# Patient Record
Sex: FEMALE | Race: WHITE | NOT HISPANIC OR LATINO | Employment: FULL TIME | URBAN - METROPOLITAN AREA
[De-identification: names, ages, dates, MRNs, and addresses within clinical notes are randomized per-mention and may not be internally consistent; named-entity substitution may affect disease eponyms.]

---

## 2017-03-15 ENCOUNTER — OFFICE VISIT (OUTPATIENT)
Dept: FAMILY MEDICINE | Facility: CLINIC | Age: 62
End: 2017-03-15
Payer: COMMERCIAL

## 2017-03-15 VITALS
SYSTOLIC BLOOD PRESSURE: 126 MMHG | OXYGEN SATURATION: 96 % | WEIGHT: 216 LBS | HEART RATE: 90 BPM | HEIGHT: 66 IN | TEMPERATURE: 97.6 F | BODY MASS INDEX: 34.72 KG/M2 | DIASTOLIC BLOOD PRESSURE: 72 MMHG | RESPIRATION RATE: 16 BRPM

## 2017-03-15 DIAGNOSIS — F33.0 MAJOR DEPRESSIVE DISORDER, RECURRENT EPISODE, MILD (H): ICD-10-CM

## 2017-03-15 DIAGNOSIS — F41.1 GAD (GENERALIZED ANXIETY DISORDER): ICD-10-CM

## 2017-03-15 PROCEDURE — 99214 OFFICE O/P EST MOD 30 MIN: CPT | Performed by: FAMILY MEDICINE

## 2017-03-15 RX ORDER — VENLAFAXINE HYDROCHLORIDE 75 MG/1
75 TABLET, EXTENDED RELEASE ORAL DAILY
Qty: 90 TABLET | Refills: 1 | Status: SHIPPED | OUTPATIENT
Start: 2017-03-15 | End: 2017-10-13

## 2017-03-15 ASSESSMENT — ANXIETY QUESTIONNAIRES
6. BECOMING EASILY ANNOYED OR IRRITABLE: SEVERAL DAYS
GAD7 TOTAL SCORE: 3
7. FEELING AFRAID AS IF SOMETHING AWFUL MIGHT HAPPEN: NOT AT ALL
IF YOU CHECKED OFF ANY PROBLEMS ON THIS QUESTIONNAIRE, HOW DIFFICULT HAVE THESE PROBLEMS MADE IT FOR YOU TO DO YOUR WORK, TAKE CARE OF THINGS AT HOME, OR GET ALONG WITH OTHER PEOPLE: SOMEWHAT DIFFICULT
2. NOT BEING ABLE TO STOP OR CONTROL WORRYING: NOT AT ALL
4. TROUBLE RELAXING: NOT AT ALL
1. FEELING NERVOUS, ANXIOUS, OR ON EDGE: SEVERAL DAYS
5. BEING SO RESTLESS THAT IT IS HARD TO SIT STILL: NOT AT ALL
3. WORRYING TOO MUCH ABOUT DIFFERENT THINGS: SEVERAL DAYS

## 2017-03-15 NOTE — PROGRESS NOTES
"  SUBJECTIVE:                                                    Debby Barriga is a 61 year old female who presents to clinic today for the following health issues:    Depression and Anxiety Follow-Up    Status since last visit: No change    Other associated symptoms fatigue     Complicating factors: none    Significant life event: No     Current substance abuse: None    PHQ-9 SCORE 3/18/2015 8/26/2015 9/20/2016   Total Score 2 - -   Total Score - 9 8     СЕРГЕЙ-7 SCORE 7/23/2013 9/20/2016   Total Score 3 -   Total Score - 4        PHQ-9  English      PHQ-9   Any Language     GAD7       Pt has good support system, she also has a dog which makes her happy.   Pt is sleeping better. Pt is taking dog out for a walk. She will start long walks.   Pt is eating better. Mood is stable, no active SI.       Problem list and histories reviewed & adjusted, as indicated.  Additional history: as documented        Reviewed and updated as needed this visit by clinical staff       Reviewed and updated as needed this visit by Provider         ROS:  Constitutional, HEENT, cardiovascular, pulmonary, gi and gu systems are negative, except as otherwise noted.    OBJECTIVE:                                                    /72 (BP Location: Left arm, Patient Position: Chair, Cuff Size: Adult Large)  Pulse 90  Temp 97.6  F (36.4  C) (Tympanic)  Resp 16  Ht 5' 6\" (1.676 m)  Wt 216 lb (98 kg)  SpO2 96%  BMI 34.86 kg/m2  Body mass index is 34.86 kg/(m^2).  GENERAL: healthy, alert and no distress  EYES: Eyes grossly normal to inspection  HENT: nose and mouth without ulcers or lesions  PSYCH: mentation appears normal, affect normal/bright    Diagnostic Test Results:  none      ASSESSMENT/PLAN:                                                      ## Major depressive disorder, recurrent episode, mild/СЕРГЕЙ:  PHQ-9 SCORE 8/26/2015 9/20/2016 3/15/2017   Total Score - - -   Total Score 9 8 2     СЕРГЕЙ-7 SCORE 7/23/2013 9/20/2016 3/15/2017   Total Score " 3 - -   Total Score - 4 3     - stable, recommended to continue increased activity   - venlafaxine (EFFEXOR-ER) 75 MG TB24 24 hr tablet; Take 1 tablet (75 mg) by mouth daily  Dispense: 90 tablet; Refill: 1  - pt due for colonoscopy, mammogram and pap, recommended to f/u within next month or two for physical     Deqa Velvet Rebollar MD  [facility]

## 2017-03-15 NOTE — NURSING NOTE
"Chief Complaint   Patient presents with     Refill Request     venlafaxine (EFFEXOR-ER)       Initial /72 (BP Location: Left arm, Patient Position: Chair, Cuff Size: Adult Large)  Pulse 90  Temp 97.6  F (36.4  C) (Tympanic)  Resp 16  Ht 5' 6\" (1.676 m)  Wt 216 lb (98 kg)  SpO2 96%  BMI 34.86 kg/m2 Estimated body mass index is 34.86 kg/(m^2) as calculated from the following:    Height as of this encounter: 5' 6\" (1.676 m).    Weight as of this encounter: 216 lb (98 kg).  Medication Reconciliation: complete       Curly Gee MA      "

## 2017-03-15 NOTE — MR AVS SNAPSHOT
"              After Visit Summary   3/15/2017    Debby Barrgia    MRN: [id number]           Patient Information     Date Of Birth          1955        Visit Information        Provider Department      3/15/2017 8:00 AM Galo Rebollar MD [facility]        Today's Diagnoses     Major depressive disorder, recurrent episode, mild (H)        СЕРГЕЙ (generalized anxiety disorder)           Follow-ups after your visit        Who to contact     If you have questions or need follow up information about today's clinic visit or your schedule please contact [facility] directly at [phone].  Normal or non-critical lab and imaging results will be communicated to you by MyChart, letter or phone within 4 business days after the clinic has received the results. If you do not hear from us within 7 days, please contact the clinic through AdExtenthart or phone. If you have a critical or abnormal lab result, we will notify you by phone as soon as possible.  Submit refill requests through Taqua or call your pharmacy and they will forward the refill request to us. Please allow 3 business days for your refill to be completed.          Additional Information About Your Visit        MyChart Information     Taqua lets you send messages to your doctor, view your test results, renew your prescriptions, schedule appointments and more. To sign up, go to [email or web address] . Click on \"Log in\" on the left side of the screen, which will take you to the Welcome page. Then click on \"Sign up Now\" on the right side of the page.     You will be asked to enter the access code listed below, as well as some personal information. Please follow the directions to create your username and password.     Your access code is: [email or web address]  Expires: 2017  8:48 AM     Your access code will  in 90 days. If you need help or a new code, please call your [facility] or [phone].        Care EveryWhere ID     " "This is your Care EveryWhere ID. This could be used by other organizations to access your [place] medical records  [id number]        Your Vitals Were     Pulse Temperature Respirations Height Pulse Oximetry BMI (Body Mass Index)    90 97.6  F (36.4  C) (Tympanic) 16 5' 6\" (1.676 m) 96% 34.86 kg/m2       Blood Pressure from Last 3 Encounters:   03/15/17 126/72   10/16/16 129/62   09/20/16 110/70    Weight from Last 3 Encounters:   03/15/17 216 lb (98 kg)   09/20/16 212 lb (96.2 kg)   12/01/15 214 lb (97.1 kg)              Today, you had the following     No orders found for display         Today's Medication Changes          These changes are accurate as of: 3/15/17  8:48 AM.  If you have any questions, ask your nurse or doctor.               Stop taking these medicines if you haven't already. Please contact your care team if you have questions.     HYDROcodone-acetaminophen 5-325 MG per tablet   Commonly known as:  NORCO   Stopped by:  Galo Rebollar MD                Where to get your medicines      These medications were sent to [facility] [address] [place] [address] AT [address] [address]    Hours:  24-hours Phone:  [phone]     venlafaxine 75 MG Tb24 24 hr tablet                Primary Care Provider Office Phone # Fax #    Moriaha Velvet Rebollar [phone]       [place] [address] [facility] [address]        Thank you!     Thank you for choosing [facility]  for your care. Our goal is always to provide you with excellent care. Hearing back from our patients is one way we can continue to improve our services. Please take a few minutes to complete the written survey that you may receive in the mail after your visit with us. Thank you!             Your Updated Medication List - Protect others around you: Learn how to safely use, store and throw away your medicines at " www.disposemymeds.org.          This list is accurate as of: 3/15/17  8:48 AM.  Always use your most recent med list.                   Brand Name Dispense Instructions for use    venlafaxine 75 MG Tb24 24 hr tablet    EFFEXOR-ER    90 tablet    Take 1 tablet (75 mg) by mouth daily

## 2017-03-16 ASSESSMENT — ANXIETY QUESTIONNAIRES: GAD7 TOTAL SCORE: 3

## 2017-03-16 ASSESSMENT — PATIENT HEALTH QUESTIONNAIRE - PHQ9: SUM OF ALL RESPONSES TO PHQ QUESTIONS 1-9: 2

## 2017-05-31 ENCOUNTER — OFFICE VISIT (OUTPATIENT)
Dept: URGENT CARE | Facility: URGENT CARE | Age: 62
End: 2017-05-31
Payer: COMMERCIAL

## 2017-05-31 VITALS
BODY MASS INDEX: 33.98 KG/M2 | DIASTOLIC BLOOD PRESSURE: 89 MMHG | OXYGEN SATURATION: 98 % | SYSTOLIC BLOOD PRESSURE: 128 MMHG | HEART RATE: 87 BPM | TEMPERATURE: 98.2 F | WEIGHT: 210.5 LBS

## 2017-05-31 DIAGNOSIS — R07.0 THROAT PAIN: Primary | ICD-10-CM

## 2017-05-31 LAB
DEPRECATED S PYO AG THROAT QL EIA: NORMAL
MICRO REPORT STATUS: NORMAL
SPECIMEN SOURCE: NORMAL

## 2017-05-31 PROCEDURE — 87880 STREP A ASSAY W/OPTIC: CPT | Performed by: FAMILY MEDICINE

## 2017-05-31 PROCEDURE — 99213 OFFICE O/P EST LOW 20 MIN: CPT | Performed by: PHYSICIAN ASSISTANT

## 2017-05-31 PROCEDURE — 87081 CULTURE SCREEN ONLY: CPT | Performed by: FAMILY MEDICINE

## 2017-05-31 NOTE — NURSING NOTE
"Chief Complaint   Patient presents with     Pharyngitis     sore throat 2x days        Initial /89 (BP Location: Left arm, Patient Position: Chair, Cuff Size: Adult Regular)  Pulse 87  Temp 98.2  F (36.8  C) (Oral)  Wt 210 lb 8 oz (95.5 kg)  SpO2 98%  BMI 33.98 kg/m2 Estimated body mass index is 33.98 kg/(m^2) as calculated from the following:    Height as of 3/15/17: 5' 6\" (1.676 m).    Weight as of this encounter: 210 lb 8 oz (95.5 kg).  Medication Reconciliation: complete    "

## 2017-05-31 NOTE — PROGRESS NOTES
SUBJECTIVE:   Debby Barriga is a 61 year old female presenting with a chief complaint of   1) sore throat for the past 2 days  2) ear discomfort.    Denies any runny nose or sneezing.    3) Slight cough.  No noted fevers.   Onset of symptoms was as above.  Course of illness is worsening.    Severity moderate  Current and Associated symptoms: as above  Treatment measures tried include OTC meds: cough drops, salt water gargles  Predisposing factors include met a client in a hospital cafeteria a few days ago.    Past Medical History:   Diagnosis Date     Corneal ulcer of right eye 3-2015     Mild major depression (H) 2/7/2013     Tear of retina     right eye     Patient Active Problem List   Diagnosis     Mild major depression (H)     Generalized anxiety disorder     Medication refill     Hypercholesteremia     Myopic astigmatism of both eyes     Presbyopia     Social History   Substance Use Topics     Smoking status: Former Smoker     Types: Cigarettes     Quit date: 7/23/1982     Smokeless tobacco: Never Used     Alcohol use No       ROS:  CONSTITUTIONAL:NEGATIVE for fever, chills, change in weight  INTEGUMENTARY/SKIN: NEGATIVE for worrisome rashes, moles or lesions  EYES: NEGATIVE for vision changes or irritation  ENT/MOUTH: as per HPI  RESP:NEGATIVE for significant cough or SOB  CV: NEGATIVE for chest pain, palpitations or peripheral edema  GI: NEGATIVE for nausea, abdominal pain, heartburn, or change in bowel habits  MUSCULOSKELETAL: NEGATIVE for significant arthralgias or myalgia    OBJECTIVE  :/89 (BP Location: Left arm, Patient Position: Chair, Cuff Size: Adult Regular)  Pulse 87  Temp 98.2  F (36.8  C) (Oral)  Wt 210 lb 8 oz (95.5 kg)  SpO2 98%  BMI 33.98 kg/m2  GENERAL APPEARANCE: healthy, alert and no distress  EYES: EOMI,  PERRL, conjunctiva clear  HENT: ear canals and TM's normal.  Nose and mouth without ulcers, erythema or lesions  HENT: nasal turbinates boggy with bluish hue and rhinorrhea  clear  NECK: supple, nontender, no lymphadenopathy  RESP: lungs clear to auscultation - no rales, rhonchi or wheezes  CV: regular rates and rhythm, normal S1 S2, no murmur noted  ABDOMEN:  soft, nontender, no HSM or masses and bowel sounds normal  NEURO: Normal strength and tone, sensory exam grossly normal,  normal speech and mentation  SKIN: no suspicious lesions or rashes    (R07.0) Throat pain  (primary encounter diagnosis)  Comment: likely secondary to post drip  Plan: Rapid strep screen, Beta strep group A culture        Salt water gargles.  May try benadryl po QHS prn.  Tylenol or ibuprofen.      F/U with PCP should symptoms persist or worsen.

## 2017-05-31 NOTE — MR AVS SNAPSHOT
"              After Visit Summary   2017    Debby Barriga    MRN: [id number]           Patient Information     Date Of Birth          1955        Visit Information        Provider Department      2017 9:35 AM Antionette George PA-C [facility]        Today's Diagnoses     Throat pain    -  1       Follow-ups after your visit        Who to contact     If you have questions or need follow up information about today's clinic visit or your schedule please contact [facility] directly at [phone].  Normal or non-critical lab and imaging results will be communicated to you by Abcamhart, letter or phone within 4 business days after the clinic has received the results. If you do not hear from us within 7 days, please contact the clinic through Abcamhart or phone. If you have a critical or abnormal lab result, we will notify you by phone as soon as possible.  Submit refill requests through [employer] or call your pharmacy and they will forward the refill request to us. Please allow 3 business days for your refill to be completed.          Additional Information About Your Visit        MyChart Information     [employer] lets you send messages to your doctor, view your test results, renew your prescriptions, schedule appointments and more. To sign up, go to [email or web address] . Click on \"Log in\" on the left side of the screen, which will take you to the Welcome page. Then click on \"Sign up Now\" on the right side of the page.     You will be asked to enter the access code listed below, as well as some personal information. Please follow the directions to create your username and password.     Your access code is: [email or web address]  Expires: 2017  8:48 AM     Your access code will  in 90 days. If you need help or a new code, please call your [place] clinic or [phone].        Care EveryWhere ID     This is your Care EveryWhere ID. This could " be used by other organizations to access your [place] medical records  [id number]        Your Vitals Were     Pulse Temperature Pulse Oximetry BMI (Body Mass Index)          87 98.2  F (36.8  C) (Oral) 98% 33.98 kg/m2         Blood Pressure from Last 3 Encounters:   05/31/17 128/89   03/15/17 126/72   10/16/16 129/62    Weight from Last 3 Encounters:   05/31/17 210 lb 8 oz (95.5 kg)   03/15/17 216 lb (98 kg)   09/20/16 212 lb (96.2 kg)              We Performed the Following     Beta strep group A culture     Rapid strep screen        Primary Care Provider Office Phone # Fax #    Deqa Velvet Rebollar [phone]       [place] [address] [facility] [address]        Thank you!     Thank you for choosing [facility]  for your care. Our goal is always to provide you with excellent care. Hearing back from our patients is one way we can continue to improve our services. Please take a few minutes to complete the written survey that you may receive in the mail after your visit with us. Thank you!             Your Updated Medication List - Protect others around you: Learn how to safely use, store and throw away your medicines at www.disposemymeds.org.          This list is accurate as of: 5/31/17 10:37 AM.  Always use your most recent med list.                   Brand Name Dispense Instructions for use    venlafaxine 75 MG Tb24 24 hr tablet    EFFEXOR-ER    90 tablet    Take 1 tablet (75 mg) by mouth daily

## 2017-06-01 LAB
BACTERIA SPEC CULT: NORMAL
MICRO REPORT STATUS: NORMAL
SPECIMEN SOURCE: NORMAL

## 2017-06-03 ENCOUNTER — HEALTH MAINTENANCE LETTER (OUTPATIENT)
Age: 62
End: 2017-06-03

## 2017-06-05 ENCOUNTER — OFFICE VISIT (OUTPATIENT)
Dept: FAMILY MEDICINE | Facility: CLINIC | Age: 62
End: 2017-06-05
Payer: COMMERCIAL

## 2017-06-05 ENCOUNTER — TELEPHONE (OUTPATIENT)
Dept: FAMILY MEDICINE | Facility: CLINIC | Age: 62
End: 2017-06-05

## 2017-06-05 VITALS
BODY MASS INDEX: 34.06 KG/M2 | DIASTOLIC BLOOD PRESSURE: 79 MMHG | RESPIRATION RATE: 16 BRPM | TEMPERATURE: 98.8 F | WEIGHT: 211 LBS | HEART RATE: 80 BPM | SYSTOLIC BLOOD PRESSURE: 139 MMHG | OXYGEN SATURATION: 97 %
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
## 2017-06-05 DIAGNOSIS — J20.9 ACUTE BRONCHITIS, UNSPECIFIED ORGANISM: Primary | ICD-10-CM

## 2017-06-05 PROCEDURE — 99214 OFFICE O/P EST MOD 30 MIN: CPT | Performed by: FAMILY MEDICINE

## 2017-06-05 RX ORDER — ALBUTEROL SULFATE 90 UG/1
2 AEROSOL, METERED RESPIRATORY (INHALATION) EVERY 4 HOURS PRN
Qty: 1 INHALER | Refills: 0 | Status: SHIPPED | OUTPATIENT
Start: 2017-06-05 | End: 2018-10-10

## 2017-06-05 NOTE — MR AVS SNAPSHOT
"              After Visit Summary   2017    Debby Barriga    MRN: [id number]           Patient Information     Date Of Birth          1955        Visit Information        Provider Department      2017 2:40 PM Galo Rebollar MD [facility]        Today's Diagnoses     Acute bronchitis, unspecified organism    -  1    Insect bite, initial encounter           Follow-ups after your visit        Who to contact     If you have questions or need follow up information about today's clinic visit or your schedule please contact [facility] directly at [phone].  Normal or non-critical lab and imaging results will be communicated to you by MyChart, letter or phone within 4 business days after the clinic has received the results. If you do not hear from us within 7 days, please contact the clinic through "[employer]hart or phone. If you have a critical or abnormal lab result, we will notify you by phone as soon as possible.  Submit refill requests through Zamzee or call your pharmacy and they will forward the refill request to us. Please allow 3 business days for your refill to be completed.          Additional Information About Your Visit        MyChart Information     Zamzee lets you send messages to your doctor, view your test results, renew your prescriptions, schedule appointments and more. To sign up, go to [email or web address] . Click on \"Log in\" on the left side of the screen, which will take you to the Welcome page. Then click on \"Sign up Now\" on the right side of the page.     You will be asked to enter the access code listed below, as well as some personal information. Please follow the directions to create your username and password.     Your access code is: [email or web address]  Expires: 2017  8:48 AM     Your access code will  in 90 days. If you need help or a new code, please call your [facility] or [phone].        Care EveryWhere ID     This is your Care " EveryWhere ID. This could be used by other organizations to access your [place] medical records  [id number]        Your Vitals Were     Pulse Temperature Respirations Pulse Oximetry BMI (Body Mass Index)       80 98.8  F (37.1  C) (Tympanic) 16 97% 34.06 kg/m2        Blood Pressure from Last 3 Encounters:   06/05/17 139/79   05/31/17 128/89   03/15/17 126/72    Weight from Last 3 Encounters:   06/05/17 211 lb (95.7 kg)   05/31/17 210 lb 8 oz (95.5 kg)   03/15/17 216 lb (98 kg)              Today, you had the following     No orders found for display         Today's Medication Changes          These changes are accurate as of: 6/5/17  3:20 PM.  If you have any questions, ask your nurse or doctor.               Start taking these medicines.        Dose/Directions    albuterol 108 (90 BASE) MCG/ACT Inhaler   Commonly known as:  PROAIR HFA/PROVENTIL HFA/VENTOLIN HFA   Used for:  Acute bronchitis, unspecified organism   Started by:  Galo Rebollar MD        Dose:  2 puff   Inhale 2 puffs into the lungs every 4 hours as needed For wheezing or shortness of breath   Quantity:  1 Inhaler   Refills:  0            Where to get your medicines      These medications were sent to [facility] [address] [place] [address] AT [address] [address]    Hours:  24-hours Phone:  [phone]     albuterol 108 (90 BASE) MCG/ACT Inhaler                Primary Care Provider Office Phone # Fax #    Galo Rebollar [phone]       [place] [id number] [facility] [address]        Thank you!     Thank you for choosing [facility]  for your care. Our goal is always to provide you with excellent care. Hearing back from our patients is one way we can continue to improve our services. Please take a few minutes to complete the written survey that you may receive in the mail after your visit with us.  Thank you!             Your Updated Medication List - Protect others around you: Learn how to safely use, store and throw away your medicines at www.disposemymeds.org.          This list is accurate as of: 6/5/17  3:20 PM.  Always use your most recent med list.                   Brand Name Dispense Instructions for use    albuterol 108 (90 BASE) MCG/ACT Inhaler    PROAIR HFA/PROVENTIL HFA/VENTOLIN HFA    1 Inhaler    Inhale 2 puffs into the lungs every 4 hours as needed For wheezing or shortness of breath       venlafaxine 75 MG Tb24 24 hr tablet    EFFEXOR-ER    90 tablet    Take 1 tablet (75 mg) by mouth daily

## 2017-06-05 NOTE — PROGRESS NOTES
SUBJECTIVE:                                                    Debby Barriga is a 61 year old female who presents to clinic today for the following health issues:      RESPIRATORY SYMPTOMS      Duration: 6 days     Description  Productive cough, nasal discharge, nasal congestion, headaches, facial pressure, mild shortness of breath with rest and activity, chest tightness, ear plugging      History (predisposing factors):  none    Precipitating or alleviating factors: None    Therapies tried and outcome:  netti pot, OTC medication, soup, resting - not helping      Pt would also like a bug bite to be looked at on the left forearm which occurred one week ago. Pruritis improved. No fever or chills.       Problem list and histories reviewed & adjusted, as indicated.  Additional history: as documented    Labs reviewed in EPIC    Reviewed and updated as needed this visit by clinical staff  Tobacco  Allergies  Med Hx  Surg Hx  Fam Hx  Soc Hx      Reviewed and updated as needed this visit by Provider         ROS:  Constitutional, HEENT, cardiovascular, pulmonary, gi and gu systems are negative, except as otherwise noted.    OBJECTIVE:                                                    /79 (BP Location: Left arm, Patient Position: Chair, Cuff Size: Adult Large)  Pulse 80  Temp 98.8  F (37.1  C) (Tympanic)  Resp 16  Wt 211 lb (95.7 kg)  SpO2 97%  BMI 34.06 kg/m2  Body mass index is 34.06 kg/(m^2).  GENERAL: healthy, alert and no distress  EYES: Eyes grossly normal to inspection  HENT: ear canals and TM's normal, nose and mouth without ulcers or lesions, + bilateral maxillary tenderness   NECK: no adenopathy, no asymmetry, masses, or scars and thyroid normal to palpation  RESP: lungs clear to auscultation - no rales, rhonchi or wheezes  CV: regular rate and rhythm, normal S1 S2  SKIN: left forearm with small small erythematous papules, no warmth, no tenderness     Diagnostic Test Results:  none       ASSESSMENT/PLAN:                                                      1. Acute bronchitis, unspecified organism  - recommended to continue symptomatic tx   - albuterol (PROAIR HFA/PROVENTIL HFA/VENTOLIN HFA) 108 (90 BASE) MCG/ACT Inhaler; Inhale 2 puffs into the lungs every 4 hours as needed For wheezing or shortness of breath  Dispense: 1 Inhaler; Refill: 0  - if not improving after 7 days, then will do course of abx     2. Insect bite, initial encounter  - no a/s cellulitis  - recommended to use hydrocortisone PRN for pruritis   - Follow if symptoms worsen or fail to improve.    Galo Rebollar MD  [facility]

## 2017-06-05 NOTE — NURSING NOTE
"Chief Complaint   Patient presents with     URI     cough       Initial /79 (BP Location: Left arm, Patient Position: Chair, Cuff Size: Adult Large)  Pulse 80  Temp 98.8  F (37.1  C) (Tympanic)  Resp 16  Wt 211 lb (95.7 kg)  SpO2 97%  BMI 34.06 kg/m2 Estimated body mass index is 34.06 kg/(m^2) as calculated from the following:    Height as of 3/15/17: 5' 6\" (1.676 m).    Weight as of this encounter: 211 lb (95.7 kg).  Medication Reconciliation: complete     Curly Gee MA      "

## 2017-06-05 NOTE — TELEPHONE ENCOUNTER
Pt was seen in Urgent Care, strep test is negative.  She is still having severe coughing, etc.  Please call with recommendations.  OK to leave message on voicemail.

## 2017-06-05 NOTE — TELEPHONE ENCOUNTER
5/31/17  note-  R07.0) Throat pain  (primary encounter diagnosis)  Comment: likely secondary to post drip  Plan: Rapid strep screen, Beta strep group A culture        Salt water gargles.  May try benadryl po QHS prn.  Tylenol or ibuprofen.       F/U with PCP should symptoms persist or worsen.      LMTCB. Would rec f/u in clinic. Left this message for ptRaissa Woods RN

## 2017-06-20 ENCOUNTER — OFFICE VISIT (OUTPATIENT)
Dept: FAMILY MEDICINE | Facility: CLINIC | Age: 62
End: 2017-06-20
Payer: COMMERCIAL

## 2017-06-20 VITALS
HEIGHT: 66 IN | OXYGEN SATURATION: 98 % | WEIGHT: 208 LBS | SYSTOLIC BLOOD PRESSURE: 132 MMHG | TEMPERATURE: 98.2 F | DIASTOLIC BLOOD PRESSURE: 74 MMHG | HEART RATE: 84 BPM | BODY MASS INDEX: 33.43 KG/M2 | RESPIRATION RATE: 16 BRPM

## 2017-06-20 DIAGNOSIS — Z13.1 SCREENING FOR DIABETES MELLITUS: ICD-10-CM

## 2017-06-20 DIAGNOSIS — Z11.59 NEED FOR HEPATITIS C SCREENING TEST: ICD-10-CM

## 2017-06-20 DIAGNOSIS — Z13.220 SCREENING FOR LIPID DISORDERS: ICD-10-CM

## 2017-06-20 DIAGNOSIS — Z12.11 COLON CANCER SCREENING: ICD-10-CM

## 2017-06-20 DIAGNOSIS — Z01.419 ENCOUNTER FOR GYNECOLOGICAL EXAMINATION WITHOUT ABNORMAL FINDING: ICD-10-CM

## 2017-06-20 DIAGNOSIS — E66.01 MORBID OBESITY, UNSPECIFIED OBESITY TYPE (H): ICD-10-CM

## 2017-06-20 DIAGNOSIS — Z12.31 VISIT FOR SCREENING MAMMOGRAM: ICD-10-CM

## 2017-06-20 DIAGNOSIS — Z00.00 ROUTINE GENERAL MEDICAL EXAMINATION AT A HEALTH CARE FACILITY: ICD-10-CM

## 2017-06-20 LAB
CHOLEST SERPL-MCNC: 269 MG/DL
GLUCOSE SERPL-MCNC: 97 MG/DL (ref 70–99)
HCV AB SERPL QL IA: NORMAL
HDLC SERPL-MCNC: 55 MG/DL
LDLC SERPL CALC-MCNC: 197 MG/DL
NONHDLC SERPL-MCNC: 214 MG/DL
TRIGL SERPL-MCNC: 84 MG/DL

## 2017-06-20 PROCEDURE — 86803 HEPATITIS C AB TEST: CPT | Performed by: FAMILY MEDICINE

## 2017-06-20 PROCEDURE — 87624 HPV HI-RISK TYP POOLED RSLT: CPT | Performed by: FAMILY MEDICINE

## 2017-06-20 PROCEDURE — 99396 PREV VISIT EST AGE 40-64: CPT | Performed by: FAMILY MEDICINE

## 2017-06-20 PROCEDURE — 36415 COLL VENOUS BLD VENIPUNCTURE: CPT | Performed by: FAMILY MEDICINE

## 2017-06-20 PROCEDURE — 80061 LIPID PANEL: CPT | Performed by: FAMILY MEDICINE

## 2017-06-20 PROCEDURE — 82947 ASSAY GLUCOSE BLOOD QUANT: CPT | Performed by: FAMILY MEDICINE

## 2017-06-20 PROCEDURE — G0123 SCREEN CERV/VAG THIN LAYER: HCPCS | Performed by: FAMILY MEDICINE

## 2017-06-20 NOTE — LETTER
[facility]   [address] [place]   [address]  [phone]    June 23, 2017      Debby Barriga  [address] [facility] [address]              Dear Ms. Barriga,    Here are your results for your recent labs.   Your blood sugar and hepatitis C testing were normal.   Your total cholesterol and LDL (bad cholesterol) are elevated. As you may know, abnormal cholesterol is one factor that increases your risk for heart disease and stroke. You can improve your cholesterol by controlling the amount and type of fat you eat and by increasing your daily activity level.     Here are some ways to improve your nutrition:   Eat less fat (especially butter, Crisco and other saturated fats)   Buy lean cuts of meat; reduce your portions of red meat or substitute poultry or fish   Use skim milk and low-fat dairy products   Eat no more than 4 egg yolks per week   Avoid fried or fast foods that are high in fat   Eat more fruits and vegetables     Also consider starting or increasing your aerobic activity; this is the best way to improve HDL (good) cholesterol. If aerobic activity would be new to you, please talk with me first about what activities are safe for you.     Please call or message [employer] me if you have questions or concerns. Our clinic phone number is [phone].          Sincerely,    Galo Rebollar MD

## 2017-06-20 NOTE — MR AVS SNAPSHOT
After Visit Summary   6/20/2017    Debby Barriga    MRN: [id number]           Patient Information     Date Of Birth          1955        Visit Information        Provider Department      6/20/2017 8:00 AM Galo Rebollar MD [facility]        Today's Diagnoses     Colon cancer screening    -  1    Routine general medical examination at a health care facility        Visit for screening mammogram        Need for hepatitis C screening test        Encounter for gynecological examination without abnormal finding        Screening for diabetes mellitus        Screening for lipid disorders          Care Instructions    Colonoscopy screening   [facility] [phone]  [place] [phone]     Mammo screening [phone]        Preventive Health Recommendations  Female Ages 50 - 64    Yearly exam: See your health care provider every year in order to  o Review health changes.   o Discuss preventive care.    o Review your medicines if your doctor has prescribed any.      Get a Pap test every three years (unless you have an abnormal result and your provider advises testing more often).    If you get Pap tests with HPV test, you only need to test every 5 years, unless you have an abnormal result.     You do not need a Pap test if your uterus was removed (hysterectomy) and you have not had cancer.    You should be tested each year for STDs (sexually transmitted diseases) if you're at risk.     Have a mammogram every 1 to 2 years.    Have a colonoscopy at age 50, or have a yearly FIT test (stool test). These exams screen for colon cancer.      Have a cholesterol test every 5 years, or more often if advised.    Have a diabetes test (fasting glucose) every three years. If you are at risk for diabetes, you should have this test more often.     If you are at risk for osteoporosis (brittle bone disease), think about having a bone density scan (DEXA).    Shots: Get a flu shot each year.  Get a tetanus shot every 10 years.    Nutrition:     Eat at least 5 servings of fruits and vegetables each day.    Eat whole-grain bread, whole-wheat pasta and brown rice instead of white grains and rice.    Talk to your provider about Calcium and Vitamin D.     Lifestyle    Exercise at least 150 minutes a week (30 minutes a day, 5 days a week). This will help you control your weight and prevent disease.    Limit alcohol to one drink per day.    No smoking.     Wear sunscreen to prevent skin cancer.     See your dentist every six months for an exam and cleaning.    See your eye doctor every 1 to 2 years.            Follow-ups after your visit        Additional Services     GASTROENTEROLOGY ADULT REF PROCEDURE ONLY       Last Lab Result: Creatinine (mg/dL)       Date                     Value                 07/23/2013               0.82             ----------  Body mass index is 34.09 kg/(m^2).     Needed:  No  Language:  English    Patient will be contacted to schedule procedure.     Please be aware that coverage of these services is subject to the terms and limitations of your health insurance plan.  Call member services at your health plan with any benefit or coverage questions.  Any procedures must be performed at a [place] facility OR coordinated by your clinic's referral office.    Please bring the following with you to your appointment:    (1) Any X-Rays, CTs or MRIs which have been performed.  Contact the facility where they were done to arrange for  prior to your scheduled appointment.    (2) List of current medications   (3) This referral request   (4) Any documents/labs given to you for this referral                  Future tests that were ordered for you today     Open Future Orders        Priority Expected Expires Ordered    MA SCREENING DIGITAL BILAT - Future  (s+30) Routine  6/20/2018 6/20/2017            Who to contact     If you have questions or need follow up information about  "today's clinic visit or your schedule please contact [facility] directly at [phone].  Normal or non-critical lab and imaging results will be communicated to you by Convenehart, letter or phone within 4 business days after the clinic has received the results. If you do not hear from us within 7 days, please contact the clinic through Convenehart or phone. If you have a critical or abnormal lab result, we will notify you by phone as soon as possible.  Submit refill requests through YesGraph or call your pharmacy and they will forward the refill request to us. Please allow 3 business days for your refill to be completed.          Additional Information About Your Visit        ConveneharKeVita Information     YesGraph lets you send messages to your doctor, view your test results, renew your prescriptions, schedule appointments and more. To sign up, go to [email or web address] . Click on \"Log in\" on the left side of the screen, which will take you to the Welcome page. Then click on \"Sign up Now\" on the right side of the page.     You will be asked to enter the access code listed below, as well as some personal information. Please follow the directions to create your username and password.     Your access code is: [email or web address]  Expires: 2017  9:05 AM     Your access code will  in 90 days. If you need help or a new code, please call your [place] clinic or [phone].        Care EveryWhere ID     This is your Care EveryWhere ID. This could be used by other organizations to access your [place] medical records  [id number]        Your Vitals Were     Pulse Temperature Respirations Height Pulse Oximetry BMI (Body Mass Index)    84 98.2  F (36.8  C) (Tympanic) 16 5' 5.5\" (1.664 m) 98% 34.09 kg/m2       Blood Pressure from Last 3 Encounters:   17 132/74   17 139/79   17 128/89    Weight from Last 3 Encounters:   17 208 lb (94.3 kg)   17 211 lb (95.7 kg)   17 210 lb 8 oz " (95.5 kg)              We Performed the Following     GASTROENTEROLOGY ADULT REF PROCEDURE ONLY     GLUCOSE     Hepatitis C Screen Reflex to HCV RNA Quant and Genotype     Lipid Profile (Chol, Trig, HDL, LDL calc)     Pap imaged thin layer screen with HPV - recommended age 30 - 65 years (select HPV order below)        Primary Care Provider Office Phone # Fax #    Deqa Velvet Rebollar [phone]       [facility] [address]  [facility] [address]        Thank you!     Thank you for choosing [facility]  for your care. Our goal is always to provide you with excellent care. Hearing back from our patients is one way we can continue to improve our services. Please take a few minutes to complete the written survey that you may receive in the mail after your visit with us. Thank you!             Your Updated Medication List - Protect others around you: Learn how to safely use, store and throw away your medicines at www.disposemymeds.org.          This list is accurate as of: 6/20/17  9:05 AM.  Always use your most recent med list.                   Brand Name Dispense Instructions for use    albuterol 108 (90 BASE) MCG/ACT Inhaler    PROAIR HFA/PROVENTIL HFA/VENTOLIN HFA    1 Inhaler    Inhale 2 puffs into the lungs every 4 hours as needed For wheezing or shortness of breath       venlafaxine 75 MG Tb24 24 hr tablet    EFFEXOR-ER    90 tablet    Take 1 tablet (75 mg) by mouth daily

## 2017-06-20 NOTE — LETTER
June 30, 2017    Debby Barriga  [address] [facility] [address]    Dear Debby,  We are happy to inform you that your PAP smear result from 06/20/17 is normal.  We are now able to do a follow up test on PAP smears. The DNA test is for HPV (Human Papilloma Virus). Cervical cancer is closely linked with certain types of HPV. Your result showed no evidence of high risk HPV.  Therefore we recommend you return in 5 years for your next pap smear and HPV test.  You will still need to return to the clinic every year for an annual exam and other preventive tests.  Please contact the clinic at [phone] with any questions.  Sincerely,    Galo Rebollar MD/froy

## 2017-06-20 NOTE — NURSING NOTE
"Chief Complaint   Patient presents with     Physical     cold sore, and discuss inhaler     Recheck Medication       Initial /74 (BP Location: Right arm, Patient Position: Chair, Cuff Size: Adult Regular)  Pulse 84  Temp 98.2  F (36.8  C) (Tympanic)  Resp 16  Ht 5' 5.5\" (1.664 m)  Wt 208 lb (94.3 kg)  SpO2 98%  BMI 34.09 kg/m2 Estimated body mass index is 34.09 kg/(m^2) as calculated from the following:    Height as of this encounter: 5' 5.5\" (1.664 m).    Weight as of this encounter: 208 lb (94.3 kg).  Medication Reconciliation: complete     Curly Gee MA      "

## 2017-06-20 NOTE — PATIENT INSTRUCTIONS
Colonoscopy screening   [facility] [phone]  [place] [phone]     Mammo screening [phone]        Preventive Health Recommendations  Female Ages 50 - 64    Yearly exam: See your health care provider every year in order to  o Review health changes.   o Discuss preventive care.    o Review your medicines if your doctor has prescribed any.      Get a Pap test every three years (unless you have an abnormal result and your provider advises testing more often).    If you get Pap tests with HPV test, you only need to test every 5 years, unless you have an abnormal result.     You do not need a Pap test if your uterus was removed (hysterectomy) and you have not had cancer.    You should be tested each year for STDs (sexually transmitted diseases) if you're at risk.     Have a mammogram every 1 to 2 years.    Have a colonoscopy at age 50, or have a yearly FIT test (stool test). These exams screen for colon cancer.      Have a cholesterol test every 5 years, or more often if advised.    Have a diabetes test (fasting glucose) every three years. If you are at risk for diabetes, you should have this test more often.     If you are at risk for osteoporosis (brittle bone disease), think about having a bone density scan (DEXA).    Shots: Get a flu shot each year. Get a tetanus shot every 10 years.    Nutrition:     Eat at least 5 servings of fruits and vegetables each day.    Eat whole-grain bread, whole-wheat pasta and brown rice instead of white grains and rice.    Talk to your provider about Calcium and Vitamin D.     Lifestyle    Exercise at least 150 minutes a week (30 minutes a day, 5 days a week). This will help you control your weight and prevent disease.    Limit alcohol to one drink per day.    No smoking.     Wear sunscreen to prevent skin cancer.     See your dentist every six months for an exam and cleaning.    See your eye doctor every 1 to 2 years.

## 2017-06-20 NOTE — PROGRESS NOTES
SUBJECTIVE:     CC: Debby Barriga is an 61 year old woman who presents for preventive health visit.     Physical   Annual:     Getting at least 3 servings of Calcium per day::  Yes    Bi-annual eye exam::  Yes    Dental care twice a year::  NO    Sleep apnea or symptoms of sleep apnea::  Excessive snoring    Diet::  Regular (no restrictions)    Frequency of exercise::  2-3 days/week    Duration of exercise::  Less than 15 minutes    Taking medications regularly::  Yes    Medication side effects::  None    Additional concerns today::  No      Pt still has a dry cough and uses inhaler once daily. A/s with rhinorrhea, mild nasal congestion, mild sore throat, mild PND, mild shortness of breath with activity. No fever or chills. Pt is feeling much better.   Pt had a sleep study done which showed that she didn't have apnea. Sleep study was done two years ago.       Today's PHQ-2 Score:   PHQ-2 ( 1999 [employer]) 6/20/2017   Q1: Little interest or pleasure in doing things 1   Q2: Feeling down, depressed or hopeless 1   PHQ-2 Score 2   Q1: Little interest or pleasure in doing things Several days   Q2: Feeling down, depressed or hopeless Several days   PHQ-2 Score 2       Abuse: Current or Past(Physical, Sexual or Emotional)- No  Do you feel safe in your environment - Yes    Social History   Substance Use Topics     Smoking status: Former Smoker     Types: Cigarettes     Quit date: 7/23/1982     Smokeless tobacco: Never Used     Alcohol use No     The patient does not drink >3 drinks per day nor >7 drinks per week.    Recent Labs   Lab Test  07/23/13   1146   CHOL  262*   HDL  41*   LDL  191*   TRIG  153*   CHOLHDLRATIO  6.4*       Reviewed orders with patient.  Reviewed health maintenance and updated orders accordingly - Yes    Mammo Decision Support:  Patient over age 50, mutual decision to screen reflected in health maintenance.    Pertinent mammograms are reviewed under the imaging tab.  History of abnormal Pap smear: NO -  "age 30-65 PAP every 5 years with negative HPV co-testing recommended    Reviewed and updated as needed this visit by clinical staff  Tobacco  Allergies  Med Hx  Surg Hx  Fam Hx  Soc Hx        Reviewed and updated as needed this visit by Provider            ROS:  C: NEGATIVE for fever, chills, change in weight  I: NEGATIVE for worrisome rashes, moles or lesions  E: NEGATIVE for vision changes or irritation  ENT: NEGATIVE for ear, mouth and throat problems  R: NEGATIVE for significant cough or SOB  B: NEGATIVE for masses, tenderness or discharge  CV: NEGATIVE for chest pain, palpitations or peripheral edema  GI: NEGATIVE for nausea, abdominal pain, heartburn, or change in bowel habits  : NEGATIVE for unusual urinary or vaginal symptoms. Periods are regular.  M: NEGATIVE for significant arthralgias or myalgia  N: + also notes intermittent headaches, no triggers, she notes that it is due to stress, takes tylenol or ibuprofen, for stress relief pt is doing weekly walking and yard work, this helps relieve stress    P: NEGATIVE for changes in mood or affect    Problem list, Medication list, Allergies, and Medical/Social/Surgical histories reviewed in EPIC and updated as appropriate.  OBJECTIVE:     /74 (BP Location: Right arm, Patient Position: Chair, Cuff Size: Adult Regular)  Pulse 84  Temp 98.2  F (36.8  C) (Tympanic)  Resp 16  Ht 5' 5.5\" (1.664 m)  Wt 208 lb (94.3 kg)  SpO2 98%  BMI 34.09 kg/m2  EXAM:  GENERAL APPEARANCE: healthy, alert and no distress  EYES: Eyes grossly normal to inspection, PERRL and conjunctivae and sclerae normal  HENT: nose and mouth without ulcers or lesions, oropharynx clear and oral mucous membranes moist  NECK: no adenopathy, no asymmetry, masses, or scars and thyroid normal to palpation  RESP: lungs clear to auscultation - no rales, rhonchi or wheezes  CV: regular rate and rhythm, normal S1 S2, no S3 or S4, no murmur, click or rub  ABDOMEN: soft, nontender, no " hepatosplenomegaly, no masses and bowel sounds normal   (female): normal female external genitalia, normal urethral meatus, vaginal mucosal atrophy noted, normal cervix, adnexae, and uterus without masses or abnormal discharge  MS: no musculoskeletal defects are noted and gait is age appropriate without ataxia  SKIN: no suspicious lesions or rashes  NEURO: Normal strength and tone, sensory exam grossly normal, mentation intact and speech normal  PSYCH: mentation appears normal and affect normal/bright    ASSESSMENT/PLAN:       1. Routine general medical examination at a health care facility  - see below     2. Morbid obesity, unspecified obesity type (H)  - Discussed healthy diet and exercise.     3. Visit for screening mammogram  - MA SCREENING DIGITAL BILAT - Future  (s+30); Future    4. Need for hepatitis C screening test  - Hepatitis C Screen Reflex to HCV RNA Quant and Genotype    5. Colon cancer screening  - GASTROENTEROLOGY ADULT REF PROCEDURE ONLY    6. Encounter for gynecological examination without abnormal finding  - Pap imaged thin layer screen with HPV - recommended age 30 - 65 years (select HPV order below)    7. Screening for diabetes mellitus  - GLUCOSE    8. Screening for lipid disorders  - Lipid Profile (Chol, Trig, HDL, LDL calc)      COUNSELING:  Reviewed preventive health counseling, as reflected in patient instructions       Regular exercise       Healthy diet/nutrition       Consider Hep C screening for patients born between 1945 and 1965    BP Screening:   Last 3 BP Readings:    BP Readings from Last 3 Encounters:   06/20/17 132/74   06/05/17 139/79   05/31/17 128/89       The following was recommended to the patient:  Re-screen BP within a year and recommended lifestyle modifications     reports that she quit smoking about 34 years ago. Her smoking use included Cigarettes. She has never used smokeless tobacco.    Estimated body mass index is 34.09 kg/(m^2) as calculated from the  "following:    Height as of this encounter: 5' 5.5\" (1.664 m).    Weight as of this encounter: 208 lb (94.3 kg).   Weight management plan: Discussed healthy diet and exercise guidelines and patient will follow up in 12 months in clinic to re-evaluate.    Counseling Resources:  ATP IV Guidelines  Pooled Cohorts Equation Calculator  Breast Cancer Risk Calculator  FRAX Risk Assessment  ICSI Preventive Guidelines  Dietary Guidelines for Americans, 2010  USDA's MyPlate  ASA Prophylaxis  Lung CA Screening    Galo Rebollar MD  [facility]  Answers for HPI/ROS submitted by the patient on 6/20/2017   PHQ-2 Score: 2    "

## 2017-06-22 LAB
COPATH REPORT: NORMAL
PAP: NORMAL

## 2017-06-23 NOTE — PROGRESS NOTES
Please send the letter to the patient with the following.         Here are your results for your recent labs.  Your blood sugar and hepatitis C testing were normal.   Your total cholesterol and LDL (bad cholesterol) are elevated. As you may know, abnormal cholesterol is one factor that increases your risk for heart disease and stroke. You can improve your cholesterol by controlling the amount and type of fat you eat and by increasing your daily activity level.    Here are some ways to improve your nutrition:  Eat less fat (especially butter, Crisco and other saturated fats)  Buy lean cuts of meat; reduce your portions of red meat or substitute poultry or fish  Use skim milk and low-fat dairy products  Eat no more than 4 egg yolks per week  Avoid fried or fast foods that are high in fat  Eat more fruits and vegetables    Also consider starting or increasing your aerobic activity; this is the best way to improve HDL (good) cholesterol. If aerobic activity would be new to you, please talk with me first about what activities are safe for you.    Please call or message me if you have questions or concerns.

## 2017-06-26 LAB
FINAL DIAGNOSIS: NORMAL
HPV HR 12 DNA CVX QL NAA+PROBE: NEGATIVE
HPV16 DNA SPEC QL NAA+PROBE: NEGATIVE
HPV18 DNA SPEC QL NAA+PROBE: NEGATIVE
SPECIMEN DESCRIPTION: NORMAL

## 2017-09-12 ENCOUNTER — OFFICE VISIT (OUTPATIENT)
Dept: FAMILY MEDICINE | Facility: CLINIC | Age: 62
End: 2017-09-12
Payer: COMMERCIAL

## 2017-09-12 VITALS
HEART RATE: 72 BPM | DIASTOLIC BLOOD PRESSURE: 69 MMHG | WEIGHT: 204 LBS | TEMPERATURE: 97.1 F | SYSTOLIC BLOOD PRESSURE: 112 MMHG | OXYGEN SATURATION: 98 % | HEIGHT: 66 IN | BODY MASS INDEX: 32.78 KG/M2

## 2017-09-12 DIAGNOSIS — L65.9 HAIR LOSS: Primary | ICD-10-CM

## 2017-09-12 DIAGNOSIS — F41.1 GENERALIZED ANXIETY DISORDER: ICD-10-CM

## 2017-09-12 DIAGNOSIS — F32.0 MILD MAJOR DEPRESSION (H): ICD-10-CM

## 2017-09-12 LAB — TSH SERPL DL<=0.005 MIU/L-ACNC: 0.67 MU/L (ref 0.4–4)

## 2017-09-12 PROCEDURE — 36415 COLL VENOUS BLD VENIPUNCTURE: CPT | Performed by: FAMILY MEDICINE

## 2017-09-12 PROCEDURE — 84443 ASSAY THYROID STIM HORMONE: CPT | Performed by: FAMILY MEDICINE

## 2017-09-12 PROCEDURE — 99214 OFFICE O/P EST MOD 30 MIN: CPT | Performed by: FAMILY MEDICINE

## 2017-09-12 RX ORDER — VENLAFAXINE HYDROCHLORIDE 37.5 MG/1
37.5 CAPSULE, EXTENDED RELEASE ORAL DAILY
Qty: 30 CAPSULE | Refills: 0 | Status: SHIPPED | OUTPATIENT
Start: 2017-09-12 | End: 2017-10-13

## 2017-09-12 ASSESSMENT — PATIENT HEALTH QUESTIONNAIRE - PHQ9: SUM OF ALL RESPONSES TO PHQ QUESTIONS 1-9: 4

## 2017-09-12 ASSESSMENT — ANXIETY QUESTIONNAIRES
4. TROUBLE RELAXING: NOT AT ALL
5. BEING SO RESTLESS THAT IT IS HARD TO SIT STILL: NOT AT ALL
IF YOU CHECKED OFF ANY PROBLEMS ON THIS QUESTIONNAIRE, HOW DIFFICULT HAVE THESE PROBLEMS MADE IT FOR YOU TO DO YOUR WORK, TAKE CARE OF THINGS AT HOME, OR GET ALONG WITH OTHER PEOPLE: NOT DIFFICULT AT ALL
7. FEELING AFRAID AS IF SOMETHING AWFUL MIGHT HAPPEN: NOT AT ALL
6. BECOMING EASILY ANNOYED OR IRRITABLE: NOT AT ALL
3. WORRYING TOO MUCH ABOUT DIFFERENT THINGS: NOT AT ALL
2. NOT BEING ABLE TO STOP OR CONTROL WORRYING: NOT AT ALL
GAD7 TOTAL SCORE: 0
1. FEELING NERVOUS, ANXIOUS, OR ON EDGE: NOT AT ALL

## 2017-09-12 NOTE — MR AVS SNAPSHOT
"              After Visit Summary   2017    Debby Barriga    MRN: [id number]           Patient Information     Date Of Birth          1955        Visit Information        Provider Department      2017 7:40 AM Galo Rebollar MD [facility]        Today's Diagnoses     Hair loss    -  1    Mild major depression (H)        Generalized anxiety disorder           Follow-ups after your visit        Who to contact     If you have questions or need follow up information about today's clinic visit or your schedule please contact [facility] directly at [phone].  Normal or non-critical lab and imaging results will be communicated to you by MyChart, letter or phone within 4 business days after the clinic has received the results. If you do not hear from us within 7 days, please contact the clinic through MTailorhart or phone. If you have a critical or abnormal lab result, we will notify you by phone as soon as possible.  Submit refill requests through [employer] or call your pharmacy and they will forward the refill request to us. Please allow 3 business days for your refill to be completed.          Additional Information About Your Visit        MyChart Information     [employer] lets you send messages to your doctor, view your test results, renew your prescriptions, schedule appointments and more. To sign up, go to [email or web address] . Click on \"Log in\" on the left side of the screen, which will take you to the Welcome page. Then click on \"Sign up Now\" on the right side of the page.     You will be asked to enter the access code listed below, as well as some personal information. Please follow the directions to create your username and password.     Your access code is: [email or web address]  Expires: 2017  9:05 AM     Your access code will  in 90 days. If you need help or a new code, please call your [facility] or [phone].        Care EveryWhere ID     This is your " "Care EveryWhere ID. This could be used by other organizations to access your [place] medical records  [id number]        Your Vitals Were     Pulse Temperature Height Pulse Oximetry BMI (Body Mass Index)       72 97.1  F (36.2  C) (Tympanic) 5' 5.5\" (1.664 m) 98% 33.43 kg/m2        Blood Pressure from Last 3 Encounters:   09/12/17 112/69   06/20/17 132/74   06/05/17 139/79    Weight from Last 3 Encounters:   09/12/17 204 lb (92.5 kg)   06/20/17 208 lb (94.3 kg)   06/05/17 211 lb (95.7 kg)              We Performed the Following     TSH with free T4 reflex          Today's Medication Changes          These changes are accurate as of: 9/12/17  8:52 AM.  If you have any questions, ask your nurse or doctor.               These medicines have changed or have updated prescriptions.        Dose/Directions    * venlafaxine 75 MG Tb24 24 hr tablet   Commonly known as:  EFFEXOR-ER   This may have changed:  Another medication with the same name was added. Make sure you understand how and when to take each.   Used for:  СЕРГЕЙ (generalized anxiety disorder), Major depressive disorder, recurrent episode, mild (H)   Changed by:  Galo Rebollar MD        Dose:  75 mg   Take 1 tablet (75 mg) by mouth daily   Quantity:  90 tablet   Refills:  1       * venlafaxine 37.5 MG 24 hr capsule   Commonly known as:  EFFEXOR-XR   This may have changed:  You were already taking a medication with the same name, and this prescription was added. Make sure you understand how and when to take each.   Used for:  Mild major depression (H), Generalized anxiety disorder   Changed by:  Galo Rebollar MD        Dose:  37.5 mg   Take 1 capsule (37.5 mg) by mouth daily   Quantity:  30 capsule   Refills:  0       * Notice:  This list has 2 medication(s) that are the same as other medications prescribed for you. Read the directions carefully, and ask your doctor or other care provider to review them with you.         Where to get your medicines "      These medications were sent to [facility] [address] [facility] [address] [facility] [address]  [address] [place], [place] [address]MIKE [place]T, [facility] [address]    Hours:  24-hours Phone:  [phone]     venlafaxine 37.5 MG 24 hr capsule                Primary Care Provider Office Phone # Fax #    Deqa Velvet Rebollar [phone] [address] [facility] [address]        Equal Access to Services     [facility]: Hadii aad ku hadasho Soomaali, waaxda luqadaha, qaybta kaalmada adeegyada, waxay charin hayjoycelynn adeeg goran owens . So [facility] [phone].    ATENCIÓN: Si habla español, tiene a rojas disposición servicios gratuitos de asistencia lingüística. [facility] [phone].    We comply with applicable federal civil rights laws and [place] laws. We do not discriminate on the basis of race, color, national origin, age, disability sex, sexual orientation or gender identity.            Thank you!     Thank you for choosing [facility]  for your care. Our goal is always to provide you with excellent care. Hearing back from our patients is one way we can continue to improve our services. Please take a few minutes to complete the written survey that you may receive in the mail after your visit with us. Thank you!             Your Updated Medication List - Protect others around you: Learn how to safely use, store and throw away your medicines at www.disposemymeds.org.          This list is accurate as of: 9/12/17  8:52 AM.  Always use your most recent med list.                   Brand Name Dispense Instructions for use Diagnosis    albuterol 108 (90 BASE) MCG/ACT Inhaler    PROAIR HFA/PROVENTIL HFA/VENTOLIN HFA    1 Inhaler    Inhale 2 puffs into the lungs every 4 hours as needed For wheezing or shortness of breath    Acute bronchitis, unspecified organism       * venlafaxine 75 MG Tb24 24 hr tablet    EFFEXOR-ER    90 tablet    Take 1 tablet (75 mg) by mouth  daily    СЕРГЕЙ (generalized anxiety disorder), Major depressive disorder, recurrent episode, mild (H)       * venlafaxine 37.5 MG 24 hr capsule    EFFEXOR-XR    30 capsule    Take 1 capsule (37.5 mg) by mouth daily    Mild major depression (H), Generalized anxiety disorder       * Notice:  This list has 2 medication(s) that are the same as other medications prescribed for you. Read the directions carefully, and ask your doctor or other care provider to review them with you.

## 2017-09-12 NOTE — PROGRESS NOTES
Please send the letter to the patient with the following.         Here are your results for your recent lab. Your thyroid was normal. Please call or message me if you have questions or concerns.

## 2017-09-12 NOTE — LETTER
September 13, 2017      Debby Barriga  [address]  [facility] [address]        Dear ,    We are writing to inform you of your test results.    Here are your results for your recent lab. Your thyroid was normal. Please call or message me if you have questions or concerns.     Resulted Orders   TSH with free T4 reflex   Result Value Ref Range    TSH 0.67 0.40 - 4.00 mU/L       If you have any questions or concerns, please call the clinic at the number listed above.       Sincerely,        Galo Rebollar MD/nr

## 2017-09-12 NOTE — NURSING NOTE
"Chief Complaint   Patient presents with     Medication Follow-up     Venlafaxine     Referral     Carpel Tunnel     LAB REQUEST     Thyroid       Initial /69 (BP Location: Left arm, Patient Position: Chair, Cuff Size: Adult Large)  Pulse 72  Temp 97.1  F (36.2  C) (Tympanic)  Ht 5' 5.5\" (1.664 m)  Wt 204 lb (92.5 kg)  SpO2 98%  BMI 33.43 kg/m2 Estimated body mass index is 33.43 kg/(m^2) as calculated from the following:    Height as of this encounter: 5' 5.5\" (1.664 m).    Weight as of this encounter: 204 lb (92.5 kg).  Medication Reconciliation: complete     Zehra Mann MA      "

## 2017-09-12 NOTE — PROGRESS NOTES
"  SUBJECTIVE:   Debby Barriga is a 62 year old female who presents to clinic today for the following health issues:    Pt has been on red berry supplement for energy for two months. She is on this for energy. Recently she stopped and urinary stinging improved. She restarted supplement but now diluting supplement. No current urinary symptoms.     She notes that she lost her window peak and wondering if it is due to thyroid. Her nails are doing better as she is taking gelatin. Rest of the hair is thinning over time.     Pt needs a refill for the effexor. She is going to schedule herself for grad school test. She has a very strong support system. She feels that her symptoms are stable and would like to try lower dose .      Problem list and histories reviewed & adjusted, as indicated.  Additional history: as documented    Labs reviewed in EPIC    Reviewed and updated as needed this visit by clinical staffTobacco  Allergies  Meds  Med Hx  Surg Hx  Fam Hx  Soc Hx      Reviewed and updated as needed this visit by Provider         ROS:  Constitutional, HEENT, cardiovascular, pulmonary, gi and gu systems are negative, except as otherwise noted.      OBJECTIVE:   /69 (BP Location: Left arm, Patient Position: Chair, Cuff Size: Adult Large)  Pulse 72  Temp 97.1  F (36.2  C) (Tympanic)  Ht 5' 5.5\" (1.664 m)  Wt 204 lb (92.5 kg)  SpO2 98%  BMI 33.43 kg/m2  Body mass index is 33.43 kg/(m^2).  GENERAL: healthy, alert and no distress  EYES: Eyes grossly normal to inspection  HENT: nose and mouth without ulcers or lesions  PSYCH: mentation appears normal, affect normal/bright    Diagnostic Test Results:  none     ASSESSMENT/PLAN:     ## Hair loss  - discussed with pt that unlikely due to thyroid etiology, recommended to reduce stress; if symptoms continue to persist then will refer to dermatology   - TSH with free T4 reflex    ## Mild major depression/СЕРГЕЙ:  PHQ-9 SCORE 9/20/2016 3/15/2017 9/12/2017   Total Score - - - "   Total Score 8 2 4     СЕРГЕЙ-7 SCORE 9/20/2016 3/15/2017 9/12/2017   Total Score - - -   Total Score 4 3 0     - symptoms stable, pt would like to try to wean further currently on Effexor 75 mg, recommended to start Effexor 37.5 mg   - pt only given one month, repeat PHQ9 in one month, if stable then do further refills   - venlafaxine (EFFEXOR-XR) 37.5 MG 24 hr capsule; Take 1 capsule (37.5 mg) by mouth daily  Dispense: 30 capsule; Refill: 0    Mammogram - pts insurance doesn't cover mammogram, wants to hold off on mammogram       Glao Rebollar MD  [facility]

## 2017-09-13 ASSESSMENT — ANXIETY QUESTIONNAIRES: GAD7 TOTAL SCORE: 0

## 2017-10-03 ENCOUNTER — OFFICE VISIT (OUTPATIENT)
Dept: FAMILY MEDICINE | Facility: CLINIC | Age: 62
End: 2017-10-03
Payer: COMMERCIAL

## 2017-10-03 VITALS
TEMPERATURE: 98.2 F | HEART RATE: 74 BPM | RESPIRATION RATE: 16 BRPM | HEIGHT: 66 IN | WEIGHT: 200.75 LBS | OXYGEN SATURATION: 98 % | DIASTOLIC BLOOD PRESSURE: 70 MMHG | BODY MASS INDEX: 32.26 KG/M2 | SYSTOLIC BLOOD PRESSURE: 104 MMHG

## 2017-10-03 DIAGNOSIS — R30.0 DYSURIA: Primary | ICD-10-CM

## 2017-10-03 DIAGNOSIS — Z23 NEED FOR PROPHYLACTIC VACCINATION AND INOCULATION AGAINST INFLUENZA: ICD-10-CM

## 2017-10-03 LAB
ALBUMIN UR-MCNC: NEGATIVE MG/DL
APPEARANCE UR: CLEAR
BACTERIA #/AREA URNS HPF: ABNORMAL /HPF
BILIRUB UR QL STRIP: NEGATIVE
COLOR UR AUTO: YELLOW
GLUCOSE UR STRIP-MCNC: NEGATIVE MG/DL
HGB UR QL STRIP: ABNORMAL
KETONES UR STRIP-MCNC: NEGATIVE MG/DL
LEUKOCYTE ESTERASE UR QL STRIP: ABNORMAL
NITRATE UR QL: NEGATIVE
NON-SQ EPI CELLS #/AREA URNS LPF: ABNORMAL /LPF
PH UR STRIP: 6 PH (ref 5–7)
RBC #/AREA URNS AUTO: ABNORMAL /HPF
SOURCE: ABNORMAL
SP GR UR STRIP: 1.01 (ref 1–1.03)
UROBILINOGEN UR STRIP-ACNC: 0.2 EU/DL (ref 0.2–1)
WBC #/AREA URNS AUTO: ABNORMAL /HPF

## 2017-10-03 PROCEDURE — 90686 IIV4 VACC NO PRSV 0.5 ML IM: CPT | Performed by: FAMILY MEDICINE

## 2017-10-03 PROCEDURE — 90471 IMMUNIZATION ADMIN: CPT | Performed by: FAMILY MEDICINE

## 2017-10-03 PROCEDURE — 81001 URINALYSIS AUTO W/SCOPE: CPT | Performed by: FAMILY MEDICINE

## 2017-10-03 PROCEDURE — 99213 OFFICE O/P EST LOW 20 MIN: CPT | Mod: 25 | Performed by: FAMILY MEDICINE

## 2017-10-03 RX ORDER — SULFAMETHOXAZOLE/TRIMETHOPRIM 800-160 MG
1 TABLET ORAL 2 TIMES DAILY
Qty: 6 TABLET | Refills: 0 | Status: SHIPPED | OUTPATIENT
Start: 2017-10-03 | End: 2017-10-06

## 2017-10-03 NOTE — NURSING NOTE
"Chief Complaint   Patient presents with     UTI       Initial /70 (Cuff Size: Adult Large)  Pulse 74  Temp 98.2  F (36.8  C) (Oral)  Resp 16  Ht 5' 5.5\" (1.664 m)  Wt 200 lb 12 oz (91.1 kg)  SpO2 98%  BMI 32.9 kg/m2 Estimated body mass index is 32.9 kg/(m^2) as calculated from the following:    Height as of this encounter: 5' 5.5\" (1.664 m).    Weight as of this encounter: 200 lb 12 oz (91.1 kg).  Medication Reconciliation: complete     Diya Centeno, SUKHWINDER      "

## 2017-10-03 NOTE — MR AVS SNAPSHOT
"              After Visit Summary   10/3/2017    Debby Barriga    MRN: [id number]           Patient Information     Date Of Birth          1955        Visit Information        Provider Department      10/3/2017 8:00 AM Raul Bashir MD [facility]        Today's Diagnoses     Dysuria    -  1    Need for prophylactic vaccination and inoculation against influenza           Follow-ups after your visit        Who to contact     If you have questions or need follow up information about today's clinic visit or your schedule please contact [facility] directly at [phone].  Normal or non-critical lab and imaging results will be communicated to you by Talenzhart, letter or phone within 4 business days after the clinic has received the results. If you do not hear from us within 7 days, please contact the clinic through Talenzhart or phone. If you have a critical or abnormal lab result, we will notify you by phone as soon as possible.  Submit refill requests through [employer] or call your pharmacy and they will forward the refill request to us. Please allow 3 business days for your refill to be completed.          Additional Information About Your Visit        MyChart Information     [employer] lets you send messages to your doctor, view your test results, renew your prescriptions, schedule appointments and more. To sign up, go to [email or web address] . Click on \"Log in\" on the left side of the screen, which will take you to the Welcome page. Then click on \"Sign up Now\" on the right side of the page.     You will be asked to enter the access code listed below, as well as some personal information. Please follow the directions to create your username and password.     Your access code is: [email or web address]  Expires: 2018 10:12 AM     Your access code will  in 90 days. If you need help or a new code, please call your [facility] or [phone].        Care EveryWhere ID     This " "is your Care EveryWhere ID. This could be used by other organizations to access your [place] medical records  [id number]        Your Vitals Were     Pulse Temperature Respirations Height Pulse Oximetry BMI (Body Mass Index)    74 98.2  F (36.8  C) (Oral) 16 5' 5.5\" (1.664 m) 98% 32.9 kg/m2       Blood Pressure from Last 3 Encounters:   10/03/17 104/70   09/12/17 112/69   06/20/17 132/74    Weight from Last 3 Encounters:   10/03/17 200 lb 12 oz (91.1 kg)   09/12/17 204 lb (92.5 kg)   06/20/17 208 lb (94.3 kg)              We Performed the Following     FLU VAC, SPLIT VIRUS IM > 3 YO (QUADRIVALENT) [15407]     UA with Microscopic reflex to Culture     Vaccine Administration, Initial [87094]          Today's Medication Changes          These changes are accurate as of: 10/3/17 10:12 AM.  If you have any questions, ask your nurse or doctor.               Start taking these medicines.        Dose/Directions    sulfamethoxazole-trimethoprim 800-160 MG per tablet   Commonly known as:  BACTRIM DS/SEPTRA DS   Used for:  Dysuria   Started by:  Raul Bashri MD        Dose:  1 tablet   Take 1 tablet by mouth 2 times daily for 3 days   Quantity:  6 tablet   Refills:  0            Where to get your medicines      These medications were sent to [facility] [address] [place] [address] AT [address] [address]    Hours:  24-hours Phone:  [phone]     sulfamethoxazole-trimethoprim 800-160 MG per tablet                Primary Care Provider Office Phone # Fax #    Deqa Velvet Rebollar [phone] [address] [facility] [address]        Equal Access to Services     CANDY COLVIN AH: Fidel Fine, aldair stanley, marilyn longo. [facility] [phone].    ATENCIÓN: Si habla brandiañol, tiene a rojas disposición servicios gratuitos de asistencia " lingüístictequilaRaissa Garcia al [phone].    We comply with applicable federal civil rights laws and [place] laws. We do not discriminate on the basis of race, color, national origin, age, disability, sex, sexual orientation, or gender identity.            Thank you!     Thank you for choosing [facility]  for your care. Our goal is always to provide you with excellent care. Hearing back from our patients is one way we can continue to improve our services. Please take a few minutes to complete the written survey that you may receive in the mail after your visit with us. Thank you!             Your Updated Medication List - Protect others around you: Learn how to safely use, store and throw away your medicines at www.disposemymeds.org.          This list is accurate as of: 10/3/17 10:12 AM.  Always use your most recent med list.                   Brand Name Dispense Instructions for use Diagnosis    albuterol 108 (90 BASE) MCG/ACT Inhaler    PROAIR HFA/PROVENTIL HFA/VENTOLIN HFA    1 Inhaler    Inhale 2 puffs into the lungs every 4 hours as needed For wheezing or shortness of breath    Acute bronchitis, unspecified organism       sulfamethoxazole-trimethoprim 800-160 MG per tablet    BACTRIM DS/SEPTRA DS    6 tablet    Take 1 tablet by mouth 2 times daily for 3 days    Dysuria       * venlafaxine 75 MG Tb24 24 hr tablet    EFFEXOR-ER    90 tablet    Take 1 tablet (75 mg) by mouth daily    СЕРГЕЙ (generalized anxiety disorder), Major depressive disorder, recurrent episode, mild (H)       * venlafaxine 37.5 MG 24 hr capsule    EFFEXOR-XR    30 capsule    Take 1 capsule (37.5 mg) by mouth daily    Mild major depression (H), Generalized anxiety disorder       * Notice:  This list has 2 medication(s) that are the same as other medications prescribed for you. Read the directions carefully, and ask your doctor or other care provider to review them with you.

## 2017-10-03 NOTE — PROGRESS NOTES
SUBJECTIVE:   Debby Barriga is a 62 year old female who presents to clinic today for the following health issues:      URINARY TRACT SYMPTOMS      Duration: 1 month     Description  dysuria, frequency, urgency and hesitancy intermittent     Intensity:  moderate    Accompanying signs and symptoms:  Fever/chills: no   Flank pain no   Nausea and vomiting: no   Vaginal symptoms: none  Abdominal/Pelvic Pain: no     History  History of frequent UTI's: no   History of kidney stones: no   Sexually Active: no   Possibility of pregnancy: No    Precipitating or alleviating factors: None    Therapies tried and outcome: betadine douche    Outcome: helpful     No fever.    Last episode 30 yrs ago. No yeast infection in the past.     In menopause for around 10 yrs.     Problem list and histories reviewed & adjusted, as indicated.  Additional history: as documented    Labs reviewed in EPIC    Reviewed and updated as needed this visit by clinical staff     Reviewed and updated as needed this visit by Provider           Social History     Social History     Marital status: Single     Spouse name: N/A     Number of children: N/A     Years of education: N/A     Social History Main Topics     Smoking status: Former Smoker     Types: Cigarettes     Quit date: 7/23/1982     Smokeless tobacco: Never Used     Alcohol use No     Drug use: No     Sexual activity: Yes     Partners: Female     Other Topics Concern     Parent/Sibling W/ Cabg, Mi Or Angioplasty Before 65f 55m? No     Social History Narrative     No Known Allergies  Patient Active Problem List   Diagnosis     Mild major depression (H)     Generalized anxiety disorder     Medication refill     Hypercholesteremia     Myopic astigmatism of both eyes     Presbyopia     Morbid obesity, unspecified obesity type (H)     Reviewed medications, social history and  past medical and surgical history.    Review of system: for general, respiratory, CVS, GI and psychiatry negative except for  "noted above.     EXAM:  /70 (Cuff Size: Adult Large)  Pulse 74  Temp 98.2  F (36.8  C) (Oral)  Resp 16  Ht 5' 5.5\" (1.664 m)  Wt 200 lb 12 oz (91.1 kg)  SpO2 98%  BMI 32.9 kg/m2  Constitutional: healthy, alert and no distress   Psychiatric: mentation appears normal and affect normal/bright     Results for orders placed or performed in visit on 10/03/17   UA with Microscopic reflex to Culture   Result Value Ref Range    Color Urine Yellow     Appearance Urine Clear     Glucose Urine Negative NEG^Negative mg/dL    Bilirubin Urine Negative NEG^Negative    Ketones Urine Negative NEG^Negative mg/dL    Specific Gravity Urine 1.010 1.003 - 1.035    pH Urine 6.0 5.0 - 7.0 pH    Protein Albumin Urine Negative NEG^Negative mg/dL    Urobilinogen Urine 0.2 0.2 - 1.0 EU/dL    Nitrite Urine Negative NEG^Negative    Blood Urine Small (A) NEG^Negative    Leukocyte Esterase Urine Trace (A) NEG^Negative    Source Midstream Urine     WBC Urine 5-10 (A) OTO2^O - 2 /HPF    RBC Urine O - 2 OTO2^O - 2 /HPF    Squamous Epithelial /LPF Urine Few FEW^Few /LPF    Bacteria Urine Moderate (A) NEG^Negative /HPF       ASSESSMENT / PLAN:  (R30.0) Dysuria  (primary encounter diagnosis)  Comment: mildly abnormal UA. OK to use bactrim for 3 days. If not improving,  exam should be done to make sure atrophic vaginitis or other etiology is not a culprit. She agreed.   Plan: UA with Microscopic reflex to Culture,         sulfamethoxazole-trimethoprim (BACTRIM         DS/SEPTRA DS) 800-160 MG per tablet             (Z23) Need for prophylactic vaccination and inoculation against influenza  Comment:     Plan: FLU VAC, SPLIT VIRUS IM > 3 YO (QUADRIVALENT)         [01210], Vaccine Administration, Initial         [51197]                Injectable Influenza Immunization Documentation    1.  Is the person to be vaccinated sick today?   No    2. Does the person to be vaccinated have an allergy to a component   of the vaccine?   No    3. Has the " person to be vaccinated ever had a serious reaction   to influenza vaccine in the past?   No    4. Has the person to be vaccinated ever had Guillain-Barré syndrome?   No    Form completed by Diya Centeno CMA

## 2017-10-13 ENCOUNTER — OFFICE VISIT (OUTPATIENT)
Dept: FAMILY MEDICINE | Facility: CLINIC | Age: 62
End: 2017-10-13
Payer: COMMERCIAL

## 2017-10-13 VITALS
HEIGHT: 66 IN | RESPIRATION RATE: 8 BRPM | WEIGHT: 199.25 LBS | SYSTOLIC BLOOD PRESSURE: 122 MMHG | OXYGEN SATURATION: 98 % | TEMPERATURE: 97.7 F | BODY MASS INDEX: 32.02 KG/M2 | DIASTOLIC BLOOD PRESSURE: 81 MMHG | HEART RATE: 70 BPM

## 2017-10-13 DIAGNOSIS — F41.1 GAD (GENERALIZED ANXIETY DISORDER): ICD-10-CM

## 2017-10-13 DIAGNOSIS — F33.0 MAJOR DEPRESSIVE DISORDER, RECURRENT EPISODE, MILD (H): Primary | ICD-10-CM

## 2017-10-13 PROCEDURE — 99214 OFFICE O/P EST MOD 30 MIN: CPT | Performed by: FAMILY MEDICINE

## 2017-10-13 RX ORDER — VENLAFAXINE HYDROCHLORIDE 75 MG/1
75 TABLET, EXTENDED RELEASE ORAL DAILY
Qty: 90 TABLET | Refills: 1 | Status: SHIPPED | OUTPATIENT
Start: 2017-10-13 | End: 2018-08-14

## 2017-10-13 ASSESSMENT — ANXIETY QUESTIONNAIRES
1. FEELING NERVOUS, ANXIOUS, OR ON EDGE: NEARLY EVERY DAY
GAD7 TOTAL SCORE: 8
2. NOT BEING ABLE TO STOP OR CONTROL WORRYING: NOT AT ALL
3. WORRYING TOO MUCH ABOUT DIFFERENT THINGS: MORE THAN HALF THE DAYS
IF YOU CHECKED OFF ANY PROBLEMS ON THIS QUESTIONNAIRE, HOW DIFFICULT HAVE THESE PROBLEMS MADE IT FOR YOU TO DO YOUR WORK, TAKE CARE OF THINGS AT HOME, OR GET ALONG WITH OTHER PEOPLE: NOT DIFFICULT AT ALL
6. BECOMING EASILY ANNOYED OR IRRITABLE: NOT AT ALL
5. BEING SO RESTLESS THAT IT IS HARD TO SIT STILL: NOT AT ALL
7. FEELING AFRAID AS IF SOMETHING AWFUL MIGHT HAPPEN: NEARLY EVERY DAY

## 2017-10-13 ASSESSMENT — PATIENT HEALTH QUESTIONNAIRE - PHQ9
5. POOR APPETITE OR OVEREATING: NOT AT ALL
SUM OF ALL RESPONSES TO PHQ QUESTIONS 1-9: 4

## 2017-10-13 NOTE — PROGRESS NOTES
"  SUBJECTIVE:   Debby Barriga is a 62 year old female who presents to clinic today for the following health issues:      Depression and Anxiety Follow-Up    Status since last visit: Worsened slightly since medication dosage reduced    Other associated symptoms:None    Complicating factors:     Significant life event: Yes-  Applying and taking a test and not feeling comfortable about test      Current substance abuse: None    PHQ-9 Score and MyChart F/U Questions 3/15/2017 9/12/2017 10/13/2017   Total Score 2 4 4   Q9: Suicide Ideation Not at all Not at all Not at all     СЕРГЕЙ-7 SCORE 3/15/2017 9/12/2017 10/13/2017   Total Score - - -   Total Score 3 0 8     PHQ-9  English  PHQ-9   Any Language  GAD7  Suicide Assessment Five-step Evaluation and Treatment (SAFE-T)      Amount of exercise or physical activity: hiking on weekends     Problems taking medications regularly: No    Medication side effects: not feeling well on level of medication   Diet: regular (no restrictions)    UTI - better now. Very tiny symptoms now. Avoiding stimulants.     Mood - \"noticable humming in mind\", more anxious, sometime challenges in decision making.   Was tapering off dose but now feels she may need to go up on the dose. When on 75mg dose - did not notice any side effects.   Mild seasonal affective disorder - walking helps.      Problem list and histories reviewed & adjusted, as indicated.  Additional history: as documented    Labs reviewed in EPIC    Reviewed and updated as needed this visit by clinical staff     Reviewed and updated as needed this visit by Provider           Social History     Social History     Marital status: Single     Spouse name: N/A     Number of children: N/A     Years of education: N/A     Social History Main Topics     Smoking status: Former Smoker     Types: Cigarettes     Quit date: 7/23/1982     Smokeless tobacco: Never Used     Alcohol use No     Drug use: No     Sexual activity: Yes     Partners: Female " "    Other Topics Concern     Parent/Sibling W/ Cabg, Mi Or Angioplasty Before 65f 55m? No     Social History Narrative     No Known Allergies  Patient Active Problem List   Diagnosis     Mild major depression (H)     Generalized anxiety disorder     Medication refill     Hypercholesteremia     Myopic astigmatism of both eyes     Presbyopia     Morbid obesity, unspecified obesity type (H)     Reviewed medications, social history and  past medical and surgical history.    Review of system: for general, respiratory, CVS, GI and psychiatry negative except for noted above.     EXAM:  /81 (Cuff Size: Adult Large)  Pulse 70  Temp 97.7  F (36.5  C) (Oral)  Resp 8  Ht 5' 5.5\" (1.664 m)  Wt 199 lb 4 oz (90.4 kg)  SpO2 98%  BMI 32.65 kg/m2  Constitutional: healthy, alert and no distress   Psychiatric: mentation appears normal and affect normal/bright  Cardiovascular: RRR. No murmurs,  Respiratory: negative, Lungs clear. No crackles or wheezing. No tachypnea.     ASSESSMENT / PLAN:  (F33.0) Major depressive disorder, recurrent episode, mild (H)  (primary encounter diagnosis)  Comment: go up on the dose as per her request. No suicidal thoughts or intention.  Plan: venlafaxine (EFFEXOR-ER) 75 MG TB24 24 hr         tablet, DEPRESSION ACTION PLAN (DAP)             (F41.1) СЕРГЕЙ (generalized anxiety disorder)  Comment:    Tolerating it well. See above for dose changes.   Plan: venlafaxine (EFFEXOR-ER) 75 MG TB24 24 hr         tablet               Patient Instructions   See DR Pulido for possible atrophic vaginitis for the cause of burning or discomfort with passing urine if your symptoms fails to improve.             "

## 2017-10-13 NOTE — NURSING NOTE
"Chief Complaint   Patient presents with     Recheck Medication     Depression     Anxiety       Initial /81 (Cuff Size: Adult Large)  Pulse 70  Temp 97.7  F (36.5  C) (Oral)  Resp 8  Ht 5' 5.5\" (1.664 m)  Wt 199 lb 4 oz (90.4 kg)  SpO2 98%  BMI 32.65 kg/m2 Estimated body mass index is 32.65 kg/(m^2) as calculated from the following:    Height as of this encounter: 5' 5.5\" (1.664 m).    Weight as of this encounter: 199 lb 4 oz (90.4 kg).  Medication Reconciliation: complete     Diya Centeno CMA      "

## 2017-10-13 NOTE — PATIENT INSTRUCTIONS
See DR Pulido for possible atrophic vaginitis for the cause of burning or discomfort with passing urine if your symptoms fails to improve.

## 2017-10-14 ASSESSMENT — ANXIETY QUESTIONNAIRES: GAD7 TOTAL SCORE: 8

## 2017-10-27 NOTE — PROGRESS NOTES
SUBJECTIVE:   Debby Barriga is a 62 year old female who presents to clinic today for the following health issues:      Musculoskeletal problem/pain      Duration: few months (unsure of date) Gradual over time on right, left was sudden onset when picking up computer    Description  Location: ENRICO hand into wrist near base of thumbs    Intensity:  moderate    Accompanying signs and symptoms: weakness of left wrist    History  Previous similar problem: YES- similar issue about 35 years ago. Seen by someone at the job  Previous evaluation:  none    Precipitating or alleviating factors:  Trauma or overuse: YES- overuse while typing and carrying computer and supplies at work  Aggravating factors include: turning left wrist inward    Therapies tried and outcome: rest/inactivity, ice, acetaminophen and Ibuprofen      Bilateral wrist pain ongoing several months.    Left wrist pain with sudden onset after picking up large laptop at work.  Pain to radial aspect of wrist.  Pain is triggered by certain positions.  No swelling, redness, or bruising.  Had some tingling to wrsit with initial injury but not since then.  course is worsening.  Feels weak when grabbing items.    Right wrist pain with gradual onset over the past few months.  Pain to base of thumb, constant discomfort, worse with certain movement.  No trauma, redness, bruising, or swelling.  No numbness or tingling.      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  Needs larger screen and so laptop is larger.  Works in client homes, on kitchen table or lap.  In this position over 4 years.      Has tried ibuprofen and tylenol twice a day and ice with improvement in symptoms.      Patient Active Problem List   Diagnosis     Mild major depression (H)     Generalized anxiety disorder     Medication refill     Hypercholesteremia     Myopic astigmatism of both eyes     Presbyopia     Morbid obesity, unspecified obesity type (H)     Past Surgical History:   Procedure  Laterality Date     LAPAROSCOPIC TUBAL LIGATION         Social History   Substance Use Topics     Smoking status: Former Smoker     Types: Cigarettes     Quit date: 7/23/1982     Smokeless tobacco: Never Used     Alcohol use No     Family History   Problem Relation Age of Onset     Alzheimer Disease Mother      Prostate Cancer Father      Glaucoma No family hx of      Macular Degeneration No family hx of      DIABETES No family hx of      Coronary Artery Disease No family hx of      Hypertension No family hx of      Hyperlipidemia No family hx of      CEREBROVASCULAR DISEASE No family hx of      Breast Cancer No family hx of          Current Outpatient Prescriptions   Medication Sig Dispense Refill     venlafaxine (EFFEXOR-ER) 75 MG TB24 24 hr tablet Take 1 tablet (75 mg) by mouth daily 90 tablet 1     albuterol (PROAIR HFA/PROVENTIL HFA/VENTOLIN HFA) 108 (90 BASE) MCG/ACT Inhaler Inhale 2 puffs into the lungs every 4 hours as needed For wheezing or shortness of breath 1 Inhaler 0       ROS:  MSK, Neuro as above, otherwise negative       OBJECTIVE:                                                    /78 (BP Location: Left arm, Patient Position: Chair, Cuff Size: Adult Large)  Pulse 102  Temp 98.2  F (36.8  C) (Oral)  Resp 12  Wt 198 lb (89.8 kg)  SpO2 98%  BMI 32.45 kg/m2   GENERAL APPEARANCE: healthy, alert and no distress  EYES: Eyes grossly normal to inspection and conjunctivae and sclerae normal  RESP: respirations nonlabored   ORTHO: Wrist Exam: LEFT WRIST:  Inspection: no swelling  no effusion  Palpation: Tender: flexor tendons  Non-tender: distal radius, distal ulna, extension tendons, carpals  Range of Motion: normal, pin with flexion and radial/ulnar deviation   Strength: no deficits  Special tests: negative Tinel's at carpal tunnel.  , negative Phalen's.  , negative Finkelstein's.      Wrist Exam: RIGHT WRIST:  Inspection: no swelling  no effusion  Palpation: Tender: none  Non-tender: distal  radius, distal ulna, extension tendons, flexor tendons, carpals   Range of Motion: normal  Strength: no deficits    RIGHT Hand/Finger Exam: Inspection:All Normal  Tender: Thumb:   CMC  Non-tender:   Range of Motion Thumb:   opposition   full, pain-free, flexion MCP   full, painful, extension MCP   full, painful, flexion IP   full, extension IP   full    PSYCH: mentation appears normal and affect normal/bright        ASSESSMENT/PLAN:                                                    (M79.641) Pain of right hand  (primary encounter diagnosis)  Comment: suspect CMC arthritis  Plan: XR Hand Right G/E 3 Views        unfortunately xray is down today, pt will come back to have imaging done.  If consistent with arthritis recommend follow up with ortho for consideration of steroid injection.  Discussed surgery may be recommended if failing conservative management.    (M25.132) Left wrist pain  Comment: suspect tendonitis of flexor tensions, also consider carpal tunnel but given fingers not affected, no numbness/tingling, suspect tendonitis more likely   Plan: [facility] PT, HAND, AND CHIROPRACTIC REFERRAL        Recommend wrist brace at work, see physical therapy, continue NSAIDs and ice        See Patient Instructions    Missy Barton, [facility]    Patient Instructions   1.  For left wrist suspect tendonitis.  Start wearing wrist brace at work and follow up with physical therapy.  Cont ice and ibuprofen    2.  I suspect arthritis at the base of your right thumb.  Call for an xray appointment later today or this week.  If confirms arthritis recommend follow up with hand surgeon for possible joint injection.

## 2017-10-30 ENCOUNTER — OFFICE VISIT (OUTPATIENT)
Dept: FAMILY MEDICINE | Facility: CLINIC | Age: 62
End: 2017-10-30
Payer: COMMERCIAL

## 2017-10-30 VITALS
SYSTOLIC BLOOD PRESSURE: 123 MMHG | RESPIRATION RATE: 12 BRPM | WEIGHT: 198 LBS | TEMPERATURE: 98.2 F | DIASTOLIC BLOOD PRESSURE: 78 MMHG | HEART RATE: 102 BPM | OXYGEN SATURATION: 98 % | BODY MASS INDEX: 32.45 KG/M2

## 2017-10-30 DIAGNOSIS — M79.641 PAIN OF RIGHT HAND: Primary | ICD-10-CM

## 2017-10-30 DIAGNOSIS — M25.532 LEFT WRIST PAIN: ICD-10-CM

## 2017-10-30 PROCEDURE — 99213 OFFICE O/P EST LOW 20 MIN: CPT | Performed by: NURSE PRACTITIONER

## 2017-10-30 NOTE — PATIENT INSTRUCTIONS
1.  For left wrist suspect tendonitis.  Start wearing wrist brace at work and follow up with physical therapy.  Cont ice and ibuprofen    2.  I suspect arthritis at the base of your right thumb.  Call for an xray appointment later today or this week.  If confirms arthritis recommend follow up with hand surgeon for possible joint injection.

## 2017-10-30 NOTE — NURSING NOTE
"Chief Complaint   Patient presents with     Work Comp     Musculoskeletal Problem       Initial /78 (BP Location: Left arm, Patient Position: Chair, Cuff Size: Adult Large)  Pulse 102  Temp 98.2  F (36.8  C) (Oral)  Resp 12  Wt 198 lb (89.8 kg)  SpO2 98%  BMI 32.45 kg/m2 Estimated body mass index is 32.45 kg/(m^2) as calculated from the following:    Height as of 10/13/17: 5' 5.5\" (1.664 m).    Weight as of this encounter: 198 lb (89.8 kg).  Medication Reconciliation: complete     Cheli Kan, CMA      "

## 2017-10-30 NOTE — MR AVS SNAPSHOT
After Visit Summary   10/30/2017    Debby Barriga    MRN: [id number]           Patient Information     Date Of Birth          1955        Visit Information        Provider Department      10/30/2017 8:00 AM Missy Barton APRN [facility]        Today's Diagnoses     Pain of right hand    -  1    Left wrist pain          Care Instructions    1.  For left wrist suspect tendonitis.  Start wearing wrist brace at work and follow up with physical therapy.  Cont ice and ibuprofen    2.  I suspect arthritis at the base of your right thumb.  Call for an xray appointment later today or this week.  If confirms arthritis recommend follow up with hand surgeon for possible joint injection.            Follow-ups after your visit        Additional Services     [facility] PT, HAND, AND CHIROPRACTIC REFERRAL       **This order will print in the [facility] Scheduling Office**    Physical Therapy, Hand Therapy and Chiropractic Care are available through:    *[facility]  *[facility]  *[facility]    Call one number to schedule at any of the above locations: [phone].    Your provider has referred you to: Hand Therapy    Indication/Reason for Referral: Wrist Pain  Onset of Illness:   Therapy Orders: Evaluate and Treat  Special Programs: None  Special Request: None    Kd Yi      Additional Comments for the Therapist or Chiropractor:     Please be aware that coverage of these services is subject to the terms and limitations of your health insurance plan.  Call member services at your health plan with any benefit or coverage questions.      Please bring the following to your appointment:    *Your personal calendar for scheduling future appointments  *Comfortable clothing                  Future tests that were ordered for you today     Open Future Orders        Priority Expected Expires Ordered    XR Hand Right G/E 3 Views Routine 10/30/2017  "10/30/2018 10/30/2017            Who to contact     If you have questions or need follow up information about today's clinic visit or your schedule please contact [facility] directly at [phone].  Normal or non-critical lab and imaging results will be communicated to you by MyChart, letter or phone within 4 business days after the clinic has received the results. If you do not hear from us within 7 days, please contact the clinic through [email or web address] or phone. If you have a critical or abnormal lab result, we will notify you by phone as soon as possible.  Submit refill requests through P[employer]eline or call your pharmacy and they will forward the refill request to us. Please allow 3 business days for your refill to be completed.          Additional Information About Your Visit        [email or web address] Information     Priceline lets you send messages to your doctor, view your test results, renew your prescriptions, schedule appointments and more. To sign up, go to [email or web address] . Click on \"Log in\" on the left side of the screen, which will take you to the Welcome page. Then click on \"Sign up Now\" on the right side of the page.     You will be asked to enter the access code listed below, as well as some personal information. Please follow the directions to create your username and password.     Your access code is: [email or web address]  Expires: 2018 10:12 AM     Your access code will  in 90 days. If you need help or a new code, please call your [facility] clinic or [phone].        Care EveryWhere ID     This is your Care EveryWhere ID. This could be used by other organizations to access your [facility] medical records  [id number]        Your Vitals Were     Pulse Temperature Respirations Pulse Oximetry BMI (Body Mass Index)       102 98.2  F (36.8  C) (Oral) 12 98% 32.45 kg/m2        Blood Pressure from Last 3 Encounters:   10/30/17 123/78   10/13/17 122/81   10/03/17 104/70    Weight from Last 3 " Encounters:   10/30/17 198 lb (89.8 kg)   10/13/17 199 lb 4 oz (90.4 kg)   10/03/17 200 lb 12 oz (91.1 kg)              We Performed the Following     [facility] PT, HAND, AND CHIROPRACTIC REFERRAL        Primary Care Provider Office Phone # Fax #    Deqa Velvet Rebollar [phone]       [address]  [facility] [address]        Equal Access to Services     CANDY COLVIN : Hadii aad ku hadasho Soomaali, waaxda luqadaha, qaybta kaalmada adeegyada, waxay idiin hayaan adeeg kharash la'[place] . So [facility] [phone].    ATENCIÓN: Si habla español, tiene a rojas disposición servicios gratuitos de asistencia lingüística. [facility] [phone].    We comply with applicable federal civil rights laws and [place] laws. We do not discriminate on the basis of race, color, national origin, age, disability, sex, sexual orientation, or gender identity.            Thank you!     Thank you for choosing [facility]  for your care. Our goal is always to provide you with excellent care. Hearing back from our patients is one way we can continue to improve our services. Please take a few minutes to complete the written survey that you may receive in the mail after your visit with us. Thank you!             Your Updated Medication List - Protect others around you: Learn how to safely use, store and throw away your medicines at www.disposemymeds.org.          This list is accurate as of: 10/30/17  8:24 AM.  Always use your most recent med list.                   Brand Name Dispense Instructions for use Diagnosis    albuterol 108 (90 BASE) MCG/ACT Inhaler    PROAIR HFA/PROVENTIL HFA/VENTOLIN HFA    1 Inhaler    Inhale 2 puffs into the lungs every 4 hours as needed For wheezing or shortness of breath    Acute bronchitis, unspecified organism       venlafaxine 75 MG Tb24 24 hr tablet    EFFEXOR-ER    90 tablet    Take 1 tablet (75 mg) by mouth daily    СЕРГЕЙ (generalized anxiety disorder), Major depressive disorder,  recurrent episode, mild (H)

## 2018-03-23 ENCOUNTER — OFFICE VISIT (OUTPATIENT)
Dept: FAMILY MEDICINE | Facility: CLINIC | Age: 63
End: 2018-03-23
Payer: COMMERCIAL

## 2018-03-23 VITALS
RESPIRATION RATE: 17 BRPM | DIASTOLIC BLOOD PRESSURE: 63 MMHG | OXYGEN SATURATION: 99 % | SYSTOLIC BLOOD PRESSURE: 103 MMHG | BODY MASS INDEX: 30.07 KG/M2 | HEIGHT: 65 IN | WEIGHT: 180.5 LBS | TEMPERATURE: 97.4 F | HEART RATE: 62 BPM

## 2018-03-23 DIAGNOSIS — F41.1 GENERALIZED ANXIETY DISORDER: ICD-10-CM

## 2018-03-23 DIAGNOSIS — Z12.31 ENCOUNTER FOR SCREENING MAMMOGRAM FOR BREAST CANCER: ICD-10-CM

## 2018-03-23 DIAGNOSIS — E66.01 MORBID OBESITY, UNSPECIFIED OBESITY TYPE (H): ICD-10-CM

## 2018-03-23 DIAGNOSIS — F32.0 MILD MAJOR DEPRESSION (H): Primary | ICD-10-CM

## 2018-03-23 PROCEDURE — 99214 OFFICE O/P EST MOD 30 MIN: CPT | Performed by: FAMILY MEDICINE

## 2018-03-23 RX ORDER — VENLAFAXINE HYDROCHLORIDE 150 MG/1
150 CAPSULE, EXTENDED RELEASE ORAL DAILY
Qty: 90 CAPSULE | Refills: 1 | Status: SHIPPED | OUTPATIENT
Start: 2018-03-23 | End: 2018-08-14

## 2018-03-23 ASSESSMENT — ANXIETY QUESTIONNAIRES
7. FEELING AFRAID AS IF SOMETHING AWFUL MIGHT HAPPEN: NOT AT ALL
1. FEELING NERVOUS, ANXIOUS, OR ON EDGE: SEVERAL DAYS
5. BEING SO RESTLESS THAT IT IS HARD TO SIT STILL: NOT AT ALL
4. TROUBLE RELAXING: SEVERAL DAYS
IF YOU CHECKED OFF ANY PROBLEMS ON THIS QUESTIONNAIRE, HOW DIFFICULT HAVE THESE PROBLEMS MADE IT FOR YOU TO DO YOUR WORK, TAKE CARE OF THINGS AT HOME, OR GET ALONG WITH OTHER PEOPLE: SOMEWHAT DIFFICULT
3. WORRYING TOO MUCH ABOUT DIFFERENT THINGS: SEVERAL DAYS
6. BECOMING EASILY ANNOYED OR IRRITABLE: NOT AT ALL
GAD7 TOTAL SCORE: 3
2. NOT BEING ABLE TO STOP OR CONTROL WORRYING: NOT AT ALL

## 2018-03-23 NOTE — MR AVS SNAPSHOT
"              After Visit Summary   3/23/2018    Debby Barriga    MRN: [id number]           Patient Information     Date Of Birth          1955        Visit Information        Provider Department      3/23/2018 7:20 AM Galo Rebollar MD [facility]        Today's Diagnoses     Mild major depression (H)    -  1    Generalized anxiety disorder        Morbid obesity, unspecified obesity type (H)        Encounter for screening mammogram for breast cancer           Follow-ups after your visit        Who to contact     If you have questions or need follow up information about today's clinic visit or your schedule please contact [facility] directly at [phone].  Normal or non-critical lab and imaging results will be communicated to you by MyChart, letter or phone within 4 business days after the clinic has received the results. If you do not hear from us within 7 days, please contact the clinic through MyChart or phone. If you have a critical or abnormal lab result, we will notify you by phone as soon as possible.  Submit refill requests through Go[employer]Ch[employer] or call your pharmacy and they will forward the refill request to us. Please allow 3 business days for your refill to be completed.          Additional Information About Your Visit        MyChart Information     GooseChase lets you send messages to your doctor, view your test results, renew your prescriptions, schedule appointments and more. To sign up, go to [email or web address] . Click on \"Log in\" on the left side of the screen, which will take you to the Welcome page. Then click on \"Sign up Now\" on the right side of the page.     You will be asked to enter the access code listed below, as well as some personal information. Please follow the directions to create your username and password.     Your access code is: [email or web address]  Expires: 2018  7:58 AM     Your access code will  in 90 days. If you need help or a new code, " "please call your [place] clinic or [phone].        Care EveryWhere ID     This is your Care EveryWhere ID. This could be used by other organizations to access your [place] medical records  [id number]        Your Vitals Were     Pulse Temperature Respirations Height Pulse Oximetry BMI (Body Mass Index)    62 97.4  F (36.3  C) (Oral) 17 5' 5.25\" (1.657 m) 99% 29.81 kg/m2       Blood Pressure from Last 3 Encounters:   03/23/18 103/63   10/30/17 123/78   10/13/17 122/81    Weight from Last 3 Encounters:   03/23/18 180 lb 8 oz (81.9 kg)   10/30/17 198 lb (89.8 kg)   10/13/17 199 lb 4 oz (90.4 kg)              Today, you had the following     No orders found for display         Today's Medication Changes          These changes are accurate as of 3/23/18  7:58 AM.  If you have any questions, ask your nurse or doctor.               These medicines have changed or have updated prescriptions.        Dose/Directions    * venlafaxine 75 MG Tb24 24 hr tablet   Commonly known as:  EFFEXOR-ER   This may have changed:  Another medication with the same name was added. Make sure you understand how and when to take each.   Used for:  СЕРГЕЙ (generalized anxiety disorder), Major depressive disorder, recurrent episode, mild (H)   Changed by:  Galo Rebollar MD        Dose:  75 mg   Take 1 tablet (75 mg) by mouth daily   Quantity:  90 tablet   Refills:  1       * venlafaxine 150 MG 24 hr capsule   Commonly known as:  EFFEXOR-XR   This may have changed:  You were already taking a medication with the same name, and this prescription was added. Make sure you understand how and when to take each.   Used for:  Mild major depression (H), Generalized anxiety disorder   Changed by:  Galo Rebollar MD        Dose:  150 mg   Take 1 capsule (150 mg) by mouth daily   Quantity:  90 capsule   Refills:  1       * Notice:  This list has 2 medication(s) that are the same as other medications prescribed for you. Read the directions " carefully, and ask your doctor or other care provider to review them with you.         Where to get your medicines      These medications were sent to [facility]. - [address], [facility] [address]     Phone:  [phone]     venlafaxine 150 MG 24 hr capsule                Primary Care Provider Office Phone # Fax #    Deqa Velvet Rebollar [phone]       [address] [facility] [address]        Equal Access to Services     EARLENE [facility] Hospital[facility] : Hadii aad ku hadasho Soomaali, waaxda luqadaha, qaybta kaalmada adeegyada, waxay idiin hayaan adechemo owens . So [facility] [phone].    ATENCIÓN: Si habla español, tiene a rojas disposición servicios gratuitos de asistencia lingüística. [facility] [phone].    We comply with applicable federal civil rights laws and [place] laws. We do not discriminate on the basis of race, color, national origin, age, disability, sex, sexual orientation, or gender identity.            Thank you!     Thank you for choosing [facility]  for your care. Our goal is always to provide you with excellent care. Hearing back from our patients is one way we can continue to improve our services. Please take a few minutes to complete the written survey that you may receive in the mail after your visit with us. Thank you!             Your Updated Medication List - Protect others around you: Learn how to safely use, store and throw away your medicines at www.disposemymeds.org.          This list is accurate as of 3/23/18  7:58 AM.  Always use your most recent med list.                   Brand Name Dispense Instructions for use Diagnosis    albuterol 108 (90 BASE) MCG/ACT Inhaler    PROAIR HFA/PROVENTIL HFA/VENTOLIN HFA    1 Inhaler    Inhale 2 puffs into the lungs every 4 hours as needed For wheezing or shortness of breath    Acute bronchitis, unspecified organism       * venlafaxine 75 MG Tb24 24 hr tablet     EFFEXOR-ER    90 tablet    Take 1 tablet (75 mg) by mouth daily    СЕРГЕЙ (generalized anxiety disorder), Major depressive disorder, recurrent episode, mild (H)       * venlafaxine 150 MG 24 hr capsule    EFFEXOR-XR    90 capsule    Take 1 capsule (150 mg) by mouth daily    Mild major depression (H), Generalized anxiety disorder       * Notice:  This list has 2 medication(s) that are the same as other medications prescribed for you. Read the directions carefully, and ask your doctor or other care provider to review them with you.
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
## 2018-03-24 ASSESSMENT — ANXIETY QUESTIONNAIRES: GAD7 TOTAL SCORE: 3

## 2018-03-24 ASSESSMENT — PATIENT HEALTH QUESTIONNAIRE - PHQ9: SUM OF ALL RESPONSES TO PHQ QUESTIONS 1-9: 2

## 2018-03-29 DIAGNOSIS — F33.0 MAJOR DEPRESSIVE DISORDER, RECURRENT EPISODE, MILD (H): ICD-10-CM

## 2018-03-29 DIAGNOSIS — F41.1 GAD (GENERALIZED ANXIETY DISORDER): ICD-10-CM

## 2018-03-29 RX ORDER — VENLAFAXINE HYDROCHLORIDE 75 MG/1
75 TABLET, EXTENDED RELEASE ORAL DAILY
Qty: 90 TABLET | Refills: 1 | Status: CANCELLED | OUTPATIENT
Start: 2018-03-29

## 2018-04-03 NOTE — TELEPHONE ENCOUNTER
On 3/23/18 Effexor 150 mg refill sent in to [facility] pharmacy.    This request is for previous dose.    Closing encounter.  OZ FlorezN, RN

## 2018-04-26 ENCOUNTER — TELEPHONE (OUTPATIENT)
Dept: FAMILY MEDICINE | Facility: CLINIC | Age: 63
End: 2018-04-26

## 2018-04-26 DIAGNOSIS — M25.511 ACUTE PAIN OF RIGHT SHOULDER: Primary | ICD-10-CM

## 2018-04-26 NOTE — TELEPHONE ENCOUNTER
Reason for Call: Request for an order or referral:    Order or referral being requested: Pt is calling requesting an referral to see ortho for R shoulder pain    Date needed: as soon as possible    Has the patient been seen by the PCP for this problem? NO    Additional comments: NA    Phone number Patient can be reached at:  Home number on file [phone] (home)    Best Time:  anytime    Can we leave a detailed message on this number?  YES    Call taken on 4/26/2018 at 8:59 AM by Brea Rizo

## 2018-04-26 NOTE — TELEPHONE ENCOUNTER
Writer called patient who stated:  1. Right shoulder pain for months  2. 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 Pain is an ache and sometimes a burning sensation  4. Pain more noticeable depending on movement of RUE  5. Discomfort present when raising RUE above head  6. Tries not to do any heavy lifting    Patient aware Dr. Rebollar not in clinic today but due to be in clinic tomorrow.    Ortho  referral pended.    Dr. Rebollar-Please review and advise/sign if agree.    Thank you!  EUNICE Knott, BSN, RN

## 2018-04-27 NOTE — TELEPHONE ENCOUNTER
Left message on machine to call back.  Ask to speak to an RN, let them know it's a return call.  Leave a number and time that you can be reached.   Analy Willis RN

## 2018-08-14 ENCOUNTER — OFFICE VISIT (OUTPATIENT)
Dept: FAMILY MEDICINE | Facility: CLINIC | Age: 63
End: 2018-08-14
Payer: COMMERCIAL

## 2018-08-14 VITALS
BODY MASS INDEX: 28.49 KG/M2 | HEIGHT: 65 IN | DIASTOLIC BLOOD PRESSURE: 78 MMHG | SYSTOLIC BLOOD PRESSURE: 118 MMHG | TEMPERATURE: 98.3 F | WEIGHT: 171 LBS | HEART RATE: 69 BPM | RESPIRATION RATE: 12 BRPM | OXYGEN SATURATION: 95 %

## 2018-08-14 DIAGNOSIS — Z00.00 ROUTINE GENERAL MEDICAL EXAMINATION AT A HEALTH CARE FACILITY: Primary | ICD-10-CM

## 2018-08-14 DIAGNOSIS — Z13.1 SCREENING FOR DIABETES MELLITUS: ICD-10-CM

## 2018-08-14 DIAGNOSIS — M79.621 PAIN OF RIGHT UPPER ARM: ICD-10-CM

## 2018-08-14 DIAGNOSIS — F41.1 GENERALIZED ANXIETY DISORDER: ICD-10-CM

## 2018-08-14 DIAGNOSIS — F32.0 MILD MAJOR DEPRESSION (H): ICD-10-CM

## 2018-08-14 DIAGNOSIS — Z13.220 SCREENING FOR LIPID DISORDERS: ICD-10-CM

## 2018-08-14 LAB
CHOLEST SERPL-MCNC: 255 MG/DL
GLUCOSE SERPL-MCNC: 78 MG/DL (ref 70–99)
HDLC SERPL-MCNC: 51 MG/DL
LDLC SERPL CALC-MCNC: 187 MG/DL
NONHDLC SERPL-MCNC: 204 MG/DL
TRIGL SERPL-MCNC: 85 MG/DL

## 2018-08-14 PROCEDURE — 99396 PREV VISIT EST AGE 40-64: CPT | Performed by: FAMILY MEDICINE

## 2018-08-14 PROCEDURE — 80061 LIPID PANEL: CPT | Performed by: FAMILY MEDICINE

## 2018-08-14 PROCEDURE — 82947 ASSAY GLUCOSE BLOOD QUANT: CPT | Performed by: FAMILY MEDICINE

## 2018-08-14 PROCEDURE — 36415 COLL VENOUS BLD VENIPUNCTURE: CPT | Performed by: FAMILY MEDICINE

## 2018-08-14 RX ORDER — VENLAFAXINE HYDROCHLORIDE 150 MG/1
150 CAPSULE, EXTENDED RELEASE ORAL DAILY
Qty: 90 CAPSULE | Refills: 1 | Status: SHIPPED | OUTPATIENT
Start: 2018-08-14 | End: 2019-01-14

## 2018-08-14 NOTE — LETTER
August 15, 2018      Debby BERT Barriga  [address]  [place] [address]        Dear Debby,      Here are your results for your recent labs.   Your blood sugar was normal.   Your total cholesterol and LDL (bad cholesterol) were elevated.   You can improve your cholesterol by controlling the amount and type of fat you eat and by increasing your daily activity level.    Here are some ways to improve your nutrition:  Eat less fat (especially butter, Crisco and other saturated fats)  Buy lean cuts of meat; reduce your portions of red meat or substitute poultry or fish  Use skim milk and low-fat dairy products  Eat no more than 4 egg yolks per week  Avoid fried or fast foods that are high in fat  Eat more fruits and vegetables    Please call or message me if you have questions or concerns.     Results for orders placed or performed in visit on 08/14/18   Lipid Profile (Chol, Trig, HDL, LDL calc)   Result Value Ref Range    Cholesterol 255 (H) <200 mg/dL    Triglycerides 85 <150 mg/dL    HDL Cholesterol 51 >49 mg/dL    LDL Cholesterol Calculated 187 (H) <100 mg/dL    Non HDL Cholesterol 204 (H) <130 mg/dL   Glucose   Result Value Ref Range    Glucose 78 70 - 99 mg/dL     Sincerely,    Galo Rebolalr MD

## 2018-08-14 NOTE — PROGRESS NOTES
SUBJECTIVE:   CC: Debby Barriga is an 62 year old woman who presents for preventive health visit.     Physical   Annual:     Getting at least 3 servings of Calcium per day:  Yes    Bi-annual eye exam:  NO    Dental care twice a year:  Yes    Sleep apnea or symptoms of sleep apnea:  Daytime drowsiness    Diet:  Carbohydrate counting    Frequency of exercise:  2-3 days/week    Duration of exercise:  15-30 minutes    Taking medications regularly:  Yes    Medication side effects:  None    She notes that her urethra twinges which is due to stress.   Around once/week she is waking up with severe pain in her arm. Pain is worse with rubbing. She is putting ice which helps. That happens around once/week. During the day, the pain doesn't occur. Pain wakes her up. She notes pain is from the right shoulder to elbow. Her ROM is normal. She endorses weakness of the extremity. She does  for her clients.   She also endorses headaches around twice/week.   Pt feels that exercise helps with stress.   Pt would be interested in therapy however her plan has a high deductible.   Pt is in AA and in recovery for 30 years. Her friends are supportive.       Today's PHQ-2 Score:   PHQ-2 ( 1999 [employer]) 8/14/2018   Q1: Little interest or pleasure in doing things 1   Q2: Feeling down, depressed or hopeless 1   PHQ-2 Score 2   Q1: Little interest or pleasure in doing things Several days   Q2: Feeling down, depressed or hopeless Several days   PHQ-2 Score 2       Abuse: Current or Past(Physical, Sexual or Emotional)- No  Do you feel safe in your environment - Yes    Social History   Substance Use Topics     Smoking status: Former Smoker     Types: Cigarettes     Quit date: 7/23/1982     Smokeless tobacco: Never Used     Alcohol use No     Alcohol Use 8/14/2018   If you drink alcohol do you typically have greater than 3 drinks per day OR greater than 7 drinks per week? No   No flowsheet data found.    Reviewed orders with patient.   "Reviewed health maintenance and updated orders accordingly - Yes      Patient over age 50, mutual decision to screen reflected in health maintenance.    Pertinent mammograms are reviewed under the imaging tab.  History of abnormal Pap smear: NO - age 30-65 PAP every 5 years with negative HPV co-testing recommended  PAP / HPV Latest Ref Rng & Units 6/20/2017   PAP - NIL   HPV 16 DNA NEG Negative   HPV 18 DNA NEG Negative   OTHER HR HPV NEG Negative     Reviewed and updated as needed this visit by clinical staff         Reviewed and updated as needed this visit by Provider            Review of Systems  CONSTITUTIONAL: NEGATIVE for fever, chills, change in weight  INTEGUMENTARU/SKIN: NEGATIVE for worrisome rashes, moles or lesions  EYES: NEGATIVE for vision changes or irritation  ENT: NEGATIVE for ear, mouth and throat problems  RESP: NEGATIVE for significant cough or SOB  BREAST: NEGATIVE for masses, tenderness or discharge  CV: NEGATIVE for chest pain, palpitations or peripheral edema  GI: NEGATIVE for nausea, abdominal pain, heartburn, or change in bowel habits  : NEGATIVE for unusual urinary or vaginal symptoms. Periods are regular.  MUSCULOSKELETAL: NEGATIVE for significant arthralgias or myalgia  NEURO: NEGATIVE for weakness, dizziness or paresthesias  PSYCHIATRIC: NEGATIVE for changes in mood or affect     OBJECTIVE:   Physical Exam   /78  Pulse 69  Temp 98.3  F (36.8  C) (Tympanic)  Resp 12  Ht 5' 5.25\" (1.657 m)  Wt 171 lb (77.6 kg)  SpO2 95%  BMI 28.24 kg/m2  GENERAL APPEARANCE: healthy, alert and no distress  EYES: Eyes grossly normal to inspection, PERRL and conjunctivae and sclerae normal  HENT: ear canals and TM's normal, nose and mouth without ulcers or lesions, oropharynx clear and oral mucous membranes moist  NECK: no adenopathy, no asymmetry, masses, or scars and thyroid normal to palpation  RESP: lungs clear to auscultation - no rales, rhonchi or wheezes  BREAST: normal without masses, " tenderness or nipple discharge and no palpable axillary masses or adenopathy  CV: regular rate and rhythm, normal S1 S2, no S3 or S4, no murmur, click or rub, no peripheral edema and peripheral pulses strong  ABDOMEN: soft, nontender, no hepatosplenomegaly, no masses and bowel sounds normal  MS: no musculoskeletal defects are noted and gait is age appropriate without ataxia  SKIN: no suspicious lesions or rashes  NEURO: Normal strength and tone, sensory exam grossly normal, mentation intact and speech normal  PSYCH: mentation appears normal and affect normal/bright    Diagnostic Test Results:  none     ASSESSMENT/PLAN:     1. Routine general medical examination at a health care facility  - pt will call [facility] program to schedule mammogram     2. Mild major depression (H)  PHQ-9 SCORE 10/13/2017 3/23/2018 8/14/2018   Total Score - - -   Total Score 4 2 3     - venlafaxine (EFFEXOR-XR) 150 MG 24 hr capsule; Take 1 capsule (150 mg) by mouth daily  Dispense: 90 capsule; Refill: 1  - repeat PHQ9 in 6 months, if stable then refill for another 6 months     3. Generalized anxiety disorder  СЕРГЕЙ-7 SCORE 9/12/2017 10/13/2017 3/23/2018   Total Score - - -   Total Score 0 8 3       - venlafaxine (EFFEXOR-XR) 150 MG 24 hr capsule; Take 1 capsule (150 mg) by mouth daily  Dispense: 90 capsule; Refill: 1    4. Screening for lipid disorders  - Lipid Profile (Chol, Trig, HDL, LDL calc)  The 10-year ASCVD risk score ([place] ERNST Jr, et al., 2013) is: 3.6%    Values used to calculate the score:      Age: 62 years      Sex: Female      Is Non- : No      Diabetic: No      Tobacco smoker: No      Systolic Blood Pressure: 118 mmHg      Is BP treated: No      HDL Cholesterol: 51 mg/dL      Total Cholesterol: 255 mg/dL    5. Screening for diabetes mellitus  - Glucose    6. Pain of right arm   - We discussed that likely due to tendinopathy. Also included in the d/d is right shoulder arthritis vs unlikely due to impingement vs  "rotator cuff injury.   - due to deductible, pt can't do PT  - advised stretching exercises, ice and tylenol PRN  - avoid overuse  - if not improving then pt would need further evaluation       COUNSELING:  Reviewed preventive health counseling, as reflected in patient instructions    BP Readings from Last 1 Encounters:   03/23/18 103/63     Estimated body mass index is 29.81 kg/(m^2) as calculated from the following:    Height as of 3/23/18: 5' 5.25\" (1.657 m).    Weight as of 3/23/18: 180 lb 8 oz (81.9 kg).      Weight management plan: Discussed healthy diet and exercise guidelines and patient will follow up in 12 months in clinic to re-evaluate.     reports that she quit smoking about 36 years ago. Her smoking use included Cigarettes. She has never used smokeless tobacco.      Counseling Resources:  ATP IV Guidelines  Pooled Cohorts Equation Calculator  Breast Cancer Risk Calculator  FRAX Risk Assessment  ICSI Preventive Guidelines  Dietary Guidelines for Americans, 2010  USDA's MyPlate  ASA Prophylaxis  Lung CA Screening    Moriaha Velvet Rebollar MD  [facility]  Answers for HPI/ROS submitted by the patient on 8/14/2018   PHQ-2 Score: 2    "

## 2018-08-14 NOTE — MR AVS SNAPSHOT
After Visit Summary   8/14/2018    Debby Barriga    MRN: [id number]           Patient Information     Date Of Birth          1955        Visit Information        Provider Department      8/14/2018 7:20 AM Galo Rebollar MD [facility]        Today's Diagnoses     Routine general medical examination at a health care facility    -  1    Mild major depression (H)        Generalized anxiety disorder        Screening for lipid disorders        Screening for diabetes mellitus        Pain of right upper arm          Care Instructions      Preventive Health Recommendations  Female Ages 50 - 64    Yearly exam: See your health care provider every year in order to  o Review health changes.   o Discuss preventive care.    o Review your medicines if your doctor has prescribed any.      Get a Pap test every three years (unless you have an abnormal result and your provider advises testing more often).    If you get Pap tests with HPV test, you only need to test every 5 years, unless you have an abnormal result.     You do not need a Pap test if your uterus was removed (hysterectomy) and you have not had cancer.    You should be tested each year for STDs (sexually transmitted diseases) if you're at risk.     Have a mammogram every 1 to 2 years.    Have a colonoscopy at age 50, or have a yearly FIT test (stool test). These exams screen for colon cancer.      Have a cholesterol test every 5 years, or more often if advised.    Have a diabetes test (fasting glucose) every three years. If you are at risk for diabetes, you should have this test more often.     If you are at risk for osteoporosis (brittle bone disease), think about having a bone density scan (DEXA).    Shots: Get a flu shot each year. Get a tetanus shot every 10 years.    Nutrition:     Eat at least 5 servings of fruits and vegetables each day.    Eat whole-grain bread, whole-wheat pasta and brown rice instead of white grains and  "rice.    Get adequate Calcium and Vitamin D.     Lifestyle    Exercise at least 150 minutes a week (30 minutes a day, 5 days a week). This will help you control your weight and prevent disease.    Limit alcohol to one drink per day.    No smoking.     Wear sunscreen to prevent skin cancer.     See your dentist every six months for an exam and cleaning.    See your eye doctor every 1 to 2 years.            Follow-ups after your visit        Who to contact     If you have questions or need follow up information about today's clinic visit or your schedule please contact [facility] directly at [phone].  Normal or non-critical lab and imaging results will be communicated to you by MyChart, letter or phone within 4 business days after the clinic has received the results. If you do not hear from us within 7 days, please contact the clinic through MyChart or phone. If you have a critical or abnormal lab result, we will notify you by phone as soon as possible.  Submit refill requests through [employer] or call your pharmacy and they will forward the refill request to us. Please allow 3 business days for your refill to be completed.          Additional Information About Your Visit        Care EveryWhere ID     This is your Care EveryWhere ID. This could be used by other organizations to access your [facility] medical records  [id number]        Your Vitals Were     Pulse Temperature Respirations Height Pulse Oximetry BMI (Body Mass Index)    69 98.3  F (36.8  C) (Tympanic) 12 5' 5.25\" (1.657 m) 95% 28.24 kg/m2       Blood Pressure from Last 3 Encounters:   08/14/18 118/78   03/23/18 103/63   10/30/17 123/78    Weight from Last 3 Encounters:   08/14/18 171 lb (77.6 kg)   03/23/18 180 lb 8 oz (81.9 kg)   10/30/17 198 lb (89.8 kg)              We Performed the Following     Glucose     Lipid Profile (Chol, Trig, HDL, LDL calc)          Today's Medication Changes          These changes are accurate as of 8/14/18  " 8:56 AM.  If you have any questions, ask your nurse or doctor.               These medicines have changed or have updated prescriptions.        Dose/Directions    venlafaxine 150 MG 24 hr capsule   Commonly known as:  EFFEXOR-XR   This may have changed:  Another medication with the same name was removed. Continue taking this medication, and follow the directions you see here.   Used for:  Mild major depression (H), Generalized anxiety disorder   Changed by:  Galo Rebollar MD        Dose:  150 mg   Take 1 capsule (150 mg) by mouth daily   Quantity:  90 capsule   Refills:  1            Where to get your medicines      These medications were sent to [facility] [place] [id number]     Phone:  [phone]     venlafaxine 150 MG 24 hr capsule                Primary Care Provider Office Phone # Fax #    Galo Rebollar [phone]       [address]  [facility] [address]        Equal Access to Services     [facility] AH: Hadii aad ku hadasho Soomaali, waaxda luqadaha, qaybta kaalmada adeegyada, waxay idiin hayjoycelynn nell owens . So [facility] [phone].    ATENCIÓN: Si habla español, tiene a rojas disposición servicios gratuitos de asistencia lingüística. [facility] [phone].    We comply with applicable federal civil rights laws and [place] laws. We do not discriminate on the basis of race, color, national origin, age, disability, sex, sexual orientation, or gender identity.            Thank you!     Thank you for choosing [facility]  for your care. Our goal is always to provide you with excellent care. Hearing back from our patients is one way we can continue to improve our services. Please take a few minutes to complete the written survey that you may receive in the mail after your visit with us. Thank you!             Your Updated Medication List - Protect others around you: Learn how to  safely use, store and throw away your medicines at www.disposemymeds.org.          This list is accurate as of 8/14/18  8:56 AM.  Always use your most recent med list.                   Brand Name Dispense Instructions for use Diagnosis    albuterol 108 (90 Base) MCG/ACT inhaler    PROAIR HFA/PROVENTIL HFA/VENTOLIN HFA    1 Inhaler    Inhale 2 puffs into the lungs every 4 hours as needed For wheezing or shortness of breath    Acute bronchitis, unspecified organism       venlafaxine 150 MG 24 hr capsule    EFFEXOR-XR    90 capsule    Take 1 capsule (150 mg) by mouth daily    Mild major depression (H), Generalized anxiety disorder

## 2018-08-15 ASSESSMENT — PATIENT HEALTH QUESTIONNAIRE - PHQ9: SUM OF ALL RESPONSES TO PHQ QUESTIONS 1-9: 3

## 2018-10-05 ENCOUNTER — OFFICE VISIT (OUTPATIENT)
Dept: FAMILY MEDICINE | Facility: CLINIC | Age: 63
End: 2018-10-05
Payer: COMMERCIAL

## 2018-10-05 VITALS
SYSTOLIC BLOOD PRESSURE: 120 MMHG | OXYGEN SATURATION: 97 % | WEIGHT: 171 LBS | BODY MASS INDEX: 28.24 KG/M2 | TEMPERATURE: 97.9 F | DIASTOLIC BLOOD PRESSURE: 80 MMHG | HEART RATE: 97 BPM | RESPIRATION RATE: 14 BRPM

## 2018-10-05 DIAGNOSIS — F43.0 ACUTE REACTION TO STRESS: ICD-10-CM

## 2018-10-05 DIAGNOSIS — Z23 NEED FOR PROPHYLACTIC VACCINATION AND INOCULATION AGAINST INFLUENZA: ICD-10-CM

## 2018-10-05 DIAGNOSIS — F32.0 MILD MAJOR DEPRESSION (H): Primary | ICD-10-CM

## 2018-10-05 DIAGNOSIS — F41.1 GENERALIZED ANXIETY DISORDER: ICD-10-CM

## 2018-10-05 DIAGNOSIS — M25.50 ARTHRALGIA, UNSPECIFIED JOINT: ICD-10-CM

## 2018-10-05 PROCEDURE — 90682 RIV4 VACC RECOMBINANT DNA IM: CPT | Performed by: FAMILY MEDICINE

## 2018-10-05 PROCEDURE — 99213 OFFICE O/P EST LOW 20 MIN: CPT | Mod: 25 | Performed by: FAMILY MEDICINE

## 2018-10-05 PROCEDURE — 90471 IMMUNIZATION ADMIN: CPT | Performed by: FAMILY MEDICINE

## 2018-10-05 ASSESSMENT — ANXIETY QUESTIONNAIRES
1. FEELING NERVOUS, ANXIOUS, OR ON EDGE: SEVERAL DAYS
3. WORRYING TOO MUCH ABOUT DIFFERENT THINGS: NOT AT ALL
5. BEING SO RESTLESS THAT IT IS HARD TO SIT STILL: SEVERAL DAYS
GAD7 TOTAL SCORE: 7
6. BECOMING EASILY ANNOYED OR IRRITABLE: MORE THAN HALF THE DAYS
2. NOT BEING ABLE TO STOP OR CONTROL WORRYING: SEVERAL DAYS
IF YOU CHECKED OFF ANY PROBLEMS ON THIS QUESTIONNAIRE, HOW DIFFICULT HAVE THESE PROBLEMS MADE IT FOR YOU TO DO YOUR WORK, TAKE CARE OF THINGS AT HOME, OR GET ALONG WITH OTHER PEOPLE: VERY DIFFICULT
7. FEELING AFRAID AS IF SOMETHING AWFUL MIGHT HAPPEN: SEVERAL DAYS

## 2018-10-05 ASSESSMENT — PATIENT HEALTH QUESTIONNAIRE - PHQ9: 5. POOR APPETITE OR OVEREATING: SEVERAL DAYS

## 2018-10-05 NOTE — PROGRESS NOTES
SUBJECTIVE:   Debby Barriga is a 63 year old female who presents to clinic today for the following health issues:    1. Pt is here for Work Conditioning purpose about back pain due usage of laptop, constant leaning forward when using laptop at work. 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     2. Update on immunization - Shingles?           Screening performed by Maldondao Gee on 10/5/2018 at 9:32 AM.    3. Injectable Influenza Immunization Documentation    1.  Is the person to be vaccinated sick today?   No    2. Does the person to be vaccinated have an allergy to a component   of the vaccine?   No  Egg Allergy Algorithm Link    3. Has the person to be vaccinated ever had a serious reaction   to influenza vaccine in the past?   No    4. Has the person to be vaccinated ever had Guillain-Barré syndrome?   No    Form completed by Maldonado Gee MA      Pt is on a leave from work. She states that she is detoriating for the last 3-6 months. She has always prided herself on her memory and taking in what's around her. Her memory is poor. She is experiencing elbows, wrist, shoulder, upper back, anxiety, confusion, headache and extremely poor memory. She is attributing this to stress. Today is the first day of the leave and she woke up happy. At work she goes to their home and has to use a lap top. 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   Pt has forms but she doesn't want to fill out forms. She will be stating state of [place] health professional services program. She needs to complete the program prior to finding a new job. She is still exercising and friends are supportive.               Problem list and histories reviewed & adjusted, as indicated.  Additional history: as documented        Reviewed and updated as needed this visit by clinical staff  Tobacco  Allergies  Meds  Med Hx  Surg Hx  Fam Hx  Soc Hx      Reviewed and updated as needed this visit by Provider          ROS:  Constitutional, HEENT, cardiovascular, pulmonary, gi and gu systems are negative, except as otherwise noted.    OBJECTIVE:     /80 (BP Location: Left arm, Patient Position: Chair, Cuff Size: Adult Regular)  Pulse 97  Temp 97.9  F (36.6  C) (Oral)  Resp 14  Wt 171 lb (77.6 kg)  SpO2 97%  BMI 28.24 kg/m2  Body mass index is 28.24 kg/(m^2).  GENERAL: healthy, alert and no distress  EYES: Eyes grossly normal to inspection, PERRL and conjunctivae and sclerae normal  HENT: ear canals and TM's normal, nose and mouth without ulcers or lesions  MS: no gross musculoskeletal defects noted, no edema  SKIN: no suspicious lesions or rashes  NEURO: Normal strength and tone, mentation intact and speech normal  PSYCH: mentation appears normal, affect normal/bright    Diagnostic Test Results:  none     ASSESSMENT/PLAN:     ## Mild major depression/СЕРГЕЙ/acute stress reaction:  - Pt starting [place] health professional services program. She is also exercising and interested in looking for a new job.   - Declined adjusting medication.   - Continue to monitor.    ## Arthralgia  - likely related to positioning while pt was at work   - symptoms improving  - continue to monitor     Galo Rebollar MD  [facility]

## 2018-10-05 NOTE — MR AVS SNAPSHOT
After Visit Summary   10/5/2018    Debby Barriga    MRN: [id number]           Patient Information     Date Of Birth          1955        Visit Information        Provider Department      10/5/2018 9:20 AM Galo Rebollar MD [facility]        Today's Diagnoses     Mild major depression (H)    -  1    Generalized anxiety disorder        Arthralgia, unspecified joint        Acute reaction to stress        Need for prophylactic vaccination and inoculation against influenza           Follow-ups after your visit        Who to contact     If you have questions or need follow up information about today's clinic visit or your schedule please contact [facility] directly at [phone].  Normal or non-critical lab and imaging results will be communicated to you by MyChart, letter or phone within 4 business days after the clinic has received the results. If you do not hear from us within 7 days, please contact the clinic through MyChart or phone. If you have a critical or abnormal lab result, we will notify you by phone as soon as possible.  Submit refill requests through Kontiki or call your pharmacy and they will forward the refill request to us. Please allow 3 business days for your refill to be completed.          Additional Information About Your Visit        Care EveryWhere ID     This is your Care EveryWhere ID. This could be used by other organizations to access your [facility] medical records  [id number]        Your Vitals Were     Pulse Temperature Respirations Pulse Oximetry BMI (Body Mass Index)       97 97.9  F (36.6  C) (Oral) 14 97% 28.24 kg/m2        Blood Pressure from Last 3 Encounters:   10/05/18 120/80   08/14/18 118/78   03/23/18 103/63    Weight from Last 3 Encounters:   10/05/18 171 lb (77.6 kg)   08/14/18 171 lb (77.6 kg)   03/23/18 180 lb 8 oz (81.9 kg)              We Performed the Following     FLU VACCINE, (RIV4) RECOMBINANT HA  , IM (FluBlok, egg  free) [12759]- >18 YRS (FMG recommended  50-64 YRS)     Vaccine Administration, Initial [83568]        Primary Care Provider Office Phone # Fax #    Galo Velvet Rebollar [phone] [address]  [facility] [address]        Equal Access to Services     CHUCKEARLENE VINICIUS : Hadii aad ku hadasho Soomaali, waaxda luqadaha, qaybta kaalmada adeegyada, waxay idiin hayaan adeeg khmaloush laSadeaan . So [facility] [phone].    ATENCIÓN: Si habla español, tiene a rojas disposición servicios gratuitos de asistencia lingüística. Llame al [phone].    We comply with applicable federal civil rights laws and [place] laws. We do not discriminate on the basis of race, color, national origin, age, disability, sex, sexual orientation, or gender identity.            Thank you!     Thank you for choosing [facility]  for your care. Our goal is always to provide you with excellent care. Hearing back from our patients is one way we can continue to improve our services. Please take a few minutes to complete the written survey that you may receive in the mail after your visit with us. Thank you!             Your Updated Medication List - Protect others around you: Learn how to safely use, store and throw away your medicines at www.disposemymeds.org.          This list is accurate as of 10/5/18  3:50 PM.  Always use your most recent med list.                   Brand Name Dispense Instructions for use Diagnosis    albuterol 108 (90 Base) MCG/ACT inhaler    PROAIR HFA/PROVENTIL HFA/VENTOLIN HFA    1 Inhaler    Inhale 2 puffs into the lungs every 4 hours as needed For wheezing or shortness of breath    Acute bronchitis, unspecified organism       venlafaxine 150 MG 24 hr capsule    EFFEXOR-XR    90 capsule    Take 1 capsule (150 mg) by mouth daily    Mild major depression (H), Generalized anxiety disorder

## 2018-10-06 ASSESSMENT — ANXIETY QUESTIONNAIRES: GAD7 TOTAL SCORE: 7

## 2018-10-06 ASSESSMENT — PATIENT HEALTH QUESTIONNAIRE - PHQ9: SUM OF ALL RESPONSES TO PHQ QUESTIONS 1-9: 10

## 2018-10-08 ENCOUNTER — TELEPHONE (OUTPATIENT)
Dept: OPHTHALMOLOGY | Facility: CLINIC | Age: 63
End: 2018-10-08

## 2018-10-08 NOTE — TELEPHONE ENCOUNTER
" Health Call Center    Phone Message    May a detailed message be left on voicemail: unknown    Reason for Call: Other: Patient sent an online appointment request regarding \"Increased Floaters and eye pressure\" Please call to [facility] symptoms     Action Taken: Message routed to:  [facility] ([facility] OPHTHALMOLOGY ADULT  "

## 2018-10-10 ENCOUNTER — OFFICE VISIT (OUTPATIENT)
Dept: FAMILY MEDICINE | Facility: CLINIC | Age: 63
End: 2018-10-10
Payer: COMMERCIAL

## 2018-10-10 VITALS
BODY MASS INDEX: 27.91 KG/M2 | OXYGEN SATURATION: 97 % | WEIGHT: 169 LBS | HEART RATE: 81 BPM | DIASTOLIC BLOOD PRESSURE: 75 MMHG | TEMPERATURE: 96.8 F | SYSTOLIC BLOOD PRESSURE: 121 MMHG

## 2018-10-10 DIAGNOSIS — Z13.220 SCREENING FOR LIPID DISORDERS: ICD-10-CM

## 2018-10-10 DIAGNOSIS — R51.9 NONINTRACTABLE EPISODIC HEADACHE, UNSPECIFIED HEADACHE TYPE: Primary | ICD-10-CM

## 2018-10-10 DIAGNOSIS — R41.3 MEMORY CHANGES: ICD-10-CM

## 2018-10-10 DIAGNOSIS — F33.1 MODERATE EPISODE OF RECURRENT MAJOR DEPRESSIVE DISORDER (H): ICD-10-CM

## 2018-10-10 DIAGNOSIS — F41.1 GENERALIZED ANXIETY DISORDER: ICD-10-CM

## 2018-10-10 LAB
ERYTHROCYTE [DISTWIDTH] IN BLOOD BY AUTOMATED COUNT: 13.9 % (ref 10–15)
HCT VFR BLD AUTO: 41.9 % (ref 35–47)
HGB BLD-MCNC: 13.2 G/DL (ref 11.7–15.7)
MCH RBC QN AUTO: 28.5 PG (ref 26.5–33)
MCHC RBC AUTO-ENTMCNC: 31.5 G/DL (ref 31.5–36.5)
MCV RBC AUTO: 91 FL (ref 78–100)
PLATELET # BLD AUTO: 322 10E9/L (ref 150–450)
RBC # BLD AUTO: 4.63 10E12/L (ref 3.8–5.2)
VIT B12 SERPL-MCNC: 487 PG/ML (ref 193–986)
WBC # BLD AUTO: 6.6 10E9/L (ref 4–11)

## 2018-10-10 PROCEDURE — 36415 COLL VENOUS BLD VENIPUNCTURE: CPT | Performed by: FAMILY MEDICINE

## 2018-10-10 PROCEDURE — 80061 LIPID PANEL: CPT | Performed by: FAMILY MEDICINE

## 2018-10-10 PROCEDURE — 99214 OFFICE O/P EST MOD 30 MIN: CPT | Performed by: FAMILY MEDICINE

## 2018-10-10 PROCEDURE — 80053 COMPREHEN METABOLIC PANEL: CPT | Performed by: FAMILY MEDICINE

## 2018-10-10 PROCEDURE — 84443 ASSAY THYROID STIM HORMONE: CPT | Performed by: FAMILY MEDICINE

## 2018-10-10 PROCEDURE — 82306 VITAMIN D 25 HYDROXY: CPT | Performed by: FAMILY MEDICINE

## 2018-10-10 PROCEDURE — 82607 VITAMIN B-12: CPT | Performed by: FAMILY MEDICINE

## 2018-10-10 PROCEDURE — 85027 COMPLETE CBC AUTOMATED: CPT | Performed by: FAMILY MEDICINE

## 2018-10-10 RX ORDER — VENLAFAXINE HYDROCHLORIDE 37.5 MG/1
37.5 CAPSULE, EXTENDED RELEASE ORAL DAILY
Qty: 90 CAPSULE | Refills: 0 | Status: SHIPPED | OUTPATIENT
Start: 2018-10-10 | End: 2019-01-14

## 2018-10-10 NOTE — PROGRESS NOTES
SUBJECTIVE:   Debby Barriga is a 63 year old female who presents to clinic today for the following health issues:    Follow up     Pt is on a leave of absence from work and going through [facility] health professionals service program.   [employer] [facility] - She is her  for the agency. She is hoping to meet with Tamy either today or tomorrow.   Her license is on hold.   She did make an appt with [facility] opthalmology clinic for floaters. Appt is on 0/12/8.   Tamy wants to rule out medical problems causing her symptoms.   Pt notes that over the last 1-2 years, she has been experiencing memory problems. She has to write things down to remember. She is having difficulty with short and long term memory. She is having difficulty with vision. She started experiencing headaches around one month ago. Headaches are temple radiating to the parietal region, constant, squeezing, moderate to severe, aggravated by stress and and relieved by ice pack. Endorses mild intermittent nausea. She endorses ringing in her ears which started 3-6 months ago. No vomiting, fever, chills, hearing changes, numbness/tingling/weakness of extremities, confusion, difficulty with speech, LOC or seizures. Headaches started after her first meeting at work. She hasn't been paying bills for the last 3-4 months due to concentrating more on work.   At work she hasn't been meeting expectations for a long time. She was put on leave - due to inability to complete time card accurately and on time.   No alcohol use or recreational drugs.  H/o tension headaches and one migraine in 1992.    No active SI.   She has started mediation.   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   One time she was diagnosed with bipolar but she didn't believe the diagnosis. She has     Problem list and histories reviewed & adjusted, as indicated.  Additional history: as documented    Labs reviewed in EPIC    Reviewed and updated as needed this visit  by clinical staff       Reviewed and updated as needed this visit by Provider         ROS:  Constitutional, HEENT, cardiovascular, pulmonary, gi and gu systems are negative, except as otherwise noted.    OBJECTIVE:     /75 (BP Location: Left arm, Patient Position: Sitting, Cuff Size: Adult Large)  Pulse 81  Temp 96.8  F (36  C) (Oral)  Wt 169 lb (76.7 kg)  SpO2 97%  BMI 27.91 kg/m2  Body mass index is 27.91 kg/(m^2).  GENERAL: healthy, alert and no distress  EYES: Eyes grossly normal to inspection  HENT:  nose and mouth without ulcers or lesions  NEURO: CN II-XII intact, Normal strength and tone, mentation intact and speech normal  PSYCH: very tearful during visit     Diagnostic Test Results:  none     ASSESSMENT/PLAN:     1. Nonintractable episodic headache, unspecified headache type  - We discussed that symptoms are likely due to tension headache. Pt has an upcoming appt with opthalmology clinic. I recommend symptomatic tx and continuing to monitor symptoms. Advised to call me in one week. If your symptoms including vision change, headache, confusion and ear ringing are not improving then I'll order a MRI for further evaluation.     2. Memory changes  - ordered below for further evaluation, tx as indicated   - Vitamin B12  - TSH with free T4 reflex  - CBC with platelets  - Vitamin D Deficiency  - Comprehensive metabolic panel (BMP + Alb, Alk Phos, ALT, AST, Total. Bili, TP)    3. Moderate episode of recurrent major depressive disorder (H)  - Due to worsening symptoms I have increased her effexor and added additional 37.5 mg daily. Towards the end of the visit pt mentioned that she was once diagnosed with bipolar however didn't believe the diagnosis.  It was hard for me to differentiate normal energy levels described by pt to manic episodes. From what she described it seemed appropriate for patient to have parties and being able to complete grad school. I have referred her to a psychiatrist for further  evaluation. I also recommend starting therapy.    - venlafaxine (EFFEXOR-XR) 37.5 MG 24 hr capsule; Take 1 capsule (37.5 mg) by mouth daily  Dispense: 90 capsule; Refill: 0  - MENTAL HEALTH REFERRAL  - Adult; Psychiatry and Medication Management; Psychiatry; [facility]: [facility] [phone].  Medication management & future refills will be returned to [facility] PCP upon completion of evaluation; We amilcar...  - Follow up in one month.     4. Generalized anxiety disorder  - venlafaxine (EFFEXOR-XR) 37.5 MG 24 hr capsule; Take 1 capsule (37.5 mg) by mouth daily  Dispense: 90 capsule; Refill: 0  - MENTAL HEALTH REFERRAL  - Adult; Psychiatry and Medication Management; Psychiatry; [facility]: [facility] [phone].  Medication management & future refills will be returned to [facility] PCP upon completion of evaluation; We amilcar...    5. Screening for lipid disorders  - Lipid Profile (Chol, Trig, HDL, LDL calc)  The 10-year ASCVD risk score (Josemanuel DC Jr, et al., 2013) is: 4%    Values used to calculate the score:      Age: 63 years      Sex: Female      Is Non- : No      Diabetic: No      Tobacco smoker: No      Systolic Blood Pressure: 121 mmHg      Is BP treated: No      HDL Cholesterol: 59 mg/dL      Total Cholesterol: 283 mg/dL    Galo Rebollar MD  [facility] [place]

## 2018-10-10 NOTE — LETTER
October 12, 2018      Debby Barriga  [address] [place] [address]        Dear ,    We are writing to inform you of your test results.    Here are your results for your recent labs.  Your vitamin D was normal. I would recommend Vitamin D 1, 000 U daily.   Your total cholesterol and LDL (bad cholesterol) were elevated. You can improve your cholesterol by controlling the amount and type of fat you eat and by increasing your daily activity level.    Here are some ways to improve your nutrition   Eat less fat (especially butter, Crisco and other saturated fats)  Buy lean cuts of meat; reduce your portions of red meat or substitute poultry or fish  Use skim milk and low-fat dairy products  Eat no more than 4 egg yolks per week  Avoid fried or fast foods that are high in fat  Eat more fruits and vegetables    The rest of your labs were normal.     Please call or message me if you have questions or concerns.     Resulted Orders   Vitamin B12   Result Value Ref Range    Vitamin B12 487 193 - 986 pg/mL   TSH with free T4 reflex   Result Value Ref Range    TSH 0.71 0.40 - 4.00 mU/L   CBC with platelets   Result Value Ref Range    WBC 6.6 4.0 - 11.0 10e9/L    RBC Count 4.63 3.8 - 5.2 10e12/L    Hemoglobin 13.2 11.7 - 15.7 g/dL    Hematocrit 41.9 35.0 - 47.0 %    MCV 91 78 - 100 fl    MCH 28.5 26.5 - 33.0 pg    MCHC 31.5 31.5 - 36.5 g/dL    RDW 13.9 10.0 - 15.0 %    Platelet Count 322 150 - 450 10e9/L   Vitamin D Deficiency   Result Value Ref Range    Vitamin D Deficiency screening 25 20 - 75 ug/L      Comment:      Season, race, dietary intake, and treatment affect the concentration of   25-hydroxy-Vitamin D. Values may decrease during winter months and increase   during summer months. Values 20-29 ug/L may indicate Vitamin D insufficiency   and values <20 ug/L may indicate Vitamin D deficiency.  Vitamin D determination is routinely performed by an immunoassay specific for   25 hydroxyvitamin D3.  If an individual  is on vitamin D2 (ergocalciferol)   supplementation, please specify 25 OH vitamin D2 and D3 level determination by   LCMSMS test VITD23.     Lipid Profile (Chol, Trig, HDL, LDL calc)   Result Value Ref Range    Cholesterol 283 (H) <200 mg/dL      Comment:      Desirable:       <200 mg/dl    Triglycerides 82 <150 mg/dL      Comment:      Non Fasting    HDL Cholesterol 59 >49 mg/dL    LDL Cholesterol Calculated 208 (H) <100 mg/dL      Comment:      Above desirable:  100-129 mg/dl  Borderline High:  130-159 mg/dL  High:             160-189 mg/dL  Very high:       >189 mg/dl      Non HDL Cholesterol 224 (H) <130 mg/dL      Comment:      Above Desirable:  130-159 mg/dl  Borderline high:  160-189 mg/dl  High:             190-219 mg/dl  Very high:       >219 mg/dl     Comprehensive metabolic panel (BMP + Alb, Alk Phos, ALT, AST, Total. Bili, TP)   Result Value Ref Range    Sodium 139 133 - 144 mmol/L    Potassium 3.9 3.4 - 5.3 mmol/L    Chloride 105 94 - 109 mmol/L    Carbon Dioxide 24 20 - 32 mmol/L    Anion Gap 10 3 - 14 mmol/L    Glucose 86 70 - 99 mg/dL      Comment:      Non Fasting    Urea Nitrogen 12 7 - 30 mg/dL    Creatinine 0.81 0.52 - 1.04 mg/dL    GFR Estimate 72 >60 mL/min/1.7m2      Comment:      Non  GFR Calc    GFR Estimate If Black 87 >60 mL/min/1.7m2      Comment:       GFR Calc    Calcium 9.2 8.5 - 10.1 mg/dL    Bilirubin Total 0.3 0.2 - 1.3 mg/dL    Albumin 3.8 3.4 - 5.0 g/dL    Protein Total 6.9 6.8 - 8.8 g/dL    Alkaline Phosphatase 36 (L) 40 - 150 U/L    ALT 18 0 - 50 U/L    AST 14 0 - 45 U/L       If you have any questions or concerns, please call the clinic at the number listed above.       Sincerely,        Galo Rebollar MD/nr

## 2018-10-10 NOTE — PATIENT INSTRUCTIONS
Increasing Effexor - add 37.5 mg daily  Please call me in one week. If your symptoms including vision change, headache, confusion and ear ringing are not improving then I'll order a MRI for further evaluation.   Continue exercise, sleep and healthy diet.   Following up with a psychiatrist to rule out bipolar.   Follow up in one month.

## 2018-10-10 NOTE — MR AVS SNAPSHOT
After Visit Summary   10/10/2018    Debby Barriga    MRN: [id number]           Patient Information     Date Of Birth          1955        Visit Information        Provider Department      10/10/2018 9:20 AM Galo Rebollar MD [facility]        Today's Diagnoses     Nonintractable episodic headache, unspecified headache type    -  1    Memory changes        Moderate episode of recurrent major depressive disorder (H)        Generalized anxiety disorder        Screening for lipid disorders          Care Instructions    Increasing Effexor - add 37.5 mg daily  Please call me in one week. If your symptoms including vision change, headache, confusion and ear ringing are not improving then I'll order a MRI for further evaluation.   Continue exercise, sleep and healthy diet.   Following up with a psychiatrist to rule out bipolar.   Follow up in one month.           Follow-ups after your visit        Additional Services     MENTAL HEALTH REFERRAL  - Adult; Psychiatry and Medication Management; Psychiatry; [facility] [place]: [facility] [phone].  Medication management & future refills will be returned to G PCP upon completion of evaluation; We amilcar...       Or community psychiatrist       All scheduling is subject to the client's specific insurance plan & benefits, provider/location availability, and provider clinical specialities.  Please arrive 15 minutes early for your first appointment and bring your completed paperwork.    Please be aware that coverage of these services is subject to the terms and limitations of your health insurance plan.  Call member services at your health plan with any benefit or coverage questions.                  Follow-up notes from your care team     Return in about 4 weeks (around 11/7/2018) for Routine Visit.      Your next 10 appointments already scheduled     Oct 12, 2018  7:40 AM CDT   (Arrive by 7:25 AM)   CALVIN HASSAN  JUNAID Guerra   [facility] ([facility])    [address]  [address]  [facility] [address] [phone]              Who to contact     If you have questions or need follow up information about today's clinic visit or your schedule please contact [facility] directly at [phone].  Normal or non-critical lab and imaging results will be communicated to you by MyChart, letter or phone within 4 business days after the clinic has received the results. If you do not hear from us within 7 days, please contact the clinic through MyChart or phone. If you have a critical or abnormal lab result, we will notify you by phone as soon as possible.  Submit refill requests through 2Peer (Qlipso) or call your pharmacy and they will forward the refill request to us. Please allow 3 business days for your refill to be completed.          Additional Information About Your Visit        Care EveryWhere ID     This is your Care EveryWhere ID. This could be used by other organizations to access your [facility] medical records  [id number]        Your Vitals Were     Pulse Temperature Pulse Oximetry BMI (Body Mass Index)          81 96.8  F (36  C) (Oral) 97% 27.91 kg/m2         Blood Pressure from Last 3 Encounters:   10/10/18 121/75   10/05/18 120/80   08/14/18 118/78    Weight from Last 3 Encounters:   10/10/18 169 lb (76.7 kg)   10/05/18 171 lb (77.6 kg)   08/14/18 171 lb (77.6 kg)              We Performed the Following     CBC with platelets     Comprehensive metabolic panel (BMP + Alb, Alk Phos, ALT, AST, Total. Bili, TP)     Lipid Profile (Chol, Trig, HDL, LDL calc)     MENTAL HEALTH REFERRAL  - Adult; Psychiatry and Medication Management; Psychiatry; [facility] [facility] [phone].  Medication management & future refills will be returned to G PCP upon completion of evaluation; We amilcar...     TSH with free T4 reflex     Vitamin B12     Vitamin D  Deficiency          Today's Medication Changes          These changes are accurate as of 10/10/18 10:13 AM.  If you have any questions, ask your nurse or doctor.               These medicines have changed or have updated prescriptions.        Dose/Directions    * venlafaxine 150 MG 24 hr capsule   Commonly known as:  EFFEXOR-XR   This may have changed:  Another medication with the same name was added. Make sure you understand how and when to take each.   Used for:  Mild major depression (H), Generalized anxiety disorder   Changed by:  Galo Rebollar MD        Dose:  150 mg   Take 1 capsule (150 mg) by mouth daily   Quantity:  90 capsule   Refills:  1       * venlafaxine 37.5 MG 24 hr capsule   Commonly known as:  EFFEXOR-XR   This may have changed:  You were already taking a medication with the same name, and this prescription was added. Make sure you understand how and when to take each.   Used for:  Moderate episode of recurrent major depressive disorder (H), Generalized anxiety disorder   Changed by:  Galo Rebollar MD        Dose:  37.5 mg   Take 1 capsule (37.5 mg) by mouth daily   Quantity:  90 capsule   Refills:  0       * Notice:  This list has 2 medication(s) that are the same as other medications prescribed for you. Read the directions carefully, and ask your doctor or other care provider to review them with you.      Stop taking these medicines if you haven't already. Please contact your care team if you have questions.     albuterol 108 (90 Base) MCG/ACT inhaler   Commonly known as:  PROAIR HFA/PROVENTIL HFA/VENTOLIN HFA   Stopped by:  Galo Rebollar MD                Where to get your medicines      These medications were sent to [facility] [place] [address]     Phone:  [phone]     venlafaxine 37.5 MG 24 hr capsule                Primary Care Provider Office Phone # Fax #    Galo Rebollar MD  [phone]       [address]  [facility] [address]        Equal Access to Services     CHUCKEARLENE VINICIUS : Hadii ashley avila brendon Fine, wavictor hugoda jedmarla, bijan kashubhamda ana, marilyn price. So [facility] [phone].    ATENCIÓN: Si habla español, tiene a rojas disposición servicios gratuitos de asistencia lingüística. Llame al [phone].    We comply with applicable federal civil rights laws and [place] laws. We do not discriminate on the basis of race, color, national origin, age, disability, sex, sexual orientation, or gender identity.            Thank you!     Thank you for choosing [facility]  for your care. Our goal is always to provide you with excellent care. Hearing back from our patients is one way we can continue to improve our services. Please take a few minutes to complete the written survey that you may receive in the mail after your visit with us. Thank you!             Your Updated Medication List - Protect others around you: Learn how to safely use, store and throw away your medicines at www.disposemymeds.org.          This list is accurate as of 10/10/18 10:13 AM.  Always use your most recent med list.                   Brand Name Dispense Instructions for use Diagnosis    * venlafaxine 150 MG 24 hr capsule    EFFEXOR-XR    90 capsule    Take 1 capsule (150 mg) by mouth daily    Mild major depression (H), Generalized anxiety disorder       * venlafaxine 37.5 MG 24 hr capsule    EFFEXOR-XR    90 capsule    Take 1 capsule (37.5 mg) by mouth daily    Moderate episode of recurrent major depressive disorder (H), Generalized anxiety disorder       * Notice:  This list has 2 medication(s) that are the same as other medications prescribed for you. Read the directions carefully, and ask your doctor or other care provider to review them with you.

## 2018-10-11 LAB
ALBUMIN SERPL-MCNC: 3.8 G/DL (ref 3.4–5)
ALP SERPL-CCNC: 36 U/L (ref 40–150)
ALT SERPL W P-5'-P-CCNC: 18 U/L (ref 0–50)
ANION GAP SERPL CALCULATED.3IONS-SCNC: 10 MMOL/L (ref 3–14)
AST SERPL W P-5'-P-CCNC: 14 U/L (ref 0–45)
BILIRUB SERPL-MCNC: 0.3 MG/DL (ref 0.2–1.3)
BUN SERPL-MCNC: 12 MG/DL (ref 7–30)
CALCIUM SERPL-MCNC: 9.2 MG/DL (ref 8.5–10.1)
CHLORIDE SERPL-SCNC: 105 MMOL/L (ref 94–109)
CHOLEST SERPL-MCNC: 283 MG/DL
CO2 SERPL-SCNC: 24 MMOL/L (ref 20–32)
CREAT SERPL-MCNC: 0.81 MG/DL (ref 0.52–1.04)
DEPRECATED CALCIDIOL+CALCIFEROL SERPL-MC: 25 UG/L (ref 20–75)
GFR SERPL CREATININE-BSD FRML MDRD: 72 ML/MIN/1.7M2
GLUCOSE SERPL-MCNC: 86 MG/DL (ref 70–99)
HDLC SERPL-MCNC: 59 MG/DL
LDLC SERPL CALC-MCNC: 208 MG/DL
NONHDLC SERPL-MCNC: 224 MG/DL
POTASSIUM SERPL-SCNC: 3.9 MMOL/L (ref 3.4–5.3)
PROT SERPL-MCNC: 6.9 G/DL (ref 6.8–8.8)
SODIUM SERPL-SCNC: 139 MMOL/L (ref 133–144)
TRIGL SERPL-MCNC: 82 MG/DL
TSH SERPL DL<=0.005 MIU/L-ACNC: 0.71 MU/L (ref 0.4–4)

## 2018-10-12 ENCOUNTER — OFFICE VISIT (OUTPATIENT)
Dept: OPHTHALMOLOGY | Facility: CLINIC | Age: 63
End: 2018-10-12
Payer: COMMERCIAL

## 2018-10-12 DIAGNOSIS — H43.813 VITREOUS DEGENERATION OF BOTH EYES: Primary | ICD-10-CM

## 2018-10-12 DIAGNOSIS — H25.13 SENILE NUCLEAR SCLEROSIS, BILATERAL: ICD-10-CM

## 2018-10-12 DIAGNOSIS — H52.13 MYOPIA OF BOTH EYES: ICD-10-CM

## 2018-10-12 ASSESSMENT — REFRACTION_MANIFEST
OD_CYLINDER: +1.25
OS_AXIS: 097
OD_SPHERE: -7.00
OS_CYLINDER: +2.25
OD_AXIS: 075
OS_ADD: +2.00
OS_SPHERE: -8.75
OD_ADD: +2.00

## 2018-10-12 ASSESSMENT — CUP TO DISC RATIO
OS_RATIO: 0.3
OD_RATIO: 0.3

## 2018-10-12 ASSESSMENT — VISUAL ACUITY
OS_CC+: -3
METHOD: SNELLEN - LINEAR
OD_CC: 20/40
CORRECTION_TYPE: GLASSES
OS_CC: 20/30

## 2018-10-12 ASSESSMENT — CONF VISUAL FIELD
OD_NORMAL: 1
METHOD: COUNTING FINGERS
OS_NORMAL: 1

## 2018-10-12 ASSESSMENT — TONOMETRY
OS_IOP_MMHG: 09
OD_IOP_MMHG: 08
IOP_METHOD: ICARE

## 2018-10-12 ASSESSMENT — EXTERNAL EXAM - RIGHT EYE: OD_EXAM: NORMAL

## 2018-10-12 ASSESSMENT — EXTERNAL EXAM - LEFT EYE: OS_EXAM: NORMAL

## 2018-10-12 ASSESSMENT — SLIT LAMP EXAM - LIDS
COMMENTS: NORMAL
COMMENTS: NORMAL

## 2018-10-12 NOTE — MR AVS SNAPSHOT
After Visit Summary   10/12/2018    Debby Barriga    MRN: [id number]           Patient Information     Date Of Birth          1955        Visit Information        Provider Department      10/12/2018 9:40 AM Canelo Guerra OD [facility]         Follow-ups after your visit        Your next 10 appointments already scheduled     Nov 06, 2018  8:30 AM CST   NEW GENERAL with Aiden Tovar MD   Eye Clinic ([facility])    [address]  [address]  [facility] [id number] [phone]              Who to contact     Please call your clinic at [phone] to:    Ask questions about your health    Make or cancel appointments    Discuss your medicines    Learn about your test results    Speak to your doctor            Additional Information About Your Visit        Care EveryWhere ID     This is your Care EveryWhere ID. This could be used by other organizations to access your [facility] medical records  [id number]         Blood Pressure from Last 3 Encounters:   10/10/18 121/75   10/05/18 120/80   08/14/18 118/78    Weight from Last 3 Encounters:   10/10/18 76.7 kg (169 lb)   10/05/18 77.6 kg (171 lb)   08/14/18 77.6 kg (171 lb)              Today, you had the following     No orders found for display       Primary Care Provider Office Phone # Fax #    Deqa Velvet Rebollar [phone] [address]  [facility] [address]        Equal Access to Services     [facility] AH: Hadii aad ku hadasho Sorussel, waaxda luqadaha, qaybta kaalmada adechemoyada, waxay eagle price. So [facility] [phone].    ATENCIÓN: Si habla español, tiene a rojas disposición servicios gratuitos de asistencia lingüística. Llame al [phone].    We comply with applicable federal civil rights laws and [place] laws. We do not discriminate on the basis of race, color, national origin, age, disability, sex, sexual orientation, or  gender identity.            Thank you!     Thank you for choosing [facility]  for your care. Our goal is always to provide you with excellent care. Hearing back from our patients is one way we can continue to improve our services. Please take a few minutes to complete the written survey that you may receive in the mail after your visit with us. Thank you!             Your Updated Medication List - Protect others around you: Learn how to safely use, store and throw away your medicines at www.disposemymeds.org.          This list is accurate as of 10/12/18 10:22 AM.  Always use your most recent med list.                   Brand Name Dispense Instructions for use Diagnosis    * venlafaxine 150 MG 24 hr capsule    EFFEXOR-XR    90 capsule    Take 1 capsule (150 mg) by mouth daily    Mild major depression (H), Generalized anxiety disorder       * venlafaxine 37.5 MG 24 hr capsule    EFFEXOR-XR    90 capsule    Take 1 capsule (37.5 mg) by mouth daily    Moderate episode of recurrent major depressive disorder (H), Generalized anxiety disorder       * Notice:  This list has 2 medication(s) that are the same as other medications prescribed for you. Read the directions carefully, and ask your doctor or other care provider to review them with you.

## 2018-10-12 NOTE — NURSING NOTE
"Chief Complaints and History of Present Illnesses   Patient presents with     COMPREHENSIVE EYE EXAM     HPI    Affected eye(s):  Both   Symptoms:     No blurred vision   Difficulty with reading   Floaters (Comment: patient notes that she cannot read smaller numbers, that floaters are increasing in amount, per pt)   No flashes   No tearing   No Dryness   No itching      Duration:  2 months      Do you have eye pain now?:  No      Comments:    Patient notes eyes are \"tired\", denies ora Malik October 12, 2018 9:40 AM               "

## 2018-10-12 NOTE — PROGRESS NOTES
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 Given presence of Renee rings in both eyes, patient was interested in any treatments that would be available. A YAG consult was offered and accepted by patient. Advised patient that consult does not guarantee her candidacy for treatment.     (H25.13) Senile nuclear sclerosis, bilateral  Comment: Not at surgical level   Plan: Monitor for now. RTC with any vision changes.     (H52.13) Myopia of both eyes   Plan: REFRACTION [20547]        SRx updated and released. Monitor regularly. RTC with any new flashes/floaters/vision changes.       Complete documentation of historical and exam elements from today's encounter can  be found in the full encounter summary report (not reduplicated in this progress  note). I personally obtained the chief complaint(s) and history of present illness. I  confirmed and edited as necessary the review of systems, past medical/surgical  history, family history, social history, and examination findings as documented by  others; and I examined the patient myself. I personally reviewed the relevant tests,  images, and reports as documented above. I formulated and edited as necessary the  assessment and plan and discussed the findings and management plan with the  patient and family.    Canelo Guerra OD

## 2018-10-12 NOTE — PROGRESS NOTES
Please send the letter to the patient with the following.         Here are your results for your recent labs.  Your vitamin D was normal. I would recommend Vitamin D 1, 000 U daily.   Your total cholesterol and LDL (bad cholesterol) were elevated. You can improve your cholesterol by controlling the amount and type of fat you eat and by increasing your daily activity level.    Here are some ways to improve your nutrition   Eat less fat (especially butter, Crisco and other saturated fats)  Buy lean cuts of meat; reduce your portions of red meat or substitute poultry or fish  Use skim milk and low-fat dairy products  Eat no more than 4 egg yolks per week  Avoid fried or fast foods that are high in fat  Eat more fruits and vegetables    The rest of your labs were normal.     Please call or message me if you have questions or concerns.

## 2018-11-06 ENCOUNTER — OFFICE VISIT (OUTPATIENT)
Dept: OPHTHALMOLOGY | Facility: CLINIC | Age: 63
End: 2018-11-06
Attending: OPHTHALMOLOGY
Payer: COMMERCIAL

## 2018-11-06 DIAGNOSIS — H43.813 VITREOUS DEGENERATION OF BOTH EYES: Primary | ICD-10-CM

## 2018-11-06 DIAGNOSIS — H43.313 VITREOUS STRAND, BILATERAL: ICD-10-CM

## 2018-11-06 DIAGNOSIS — H25.13 SENILE NUCLEAR SCLEROSIS, BILATERAL: ICD-10-CM

## 2018-11-06 PROCEDURE — G0463 HOSPITAL OUTPT CLINIC VISIT: HCPCS | Mod: ZF

## 2018-11-06 RX ORDER — OMEGA-3-ACID ETHYL ESTERS 1 G/1
2 CAPSULE, LIQUID FILLED ORAL 2 TIMES DAILY
COMMUNITY
End: 2020-11-11

## 2018-11-06 ASSESSMENT — REFRACTION_WEARINGRX
OD_CYLINDER: +1.25
OD_AXIS: 075
OS_AXIS: 097
OS_CYLINDER: +2.25
OD_ADD: +2.00
OS_ADD: +2.00
OS_SPHERE: -8.75
OD_SPHERE: -7.00

## 2018-11-06 ASSESSMENT — VISUAL ACUITY
CORRECTION_TYPE: GLASSES
OD_CC: 20/40
OS_CC+: -1
OD_CC+: +1
OS_CC: 20/25
METHOD: SNELLEN - LINEAR
OD_PH_CC: 20/30-2

## 2018-11-06 ASSESSMENT — CONF VISUAL FIELD
OD_NORMAL: 1
OS_NORMAL: 1

## 2018-11-06 ASSESSMENT — EXTERNAL EXAM - LEFT EYE: OS_EXAM: NORMAL

## 2018-11-06 ASSESSMENT — TONOMETRY
OS_IOP_MMHG: 7
OD_IOP_MMHG: 8
IOP_METHOD: ICARE

## 2018-11-06 ASSESSMENT — CUP TO DISC RATIO
OD_RATIO: 0.3
OS_RATIO: 0.3

## 2018-11-06 ASSESSMENT — SLIT LAMP EXAM - LIDS
COMMENTS: NORMAL
COMMENTS: NORMAL

## 2018-11-06 ASSESSMENT — EXTERNAL EXAM - RIGHT EYE: OD_EXAM: NORMAL

## 2018-11-06 NOTE — PROGRESS NOTES
Assessment & Plan      Debby Barriga is a 63 year old female with the following diagnoses:   1. Vitreous degeneration of both eyes    2. Vitreous strand, bilateral    3. Senile nuclear sclerosis, bilateral         Referral from Dr. Guerra for chronic floaters that are affecting her comfort when reading  High myope without history of surgery or trauma  Healthy dilated fundus exam with moderate syneresis in both eyes  Prominent Renee in the left eye > right eye that would be amenable to laser vitreolysis    RBA to LFT reviewed, would like to schedule  Start with left eye       Patient disposition:   Return in about 1 week (around 11/13/2018) for DFE LEFT for vitreolysis.          Attending Physician Attestation:  Complete documentation of historical and exam elements from today's encounter can be found in the full encounter summary report (not reduplicated in this progress note).  I personally obtained the chief complaint(s) and history of present illness.  I confirmed and edited as necessary the review of systems, past medical/surgical history, family history, social history, and examination findings as documented by others; and I examined the patient myself.  I personally reviewed the relevant tests, images, and reports as documented above.  I formulated and edited as necessary the assessment and plan and discussed the findings and management plan with the patient and family. . - Aiden Tovar MD

## 2018-11-06 NOTE — LETTER
11/6/2018       RE: Debby Barriga  [address]  [facility] [address]     Dear Colleague,    Thank you for referring your patient, Debby Barriga, to the EYE CLINIC at [facility]. Please see a copy of my visit note below.    Assessment & Plan      Debby Barriga is a 63 year old female with the following diagnoses:   1. Vitreous degeneration of both eyes    2. Vitreous strand, bilateral    3. Senile nuclear sclerosis, bilateral         Referral from Dr. Guerra for chronic floaters that are affecting her comfort when reading  High myope without history of surgery or trauma  Healthy dilated fundus exam with moderate syneresis in both eyes  Prominent Renee in the left eye > right eye that would be amenable to laser vitreolysis    RBA to LFT reviewed, would like to schedule  Start with left eye       Patient disposition:   Return in about 1 week (around 11/13/2018) for DFE LEFT for vitreolysis.          Attending Physician Attestation:  Complete documentation of historical and exam elements from today's encounter can be found in the full encounter summary report (not reduplicated in this progress note).  I personally obtained the chief complaint(s) and history of present illness.  I confirmed and edited as necessary the review of systems, past medical/surgical history, family history, social history, and examination findings as documented by others; and I examined the patient myself.  I personally reviewed the relevant tests, images, and reports as documented above.  I formulated and edited as necessary the assessment and plan and discussed the findings and management plan with the patient and family. . - Aiden Tovar MD       Again, thank you for allowing me to participate in the care of your patient.      Sincerely,    Aiden Tovar MD

## 2018-11-06 NOTE — MR AVS SNAPSHOT
After Visit Summary   11/6/2018    Debby Barriga    MRN: [id number]           Patient Information     Date Of Birth          1955        Visit Information        Provider Department      11/6/2018 8:30 AM Aiden Tovar MD Eye Clinic        Today's Diagnoses     Vitreous degeneration of both eyes    -  1    Vitreous strand, bilateral        Senile nuclear sclerosis, bilateral           Follow-ups after your visit        Follow-up notes from your care team     Return in about 1 week (around 11/13/2018) for DFE LEFT for vitreolysis.      Your next 10 appointments already scheduled     Nov 28, 2018  2:00 PM CST   Procedure with Aiden Tovar MD   Eye Clinic ([facility])    [address] [address] [phone]            Dec 05, 2018 12:30 PM CST   Procedure with Aiden Tovar MD   Eye Clinic ([facility])    [address] [address] [phone]              Who to contact     Please call your clinic at [phone] to:    Ask questions about your health    Make or cancel appointments    Discuss your medicines    Learn about your test results    Speak to your doctor            Additional Information About Your Visit        Care EveryWhere ID     This is your Care EveryWhere ID. This could be used by other organizations to access your [place] medical records  [id number]         Blood Pressure from Last 3 Encounters:   10/10/18 121/75   10/05/18 120/80   08/14/18 118/78    Weight from Last 3 Encounters:   10/10/18 76.7 kg (169 lb)   10/05/18 77.6 kg (171 lb)   08/14/18 77.6 kg (171 lb)              Today, you had the following     No orders found for display       Primary Care Provider Office Phone # Fax #    Deqa Velvet Rebollar [phone] [address]  [facility] [address]        Equal Access to Services      CANDY COLVIN : Hadii aad ku brendon Fine, waaxda luqadaha, qaybta kaallisette yousifjohnnybishop, marilyn eagle ramirezmaloubonita owens . So [facility] [phone].    ATENCIÓN: Si habla jose carlos, tiene a rojas disposición servicios gratuitos de asistencia lingüística. Llame al [phone].    We comply with applicable federal civil rights laws and [place] laws. We do not discriminate on the basis of race, color, national origin, age, disability, sex, sexual orientation, or gender identity.            Thank you!     Thank you for choosing EYE CLINIC  for your care. Our goal is always to provide you with excellent care. Hearing back from our patients is one way we can continue to improve our services. Please take a few minutes to complete the written survey that you may receive in the mail after your visit with us. Thank you!             Your Updated Medication List - Protect others around you: Learn how to safely use, store and throw away your medicines at www.disposemymeds.org.          This list is accurate as of 11/6/18  9:22 AM.  Always use your most recent med list.                   Brand Name Dispense Instructions for use Diagnosis    omega-3 acid ethyl esters 1 g capsule    Lovaza     Take 2 g by mouth 2 times daily        * venlafaxine 150 MG 24 hr capsule    EFFEXOR-XR    90 capsule    Take 1 capsule (150 mg) by mouth daily    Mild major depression (H), Generalized anxiety disorder       * venlafaxine 37.5 MG 24 hr capsule    EFFEXOR-XR    90 capsule    Take 1 capsule (37.5 mg) by mouth daily    Moderate episode of recurrent major depressive disorder (H), Generalized anxiety disorder       * Notice:  This list has 2 medication(s) that are the same as other medications prescribed for you. Read the directions carefully, and ask your doctor or other care provider to review them with you.

## 2018-11-06 NOTE — NURSING NOTE
Chief Complaints and History of Present Illnesses   Patient presents with     Floaters Both Eyes     HPI    Last Eye Exam:  10/12/18   Additional Referring Providers:  Dr. Guerra od [facility]    Affected eye(s):  Both   Symptoms:     Floaters      Duration:  5 years   Frequency:  Constant       Do you have eye pain now?:  No      Comments:  Pt sent here at request of Dr. Charlie ruiz. Pt has long standing Hx of floaters, notice constantly. Denies any flashing lights. BE feels good and comfortable. NIKIA CLAROS, COA 8:50 AM 11/06/2018

## 2018-11-08 ENCOUNTER — TELEPHONE (OUTPATIENT)
Dept: OPHTHALMOLOGY | Facility: CLINIC | Age: 63
End: 2018-11-08

## 2018-11-08 NOTE — TELEPHONE ENCOUNTER
Note to financial counselor to assist in codes for vitreolysis   Hector Nevarez RN 2:06 PM 11/08/18

## 2018-11-08 NOTE — TELEPHONE ENCOUNTER
[facility]    Phone Message    May a detailed message be left on voicemail: yes    Reason for Call: Other: Pt called requesting Billing Codes for Laser Eye Procedure scheduled w/ Dr. Tovar to give to her insurance company.      Action Taken: Message routed to:  [facility]

## 2018-11-14 ENCOUNTER — TELEPHONE (OUTPATIENT)
Dept: OPHTHALMOLOGY | Facility: CLINIC | Age: 63
End: 2018-11-14

## 2018-11-14 NOTE — TELEPHONE ENCOUNTER
[facility] Call [facility]    Phone Message    May a detailed message be left on voicemail: yes    Reason for Call: Other: per pt- called requesting procedure codes to give to her insurance on 11/8/18 for upcoming procedure with -- pt gave me [id number] for cost of lazer and 72084 for procedure with MD- pt states that code [id number] is incorrect according to her insurance company, please call pt back thanks     Action Taken: Message routed to:  [facility] Clinic

## 2018-11-14 NOTE — TELEPHONE ENCOUNTER
Note to financial counselor to review codes for upcoming procedure  Hector Nevarez RN 12:05 PM 11/14/18    vitreolysis scheduled   Hector Nevarez RN 12:06 PM 11/14/18

## 2018-11-28 ENCOUNTER — OFFICE VISIT (OUTPATIENT)
Dept: OPHTHALMOLOGY | Facility: CLINIC | Age: 63
End: 2018-11-28
Attending: OPHTHALMOLOGY
Payer: COMMERCIAL

## 2018-11-28 DIAGNOSIS — H43.313 VITREOUS STRAND, BILATERAL: Primary | ICD-10-CM

## 2018-11-28 PROCEDURE — 67031 LASER SURGERY EYE STRANDS: CPT | Mod: ZF | Performed by: OPHTHALMOLOGY

## 2018-11-28 ASSESSMENT — VISUAL ACUITY
OD_CC: 20/40-
OS_CC: 20/30-
METHOD: SNELLEN - LINEAR

## 2018-11-28 ASSESSMENT — REFRACTION_WEARINGRX
OS_CYLINDER: +2.25
OS_SPHERE: -8.75
OD_ADD: +2.00
OD_AXIS: 075
OD_CYLINDER: +1.25
OS_AXIS: 097
OD_SPHERE: -7.00
OS_ADD: +2.00

## 2018-11-28 ASSESSMENT — TONOMETRY
IOP_METHOD: TONOPEN
OS_IOP_MMHG: 13

## 2018-11-28 ASSESSMENT — SLIT LAMP EXAM - LIDS
COMMENTS: NORMAL
COMMENTS: NORMAL

## 2018-11-28 ASSESSMENT — EXTERNAL EXAM - RIGHT EYE: OD_EXAM: NORMAL

## 2018-11-28 ASSESSMENT — EXTERNAL EXAM - LEFT EYE: OS_EXAM: NORMAL

## 2018-11-28 ASSESSMENT — CUP TO DISC RATIO: OS_RATIO: 0.3

## 2018-11-28 NOTE — MR AVS SNAPSHOT
After Visit Summary   2018    Debby Barriga    MRN: [id number]           Patient Information     Date Of Birth          1955        Visit Information        Provider Department      2018 2:00 PM Aiden Tovar MD [facility] Clinic        Today's Diagnoses     Vitreous strand, bilateral    -  1       Follow-ups after your visit        Your next 10 appointments already scheduled     Dec 05, 2018 12:30 PM CST   Procedure with Aiden Tovar MD   Eye Clinic ([facility])    [address]  [facility] [address]   [phone]              Who to contact     Please call your clinic at [phone] to:    Ask questions about your health    Make or cancel appointments    Discuss your medicines    Learn about your test results    Speak to your doctor            Additional Information About Your Visit        MyChart Information     [employer] is an electronic gateway that provides easy, online access to your medical records. With [employer], you can request a clinic appointment, read your test results, renew a prescription or communicate with your care team.     To sign up for [employer] visit the website at [email or web address]   You will be asked to enter the access code listed below, as well as some personal information. Please follow the directions to create your username and password.     Your access code is: [email or web address]  Expires: 2019  6:30 AM     Your access code will  in 90 days. If you need help or a new code, please contact your [facility] or call [phone] for assistance.        Care EveryWhere ID     This is your Care EveryWhere ID. This could be used by other organizations to access your [place] medical records  [id number]         Blood Pressure from Last 3 Encounters:   10/10/18 121/75   10/05/18 120/80   18 118/78    Weight from Last 3 Encounters:   10/10/18 76.7 kg  (169 lb)   10/05/18 77.6 kg (171 lb)   08/14/18 77.6 kg (171 lb)              We Performed the Following     SEVERING VITREOUS STRANDS, LASER        Primary Care Provider Office Phone # Fax #    Galo Velvet Rebollar [phone]       [address]  [facility] [address]        Equal Access to Services     [facility] : Hadii aad ku hadasho Soomaali, waaxda luqadaha, qaybta kaalmada adeegyada, waxay idiin hayaan adeeg khmaloush la'aan ah. So [facility] [phone].    ATENCIÓN: Si lelia branch, tiene a rojas disposición servicios gratuitos de asistencia lingüística. [facility] [phone].    We comply with applicable federal civil rights laws and [place] laws. We do not discriminate on the basis of race, color, national origin, age, disability, sex, sexual orientation, or gender identity.            Thank you!     Thank you for choosing EYE CLINIC  for your care. Our goal is always to provide you with excellent care. Hearing back from our patients is one way we can continue to improve our services. Please take a few minutes to complete the written survey that you may receive in the mail after your visit with us. Thank you!             Your Updated Medication List - Protect others around you: Learn how to safely use, store and throw away your medicines at www.disposemymeds.org.          This list is accurate as of 11/28/18  3:14 PM.  Always use your most recent med list.                   Brand Name Dispense Instructions for use Diagnosis    omega-3 acid ethyl esters 1 g capsule    Lovaza     Take 2 g by mouth 2 times daily        * venlafaxine 150 MG 24 hr capsule    EFFEXOR-XR    90 capsule    Take 1 capsule (150 mg) by mouth daily    Mild major depression (H), Generalized anxiety disorder       * venlafaxine 37.5 MG 24 hr capsule    EFFEXOR-XR    90 capsule    Take 1 capsule (37.5 mg) by mouth daily    Moderate episode of recurrent major depressive disorder (H), Generalized anxiety disorder       *  Notice:  This list has 2 medication(s) that are the same as other medications prescribed for you. Read the directions carefully, and ask your doctor or other care provider to review them with you.

## 2018-11-28 NOTE — PROGRESS NOTES
Assessment & Plan      Debby Barriga is a 63 year old female with the following diagnoses:   1. Vitreous strand, bilateral         Here for LFT left eye.    Prominent Renee in the left eye > right eye that would be amenable to laser vitreolysis  RBA reviewed, consent obtained    Discussed symptoms of retinal tear/detachment and the need to be seen urgently should they occur   2 week recheck and likely laser right eye          Attending Physician Attestation:  Complete documentation of historical and exam elements from today's encounter can be found in the full encounter summary report (not reduplicated in this progress note).  I personally obtained the chief complaint(s) and history of present illness.  I confirmed and edited as necessary the review of systems, past medical/surgical history, family history, social history, and examination findings as documented by others; and I examined the patient myself.  I personally reviewed the relevant tests, images, and reports as documented above.  I formulated and edited as necessary the assessment and plan and discussed the findings and management plan with the patient and family. . - Aiden Tovar MD

## 2018-11-28 NOTE — NURSING NOTE
Chief Complaints and History of Present Illnesses   Patient presents with     Procedure     vitreolysis LE     HPI    Affected eye(s):  Left   Symptoms:     No blurred vision   Floaters      Duration:  2 weeks   Frequency:  Constant       Do you have eye pain now?:  No      Comments:  Here for procedure only of left eye, to rid her left eye of floaters    Maryana Ellis, COT 2:19 PM 11/28/2018

## 2018-12-04 ENCOUNTER — TELEPHONE (OUTPATIENT)
Dept: FAMILY MEDICINE | Facility: CLINIC | Age: 63
End: 2018-12-04

## 2018-12-04 DIAGNOSIS — R41.3 MEMORY LOSS: Primary | ICD-10-CM

## 2018-12-04 DIAGNOSIS — F41.8 DEPRESSION WITH ANXIETY: ICD-10-CM

## 2018-12-04 LAB — VIT B12 SERPL-MCNC: 422 PG/ML (ref 193–986)

## 2018-12-04 PROCEDURE — 36415 COLL VENOUS BLD VENIPUNCTURE: CPT

## 2018-12-04 PROCEDURE — 82607 VITAMIN B-12: CPT

## 2018-12-04 PROCEDURE — 84443 ASSAY THYROID STIM HORMONE: CPT

## 2018-12-04 NOTE — TELEPHONE ENCOUNTER
Patient was seen at the [facility] and wanted Dr Rebollar to know that they said it may be the beginning of Alzheimers.  You can call if you have any questions.  OK to leave message on voicemail.

## 2018-12-05 ENCOUNTER — OFFICE VISIT (OUTPATIENT)
Dept: OPHTHALMOLOGY | Facility: CLINIC | Age: 63
End: 2018-12-05
Attending: OPHTHALMOLOGY
Payer: COMMERCIAL

## 2018-12-05 DIAGNOSIS — H43.813 VITREOUS DEGENERATION OF BOTH EYES: ICD-10-CM

## 2018-12-05 DIAGNOSIS — H43.313 VITREOUS STRAND, BILATERAL: Primary | ICD-10-CM

## 2018-12-05 LAB — TSH SERPL DL<=0.005 MIU/L-ACNC: 1.34 MU/L (ref 0.4–4)

## 2018-12-05 PROCEDURE — G0463 HOSPITAL OUTPT CLINIC VISIT: HCPCS | Mod: ZF

## 2018-12-05 ASSESSMENT — VISUAL ACUITY
OS_CC: 20/30
CORRECTION_TYPE: GLASSES
METHOD: SNELLEN - LINEAR
OD_CC: 20/50
OS_PH_CC: 20/25

## 2018-12-05 ASSESSMENT — TONOMETRY
IOP_METHOD: TONOPEN
OD_IOP_MMHG: 9
OS_IOP_MMHG: 9

## 2018-12-05 ASSESSMENT — EXTERNAL EXAM - RIGHT EYE: OD_EXAM: NORMAL

## 2018-12-05 ASSESSMENT — SLIT LAMP EXAM - LIDS
COMMENTS: NORMAL
COMMENTS: NORMAL

## 2018-12-05 ASSESSMENT — CUP TO DISC RATIO: OS_RATIO: 0.3

## 2018-12-05 ASSESSMENT — CONF VISUAL FIELD
OD_NORMAL: 1
OS_NORMAL: 1

## 2018-12-05 ASSESSMENT — EXTERNAL EXAM - LEFT EYE: OS_EXAM: NORMAL

## 2018-12-05 NOTE — NURSING NOTE
Chief Complaints and History of Present Illnesses   Patient presents with     Follow Up For     Yag Vit. RE     HPI    Affected eye(s):  Right   Symptoms:        Duration:  1 week   Frequency:  Constant       Do you have eye pain now?:  No      Comments:  Pt. States that she is doing well.  Not seeing any floaters in LE after laser.  Not sure if she is seeing any in RE anymore??  No change in [place] BE.  Nay Arredondo COT 12:38 PM December 5, 2018

## 2018-12-05 NOTE — MR AVS SNAPSHOT
After Visit Summary   2018    Debby Barriga    MRN: [id number]           Patient Information     Date Of Birth          1955        Visit Information        Provider Department      2018 12:30 PM [facility]        Today's Diagnoses     Vitreous strand, bilateral    -  1    Vitreous degeneration of both eyes           Follow-ups after your visit        Follow-up notes from your care team     Return if symptoms worsen or fail to improve.      Who to contact     Please call your clinic at [phone] to:    Ask questions about your health    Make or cancel appointments    Discuss your medicines    Learn about your test results    Speak to your doctor            Additional Information About Your Visit        MyChart Information     [employer] is an electronic gateway that provides easy, online access to your medical records. With [employer], you can request a clinic appointment, read your test results, renew a prescription or communicate with your care team.     To sign up for [employer] visit the website at [email or web address]   You will be asked to enter the access code listed below, as well as some personal information. Please follow the directions to create your username and password.     Your access code is: [email or web address]  Expires: 2019  6:30 AM     Your access code will  in 90 days. If you need help or a new code, please contact your [facility] or call [phone] for assistance.        Care EveryWhere ID     This is your Care EveryWhere ID. This could be used by other organizations to access your [place] medical records  [id number]         Blood Pressure from Last 3 Encounters:   10/10/18 121/75   10/05/18 120/80   18 118/78    Weight from Last 3 Encounters:   10/10/18 76.7 kg (169 lb)   10/05/18 77.6 kg (171 lb)   18 77.6 kg (171 lb)              Today, you had the following     No orders found for display        Primary Care Provider Office Phone # Fax #    Deqa Velvet Rebollar [phone]       [address]  [facility] [address]        Equal Access to Services     CANDY COLVIN : Hadii ashley ku zuleimao Sotyrellali, waaxda luqadaha, qaybta kaalmada ana, marilyn cardozahudson price. So [facility] [phone].    ATENCIÓN: Si habla español, tiene a rojas disposición servicios gratuitos de asistencia lingüística. Llame al [phone].    We comply with applicable federal civil rights laws and [place] laws. We do not discriminate on the basis of race, color, national origin, age, disability, sex, sexual orientation, or gender identity.            Thank you!     Thank you for choosing [facility]  for your care. Our goal is always to provide you with excellent care. Hearing back from our patients is one way we can continue to improve our services. Please take a few minutes to complete the written survey that you may receive in the mail after your visit with us. Thank you!             Your Updated Medication List - Protect others around you: Learn how to safely use, store and throw away your medicines at www.disposemymeds.org.          This list is accurate as of 12/5/18  1:17 PM.  Always use your most recent med list.                   Brand Name Dispense Instructions for use Diagnosis    omega-3 acid ethyl esters 1 g capsule    LOVAZA     Take 2 g by mouth 2 times daily        * venlafaxine 150 MG 24 hr capsule    EFFEXOR-XR    90 capsule    Take 1 capsule (150 mg) by mouth daily    Mild major depression (H), Generalized anxiety disorder       * venlafaxine 37.5 MG 24 hr capsule    EFFEXOR-XR    90 capsule    Take 1 capsule (37.5 mg) by mouth daily    Moderate episode of recurrent major depressive disorder (H), Generalized anxiety disorder       * Notice:  This list has 2 medication(s) that are the same as other medications prescribed for you. Read the directions carefully, and ask your doctor  or other care provider to review them with you.

## 2018-12-05 NOTE — PROGRESS NOTES
Assessment & Plan      Debby Barriga is a 63 year old female with the following diagnoses:   1. Vitreous strand, bilateral    2. Vitreous degeneration of both eyes         S/P LFT left eye.  Very happy with result.  Right eye symptoms are now resolved as well  Discussed symptoms of retinal tear/detachment and the need to be seen urgently should they occur    Patient disposition:   Return if symptoms worsen or fail to improve.         Attending Physician Attestation:  Complete documentation of historical and exam elements from today's encounter can be found in the full encounter summary report (not reduplicated in this progress note).  I personally obtained the chief complaint(s) and history of present illness.  I confirmed and edited as necessary the review of systems, past medical/surgical history, family history, social history, and examination findings as documented by others; and I examined the patient myself.  I personally reviewed the relevant tests, images, and reports as documented above.  I formulated and edited as necessary the assessment and plan and discussed the findings and management plan with the patient and family. . - Aiden Tovar MD

## 2019-01-08 ENCOUNTER — COMMUNICATION - HEALTHEAST (OUTPATIENT)
Dept: FAMILY MEDICINE | Facility: CLINIC | Age: 64
End: 2019-01-08

## 2019-01-14 ENCOUNTER — OFFICE VISIT (OUTPATIENT)
Dept: FAMILY MEDICINE | Facility: CLINIC | Age: 64
End: 2019-01-14
Payer: MEDICAID

## 2019-01-14 ENCOUNTER — TELEPHONE (OUTPATIENT)
Dept: FAMILY MEDICINE | Facility: CLINIC | Age: 64
End: 2019-01-14

## 2019-01-14 VITALS
BODY MASS INDEX: 27.58 KG/M2 | SYSTOLIC BLOOD PRESSURE: 116 MMHG | OXYGEN SATURATION: 97 % | DIASTOLIC BLOOD PRESSURE: 74 MMHG | WEIGHT: 167 LBS | HEART RATE: 74 BPM

## 2019-01-14 DIAGNOSIS — F33.1 MODERATE EPISODE OF RECURRENT MAJOR DEPRESSIVE DISORDER (H): ICD-10-CM

## 2019-01-14 DIAGNOSIS — F41.1 GENERALIZED ANXIETY DISORDER: ICD-10-CM

## 2019-01-14 DIAGNOSIS — F02.80 ALZHEIMER'S DEMENTIA WITHOUT BEHAVIORAL DISTURBANCE, UNSPECIFIED TIMING OF DEMENTIA ONSET: ICD-10-CM

## 2019-01-14 DIAGNOSIS — G30.9 ALZHEIMER'S DEMENTIA WITHOUT BEHAVIORAL DISTURBANCE, UNSPECIFIED TIMING OF DEMENTIA ONSET: ICD-10-CM

## 2019-01-14 PROCEDURE — 99214 OFFICE O/P EST MOD 30 MIN: CPT | Performed by: FAMILY MEDICINE

## 2019-01-14 RX ORDER — VENLAFAXINE HYDROCHLORIDE 37.5 MG/1
37.5 CAPSULE, EXTENDED RELEASE ORAL DAILY
Qty: 90 CAPSULE | Refills: 0 | Status: SHIPPED | OUTPATIENT
Start: 2019-01-14 | End: 2019-04-23

## 2019-01-14 RX ORDER — VENLAFAXINE HYDROCHLORIDE 150 MG/1
150 CAPSULE, EXTENDED RELEASE ORAL DAILY
Qty: 90 CAPSULE | Refills: 0 | Status: SHIPPED | OUTPATIENT
Start: 2019-01-14 | End: 2019-05-29

## 2019-01-14 ASSESSMENT — ANXIETY QUESTIONNAIRES
6. BECOMING EASILY ANNOYED OR IRRITABLE: MORE THAN HALF THE DAYS
IF YOU CHECKED OFF ANY PROBLEMS ON THIS QUESTIONNAIRE, HOW DIFFICULT HAVE THESE PROBLEMS MADE IT FOR YOU TO DO YOUR WORK, TAKE CARE OF THINGS AT HOME, OR GET ALONG WITH OTHER PEOPLE: VERY DIFFICULT
3. WORRYING TOO MUCH ABOUT DIFFERENT THINGS: SEVERAL DAYS
GAD7 TOTAL SCORE: 5
5. BEING SO RESTLESS THAT IT IS HARD TO SIT STILL: NOT AT ALL
1. FEELING NERVOUS, ANXIOUS, OR ON EDGE: SEVERAL DAYS
2. NOT BEING ABLE TO STOP OR CONTROL WORRYING: NOT AT ALL
7. FEELING AFRAID AS IF SOMETHING AWFUL MIGHT HAPPEN: SEVERAL DAYS

## 2019-01-14 ASSESSMENT — PATIENT HEALTH QUESTIONNAIRE - PHQ9
SUM OF ALL RESPONSES TO PHQ QUESTIONS 1-9: 10
5. POOR APPETITE OR OVEREATING: NOT AT ALL

## 2019-01-14 NOTE — PATIENT INSTRUCTIONS
Please check with insurance about costs for shingles vaccine. Is it cheaper to get it at pharmacy vs clinic?  Please schedule mammogram.  Please start vitamin D 1, 000 U daily.   Continue current Effexor dose and follow up in 3 months.

## 2019-01-14 NOTE — PROGRESS NOTES
SUBJECTIVE:   Debby Barriga is a 63 year old female who presents to clinic today for the following health issues:    Pt has been seeing a holistic doctor for alzheimer. She has trouble with short term memory and she gets impatient.   She is due for a refill for Effexor.   She notes that mood fluctuates. She has one dog that is the apple of her eye. It is hard to be upset when he is around.   She is eligible to renew her license in Aug 2018. She will make her license inactive.   She notes that her depression has gotten worse. She has good friend support.   She will stat with AA.    She will schedule mammogram.   She doesn't want to increase Effexor dose due to   She got a new coffee pot which is clear plastic. She feels that she can't pour it on the right spot. There is something going on with her eyes.     Pt needs below testing for integreative medicine -   1. APOe genotype   2. MTHFR genotype     Problem list and histories reviewed & adjusted, as indicated.  Additional history: as documented    Labs reviewed in EPIC    Reviewed and updated as needed this visit by clinical staff  Tobacco  Allergies  Meds  Med Hx  Surg Hx  Fam Hx  Soc Hx      Reviewed and updated as needed this visit by Provider         ROS:  Constitutional, HEENT, cardiovascular, pulmonary, gi and gu systems are negative, except as otherwise noted.    OBJECTIVE:     /74   Pulse 74   Wt 75.8 kg (167 lb)   SpO2 97%   BMI 27.58 kg/m    Body mass index is 27.58 kg/m .  GENERAL: healthy, alert and no distress  EYES: Eyes grossly normal to inspection  HENT:  nose and mouth without ulcers or lesions  PSYCH: mood depressed, affect congruent     Diagnostic Test Results:  none     ASSESSMENT/PLAN:     1. Moderate episode of recurrent major depressive disorder (H)  PHQ-9 SCORE 8/14/2018 10/5/2018 1/14/2019   PHQ-9 Total Score - - -   PHQ-9 Total Score 3 10 10   - Pt not interested in adjusting medication due to side effects. Advised to start    vitamin D 1, 000 U daily.   - venlafaxine (EFFEXOR-XR) 37.5 MG 24 hr capsule; Take 1 capsule (37.5 mg) by mouth daily  Dispense: 90 capsule; Refill: 0  - venlafaxine (EFFEXOR-XR) 150 MG 24 hr capsule; Take 1 capsule (150 mg) by mouth daily  Dispense: 90 capsule; Refill: 0    2. Generalized anxiety disorder  - venlafaxine (EFFEXOR-XR) 37.5 MG 24 hr capsule; Take 1 capsule (37.5 mg) by mouth daily  Dispense: 90 capsule; Refill: 0  - venlafaxine (EFFEXOR-XR) 150 MG 24 hr capsule; Take 1 capsule (150 mg) by mouth daily  Dispense: 90 capsule; Refill: 0    3. Alzheimer's dementia without behavioral disturbance, unspecified timing of dementia onset  - Pt needs below testing for integreative medicine -   1. APOe genotype   2. MTHFR genotype   I had some questions on lab order. Pt will ask her provider and then I'll order the tests.       Advised below -   Please check with insurance about costs for shingles vaccine. Is it cheaper to get it at pharmacy vs clinic?  Please schedule mammogram.      Galo Rebollar MD  [facility]

## 2019-01-14 NOTE — TELEPHONE ENCOUNTER
Patient is calling back with information that Dr Rebollar needed to know before putting in patients lab orders. Here's the information. Answer is yes  APOLIPOPROTEIN-E  Genotype  CVrisk(aka) APOE  Yes patient has signed geriatric consent form and will bring with to her blood draw appointment

## 2019-01-15 ASSESSMENT — ANXIETY QUESTIONNAIRES: GAD7 TOTAL SCORE: 5

## 2019-01-16 NOTE — TELEPHONE ENCOUNTER
Pt calling to check on status of her last message and when the labs will be put in? Please advise. Thank-you

## 2019-01-16 NOTE — TELEPHONE ENCOUNTER
Dr. Rebollar-Please review and advise/sign if agree.    Lab pended.    Thank you!  OZ FlorezN, RN

## 2019-01-17 ENCOUNTER — TELEPHONE (OUTPATIENT)
Dept: FAMILY MEDICINE | Facility: CLINIC | Age: 64
End: 2019-01-17

## 2019-01-17 DIAGNOSIS — G30.9 ALZHEIMER'S DEMENTIA WITHOUT BEHAVIORAL DISTURBANCE, UNSPECIFIED TIMING OF DEMENTIA ONSET: Primary | ICD-10-CM

## 2019-01-17 DIAGNOSIS — G30.9 ALZHEIMER'S DEMENTIA WITHOUT BEHAVIORAL DISTURBANCE, UNSPECIFIED TIMING OF DEMENTIA ONSET: ICD-10-CM

## 2019-01-17 DIAGNOSIS — F02.80 ALZHEIMER'S DEMENTIA WITHOUT BEHAVIORAL DISTURBANCE, UNSPECIFIED TIMING OF DEMENTIA ONSET: Primary | ICD-10-CM

## 2019-01-17 DIAGNOSIS — F02.80 ALZHEIMER'S DEMENTIA WITHOUT BEHAVIORAL DISTURBANCE, UNSPECIFIED TIMING OF DEMENTIA ONSET: ICD-10-CM

## 2019-01-17 PROCEDURE — 86618 LYME DISEASE ANTIBODY: CPT | Performed by: FAMILY MEDICINE

## 2019-01-17 PROCEDURE — 81401 MOPATH PROCEDURE LEVEL 2: CPT | Performed by: FAMILY MEDICINE

## 2019-01-17 PROCEDURE — 36415 COLL VENOUS BLD VENIPUNCTURE: CPT | Performed by: FAMILY MEDICINE

## 2019-01-17 NOTE — LETTER
January 22, 2019      Debby Barriga  [address]  [facility] [address]        Dear Debby,    Here are your results for your recent lab which was normal. Please call or message me if you have questions or concerns.     Results for orders placed or performed in visit on 01/17/19   **Lyme Disease Joellen with reflex to WB Serum FUTURE 14d   Result Value Ref Range    Lyme Disease Antibodies Serum 0.07 0.00 - 0.89         Sincerely,        Melissa Rebollar MD/sukhdeep

## 2019-01-17 NOTE — TELEPHONE ENCOUNTER
Galo Rebollar MD     Request for lyme disease testing - pended     Patient states that because she was diagnosed with early onset alzheimer's that there is a correlation between this and lyme's disease and this is why she is requesting lab order - patient was advised that pcp is not in the clinic today and will address upon return to clinic 1/18/19     Please sign if okay or advise of recommendations     Ellyn Mccormack Registered Nurse   [facility]

## 2019-01-17 NOTE — TELEPHONE ENCOUNTER
Patient advised of lab orders being placed     She will schedule lab only appointment     Ellyn Mccormack, Registered Nurse   [facility]

## 2019-01-17 NOTE — TELEPHONE ENCOUNTER
Reason for Call:  Other call back    Detailed comments: Patient would like orders to be place to be tested for lime     Phone Number Patient can be reached at: Home number on file [phone] (home)    Best Time: Any     Can we leave a detailed message on this number? YES    Call taken on 1/17/2019 at 3:09 PM by Octavio Renteria

## 2019-01-18 DIAGNOSIS — F02.80 ALZHEIMER'S DEMENTIA WITHOUT BEHAVIORAL DISTURBANCE, UNSPECIFIED TIMING OF DEMENTIA ONSET: ICD-10-CM

## 2019-01-18 DIAGNOSIS — G30.9 ALZHEIMER'S DEMENTIA WITHOUT BEHAVIORAL DISTURBANCE, UNSPECIFIED TIMING OF DEMENTIA ONSET: ICD-10-CM

## 2019-01-18 PROCEDURE — 81291 MTHFR GENE: CPT | Performed by: FAMILY MEDICINE

## 2019-01-18 PROCEDURE — 36415 COLL VENOUS BLD VENIPUNCTURE: CPT | Performed by: FAMILY MEDICINE

## 2019-01-18 NOTE — LETTER
January 25, 2019      Yosi Barriga  [address] [facility] [address]        Dear ,    We are writing to inform you of your test results.    Below is a copy of your testing for MTHFR genotype. Can you please share the results with your integrative medicine provider. Please call or message me if you have questions or concerns.     Resulted Orders   MTHFR genotype   Result Value Ref Range    Copath Report       Patient Name: YOSI BARRIGA  MR#: [id number]  Specimen #:   Collected: 1/18/2019 12:45  Received: 1/21/2019 09:27  Reported: 1/24/2019 15:59  Ordering Phy(s): SHANIKA MAYS    For improved result formatting, select 'View Enhanced Report Format' under   Linked Documents section.  _________________________________________    TEST(S) REQUESTED:  Methylene Tetrahydrofolate Reductase Molecular Analysis    SPECIMEN DESCRIPTION:  Blood    METHODOLOGY: The region of genomic DNA was isolated containing the C677T   mutation (thermolabile form) of the  MTHFR gene and amplified using the polymerase chain reaction. The   amplified products were digested with  restriction endonuclease Hinf I and products were analyzed by gel   electrophoresis.    RESULTS:    MTHFR (5,10-methylenetetrahydrofolate reductase) Gene:    MTHFR Gene C677T RESULTS: NORMAL    INTERPRETATION:  The patient does not carry the C677T mutation (thermolabile form) in the   MTHFR (5,10-methylenetetrahydrofolate  reductase ) gene.    COMMENTS:  If a patient is the recipient of an allogeneic bone marrow transplant,   this test must be done on a  pre-transplant sample or buccal swab.  A previous allogeneic bone marrow   transplant will interfere with test  results.  Call the [facility] Lab([phone]) for   instructions on sample collection for these  patients.    This test was developed and its performance characteristics determined by   the [facility] [place] [facility],  [facility]. It has not  been cleared or   approved by the FDA. The laboratory is  regulated under CLIA as qualified to perform high-complexity testing. This   test is used for clinical purposes.  It should not be regarded as investigational or for research.    A resident/fellow in an accredited training program was involved in the   selection of testing, review of  laboratory data, and/or interpretation of this case.  I, as the senior   physician, attest that I: (i) confirmed  appropriate test ing, (ii) examined the relevant raw data for the   specimen(s); and (iii) rendered or confirmed  the interpretation(s).    Electronically Signed Out By:  LISA Puentes    CPT Codes:  A: 92027-GYXJFNW, -PJDEKP    TESTING LAB LOCATION:  [address] [address]  [phone]    COLLECTION SITE:  Client:  [facility]  Location:  [facility] (B)         If you have any questions or concerns, please call the clinic at the number listed above.       Sincerely,    Galo Rebollar MD/nr

## 2019-01-21 DIAGNOSIS — A69.20 LYME DISEASE: Primary | ICD-10-CM

## 2019-01-21 LAB — B BURGDOR IGG+IGM SER QL: 0.07 (ref 0–0.89)

## 2019-01-21 PROCEDURE — 86666 EHRLICHIA ANTIBODY: CPT | Mod: 90 | Performed by: FAMILY MEDICINE

## 2019-01-21 PROCEDURE — 86753 PROTOZOA ANTIBODY NOS: CPT | Mod: 90 | Performed by: FAMILY MEDICINE

## 2019-01-21 PROCEDURE — 86611 BARTONELLA ANTIBODY: CPT | Mod: 90 | Performed by: FAMILY MEDICINE

## 2019-01-21 PROCEDURE — 99000 SPECIMEN HANDLING OFFICE-LAB: CPT | Performed by: FAMILY MEDICINE

## 2019-01-21 PROCEDURE — 86757 RICKETTSIA ANTIBODY: CPT | Mod: 90 | Performed by: FAMILY MEDICINE

## 2019-01-21 PROCEDURE — 36415 COLL VENOUS BLD VENIPUNCTURE: CPT | Performed by: FAMILY MEDICINE

## 2019-01-23 LAB
A PHAGOCYTOPH IGG TITR SER IF: NORMAL {TITER}
A PHAGOCYTOPH IGM TITR SER IF: NORMAL {TITER}
B HENSELAE IGG TITR SER IF: NORMAL {TITER}
B HENSELAE IGM TITR SER IF: NORMAL {TITER}
B QUINTANA IGG TITR SER: NORMAL {TITER}
B QUINTANA IGM TITR SER: NORMAL {TITER}
E CHAFFEENSIS IGG TITR SER: NORMAL {TITER}
E CHAFFEENSIS IGM TITR SER: NORMAL {TITER}
R RICKETTSI IGG TITR SER IF: NORMAL {TITER}
R RICKETTSI IGM TITR SER IF: NORMAL {TITER}

## 2019-01-24 LAB
B MICROTI IGG TITR SER: NORMAL {TITER}
B MICROTI IGM TITR SER: NORMAL {TITER}
COPATH REPORT: NORMAL

## 2019-01-24 NOTE — RESULT ENCOUNTER NOTE
Please send the letter to the patient with the following.         Below is a copy of your testing for MTHFR genotype. Can you please share the results with your integrative medicine provider. Please call or message me if you have questions or concerns.

## 2019-01-25 LAB
APOE SPECIMEN: NORMAL
Lab: NORMAL

## 2019-04-23 DIAGNOSIS — F33.1 MODERATE EPISODE OF RECURRENT MAJOR DEPRESSIVE DISORDER (H): ICD-10-CM

## 2019-04-23 DIAGNOSIS — F41.1 GENERALIZED ANXIETY DISORDER: ICD-10-CM

## 2019-04-23 RX ORDER — VENLAFAXINE HYDROCHLORIDE 37.5 MG/1
CAPSULE, EXTENDED RELEASE ORAL
Qty: 30 CAPSULE | Refills: 0 | Status: SHIPPED | OUTPATIENT
Start: 2019-04-23 | End: 2019-05-29

## 2019-04-23 NOTE — TELEPHONE ENCOUNTER
"Requested Prescriptions   Pending Prescriptions Disp Refills     venlafaxine (EFFEXOR-XR) 37.5 MG 24 hr capsule [Pharmacy Med Name: VENLAFAXINE HCL ER 37.5MG CP24] 90 capsule 0     Sig: TAKE ONE CAPSULE BY MOUTH ONCE DAILY       Serotonin-Norepinephrine Reuptake Inhibitors  Failed - 4/23/2019  5:13 PM        Failed - PHQ-9 score of less than 5 in past 6 months     Please review last PHQ-9 score.     PHQ-9 SCORE 8/14/2018 10/5/2018 1/14/2019   PHQ-9 Total Score - - -   PHQ-9 Total Score 3 10 10               Passed - Blood pressure under 140/90 in past 12 months     BP Readings from Last 3 Encounters:   01/14/19 116/74   10/10/18 121/75   10/05/18 120/80                 Passed - Medication is active on med list        Passed - Patient is age 18 or older        Passed - No active pregnancy on record        Passed - Normal serum creatinine on file in past 12 months     Recent Labs   Lab Test 10/10/18  1020   CR 0.81             Passed - No positive pregnancy test in past 12 months        Passed - Recent (6 mo) or future (30 days) visit within the authorizing provider's specialty     Patient had office visit in the last 6 months or has a visit in the next 30 days with authorizing provider or within the authorizing provider's specialty.  See \"Patient Info\" tab in inbasket, or \"Choose Columns\" in Meds & Orders section of the refill encounter.            Medication is being filled for 1 time refill only due to:  Patient needs to be seen because Smooth advised 3 month f/u.     Signed Prescriptions:                        Disp   Refills    venlafaxine (EFFEXOR-XR) 37.5 MG 24 hr cap*30 cap*0        Sig: TAKE ONE CAPSULE BY MOUTH ONCE DAILY  Authorizing Provider: SHANIKA MAYS  Ordering User: JUANCHO BAILEY      Routing to  Reception - one month refill due for 3 month f/u    Thank you,  Juancho Bailey RN      "

## 2019-05-28 NOTE — PROGRESS NOTES
Subjective     Debby Barriga is a 63 year old female who presents to clinic today for the following health issues:    HPI   Depression Followup    How are you doing with your depression since your last visit? Improved      Are you having other symptoms that might be associated with depression? No    Have you had a significant life event?  No     Are you feeling anxious or having panic attacks?   No    Do you have any concerns with your use of alcohol or other drugs? No    Social History     Tobacco Use     Smoking status: Former Smoker     Types: Cigarettes     Last attempt to quit: 1982     Years since quittin.8     Smokeless tobacco: Never Used   Substance Use Topics     Alcohol use: No     Drug use: No     PHQ 2018 10/5/2018 2019   PHQ-9 Total Score 3 10 10   Q9: Thoughts of better off dead/self-harm past 2 weeks Not at all Not at all Not at all   F/U: Thoughts of suicide or self-harm - - No   F/U: Safety concerns - - No     СЕРГЕЙ-7 SCORE 3/23/2018 10/5/2018 2019   Total Score - - -   Total Score 3 7 5          Suicide Assessment Five-step Evaluation and Treatment (SAFE-T)    Amount of exercise or physical activity: 4-5 days/week for an average of 45-60 minutes    Problems taking medications regularly: No    Medication side effects: none    Diet: keto     Overall doing well. No major side effects from the medications.     Job she really loved but hard to do it - she had to stop working and it is helping her mood too.   Strong support network.  Alzheimer - doing well. Memory is improving. Seeing alternative therapy physician  Currently on 187.5mg per day. Will think about cutting back on 37.5mg in future.   No suicidal thoughts.       Social History     Occupational History     Not on file   Tobacco Use     Smoking status: Former Smoker     Types: Cigarettes     Last attempt to quit: 1982     Years since quittin.8     Smokeless tobacco: Never Used   Substance and Sexual Activity      "Alcohol use: No     Drug use: No     Sexual activity: Yes     Partners: Female     No Known Allergies  Patient Active Problem List   Diagnosis     Mild major depression (H)     Generalized anxiety disorder     Hypercholesteremia     Myopic astigmatism of both eyes     Presbyopia     Morbid obesity, unspecified obesity type (H)     Alzheimer's dementia without behavioral disturbance, unspecified timing of dementia onset     Reviewed medications, social history and  past medical and surgical history.    Review of system: for general, respiratory, CVS, GI and psychiatry negative except for noted above.     EXAM:  /71   Pulse 70   Temp 96.8  F (36  C) (Tympanic)   Ht 1.664 m (5' 5.5\")   Wt 72.8 kg (160 lb 8 oz)   SpO2 99%   BMI 26.30 kg/m    Constitutional: healthy, alert and no distress   Psychiatric: mentation appears normal and affect normal/bright      ASSESSMENT / PLAN:  (F33.1) Moderate episode of recurrent major depressive disorder (H)  (primary encounter diagnosis)  Comment: todays PHQ 9 score is better than the last time.  She is feeling better with the current drug regimen she would like to continue the same.  She is not having any major side effects.  Plan: venlafaxine (EFFEXOR-XR) 150 MG 24 hr capsule,         venlafaxine (EFFEXOR-XR) 37.5 MG 24 hr capsule             (F41.1) Generalized anxiety disorder  Comment: Today's СЕРГЕЙ 7 score is better than the last time.  She is feeling better with the current drug regimen she would like to continue the same.  She is not having any major side effects.  She will think about cutting back dose to 150 mg/day but not until next year.  Will talk to her PCP about it.  Plan: venlafaxine (EFFEXOR-XR) 150 MG 24 hr capsule,         venlafaxine (EFFEXOR-XR) 37.5 MG 24 hr capsule             (G30.9,  F02.80) Alzheimer's dementia without behavioral disturbance, unspecified timing of dementia onset  Comment:    Plan: Feeling like her memory is improving.  Seeing the " alternative medicine doctor.  Continue the same.    (Z12.31) Encounter for screening mammogram for breast cancer  Comment:    Plan: MA SCREENING DIGITAL BILAT - Future  (s+30)                Follow up: 6 months with PCP    The above note was dictated using voice recognition. Although reviewed after completion, some word and grammatical error may remain .

## 2019-05-29 ENCOUNTER — OFFICE VISIT (OUTPATIENT)
Dept: FAMILY MEDICINE | Facility: CLINIC | Age: 64
End: 2019-05-29
Payer: COMMERCIAL

## 2019-05-29 VITALS
OXYGEN SATURATION: 99 % | DIASTOLIC BLOOD PRESSURE: 71 MMHG | WEIGHT: 160.5 LBS | SYSTOLIC BLOOD PRESSURE: 116 MMHG | TEMPERATURE: 96.8 F | HEART RATE: 70 BPM | BODY MASS INDEX: 25.79 KG/M2 | HEIGHT: 66 IN

## 2019-05-29 DIAGNOSIS — Z12.31 ENCOUNTER FOR SCREENING MAMMOGRAM FOR BREAST CANCER: ICD-10-CM

## 2019-05-29 DIAGNOSIS — F02.80 ALZHEIMER'S DEMENTIA WITHOUT BEHAVIORAL DISTURBANCE, UNSPECIFIED TIMING OF DEMENTIA ONSET: ICD-10-CM

## 2019-05-29 DIAGNOSIS — G30.9 ALZHEIMER'S DEMENTIA WITHOUT BEHAVIORAL DISTURBANCE, UNSPECIFIED TIMING OF DEMENTIA ONSET: ICD-10-CM

## 2019-05-29 DIAGNOSIS — F41.1 GENERALIZED ANXIETY DISORDER: ICD-10-CM

## 2019-05-29 DIAGNOSIS — F33.1 MODERATE EPISODE OF RECURRENT MAJOR DEPRESSIVE DISORDER (H): Primary | ICD-10-CM

## 2019-05-29 PROCEDURE — 99214 OFFICE O/P EST MOD 30 MIN: CPT | Performed by: FAMILY MEDICINE

## 2019-05-29 RX ORDER — VENLAFAXINE HYDROCHLORIDE 37.5 MG/1
CAPSULE, EXTENDED RELEASE ORAL
Qty: 90 CAPSULE | Refills: 1 | Status: SHIPPED | OUTPATIENT
Start: 2019-05-29 | End: 2019-08-05

## 2019-05-29 RX ORDER — VENLAFAXINE HYDROCHLORIDE 150 MG/1
150 CAPSULE, EXTENDED RELEASE ORAL DAILY
Qty: 90 CAPSULE | Refills: 0 | Status: SHIPPED | OUTPATIENT
Start: 2019-05-29 | End: 2019-08-05

## 2019-05-29 ASSESSMENT — ANXIETY QUESTIONNAIRES
6. BECOMING EASILY ANNOYED OR IRRITABLE: SEVERAL DAYS
IF YOU CHECKED OFF ANY PROBLEMS ON THIS QUESTIONNAIRE, HOW DIFFICULT HAVE THESE PROBLEMS MADE IT FOR YOU TO DO YOUR WORK, TAKE CARE OF THINGS AT HOME, OR GET ALONG WITH OTHER PEOPLE: NOT DIFFICULT AT ALL
2. NOT BEING ABLE TO STOP OR CONTROL WORRYING: NOT AT ALL
GAD7 TOTAL SCORE: 2
5. BEING SO RESTLESS THAT IT IS HARD TO SIT STILL: NOT AT ALL
7. FEELING AFRAID AS IF SOMETHING AWFUL MIGHT HAPPEN: NOT AT ALL
1. FEELING NERVOUS, ANXIOUS, OR ON EDGE: SEVERAL DAYS
3. WORRYING TOO MUCH ABOUT DIFFERENT THINGS: NOT AT ALL

## 2019-05-29 ASSESSMENT — PATIENT HEALTH QUESTIONNAIRE - PHQ9
SUM OF ALL RESPONSES TO PHQ QUESTIONS 1-9: 3
5. POOR APPETITE OR OVEREATING: NOT AT ALL

## 2019-05-29 ASSESSMENT — MIFFLIN-ST. JEOR: SCORE: 1301.83

## 2019-05-30 ASSESSMENT — ANXIETY QUESTIONNAIRES: GAD7 TOTAL SCORE: 2

## 2019-08-01 NOTE — PROGRESS NOTES
Subjective     Debby Barriga is a 63 year old female who presents to clinic today for the following health issues:    HPI   Depression and Anxiety Follow-Up    How are you doing with your depression since your last visit? Improved mood    How are you doing with your anxiety since your last visit?  Worsened     Are you having other symptoms that might be associated with depression or anxiety? No    Have you had a significant life event? No     Do you have any concerns with your use of alcohol or other drugs? No    Social History     Tobacco Use     Smoking status: Former Smoker     Types: Cigarettes     Last attempt to quit: 1982     Years since quittin.0     Smokeless tobacco: Never Used   Substance Use Topics     Alcohol use: No     Drug use: No     PHQ 10/5/2018 2019 2019   PHQ-9 Total Score 10 10 3   Q9: Thoughts of better off dead/self-harm past 2 weeks Not at all Not at all Not at all   F/U: Thoughts of suicide or self-harm - No -   F/U: Safety concerns - No -     СЕРГЕЙ-7 SCORE 10/5/2018 2019 2019   Total Score - - -   Total Score 7 5 2         Symptoms have been stable.   No side effects.       Reviewed and updated as needed this visit by Provider         Review of Systems   ROS COMP: Constitutional, HEENT, cardiovascular, pulmonary, gi and gu systems are negative, except as otherwise noted.      Objective    There were no vitals taken for this visit.  There is no height or weight on file to calculate BMI.   /73   Pulse 62   Temp 97.4  F (36.3  C) (Oral)   Wt 71.2 kg (157 lb)   SpO2 99%   BMI 25.73 kg/m    Physical Exam   GENERAL: healthy, alert and no distress  EYES: Eyes grossly normal to inspection  HENT:nose and mouth without ulcers or lesions  PSYCH: mentation appears normal, affect normal/    Diagnostic Test Results:  none         Assessment & Plan     1. Moderate episode of recurrent major depressive disorder (H)  - stable, refilled below   - venlafaxine (EFFEXOR-XR)  37.5 MG 24 hr capsule; TAKE ONE CAPSULE BY MOUTH ONCE DAILY. Total 187.5 mg/day  Dispense: 90 capsule; Refill: 1  - venlafaxine (EFFEXOR-XR) 150 MG 24 hr capsule; Take 1 capsule (150 mg) by mouth daily Total 187.5 mg/day  Dispense: 90 capsule; Refill: 1    2. Generalized anxiety disorder  - stable, refilled below   - venlafaxine (EFFEXOR-XR) 37.5 MG 24 hr capsule; TAKE ONE CAPSULE BY MOUTH ONCE DAILY. Total 187.5 mg/day  Dispense: 90 capsule; Refill: 1  - venlafaxine (EFFEXOR-XR) 150 MG 24 hr capsule; Take 1 capsule (150 mg) by mouth daily Total 187.5 mg/day  Dispense: 90 capsule; Refill: 1      Discussed shingles vaccine.   Pt will schedule mammogram.      Galo Rebollar MD  [facility]

## 2019-08-05 ENCOUNTER — OFFICE VISIT (OUTPATIENT)
Dept: FAMILY MEDICINE | Facility: CLINIC | Age: 64
End: 2019-08-05
Payer: COMMERCIAL

## 2019-08-05 VITALS
OXYGEN SATURATION: 99 % | TEMPERATURE: 97.4 F | SYSTOLIC BLOOD PRESSURE: 106 MMHG | BODY MASS INDEX: 25.73 KG/M2 | DIASTOLIC BLOOD PRESSURE: 73 MMHG | WEIGHT: 157 LBS | HEART RATE: 62 BPM

## 2019-08-05 DIAGNOSIS — F41.1 GENERALIZED ANXIETY DISORDER: ICD-10-CM

## 2019-08-05 DIAGNOSIS — F33.1 MODERATE EPISODE OF RECURRENT MAJOR DEPRESSIVE DISORDER (H): ICD-10-CM

## 2019-08-05 PROCEDURE — 99214 OFFICE O/P EST MOD 30 MIN: CPT | Performed by: FAMILY MEDICINE

## 2019-08-05 RX ORDER — VENLAFAXINE HYDROCHLORIDE 37.5 MG/1
CAPSULE, EXTENDED RELEASE ORAL
Qty: 90 CAPSULE | Refills: 1 | Status: SHIPPED | OUTPATIENT
Start: 2019-08-05 | End: 2019-10-04

## 2019-08-05 RX ORDER — VENLAFAXINE HYDROCHLORIDE 150 MG/1
150 CAPSULE, EXTENDED RELEASE ORAL DAILY
Qty: 90 CAPSULE | Refills: 1 | Status: SHIPPED | OUTPATIENT
Start: 2019-08-05 | End: 2019-10-04

## 2019-08-05 ASSESSMENT — ANXIETY QUESTIONNAIRES
5. BEING SO RESTLESS THAT IT IS HARD TO SIT STILL: NOT AT ALL
1. FEELING NERVOUS, ANXIOUS, OR ON EDGE: NOT AT ALL
3. WORRYING TOO MUCH ABOUT DIFFERENT THINGS: NOT AT ALL
6. BECOMING EASILY ANNOYED OR IRRITABLE: NOT AT ALL
7. FEELING AFRAID AS IF SOMETHING AWFUL MIGHT HAPPEN: NOT AT ALL
2. NOT BEING ABLE TO STOP OR CONTROL WORRYING: NOT AT ALL
GAD7 TOTAL SCORE: 0
IF YOU CHECKED OFF ANY PROBLEMS ON THIS QUESTIONNAIRE, HOW DIFFICULT HAVE THESE PROBLEMS MADE IT FOR YOU TO DO YOUR WORK, TAKE CARE OF THINGS AT HOME, OR GET ALONG WITH OTHER PEOPLE: NOT DIFFICULT AT ALL

## 2019-08-05 ASSESSMENT — PATIENT HEALTH QUESTIONNAIRE - PHQ9
5. POOR APPETITE OR OVEREATING: NOT AT ALL
SUM OF ALL RESPONSES TO PHQ QUESTIONS 1-9: 3

## 2019-08-06 ASSESSMENT — ANXIETY QUESTIONNAIRES: GAD7 TOTAL SCORE: 0

## 2019-08-15 ENCOUNTER — OFFICE VISIT (OUTPATIENT)
Dept: OPHTHALMOLOGY | Facility: CLINIC | Age: 64
End: 2019-08-15
Attending: OPHTHALMOLOGY
Payer: COMMERCIAL

## 2019-08-15 DIAGNOSIS — H52.7 REFRACTIVE ERROR: ICD-10-CM

## 2019-08-15 DIAGNOSIS — H53.10 SUBJECTIVE VISION DISTURBANCE, BILATERAL: Primary | ICD-10-CM

## 2019-08-15 DIAGNOSIS — H25.13 NUCLEAR SCLEROSIS, BILATERAL: ICD-10-CM

## 2019-08-15 DIAGNOSIS — G30.0 EARLY ONSET ALZHEIMER'S DEMENTIA WITHOUT BEHAVIORAL DISTURBANCE (H): ICD-10-CM

## 2019-08-15 DIAGNOSIS — F02.80 EARLY ONSET ALZHEIMER'S DEMENTIA WITHOUT BEHAVIORAL DISTURBANCE (H): ICD-10-CM

## 2019-08-15 PROCEDURE — G0463 HOSPITAL OUTPT CLINIC VISIT: HCPCS | Mod: ZF

## 2019-08-15 PROCEDURE — 92015 DETERMINE REFRACTIVE STATE: CPT | Mod: ZF

## 2019-08-15 ASSESSMENT — VISUAL ACUITY
OD_PH_CC: 20/30
OS_PH_CC: 20/30
OD_CC: J1+
METHOD: SNELLEN - LINEAR
OS_CC: J1+
OS_CC+: -2
OS_CC: 20/30
CORRECTION_TYPE: GLASSES
OS_PH_CC+: +2
OD_CC: 20/40
OD_CC+: -2

## 2019-08-15 ASSESSMENT — REFRACTION_MANIFEST
OD_CYLINDER: +1.00
OS_SPHERE: -8.75
OD_AXIS: 065
OD_ADD: +2.75
OD_SPHERE: -7.50
OS_AXIS: 105
OS_ADD: +2.75
OS_CYLINDER: +2.00

## 2019-08-15 ASSESSMENT — REFRACTION_WEARINGRX
OS_ADD: +2.50
OD_ADD: +2.50
OS_CYLINDER: +2.25
OS_SPHERE: -9.75
OD_AXIS: 075
OS_AXIS: 095
OD_CYLINDER: +1.25
SPECS_TYPE: PAL
OD_SPHERE: -7.25

## 2019-08-15 ASSESSMENT — SLIT LAMP EXAM - LIDS
COMMENTS: NORMAL
COMMENTS: NORMAL

## 2019-08-15 ASSESSMENT — CONF VISUAL FIELD
METHOD: COUNTING FINGERS
OD_NORMAL: 1
OS_NORMAL: 1

## 2019-08-15 ASSESSMENT — TONOMETRY
IOP_METHOD: TONOPEN
OD_IOP_MMHG: 13
OS_IOP_MMHG: 13

## 2019-08-15 ASSESSMENT — EXTERNAL EXAM - RIGHT EYE: OD_EXAM: NORMAL

## 2019-08-15 ASSESSMENT — CUP TO DISC RATIO
OD_RATIO: 0.3
OS_RATIO: 0.3

## 2019-08-15 ASSESSMENT — EXTERNAL EXAM - LEFT EYE: OS_EXAM: NORMAL

## 2019-08-15 NOTE — PROGRESS NOTES
Chief Complaint(s) and History of Present Illness(es)     Annual Eye Exam     Laterality: both eyes    Quality: blurred vision    Frequency: constantly    Associated symptoms: Negative for eye pain, redness, tearing, dryness,   glare and haloes    Pain scale: 0/10              Comments     Diagnosed with early Alzheimers last October. Feels like eyes are 'weird'   since then; that VA fluctuates. Did not fill last Mrx from Dr. Guerra.  Restricts herself to limited driving.  Working with homeopathic MD to slow progression of Alzheimer's.   Ev Louis COT 2:35 PM 08/15/2019     Noticing some visual disturbances since diagnosis 6 months ago. Has difficulty describing, will see odd repetitive light and color patterns. Diagnosed with early onset Alzheimer's 9 months ago and sees holistic practitioner, Dr. Zenker, uses a light and sound box consisting of goggles, looks into and see patterns of colors. Visual disturbances look similar to what she sees in light box. However disturbances occur randomly. Started noticing visual disturbances after first using light box, has since decreased in frequency.     Taking venlofaxine. Other supplments include charcoal, cannot remember rest of list.     Dr. Zenker [place] [phone]      Review of systems for the eyes was negative other than the pertinent positives/negatives listed in the HPI.      Assessment & Plan      Debby Barriga is a 63 year old female with the following diagnoses:   1. Subjective vision disturbance, bilateral    2. Early onset Alzheimer's dementia without behavioral disturbance    3. Nuclear sclerosis, bilateral    4. Refractive error         Healthy dilated fundus exam today.  No longer bothered by floaters  Nonspecific visual phenomena noted since Alzheimer's diagnosis earlier this year.  Improving with light/sound box therapy  Discussed option of Low vision OT consult, will consider  Discussed symptoms of retinal tear/detachment and the need to be seen  urgently should they occur       Patient disposition:   Return in about 1 year (around 8/15/2020).         Attending Physician Attestation:  Complete documentation of historical and exam elements from today's encounter can be found in the full encounter summary report (not reduplicated in this progress note).  I personally obtained the chief complaint(s) and history of present illness.  I confirmed and edited as necessary the review of systems, past medical/surgical history, family history, social history, and examination findings as documented by others; and I examined the patient myself.  I personally reviewed the relevant tests, images, and reports as documented above.  I formulated and edited as necessary the assessment and plan and discussed the findings and management plan with the patient and family. . - Aiden Tovar MD

## 2019-08-15 NOTE — NURSING NOTE
Chief Complaints and History of Present Illnesses   Patient presents with     Annual Eye Exam     Chief Complaint(s) and History of Present Illness(es)     Annual Eye Exam     Laterality: both eyes    Quality: blurred vision    Frequency: constantly    Associated symptoms: Negative for eye pain, redness, tearing, dryness, glare and haloes    Pain scale: 0/10              Comments     Diagnosed with early Alzheimers last October. Feels like eyes are 'weird' since then; that VA fluctuates. Did not fill last Mrx from Dr. Guerra.  Restricts herself to limited driving.  Working with homeopathic MD to slow progression of Alzheimer's.   Ev Louis COT 2:35 PM 08/15/2019

## 2019-09-29 ENCOUNTER — HEALTH MAINTENANCE LETTER (OUTPATIENT)
Age: 64
End: 2019-09-29

## 2019-10-04 ENCOUNTER — OFFICE VISIT (OUTPATIENT)
Dept: FAMILY MEDICINE | Facility: CLINIC | Age: 64
End: 2019-10-04
Payer: COMMERCIAL

## 2019-10-04 VITALS
HEART RATE: 69 BPM | SYSTOLIC BLOOD PRESSURE: 122 MMHG | OXYGEN SATURATION: 98 % | DIASTOLIC BLOOD PRESSURE: 80 MMHG | WEIGHT: 160 LBS | BODY MASS INDEX: 26.22 KG/M2 | TEMPERATURE: 98.2 F

## 2019-10-04 DIAGNOSIS — Z12.31 ENCOUNTER FOR SCREENING MAMMOGRAM FOR BREAST CANCER: ICD-10-CM

## 2019-10-04 DIAGNOSIS — Z23 NEED FOR PROPHYLACTIC VACCINATION AND INOCULATION AGAINST INFLUENZA: ICD-10-CM

## 2019-10-04 DIAGNOSIS — F41.1 GENERALIZED ANXIETY DISORDER: ICD-10-CM

## 2019-10-04 DIAGNOSIS — F33.1 MODERATE EPISODE OF RECURRENT MAJOR DEPRESSIVE DISORDER (H): ICD-10-CM

## 2019-10-04 DIAGNOSIS — Z13.220 LIPID SCREENING: ICD-10-CM

## 2019-10-04 DIAGNOSIS — Z12.11 COLON CANCER SCREENING: ICD-10-CM

## 2019-10-04 LAB
ANION GAP SERPL CALCULATED.3IONS-SCNC: 10 MMOL/L (ref 3–14)
BUN SERPL-MCNC: 13 MG/DL (ref 7–30)
CALCIUM SERPL-MCNC: 8.8 MG/DL (ref 8.5–10.1)
CHLORIDE SERPL-SCNC: 105 MMOL/L (ref 94–109)
CHOLEST SERPL-MCNC: 323 MG/DL
CO2 SERPL-SCNC: 23 MMOL/L (ref 20–32)
CREAT SERPL-MCNC: 0.7 MG/DL (ref 0.52–1.04)
GFR SERPL CREATININE-BSD FRML MDRD: >90 ML/MIN/{1.73_M2}
GLUCOSE SERPL-MCNC: 71 MG/DL (ref 70–99)
HDLC SERPL-MCNC: 67 MG/DL
LDLC SERPL CALC-MCNC: 237 MG/DL
NONHDLC SERPL-MCNC: 256 MG/DL
POTASSIUM SERPL-SCNC: 3.8 MMOL/L (ref 3.4–5.3)
SODIUM SERPL-SCNC: 138 MMOL/L (ref 133–144)
TRIGL SERPL-MCNC: 97 MG/DL

## 2019-10-04 PROCEDURE — 36415 COLL VENOUS BLD VENIPUNCTURE: CPT | Performed by: FAMILY MEDICINE

## 2019-10-04 PROCEDURE — 80048 BASIC METABOLIC PNL TOTAL CA: CPT | Performed by: FAMILY MEDICINE

## 2019-10-04 PROCEDURE — 99214 OFFICE O/P EST MOD 30 MIN: CPT | Mod: 25 | Performed by: FAMILY MEDICINE

## 2019-10-04 PROCEDURE — 80061 LIPID PANEL: CPT | Performed by: FAMILY MEDICINE

## 2019-10-04 PROCEDURE — 90471 IMMUNIZATION ADMIN: CPT | Performed by: FAMILY MEDICINE

## 2019-10-04 PROCEDURE — 90682 RIV4 VACC RECOMBINANT DNA IM: CPT | Performed by: FAMILY MEDICINE

## 2019-10-04 RX ORDER — VENLAFAXINE HYDROCHLORIDE 150 MG/1
150 CAPSULE, EXTENDED RELEASE ORAL DAILY
Qty: 90 CAPSULE | Refills: 1 | Status: SHIPPED | OUTPATIENT
Start: 2019-10-04 | End: 2020-02-07

## 2019-10-04 RX ORDER — VENLAFAXINE HYDROCHLORIDE 37.5 MG/1
CAPSULE, EXTENDED RELEASE ORAL
Qty: 90 CAPSULE | Refills: 1 | Status: SHIPPED | OUTPATIENT
Start: 2019-10-04 | End: 2019-12-22

## 2019-10-04 ASSESSMENT — ANXIETY QUESTIONNAIRES
GAD7 TOTAL SCORE: 2
IF YOU CHECKED OFF ANY PROBLEMS ON THIS QUESTIONNAIRE, HOW DIFFICULT HAVE THESE PROBLEMS MADE IT FOR YOU TO DO YOUR WORK, TAKE CARE OF THINGS AT HOME, OR GET ALONG WITH OTHER PEOPLE: NOT DIFFICULT AT ALL
6. BECOMING EASILY ANNOYED OR IRRITABLE: SEVERAL DAYS
1. FEELING NERVOUS, ANXIOUS, OR ON EDGE: NOT AT ALL
5. BEING SO RESTLESS THAT IT IS HARD TO SIT STILL: NOT AT ALL
3. WORRYING TOO MUCH ABOUT DIFFERENT THINGS: NOT AT ALL
2. NOT BEING ABLE TO STOP OR CONTROL WORRYING: NOT AT ALL
7. FEELING AFRAID AS IF SOMETHING AWFUL MIGHT HAPPEN: SEVERAL DAYS

## 2019-10-04 ASSESSMENT — ASTHMA QUESTIONNAIRES
QUESTION_3 LAST FOUR WEEKS HOW OFTEN DID YOUR ASTHMA SYMPTOMS (WHEEZING, COUGHING, SHORTNESS OF BREATH, CHEST TIGHTNESS OR PAIN) WAKE YOU UP AT NIGHT OR EARLIER THAN USUAL IN THE MORNING: NOT AT ALL
QUESTION_5 LAST FOUR WEEKS HOW WOULD YOU RATE YOUR ASTHMA CONTROL: COMPLETELY CONTROLLED
QUESTION_4 LAST FOUR WEEKS HOW OFTEN HAVE YOU USED YOUR RESCUE INHALER OR NEBULIZER MEDICATION (SUCH AS ALBUTEROL): NOT AT ALL
ACT_TOTALSCORE: 25
QUESTION_2 LAST FOUR WEEKS HOW OFTEN HAVE YOU HAD SHORTNESS OF BREATH: NOT AT ALL
QUESTION_1 LAST FOUR WEEKS HOW MUCH OF THE TIME DID YOUR ASTHMA KEEP YOU FROM GETTING AS MUCH DONE AT WORK, SCHOOL OR AT HOME: NONE OF THE TIME
ACUTE_EXACERBATION_TODAY: NO

## 2019-10-04 ASSESSMENT — PATIENT HEALTH QUESTIONNAIRE - PHQ9
5. POOR APPETITE OR OVEREATING: NOT AT ALL
SUM OF ALL RESPONSES TO PHQ QUESTIONS 1-9: 1

## 2019-10-04 NOTE — PROGRESS NOTES
Subjective     Debby Barriga is a 64 year old female who presents to clinic today for the following health issues:    HPI   Anxiety Follow-Up    How are you doing with your anxiety since your last visit? Improved a little     Are you having other symptoms that might be associated with anxiety? No    Have you had a significant life event? Yes study at [facility]     Are you feeling depressed? No    Do you have any concerns with your use of alcohol or other drugs? No    Social History     Tobacco Use     Smoking status: Former Smoker     Types: Cigarettes     Last attempt to quit: 1982     Years since quittin.2     Smokeless tobacco: Never Used   Substance Use Topics     Alcohol use: No     Drug use: No     СЕРГЕЙ-7 SCORE 2019   Total Score - - -   Total Score 5 2 0     PHQ 2019   PHQ-9 Total Score 10 3 3   Q9: Thoughts of better off dead/self-harm past 2 weeks Not at all Not at all Not at all   F/U: Thoughts of suicide or self-harm No - -   F/U: Safety concerns No - -         She is doing well.  Partner is supportive.  She has friends who are supportive.         Reviewed and updated as needed this visit by Provider         Review of Systems   ROS COMP: Constitutional, HEENT, cardiovascular, pulmonary, gi and gu systems are negative, except as otherwise noted.      Objective    There were no vitals taken for this visit.  There is no height or weight on file to calculate BMI.  Physical Exam   GENERAL: healthy, alert and no distress  EYES: Eyes grossly normal to inspection  HENT: nose and mouth without ulcers or lesions  PSYCH: mentation appears normal, affect normal    Diagnostic Test Results:  none         Assessment & Plan     1. Moderate episode of recurrent major depressive disorder (H)  - stable   - venlafaxine (EFFEXOR-XR) 37.5 MG 24 hr capsule; TAKE ONE CAPSULE BY MOUTH ONCE DAILY. Total 187.5 mg/day  Dispense: 90 capsule; Refill: 1  - venlafaxine (EFFEXOR-XR)  150 MG 24 hr capsule; Take 1 capsule (150 mg) by mouth daily Total 187.5 mg/day  Dispense: 90 capsule; Refill: 1    2. Generalized anxiety disorder  - stable   - venlafaxine (EFFEXOR-XR) 37.5 MG 24 hr capsule; TAKE ONE CAPSULE BY MOUTH ONCE DAILY. Total 187.5 mg/day  Dispense: 90 capsule; Refill: 1  - venlafaxine (EFFEXOR-XR) 150 MG 24 hr capsule; Take 1 capsule (150 mg) by mouth daily Total 187.5 mg/day  Dispense: 90 capsule; Refill: 1    3. Encounter for screening mammogram for breast cancer  - pt will get mammogram scheduled     4. Colon cancer screening   - FIT    Pt will verify with insurance about shingles vaccine.       Galo Rebollar MD  [facility]

## 2019-10-05 ASSESSMENT — ASTHMA QUESTIONNAIRES: ACT_TOTALSCORE: 25

## 2019-10-05 ASSESSMENT — ANXIETY QUESTIONNAIRES: GAD7 TOTAL SCORE: 2

## 2019-10-11 ENCOUNTER — TELEPHONE (OUTPATIENT)
Dept: FAMILY MEDICINE | Facility: CLINIC | Age: 64
End: 2019-10-11

## 2019-10-11 NOTE — TELEPHONE ENCOUNTER
RN, can you please inform pt below -     1. Kidney functions stable  2. Total cholesterol and LDL (bad cholesterol) are very elevated and I would recommend starting a statin. This medication would help lower her cholesterol. In addition she would need to increase activity and eat low fat diet.    Thanks!  DM

## 2019-10-11 NOTE — TELEPHONE ENCOUNTER
Gave pt this message.  She will call back on Monday 10/14/19 to take better notes.    If pt is interested in the cholesterol med, should she be seen again or would you prescribe?  EILEEN Woods

## 2019-10-16 NOTE — TELEPHONE ENCOUNTER
Called patient, left voicemail to return call to clinic    Thank You!  Ilana De La Rosa, RN  Triage Nurse

## 2019-10-22 NOTE — TELEPHONE ENCOUNTER
"Routing to provider - Smooth - please review and advise as appropriate      Patient reports she has been diagnosed with alzheimer's - She states she took statins years ago and felt like it really reduced her memory - she wants to hold off on medication at this time    Patient is tentatively set up to go to the [facility] to document the alzheimer's - participating in a study - she says she is putting all this on hold for this reason    Declined nutritional education and medication alternatives - says she is on a \"keto diet\" and feels this has helped a great deal - says she exercises and sees a holistic medical provider      "

## 2019-12-22 ENCOUNTER — ANESTHESIA (OUTPATIENT)
Dept: EMERGENCY MEDICINE | Facility: CLINIC | Age: 64
End: 2019-12-22
Payer: COMMERCIAL

## 2019-12-22 ENCOUNTER — HOSPITAL ENCOUNTER (EMERGENCY)
Facility: CLINIC | Age: 64
Discharge: HOME | End: 2019-12-22
Attending: EMERGENCY MEDICINE | Admitting: EMERGENCY MEDICINE
Payer: COMMERCIAL

## 2019-12-22 ENCOUNTER — ANESTHESIA EVENT (OUTPATIENT)
Dept: EMERGENCY MEDICINE | Facility: CLINIC | Age: 64
End: 2019-12-22
Payer: COMMERCIAL

## 2019-12-22 VITALS
DIASTOLIC BLOOD PRESSURE: 70 MMHG | TEMPERATURE: 97.6 F | HEART RATE: 58 BPM | SYSTOLIC BLOOD PRESSURE: 119 MMHG | RESPIRATION RATE: 18 BRPM | OXYGEN SATURATION: 99 %

## 2019-12-22 DIAGNOSIS — G97.1 POST LUMBAR PUNCTURE HEADACHE: ICD-10-CM

## 2019-12-22 PROCEDURE — 99285 EMERGENCY DEPT VISIT HI MDM: CPT | Mod: 25 | Performed by: EMERGENCY MEDICINE

## 2019-12-22 PROCEDURE — 25800030 ZZH RX IP 258 OP 636: Performed by: EMERGENCY MEDICINE

## 2019-12-22 PROCEDURE — 96374 THER/PROPH/DIAG INJ IV PUSH: CPT | Performed by: EMERGENCY MEDICINE

## 2019-12-22 PROCEDURE — 62273 INJECT EPIDURAL PATCH: CPT | Performed by: EMERGENCY MEDICINE

## 2019-12-22 PROCEDURE — 25000128 H RX IP 250 OP 636: Performed by: EMERGENCY MEDICINE

## 2019-12-22 PROCEDURE — 96361 HYDRATE IV INFUSION ADD-ON: CPT | Performed by: EMERGENCY MEDICINE

## 2019-12-22 PROCEDURE — 99284 EMERGENCY DEPT VISIT MOD MDM: CPT | Mod: Z6 | Performed by: EMERGENCY MEDICINE

## 2019-12-22 PROCEDURE — 96375 TX/PRO/DX INJ NEW DRUG ADDON: CPT | Mod: 59 | Performed by: EMERGENCY MEDICINE

## 2019-12-22 RX ORDER — SODIUM CHLORIDE 9 MG/ML
1000 INJECTION, SOLUTION INTRAVENOUS CONTINUOUS
Status: DISCONTINUED | OUTPATIENT
Start: 2019-12-22 | End: 2019-12-22 | Stop reason: HOSPADM

## 2019-12-22 RX ORDER — CAFFEINE AND SODIUM BENZOATE 125 MG/ML
500 INJECTION, SOLUTION INTRAMUSCULAR; INTRAVENOUS ONCE
Status: DISCONTINUED | OUTPATIENT
Start: 2019-12-22 | End: 2019-12-22

## 2019-12-22 RX ORDER — KETOROLAC TROMETHAMINE 30 MG/ML
30 INJECTION, SOLUTION INTRAMUSCULAR; INTRAVENOUS ONCE
Status: COMPLETED | OUTPATIENT
Start: 2019-12-22 | End: 2019-12-22

## 2019-12-22 RX ADMIN — SODIUM CHLORIDE 1000 ML: 9 INJECTION, SOLUTION INTRAVENOUS at 16:30

## 2019-12-22 RX ADMIN — SODIUM CHLORIDE 1000 ML: 9 INJECTION, SOLUTION INTRAVENOUS at 18:23

## 2019-12-22 RX ADMIN — KETOROLAC TROMETHAMINE 30 MG: 30 INJECTION, SOLUTION INTRAMUSCULAR at 16:29

## 2019-12-22 RX ADMIN — PROCHLORPERAZINE EDISYLATE 5 MG: 5 INJECTION INTRAMUSCULAR; INTRAVENOUS at 16:29

## 2019-12-22 ASSESSMENT — ENCOUNTER SYMPTOMS
APPETITE CHANGE: 1
CARDIOVASCULAR NEGATIVE: 1
FEVER: 0
WEAKNESS: 0
BACK PAIN: 0
HEADACHES: 1
GASTROINTESTINAL NEGATIVE: 1
RESPIRATORY NEGATIVE: 1
ACTIVITY CHANGE: 1

## 2019-12-22 NOTE — ANESTHESIA PREPROCEDURE EVALUATION
Anesthesia Pre-Procedure Evaluation    Patient: Debby Barriga   MRN:     [id number] Gender:   female   Age:    64 year old :      1955        Preoperative Diagnosis: * No pre-op diagnosis entered *   * No procedures listed *     Past Medical History:   Diagnosis Date     Corneal ulcer of right eye 3-     Mild major depression (H) 2013     Tear of retina     right eye      Past Surgical History:   Procedure Laterality Date     LAPAROSCOPIC TUBAL LIGATION            Anesthesia Evaluation     .             ROS/MED HX    ENT/Pulmonary:  - neg pulmonary ROS   (+)asthma , . .    Neurologic: Comment: Post dural puncture headache symptoms:  S/p lumbar puncture .  Since that time has postural headache and nausea and vomiting. - neg neurologic ROS   (+)dementia,     Cardiovascular:  - neg cardiovascular ROS       METS/Exercise Tolerance:     Hematologic:  - neg hematologic  ROS       Musculoskeletal:  - neg musculoskeletal ROS       GI/Hepatic:  - neg GI/hepatic ROS       Renal/Genitourinary:  - ROS Renal section negative       Endo:  - neg endo ROS       Psychiatric:  - neg psychiatric ROS   (+) psychiatric history anxiety and depression      Infectious Disease:  - neg infectious disease ROS       Malignancy:      - no malignancy   Other: Comment: Hx of etoh abuse   - neg other ROS                     PHYSICAL EXAM:   Mental Status/Neuro: A/A/O   Airway: Facies: Feasible  Mallampati: I  Mouth/Opening: Full  TM distance: > 6 cm  Neck ROM: Full   Respiratory: Auscultation: CTAB     Resp. Rate: Normal     Resp. Effort: Normal      CV: Rhythm: Regular  Rate: Age appropriate  Heart: Normal Sounds  Edema: None   Comments:      Dental: Normal Dentition                LABS:  CBC:   Lab Results   Component Value Date    WBC 6.6 10/10/2018    HGB 13.2 10/10/2018    HCT 41.9 10/10/2018     10/10/2018     BMP:   Lab Results   Component Value Date     10/04/2019     10/10/2018    POTASSIUM 3.8  "10/04/2019    POTASSIUM 3.9 10/10/2018    CHLORIDE 105 10/04/2019    CHLORIDE 105 10/10/2018    CO2 23 10/04/2019    CO2 24 10/10/2018    BUN 13 10/04/2019    BUN 12 10/10/2018    CR 0.70 10/04/2019    CR 0.81 10/10/2018    GLC 71 10/04/2019    GLC 86 10/10/2018     COAGS: No results found for: PTT, INR, FIBR  POC: No results found for: BGM, HCG, HCGS  OTHER:   Lab Results   Component Value Date    A1C 5.6 07/23/2013    KAMAR 8.8 10/04/2019    ALBUMIN 3.8 10/10/2018    PROTTOTAL 6.9 10/10/2018    ALT 18 10/10/2018    AST 14 10/10/2018    ALKPHOS 36 (L) 10/10/2018    BILITOTAL 0.3 10/10/2018    TSH 1.34 12/04/2018        Preop Vitals    BP Readings from Last 3 Encounters:   12/22/19 129/54   10/04/19 122/80   08/05/19 106/73    Pulse Readings from Last 3 Encounters:   12/22/19 71   10/04/19 69   08/05/19 62      Resp Readings from Last 3 Encounters:   12/22/19 16   10/05/18 14   08/14/18 12    SpO2 Readings from Last 3 Encounters:   12/22/19 100%   10/04/19 98%   08/05/19 99%      Temp Readings from Last 1 Encounters:   12/22/19 36.4  C (97.6  F) (Oral)    Ht Readings from Last 1 Encounters:   05/29/19 1.664 m (5' 5.5\")      Wt Readings from Last 1 Encounters:   10/04/19 72.6 kg (160 lb)    Estimated body mass index is 26.22 kg/m  as calculated from the following:    Height as of 5/29/19: 1.664 m (5' 5.5\").    Weight as of 10/4/19: 72.6 kg (160 lb).     LDA:  Peripheral IV 12/22/19 Left Upper forearm (Active)   Number of days: 0        Assessment:   ASA SCORE: 2    H&P: History and physical reviewed and following examination; no interval change.   Smoking Status:  Non-Smoker/Unknown        Plan:   Anes. Type:  General; Epidural     Epidural Details:  Single Shot   Pre-Medication: None   Induction:  IV (Standard)      Access/Monitoring: PIV        Postop Plan:     Postop Sedation/Airway: Not planned  Disposition: Outpatient     PONV Management:   Adult Risk Factors: Female, Non-Smoker       Comments for " Plan/Consent:  Epidural Blood patch                 Olivier Rivas MD

## 2019-12-22 NOTE — ED PROVIDER NOTES
[facility] EMERGENCY DEPARTMENT ([facility])    19        History     Chief Complaint   Patient presents with     Headache     The history is provided by the patient and medical records.     Debby Barriga is a 64 year old female who 2 days ago had a lumbar puncture as part of an Alzheimer's study at [facility].  She returns now with a classic low pressure headache, it is worse when she stands up and improves when she lays flat. [place] advised her to come in for a blood patch. She is otherwise doing well.  No fevers no back pain    This part of the medical record was transcribed by Susy Aguayo, Medical Scribe, from a dictation done by Ty Fuentes.     I have reviewed the Medications, Allergies, Past Medical and Surgical History, and Social History in the [employer] system.    Past Medical History:   Diagnosis Date     Corneal ulcer of right eye 3-2015     Mild major depression (H) 2013     Tear of retina     right eye       Past Surgical History:   Procedure Laterality Date     LAPAROSCOPIC TUBAL LIGATION         Family History   Problem Relation Age of Onset     Alzheimer Disease Mother      Prostate Cancer Father      Glaucoma No family hx of      Macular Degeneration No family hx of      Diabetes No family hx of      Coronary Artery Disease No family hx of      Hypertension No family hx of      Hyperlipidemia No family hx of      Cerebrovascular Disease No family hx of      Breast Cancer No family hx of        Social History     Tobacco Use     Smoking status: Former Smoker     Types: Cigarettes     Last attempt to quit: 1982     Years since quittin.4     Smokeless tobacco: Never Used   Substance Use Topics     Alcohol use: No       Current Facility-Administered Medications   Medication     0.9% sodium chloride BOLUS    Followed by     sodium chloride 0.9% infusion     caffeine-sodium benzoate 500 mg in sodium chloride 0.9 % 500 mL intermittent infusion     Current Outpatient Medications    Medication     NUTRITIONAL SUPPLEMENTS PO     omega-3 acid ethyl esters (LOVAZA) 1 g capsule     venlafaxine (EFFEXOR-XR) 150 MG 24 hr capsule        Allergies   Allergen Reactions     Hydrocodone      Ears ringing     Trazodone      Other reaction(s): Headache       Review of Systems   Constitutional: Positive for activity change and appetite change. Negative for fever.   Respiratory: Negative.    Cardiovascular: Negative.    Gastrointestinal: Negative.    Musculoskeletal: Negative for back pain.   Skin: Negative.    Neurological: Positive for headaches. Negative for weakness.   All other systems reviewed and are negative.      Physical Exam   BP: 129/54  Pulse: 71  Temp: 97.6  F (36.4  C)  Resp: 16  SpO2: 100 %      Physical Exam  Vitals signs and nursing note reviewed.   Constitutional:       General: She is not in acute distress.     Appearance: She is well-developed.   HENT:      Head: Normocephalic and atraumatic.   Eyes:      General: No scleral icterus.     Conjunctiva/sclera: Conjunctivae normal.      Pupils: Pupils are equal, round, and reactive to light.   Neck:      Musculoskeletal: Normal range of motion and neck supple.   Cardiovascular:      Rate and Rhythm: Normal rate and regular rhythm.      Heart sounds: Normal heart sounds.   Pulmonary:      Effort: Pulmonary effort is normal. No respiratory distress.      Breath sounds: Normal breath sounds. No wheezing.   Skin:     General: Skin is warm and dry.   Neurological:      General: No focal deficit present.      Mental Status: She is alert and oriented to person, place, and time.      Cranial Nerves: No cranial nerve deficit.   Psychiatric:         Mood and Affect: Mood normal.         Behavior: Behavior normal.         ED Course        Procedures        Medications   0.9% sodium chloride BOLUS (1,000 mLs Intravenous New Bag 12/22/19 1630)     Followed by   sodium chloride 0.9% infusion (has no administration in time range)   caffeine-sodium  benzoate 500 mg in sodium chloride 0.9 % 500 mL intermittent infusion (has no administration in time range)   ketorolac (TORADOL) injection 30 mg (30 mg Intravenous Given 12/22/19 1629)   prochlorperazine (COMPAZINE) injection 5 mg (5 mg Intravenous Given 12/22/19 1629)     Anesthesia was consulted saw the patient and performed a blood patch.       Labs Ordered and Resulted from Time of ED Arrival Up to the Time of Departure from the ED - No data to display     7:30 PM much better    Assessments & Plan (with Medical Decision Making)   64-year-old female with post LP headache. Anesthesia was consulted and performed a blood patch. She will be discharged home to follow-up in her clinic, return to the ER if you are doing well.    This part of the medical record was transcribed by Susy Aguayo Medical Scribe, from a dictation done by Ty Fuentes.     I have reviewed the nursing notes.    I have reviewed the findings, diagnosis, plan and need for follow up with the patient.    New Prescriptions    No medications on file       Final diagnoses:   Post lumbar puncture headache       12/22/2019   [facility], [place], EMERGENCY DEPARTMENT     Ty Fuentes MD  12/22/19 [id number]       Ty Fuentes MD  12/22/19 1930

## 2019-12-22 NOTE — ED TRIAGE NOTES
Pt had a lumbar puncture Thursday and has been having a HA since. Pt states she talked to the clinic and was told to come to the ED and have a blood patch done.

## 2019-12-22 NOTE — ED AVS SNAPSHOT
[facility], [place], Emergency Department  [address]  [place] [address]  Phone:  [phone]  Fax:  [phone]                                    Debby Barriga   MRN: [id number]    Department:  [facility], Emergency Department   Date of Visit:  12/22/2019           After Visit Summary Signature Page    I have received my discharge instructions, and my questions have been answered. I have discussed any challenges I see with this plan with the nurse or doctor.    ..........................................................................................................................................  Patient/Patient Representative Signature      ..........................................................................................................................................  Patient Representative Print Name and Relationship to Patient    ..................................................               ................................................  Date                                   Time    ..........................................................................................................................................  Reviewed by Signature/Title    ...................................................              ..............................................  Date                                               Time          22EPIC Rev 08/18

## 2019-12-22 NOTE — DISCHARGE INSTRUCTIONS
The blood patch should take care of the headache  Follow-up in your clinic next week  Return if any problems or concerns

## 2019-12-23 NOTE — ED NOTES
"Patient has been laying flat for 45 minutes, and MD states to try and sit patient up 45 degrees to evaluate headache. Patient states that she is feeling better but \"has a tiny tiny pain in her head\".   "

## 2020-01-06 ENCOUNTER — ANCILLARY PROCEDURE (OUTPATIENT)
Dept: MAMMOGRAPHY | Facility: CLINIC | Age: 65
End: 2020-01-06
Payer: COMMERCIAL

## 2020-01-06 DIAGNOSIS — Z12.31 VISIT FOR SCREENING MAMMOGRAM: ICD-10-CM

## 2020-01-06 DIAGNOSIS — Z12.31 ENCOUNTER FOR SCREENING MAMMOGRAM FOR BREAST CANCER: ICD-10-CM

## 2020-01-06 PROCEDURE — 77067 SCR MAMMO BI INCL CAD: CPT

## 2020-01-20 ENCOUNTER — ANCILLARY PROCEDURE (OUTPATIENT)
Dept: MAMMOGRAPHY | Facility: CLINIC | Age: 65
End: 2020-01-20
Attending: FAMILY MEDICINE
Payer: COMMERCIAL

## 2020-01-20 DIAGNOSIS — R92.8 ABNORMAL MAMMOGRAM OF LEFT BREAST: ICD-10-CM

## 2020-01-29 ENCOUNTER — TELEPHONE (OUTPATIENT)
Dept: FAMILY MEDICINE | Facility: CLINIC | Age: 65
End: 2020-01-29

## 2020-01-29 DIAGNOSIS — F33.1 MODERATE EPISODE OF RECURRENT MAJOR DEPRESSIVE DISORDER (H): ICD-10-CM

## 2020-01-29 DIAGNOSIS — F41.1 GENERALIZED ANXIETY DISORDER: ICD-10-CM

## 2020-01-29 RX ORDER — VENLAFAXINE HYDROCHLORIDE 150 MG/1
150 CAPSULE, EXTENDED RELEASE ORAL DAILY
Qty: 90 CAPSULE | Refills: 1 | Status: CANCELLED | OUTPATIENT
Start: 2020-01-29

## 2020-01-29 NOTE — LETTER
[facility]  [address] [id number] [phone]          February 5, 2020    Debby Barriga                                                                                                                     [address] [place] [id number]            Dear Debby,    We tried contacting you but were unable to reach you.  We need clarification from you regarding your Venlafaxine (Effexor) dose.  Please call the clinic and ask to speak with a triage nurse.      Sincerely,         [facility] Care Team

## 2020-01-29 NOTE — TELEPHONE ENCOUNTER
Clarification needed from patient if no loner taking 187.5 mg daily, which is sig from 10/4/19 order.    If patient decreased dose to 150 mg daily, request will be sent to provider for authorization.    Left message to call back and ask to speak with an available triage nurse.  OZ FlorezN, RN

## 2020-01-29 NOTE — TELEPHONE ENCOUNTER
Patient stopped into the pharmacy to  prescription for Venlafaxine and stateds there is an issue with the way that the prescription is writen. It needs to be rewritten for 150mg once a day. They patient is hoping that to get more than one month at a time also.

## 2020-02-04 NOTE — TELEPHONE ENCOUNTER
Left message to call back and ask to speak with an available triage nurse.  EUNICE Knott, OZN, RN

## 2020-02-05 DIAGNOSIS — E78.2 MIXED HYPERLIPIDEMIA: Primary | ICD-10-CM

## 2020-02-05 DIAGNOSIS — E03.9 MYXEDEMA HEART DISEASE: ICD-10-CM

## 2020-02-05 DIAGNOSIS — R41.3 MEMORY LOSS: ICD-10-CM

## 2020-02-05 DIAGNOSIS — E53.1 PYRIDOXINE DEPENDENCY SYNDROME: ICD-10-CM

## 2020-02-05 DIAGNOSIS — I51.9 MYXEDEMA HEART DISEASE: ICD-10-CM

## 2020-02-07 ENCOUNTER — MEDICAL CORRESPONDENCE (OUTPATIENT)
Dept: HEALTH INFORMATION MANAGEMENT | Facility: CLINIC | Age: 65
End: 2020-02-07

## 2020-02-07 RX ORDER — VENLAFAXINE HYDROCHLORIDE 150 MG/1
150 CAPSULE, EXTENDED RELEASE ORAL DAILY
Qty: 60 CAPSULE | Refills: 0 | Status: SHIPPED | OUTPATIENT
Start: 2020-02-07 | End: 2020-03-30

## 2020-02-07 NOTE — TELEPHONE ENCOUNTER
"LOV: 10/4/2019     Per MD note from 10/4:  \"Return in about 6 months (around 4/4/2020) for Routine Visit.\"    Will fill to get to 4/4/20    Thanks! Sandra Olmstead RN      "

## 2020-02-07 NOTE — TELEPHONE ENCOUNTER
Patient called back writer informed of message below, patient states she is taking 150 MG of Venlafaxine and that it was decreased but never noted in chart

## 2020-02-11 ENCOUNTER — HOSPITAL ENCOUNTER (OUTPATIENT)
Dept: LAB | Facility: CLINIC | Age: 65
Discharge: HOME | End: 2020-02-11
Attending: PSYCHIATRY & NEUROLOGY | Admitting: PSYCHIATRY & NEUROLOGY
Payer: COMMERCIAL

## 2020-02-11 DIAGNOSIS — I51.9 MYXEDEMA HEART DISEASE: ICD-10-CM

## 2020-02-11 DIAGNOSIS — R41.3 MEMORY LOSS: ICD-10-CM

## 2020-02-11 DIAGNOSIS — E03.9 MYXEDEMA HEART DISEASE: ICD-10-CM

## 2020-02-11 DIAGNOSIS — E78.2 MIXED HYPERLIPIDEMIA: ICD-10-CM

## 2020-02-11 DIAGNOSIS — E53.1 PYRIDOXINE DEPENDENCY SYNDROME: ICD-10-CM

## 2020-02-11 LAB
CRP SERPL HS-MCNC: 0.4 MG/L
FOLATE SERPL-MCNC: 36.6 NG/ML
HBA1C MFR BLD: 5.5 % (ref 0–5.6)
MISCELLANEOUS TEST: NORMAL
T3FREE SERPL-MCNC: 2.2 PG/ML (ref 2.3–4.2)
T4 FREE SERPL-MCNC: 0.98 NG/DL (ref 0.76–1.46)
TSH SERPL DL<=0.005 MIU/L-ACNC: 0.67 MU/L (ref 0.4–4)
VIT B12 SERPL-MCNC: 1587 PG/ML (ref 193–986)

## 2020-02-11 PROCEDURE — 84252 ASSAY OF VITAMIN B-2: CPT | Performed by: PSYCHIATRY & NEUROLOGY

## 2020-02-11 PROCEDURE — 84999 UNLISTED CHEMISTRY PROCEDURE: CPT | Performed by: PSYCHIATRY & NEUROLOGY

## 2020-02-11 PROCEDURE — 83704 LIPOPROTEIN BLD QUAN PART: CPT | Performed by: PSYCHIATRY & NEUROLOGY

## 2020-02-11 PROCEDURE — 82652 VIT D 1 25-DIHYDROXY: CPT | Performed by: PSYCHIATRY & NEUROLOGY

## 2020-02-11 PROCEDURE — 36415 COLL VENOUS BLD VENIPUNCTURE: CPT | Performed by: PSYCHIATRY & NEUROLOGY

## 2020-02-11 PROCEDURE — 86800 THYROGLOBULIN ANTIBODY: CPT | Performed by: PSYCHIATRY & NEUROLOGY

## 2020-02-11 PROCEDURE — 82180 ASSAY OF ASCORBIC ACID: CPT | Performed by: PSYCHIATRY & NEUROLOGY

## 2020-02-11 PROCEDURE — 84378 SUGARS SINGLE QUANT: CPT | Performed by: PSYCHIATRY & NEUROLOGY

## 2020-02-11 PROCEDURE — 82525 ASSAY OF COPPER: CPT | Performed by: PSYCHIATRY & NEUROLOGY

## 2020-02-11 PROCEDURE — 83735 ASSAY OF MAGNESIUM: CPT | Performed by: PSYCHIATRY & NEUROLOGY

## 2020-02-11 PROCEDURE — 82306 VITAMIN D 25 HYDROXY: CPT | Performed by: PSYCHIATRY & NEUROLOGY

## 2020-02-11 PROCEDURE — 82607 VITAMIN B-12: CPT | Performed by: PSYCHIATRY & NEUROLOGY

## 2020-02-11 PROCEDURE — 86376 MICROSOMAL ANTIBODY EACH: CPT | Performed by: PSYCHIATRY & NEUROLOGY

## 2020-02-11 PROCEDURE — 82746 ASSAY OF FOLIC ACID SERUM: CPT | Performed by: PSYCHIATRY & NEUROLOGY

## 2020-02-11 PROCEDURE — 83090 ASSAY OF HOMOCYSTEINE: CPT | Performed by: PSYCHIATRY & NEUROLOGY

## 2020-02-11 PROCEDURE — 84482 T3 REVERSE: CPT | Performed by: PSYCHIATRY & NEUROLOGY

## 2020-02-11 PROCEDURE — 84590 ASSAY OF VITAMIN A: CPT | Performed by: PSYCHIATRY & NEUROLOGY

## 2020-02-11 PROCEDURE — 84439 ASSAY OF FREE THYROXINE: CPT | Performed by: PSYCHIATRY & NEUROLOGY

## 2020-02-11 PROCEDURE — 80061 LIPID PANEL: CPT | Performed by: PSYCHIATRY & NEUROLOGY

## 2020-02-11 PROCEDURE — 83695 ASSAY OF LIPOPROTEIN(A): CPT | Performed by: PSYCHIATRY & NEUROLOGY

## 2020-02-11 PROCEDURE — 84425 ASSAY OF VITAMIN B-1: CPT | Performed by: PSYCHIATRY & NEUROLOGY

## 2020-02-11 PROCEDURE — 84443 ASSAY THYROID STIM HORMONE: CPT | Performed by: PSYCHIATRY & NEUROLOGY

## 2020-02-11 PROCEDURE — 86141 C-REACTIVE PROTEIN HS: CPT | Performed by: PSYCHIATRY & NEUROLOGY

## 2020-02-11 PROCEDURE — 84630 ASSAY OF ZINC: CPT | Performed by: PSYCHIATRY & NEUROLOGY

## 2020-02-11 PROCEDURE — 84207 ASSAY OF VITAMIN B-6: CPT | Performed by: PSYCHIATRY & NEUROLOGY

## 2020-02-11 PROCEDURE — 83921 ORGANIC ACID SINGLE QUANT: CPT | Performed by: PSYCHIATRY & NEUROLOGY

## 2020-02-11 PROCEDURE — 84481 FREE ASSAY (FT-3): CPT | Performed by: PSYCHIATRY & NEUROLOGY

## 2020-02-11 PROCEDURE — 83036 HEMOGLOBIN GLYCOSYLATED A1C: CPT | Performed by: PSYCHIATRY & NEUROLOGY

## 2020-02-12 LAB
1,25(OH)2D SERPL-MCNC: 57.3 PG/ML (ref 19.9–79.3)
DEPRECATED CALCIDIOL+CALCIFEROL SERPL-MC: 51 UG/L (ref 20–75)
HCYS SERPL-SCNC: 6.7 UMOL/L (ref 4–12)
LPA SERPL-MCNC: 27 MG/DL
THYROGLOB AB SERPL IA-ACNC: <20 IU/ML (ref 0–40)
THYROPEROXIDASE AB SERPL-ACNC: <10 IU/ML

## 2020-02-13 LAB
ANNOTATION COMMENT IMP: NORMAL
COPPER SERPL-MCNC: 94.1 UG/DL (ref 80–155)
MAGNESIUM RBC-SCNC: 2.2 MMOL/L (ref 1.5–3.1)
METHYLMALONATE SERPL-SCNC: 0.11 UMOL/L (ref 0–0.4)
RETINYL PALMITATE SERPL-MCNC: 0.02 MG/L (ref 0–0.1)
VIT A SERPL-MCNC: 0.51 MG/L (ref 0.3–1.2)
VIT B6 SERPL-MCNC: 55.6 NMOL/L (ref 20–125)
ZINC SERPL-MCNC: 89.2 UG/DL (ref 60–120)

## 2020-02-14 LAB
CHOLEST SERPL-MCNC: 325 MG/DL
HDL SERPL QN: 9 NM
HDL SERPL-SCNC: 35.4 UMOL/L
HDLC SERPL-MCNC: 72 MG/DL (ref 40–59)
HLD.LARGE SERPL-SCNC: 7.9 UMOL/L
LDL SERPL QN: 22 NM
LDL SERPL-SCNC: 2297 NMOL/L
LDL SMALL SERPL-SCNC: 430 NMOL/L
LDLC SERPL CALC-MCNC: 236 MG/DL
PATHOLOGY STUDY: ABNORMAL
T3REVERSE SERPL-MCNC: 15.5 NG/DL (ref 9–27)
TRIGL SERPL-MCNC: 84 MG/DL (ref 30–149)
VIT B1 SERPL-MCNC: 6 NMOL/L (ref 4–15)
VIT C SERPL-MCNC: 66 UMOL/L (ref 23–114)
VLDL LARGE SERPL-SCNC: 2.6 NMOL/L
VLDL SERPL QN: 42.7 NM

## 2020-02-16 LAB — VIT B2 SERPL-MCNC: 5 MCG/L (ref 1–19)

## 2020-02-18 LAB
RESULT: ABNORMAL
SEND OUTS MISC TEST CODE: ABNORMAL
SEND OUTS MISC TEST SPECIMEN: ABNORMAL
TEST NAME: ABNORMAL

## 2020-03-11 ENCOUNTER — TRANSFERRED RECORDS (OUTPATIENT)
Dept: HEALTH INFORMATION MANAGEMENT | Facility: CLINIC | Age: 65
End: 2020-03-11

## 2020-05-13 DIAGNOSIS — Z12.11 COLON CANCER SCREENING: ICD-10-CM

## 2020-05-13 PROCEDURE — 82274 ASSAY TEST FOR BLOOD FECAL: CPT | Performed by: FAMILY MEDICINE

## 2020-05-15 LAB — HEMOCCULT STL QL IA: NEGATIVE

## 2020-06-22 DIAGNOSIS — F41.1 GENERALIZED ANXIETY DISORDER: ICD-10-CM

## 2020-06-22 DIAGNOSIS — F33.1 MODERATE EPISODE OF RECURRENT MAJOR DEPRESSIVE DISORDER (H): ICD-10-CM

## 2020-06-24 RX ORDER — VENLAFAXINE HYDROCHLORIDE 150 MG/1
CAPSULE, EXTENDED RELEASE ORAL
Qty: 90 CAPSULE | Refills: 0 | Status: SHIPPED | OUTPATIENT
Start: 2020-06-24 | End: 2020-09-30

## 2020-08-19 ENCOUNTER — ANCILLARY PROCEDURE (OUTPATIENT)
Dept: MAMMOGRAPHY | Facility: CLINIC | Age: 65
End: 2020-08-19
Attending: FAMILY MEDICINE
Payer: COMMERCIAL

## 2020-08-19 DIAGNOSIS — R92.8 ABNORMAL MAMMOGRAM OF LEFT BREAST: ICD-10-CM

## 2020-08-19 DIAGNOSIS — R92.8 CATEGORY 3 MAMMOGRAPHY RESULT WITH SHORT FOLLOW-UP INTERVAL SUGGESTED FOR PROBABLY BENIGN FINDING: ICD-10-CM

## 2020-09-29 ENCOUNTER — TELEPHONE (OUTPATIENT)
Dept: FAMILY MEDICINE | Facility: CLINIC | Age: 65
End: 2020-09-29

## 2020-09-29 NOTE — TELEPHONE ENCOUNTER
Pt has schedule an appt for 10/05/20. Needs a refill ASAP on venlafaxine (EFFEXOR-XR) 150 MG 24 hr capsule    Bergoo M  [facility]

## 2020-10-05 ENCOUNTER — OFFICE VISIT (OUTPATIENT)
Dept: FAMILY MEDICINE | Facility: CLINIC | Age: 65
End: 2020-10-05
Payer: COMMERCIAL

## 2020-10-05 VITALS
HEIGHT: 65 IN | BODY MASS INDEX: 30.02 KG/M2 | DIASTOLIC BLOOD PRESSURE: 83 MMHG | SYSTOLIC BLOOD PRESSURE: 121 MMHG | TEMPERATURE: 98.1 F | WEIGHT: 180.2 LBS | OXYGEN SATURATION: 97 % | HEART RATE: 73 BPM

## 2020-10-05 DIAGNOSIS — F02.80 EARLY ONSET ALZHEIMER'S DEMENTIA WITHOUT BEHAVIORAL DISTURBANCE (H): ICD-10-CM

## 2020-10-05 DIAGNOSIS — E66.01 MORBID OBESITY, UNSPECIFIED OBESITY TYPE (H): ICD-10-CM

## 2020-10-05 DIAGNOSIS — F33.1 MODERATE EPISODE OF RECURRENT MAJOR DEPRESSIVE DISORDER (H): ICD-10-CM

## 2020-10-05 DIAGNOSIS — G30.0 EARLY ONSET ALZHEIMER'S DEMENTIA WITHOUT BEHAVIORAL DISTURBANCE (H): ICD-10-CM

## 2020-10-05 DIAGNOSIS — Z13.220 LIPID SCREENING: ICD-10-CM

## 2020-10-05 DIAGNOSIS — Z00.00 ROUTINE GENERAL MEDICAL EXAMINATION AT A HEALTH CARE FACILITY: Primary | ICD-10-CM

## 2020-10-05 DIAGNOSIS — F41.1 GENERALIZED ANXIETY DISORDER: ICD-10-CM

## 2020-10-05 LAB
CHOLEST SERPL-MCNC: 250 MG/DL
HDLC SERPL-MCNC: 72 MG/DL
LDLC SERPL CALC-MCNC: 161 MG/DL
NONHDLC SERPL-MCNC: 178 MG/DL
TRIGL SERPL-MCNC: 85 MG/DL

## 2020-10-05 PROCEDURE — 99213 OFFICE O/P EST LOW 20 MIN: CPT | Mod: 25 | Performed by: FAMILY MEDICINE

## 2020-10-05 PROCEDURE — 90471 IMMUNIZATION ADMIN: CPT | Performed by: FAMILY MEDICINE

## 2020-10-05 PROCEDURE — 90682 RIV4 VACC RECOMBINANT DNA IM: CPT | Performed by: FAMILY MEDICINE

## 2020-10-05 PROCEDURE — 99396 PREV VISIT EST AGE 40-64: CPT | Mod: 25 | Performed by: FAMILY MEDICINE

## 2020-10-05 PROCEDURE — 80061 LIPID PANEL: CPT | Performed by: FAMILY MEDICINE

## 2020-10-05 PROCEDURE — 36415 COLL VENOUS BLD VENIPUNCTURE: CPT | Performed by: FAMILY MEDICINE

## 2020-10-05 RX ORDER — VENLAFAXINE HYDROCHLORIDE 150 MG/1
CAPSULE, EXTENDED RELEASE ORAL
Qty: 90 CAPSULE | Refills: 1 | Status: SHIPPED | OUTPATIENT
Start: 2020-10-05 | End: 2020-10-29

## 2020-10-05 ASSESSMENT — ASTHMA QUESTIONNAIRES
QUESTION_2 LAST FOUR WEEKS HOW OFTEN HAVE YOU HAD SHORTNESS OF BREATH: NOT AT ALL
ACT_TOTALSCORE: 25
QUESTION_4 LAST FOUR WEEKS HOW OFTEN HAVE YOU USED YOUR RESCUE INHALER OR NEBULIZER MEDICATION (SUCH AS ALBUTEROL): NOT AT ALL
QUESTION_1 LAST FOUR WEEKS HOW MUCH OF THE TIME DID YOUR ASTHMA KEEP YOU FROM GETTING AS MUCH DONE AT WORK, SCHOOL OR AT HOME: NONE OF THE TIME
QUESTION_5 LAST FOUR WEEKS HOW WOULD YOU RATE YOUR ASTHMA CONTROL: COMPLETELY CONTROLLED
QUESTION_3 LAST FOUR WEEKS HOW OFTEN DID YOUR ASTHMA SYMPTOMS (WHEEZING, COUGHING, SHORTNESS OF BREATH, CHEST TIGHTNESS OR PAIN) WAKE YOU UP AT NIGHT OR EARLIER THAN USUAL IN THE MORNING: NOT AT ALL

## 2020-10-05 ASSESSMENT — ENCOUNTER SYMPTOMS
ABDOMINAL PAIN: 0
COUGH: 0
HEMATURIA: 0
DIARRHEA: 0
CHILLS: 0
HEMATOCHEZIA: 0
CONSTIPATION: 0

## 2020-10-05 ASSESSMENT — MIFFLIN-ST. JEOR: SCORE: 1373.26

## 2020-10-05 NOTE — PROGRESS NOTES
SUBJECTIVE:   CC: Debby Barriga is an 65 year old woman who presents for preventive health visit.       Patient has been advised of split billing requirements and indicates understanding: Yes  Healthy Habits:     Getting at least 3 servings of Calcium per day:  Yes    Bi-annual eye exam:  Yes    Dental care twice a year:  NO    Sleep apnea or symptoms of sleep apnea:  None    Diet:  Regular (no restrictions)    Frequency of exercise:  4-5 days/week    Duration of exercise:  30-45 minutes    Taking medications regularly:  Yes    Medication side effects:  Not applicable    PHQ-2 Total Score: 2    Additional concerns today:  No    Memory - stable. She is staying busy and keeping the house going. She has a large social network.   MDD - stable       Today's PHQ-2 Score:   PHQ-2 (  [employer]) 10/5/2020   Q1: Little interest or pleasure in doing things 1   Q2: Feeling down, depressed or hopeless 1   PHQ-2 Score 2   Q1: Little interest or pleasure in doing things Several days   Q2: Feeling down, depressed or hopeless Several days   PHQ-2 Score 2       Abuse: Current or Past (Physical, Sexual or Emotional) - No  Do you feel safe in your environment? Yes    Have you ever done Advance Care Planning? (For example, a Health Directive, POLST, or a discussion with a medical provider or your loved ones about your wishes): No, advance care planning information given to patient to review.  Patient declined advance care planning discussion at this time.    Social History     Tobacco Use     Smoking status: Former Smoker     Types: Cigarettes     Quit date: 1982     Years since quittin.2     Smokeless tobacco: Never Used   Substance Use Topics     Alcohol use: No     If you drink alcohol do you typically have >3 drinks per day or >7 drinks per week? No    Alcohol Use 10/5/2020   Prescreen: >3 drinks/day or >7 drinks/week? Not Applicable   Prescreen: >3 drinks/day or >7 drinks/week? -       Reviewed orders with patient.   "Reviewed health maintenance and updated orders accordingly - Yes      {Mammo Decision Support mammogram     Pertinent mammograms are reviewed under the imaging tab.  History of abnormal Pap smear: NO - age 30-65 PAP every 5 years with negative HPV co-testing recommended  PAP / HPV Latest Ref Rng & Units 6/20/2017   PAP - NIL   HPV 16 DNA NEG Negative   HPV 18 DNA NEG Negative   OTHER HR HPV NEG Negative     Reviewed and updated as needed this visit by clinical staff                 Reviewed and updated as needed this visit by Provider                    Review of Systems   Constitutional: Negative for chills.   HENT: Negative for congestion.    Respiratory: Negative for cough.    Cardiovascular: Negative for chest pain.   Gastrointestinal: Negative for abdominal pain, constipation, diarrhea and hematochezia.   Genitourinary: Negative for hematuria.   10 point ROS negative except for above       OBJECTIVE:   Physical Exam   /83   Pulse 73   Temp 98.1  F (36.7  C) (Tympanic)   Ht 1.651 m (5' 5\")   Wt 81.7 kg (180 lb 3.2 oz)   SpO2 97%   BMI 29.99 kg/m    GENERAL APPEARANCE: healthy, alert and no distress  EYES: Eyes grossly normal to inspection, conjunctivae and sclerae normal  HENT: nose and mouth without ulcers or lesions, oropharynx clear and oral mucous membranes moist  NECK: no adenopathy, no asymmetry, masses, or scars and thyroid normal to palpation  RESP: lungs clear to auscultation - no rales, rhonchi or wheezes  CV: regular rate and rhythm, normal S1 S2  ABDOMEN: soft, nontender, no hepatosplenomegaly, no masses and bowel sounds normal  MS: no musculoskeletal defects are noted and gait is age appropriate without ataxia  SKIN: no suspicious lesions or rashes  NEURO: Normal strength and tone, sensory exam grossly normal, mentation intact and speech normal  PSYCH: mentation appears normal and affect normal/bright    Diagnostic Test Results:  Labs reviewed in Epic    ASSESSMENT/PLAN:     1. Routine " "general medical examination at a health care facility  - C RIV4 (FLUBLOK) VACCINE RECOMBINANT DNA PRSRV ANTIBIO FREE, IM [45618]    2. Moderate episode of recurrent major depressive disorder (H)  - stable refilled below  - venlafaxine (EFFEXOR-XR) 150 MG 24 hr capsule; TAKE ONE CAPSULE BY MOUTH ONCE DAILY  Dispense: 90 capsule; Refill: 1  - virtual visit in 6 months    3. Early onset Alzheimer's dementia without behavioral disturbance (H)  - followed by integrated clinic    4. Morbid obesity, unspecified obesity type (H)  - Discussed healthy diet and exercise.     5. Generalized anxiety disorder  - venlafaxine (EFFEXOR-XR) 150 MG 24 hr capsule; TAKE ONE CAPSULE BY MOUTH ONCE DAILY  Dispense: 90 capsule; Refill: 1    6. Lipid screening  - Lipid Profile (Chol, Trig, HDL, LDL calc)      Patient has been advised of split billing requirements and indicates understanding: Yes  COUNSELING:  Reviewed preventive health counseling, as reflected in patient instructions    Estimated body mass index is 26.22 kg/m  as calculated from the following:    Height as of 5/29/19: 1.664 m (5' 5.5\").    Weight as of 10/4/19: 72.6 kg (160 lb).    Weight management plan: Discussed healthy diet and exercise guidelines    She reports that she quit smoking about 38 years ago. Her smoking use included cigarettes. She has never used smokeless tobacco.      Counseling Resources:  ATP IV Guidelines  Pooled Cohorts Equation Calculator  Breast Cancer Risk Calculator  BRCA-Related Cancer Risk Assessment: FHS-7 Tool  FRAX Risk Assessment  ICSI Preventive Guidelines  Dietary Guidelines for Americans, 2010  USDA's MyPlate  ASA Prophylaxis  Lung CA Screening    Galo Rebollar MD  [facility]  "

## 2020-10-05 NOTE — PROGRESS NOTES
"   SUBJECTIVE:   CC: Debby Barriga is an 65 year old woman who presents for preventive health visit.     {Split Bill scripting  The purpose of this visit is to discuss your medical history and prevent health problems before you are sick. You may be responsible for a co-pay, coinsurance, or deductible if your visit today includes services such as checking on a sore throat, having an x-ray or lab test, or treating and evaluating a new or existing condition :595400}  Patient has been advised of split billing requirements and indicates understanding: {Yes and No:723612}  Healthy Habits:    Do you get at least three servings of calcium containing foods daily (dairy, green leafy vegetables, etc.)? { :583925::\"yes\"}    Amount of exercise or daily activities, outside of work: { :777982}    Problems taking medications regularly { :453230::\"No\"}    Medication side effects: { :992294::\"No\"}    Have you had an eye exam in the past two years? { :048006}    Do you see a dentist twice per year? { :754466}    Do you have sleep apnea, excessive snoring or daytime drowsiness?{ :416880}  {Outside tests to abstract? :518874}    {additional problems to add (Optional):458174}    Today's PHQ-2 Score:   PHQ-2 ( 1999 [employer]) 10/5/2020 8/14/2018   Q1: Little interest or pleasure in doing things 1 1   Q2: Feeling down, depressed or hopeless 1 1   PHQ-2 Score 2 2   Q1: Little interest or pleasure in doing things Several days Several days   Q2: Feeling down, depressed or hopeless Several days Several days   PHQ-2 Score 2 2     {PHQ-2 LOOK IN ASSESSMENTS (Optional) :412980}  Abuse: Current or Past(Physical, Sexual or Emotional)- {YES/NO/NA:625717}  Do you feel safe in your environment? {YES/NO/NA:067472}    Have you ever done Advance Care Planning? (For example, a Health Directive, POLST, or a discussion with a medical provider or your loved ones about your wishes): { :273272}    Social History     Tobacco Use     Smoking status: Former Smoker     " "Types: Cigarettes     Quit date: 1982     Years since quittin.2     Smokeless tobacco: Never Used   Substance Use Topics     Alcohol use: No     If you drink alcohol do you typically have >3 drinks per day or >7 drinks per week? {ETOH :930396}                     Reviewed orders with patient.  Reviewed health maintenance and updated orders accordingly - {Yes/No:452627::\"Yes\"}  {Chronicprobdata (Optional):433064}    {Mammo Decision Support (Optional):175501}    Pertinent mammograms are reviewed under the imaging tab.  History of abnormal Pap smear: {PAP HX:179216}  PAP / HPV Latest Ref Rng & Units 2017   PAP - NIL   HPV 16 DNA NEG Negative   HPV 18 DNA NEG Negative   OTHER HR HPV NEG Negative     Reviewed and updated as needed this visit by clinical staff                 Reviewed and updated as needed this visit by Provider                {HISTORY OPTIONS (Optional):852389}    ROS:  { :276843}    OBJECTIVE:   There were no vitals taken for this visit.  EXAM:  {Exam Choices:705296}    {Diagnostic Test Results (Optional):329200::\"Diagnostic Test Results:\",\"Labs reviewed in Epic\"}    ASSESSMENT/PLAN:   {Diag Picklist:130708}    Patient has been advised of split billing requirements and indicates understanding: {YES / NO:029806::\"Yes\"}  COUNSELING:   {FEMALE COUNSELING MESSAGES:410403::\"Reviewed preventive health counseling, as reflected in patient instructions\"}    Estimated body mass index is 26.22 kg/m  as calculated from the following:    Height as of 19: 1.664 m (5' 5.5\").    Weight as of 10/4/19: 72.6 kg (160 lb).    {Weight Management Plan (ACO) Complete if BMI is abnormal-  Ages 18-64  BMI >24.9.  Age 65+ with BMI <23 or >30 (Optional):639398}    She reports that she quit smoking about 38 years ago. Her smoking use included cigarettes. She has never used smokeless tobacco.      Counseling Resources:  ATP IV Guidelines  Pooled Cohorts Equation Calculator  Breast Cancer Risk " Calculator  BRCA-Related Cancer Risk Assessment: FHS-7 Tool  FRAX Risk Assessment  ICSI Preventive Guidelines  Dietary Guidelines for Americans, 2010  USDA's MyPlate  ASA Prophylaxis  Lung CA Screening    Galo Rebollar MD  [facility]

## 2020-10-06 ASSESSMENT — ASTHMA QUESTIONNAIRES: ACT_TOTALSCORE: 25

## 2020-10-29 DIAGNOSIS — F41.1 GENERALIZED ANXIETY DISORDER: ICD-10-CM

## 2020-10-29 DIAGNOSIS — F33.1 MODERATE EPISODE OF RECURRENT MAJOR DEPRESSIVE DISORDER (H): ICD-10-CM

## 2020-10-29 RX ORDER — VENLAFAXINE HYDROCHLORIDE 150 MG/1
CAPSULE, EXTENDED RELEASE ORAL
Qty: 90 CAPSULE | Refills: 0 | Status: SHIPPED | OUTPATIENT
Start: 2020-10-29 | End: 2021-07-23

## 2020-11-11 ENCOUNTER — OFFICE VISIT (OUTPATIENT)
Dept: FAMILY MEDICINE | Facility: CLINIC | Age: 65
End: 2020-11-11
Payer: COMMERCIAL

## 2020-11-11 VITALS
DIASTOLIC BLOOD PRESSURE: 86 MMHG | WEIGHT: 184 LBS | RESPIRATION RATE: 16 BRPM | OXYGEN SATURATION: 98 % | SYSTOLIC BLOOD PRESSURE: 128 MMHG | TEMPERATURE: 98.3 F | HEART RATE: 75 BPM | BODY MASS INDEX: 30.62 KG/M2

## 2020-11-11 DIAGNOSIS — F02.80 ALZHEIMER'S DEMENTIA WITHOUT BEHAVIORAL DISTURBANCE, UNSPECIFIED TIMING OF DEMENTIA ONSET: ICD-10-CM

## 2020-11-11 DIAGNOSIS — S52.502P CLOSED FRACTURE OF DISTAL END OF LEFT RADIUS WITH MALUNION, UNSPECIFIED FRACTURE MORPHOLOGY, SUBSEQUENT ENCOUNTER: ICD-10-CM

## 2020-11-11 DIAGNOSIS — Z01.818 PREOP GENERAL PHYSICAL EXAM: Primary | ICD-10-CM

## 2020-11-11 DIAGNOSIS — G30.9 ALZHEIMER'S DEMENTIA WITHOUT BEHAVIORAL DISTURBANCE, UNSPECIFIED TIMING OF DEMENTIA ONSET: ICD-10-CM

## 2020-11-11 LAB
ERYTHROCYTE [DISTWIDTH] IN BLOOD BY AUTOMATED COUNT: 12.8 % (ref 10–15)
HCT VFR BLD AUTO: 39.2 % (ref 35–47)
HGB BLD-MCNC: 12.5 G/DL (ref 11.7–15.7)
MCH RBC QN AUTO: 28.2 PG (ref 26.5–33)
MCHC RBC AUTO-ENTMCNC: 31.9 G/DL (ref 31.5–36.5)
MCV RBC AUTO: 89 FL (ref 78–100)
PLATELET # BLD AUTO: 314 10E9/L (ref 150–450)
RBC # BLD AUTO: 4.43 10E12/L (ref 3.8–5.2)
WBC # BLD AUTO: 7.2 10E9/L (ref 4–11)

## 2020-11-11 PROCEDURE — 36415 COLL VENOUS BLD VENIPUNCTURE: CPT | Performed by: NURSE PRACTITIONER

## 2020-11-11 PROCEDURE — 85027 COMPLETE CBC AUTOMATED: CPT | Performed by: NURSE PRACTITIONER

## 2020-11-11 PROCEDURE — 99214 OFFICE O/P EST MOD 30 MIN: CPT | Performed by: NURSE PRACTITIONER

## 2020-11-11 ASSESSMENT — PATIENT HEALTH QUESTIONNAIRE - PHQ9: SUM OF ALL RESPONSES TO PHQ QUESTIONS 1-9: 5

## 2020-11-11 NOTE — PROGRESS NOTES
[facility] [address] [place] [address]  Phone: [phone]  Primary Provider: Galo Rebollar  Pre-op Performing Provider: KHOA FIGUEROA    PREOPERATIVE EVALUATION:  Today's date: 11/11/2020    Debby Barriga is a 65 year old female who presents for a preoperative evaluation.    Surgical Information:  Surgery/Procedure: Repair left distal shoulder   Surgery Location: [facility]  Surgeon: Dr. Shyam Watts MD  Surgery Date: 11/13/2020  Time of Surgery: 10 am  Where patient plans to recover: At home with family  Fax number for surgical facility: [phone]    Type of Anesthesia Anticipated: General    Subjective     HPI related to upcoming procedure: Fell on 11/6 and fractured left distal radius.    Preop Questions 11/11/2020   1. Have you ever had a heart attack or stroke? No   2. Have you ever had surgery on your heart or blood vessels, such as a stent placement, a coronary artery bypass, or surgery on an artery in your head, neck, heart, or legs? No   3. Do you have chest pain with activity? No   4. Do you have a history of  heart failure? No   5. Do you currently have a cold, bronchitis or symptoms of other infection? No   6. Do you have a cough, shortness of breath, or wheezing? No   7. Do you or anyone in your family have previous history of blood clots? No   8. Do you or does anyone in your family have a serious bleeding problem such as prolonged bleeding following surgeries or cuts? No   9. Have you ever had problems with anemia or been told to take iron pills? No   10. Have you had any abnormal blood loss such as black, tarry or bloody stools, or abnormal vaginal bleeding? No   11. Have you ever had a blood transfusion? No   12. Are you willing to have a blood transfusion if it is medically needed before, during, or after your surgery? NO    13. Have you or any of your relatives ever had problems with anesthesia? No   14. Do you have sleep apnea,  excessive snoring or daytime drowsiness? No   15. Do you have any artifical heart valves or other implanted medical devices like a pacemaker, defibrillator, or continuous glucose monitor? No   16. Do you have artificial joints? No   17. Are you allergic to latex? No       Health Care Directive:  Patient does not have a Health Care Directive or Living Will:     Preoperative Review of :        Status of Chronic Conditions:  See problem list for active medical problems.  Problems all longstanding and stable, except as noted/documented.  See ROS for pertinent symptoms related to these conditions.      Review of Systems  CONSTITUTIONAL: NEGATIVE for fever, chills, change in weight  INTEGUMENTARY/SKIN: NEGATIVE for worrisome rashes, moles or lesions  EYES: NEGATIVE for vision changes or irritation  ENT/MOUTH: NEGATIVE for ear, mouth and throat problems  RESP: NEGATIVE for significant cough or SOB  BREAST: NEGATIVE for masses, tenderness or discharge  CV: NEGATIVE for chest pain, palpitations or peripheral edema  GI: NEGATIVE for nausea, abdominal pain, heartburn, or change in bowel habits  : NEGATIVE for frequency, dysuria, or hematuria  MUSCULOSKELETAL:see HPI  NEURO: mild tingling in left hand  ENDOCRINE: NEGATIVE for temperature intolerance, skin/hair changes  HEME: NEGATIVE for bleeding problems  PSYCHIATRIC: memory loss secondary to Alzheimer's    Patient Active Problem List    Diagnosis Date Noted     Alzheimer's dementia without behavioral disturbance, unspecified timing of dementia onset (H) 01/14/2019     Priority: Medium     Myopic astigmatism of both eyes 03/18/2015     Priority: Medium     Presbyopia 03/18/2015     Priority: Medium     Generalized anxiety disorder 07/23/2013     Priority: Medium     Insomnia, unspecified 10/31/2011     Priority: Medium     Hyperlipidemia 06/24/2010     Priority: Medium     Major depression, recurrent (H) 08/12/2009     Priority: Medium     Health maintenance examination  2009     Priority: Medium     Overview:   Last PE, 2009  Last Pap, 07  Last Lipids:  Chol: 149    3/17/2011  T    3/17/2011  HDL:   44    3/17/2011  LDL:  91    3/17/2011  Mammo, 10/2002, (elsewhere), 2009-neg  Dexa:2009-osteopenia  Colonoscopy, 08       Morbid obesity (H) 2009     Priority: Medium     Asthma 2009     Priority: Medium     ETOH abuse 2009     Priority: Medium     Plantar fasciitis 2009     Priority: Medium     Overview:   Chronic Left Plantar Fasciitis       Tobacco use disorder 2009     Priority: Medium      Past Medical History:   Diagnosis Date     Corneal ulcer of right eye 3-     Mild major depression (H) 2013     Tear of retina     right eye     Past Surgical History:   Procedure Laterality Date     LAPAROSCOPIC TUBAL LIGATION       Current Outpatient Medications   Medication Sig Dispense Refill     NUTRITIONAL SUPPLEMENTS PO Multiple various types       venlafaxine (EFFEXOR-XR) 150 MG 24 hr capsule TAKE ONE CAPSULE BY MOUTH ONCE DAILY 90 capsule 0     omega-3 acid ethyl esters (LOVAZA) 1 g capsule Take 2 g by mouth 2 times daily         Allergies   Allergen Reactions     Hydrocodone      Ears ringing     Trazodone      Other reaction(s): Headache        Social History     Tobacco Use     Smoking status: Former Smoker     Types: Cigarettes     Quit date: 1982     Years since quittin.3     Smokeless tobacco: Never Used   Substance Use Topics     Alcohol use: No       History   Drug Use Unknown         Objective     /86 (BP Location: Right arm, Patient Position: Sitting, Cuff Size: Adult Regular)   Pulse 75   Temp 98.3  F (36.8  C) (Tympanic)   Resp 16   Wt 83.5 kg (184 lb)   SpO2 98%   BMI 30.62 kg/m      Physical Exam    GENERAL APPEARANCE: healthy, alert and no distress     EYES: EOMI, PERRL     HENT: ear canals and TM's normal and nose and mouth without ulcers or lesions     NECK: no adenopathy,  no asymmetry, masses, or scars and thyroid normal to palpation     RESP: lungs clear to auscultation - no rales, rhonchi or wheezes     CV: regular rates and rhythm, normal S1 S2, no S3 or S4 and no murmur, click or rub     ABDOMEN:  soft, nontender, no HSM or masses and bowel sounds normal     MS: left wrist in splint and sling     SKIN: no suspicious lesions or rashes     NEURO: Normal strength and tone, sensory exam grossly normal, mentation intact and speech normal     PSYCH: affect normal/bright     LYMPHATICS: No cervical adenopathy    Recent Labs   Lab Test 02/11/20  1329 10/04/19  1154   NA  --  138   POTASSIUM  --  3.8   CR  --  0.70   A1C 5.5  --         Diagnostics:  Recent Results (from the past 24 hour(s))   CBC with platelets    Collection Time: 11/11/20  3:04 PM   Result Value Ref Range    WBC 7.2 4.0 - 11.0 10e9/L    RBC Count 4.43 3.8 - 5.2 10e12/L    Hemoglobin 12.5 11.7 - 15.7 g/dL    Hematocrit 39.2 35.0 - 47.0 %    MCV 89 78 - 100 fl    MCH 28.2 26.5 - 33.0 pg    MCHC 31.9 31.5 - 36.5 g/dL    RDW 12.8 10.0 - 15.0 %    Platelet Count 314 150 - 450 10e9/L      No EKG required, no history of coronary heart disease, significant arrhythmia, peripheral arterial disease or other structural heart disease.    Revised Cardiac Risk Index (RCRI):  The patient has the following serious cardiovascular risks for perioperative complications:   - No serious cardiac risks = 0 points     RCRI Interpretation: 0 points: Class I (very low risk - 0.4% complication rate)           Assessment & Plan   The proposed surgical procedure is considered INTERMEDIATE risk.    Preop general physical exam    - CBC with platelets    Closed fracture of distal end of left radius with malunion, unspecified fracture morphology, subsequent encounter      Alzheimer's dementia without behavioral disturbance, unspecified timing of dementia onset (H)           Risks and Recommendations:  The patient has the following additional risks and  recommendations for perioperative complications:   - No identified additional risk factors other than previously addressed        RECOMMENDATION:  APPROVAL GIVEN to proceed with proposed procedure, without further diagnostic evaluation.    Signed Electronically by: Sharri Lacey NP    Copy of this evaluation report is provided to requesting physician.    Preop [facility] Preop Guidelines    Revised Cardiac Risk Index

## 2020-11-11 NOTE — PATIENT INSTRUCTIONS

## 2021-02-04 ENCOUNTER — NURSE TRIAGE (OUTPATIENT)
Dept: NURSING | Facility: CLINIC | Age: 66
End: 2021-02-04

## 2021-02-04 NOTE — TELEPHONE ENCOUNTER
Patient's partner wanted to know how long it would take for patient's dental implants to be removed.  FNA advised estimated time would probably one hour or so unless dental place is behind or busy. Caller verbalizes understanding.      Additional Information    Negative: Nursing judgment    Negative: Nursing judgment    Negative: Nursing judgment    Negative: Nursing judgment    Information only question and nurse able to answer    Protocols used: INFORMATION ONLY CALL - NO TRIAGE-A-OH

## 2021-05-10 ENCOUNTER — TELEPHONE (OUTPATIENT)
Dept: FAMILY MEDICINE | Facility: CLINIC | Age: 66
End: 2021-05-10

## 2021-05-10 NOTE — TELEPHONE ENCOUNTER
Please contact partner, Josie, at [phone] regarding attached Telephone Encounter; okay to leave detailed message at that number.    Routing comment      Partner requesting clarification on prescribed Vitamin D as patient is to take 125 units and what she has on hand is 10mcg (400 unit tablet) and Fish Oil is prescribed as 2 grams but has 1000mg capsules.  FNA doesn't see those listed in chart.     Debby Elaine RN  [facility] Triage Nurse Advisor     Please close encounter when completed.

## 2021-05-10 NOTE — TELEPHONE ENCOUNTER
Dr. Rebollar-  Please advise on 2 meds-    1- Vit D? Can pt take the 400 units that she has at home?    2- Pt should be able to take 2 of the 1000 mg capsules of fish oil.  EILEEN Woods

## 2021-05-20 RX ORDER — SWAB
SWAB, NON-MEDICATED MISCELLANEOUS
COMMUNITY
Start: 2021-05-20 | End: 2021-10-11

## 2021-05-20 RX ORDER — METAPROTERENOL SULFATE 10 MG
2000 TABLET ORAL 2 TIMES DAILY
COMMUNITY
Start: 2021-05-20

## 2021-05-20 NOTE — TELEPHONE ENCOUNTER
Writer called home number and spoke with Josie.  Consent to communicate on file.    Writer reviewed message per Dr. Rebollar with Josie.    Josie verbalized understanding and in agreement with plan.    Med list updated.    GABY Florez, RN  [facility]

## 2021-06-22 NOTE — TELEPHONE ENCOUNTER
Call pt- unfortunately I cannot take on any new primary care patients into my panel as it is too full already.  I can do functional medicine consults but she is already seeing a functional medicine provider.

## 2021-06-22 NOTE — TELEPHONE ENCOUNTER
New Appointment Needed  What is the reason for the visit:    Same Date/Next Day Appt Request  What is the reason for your visit?: Patient is requesting to ESTABLISH CARE with Dr Moncada. She has been recently diagnosed with Alzheimer's, and is in need of a Primary Care Physician. Patient is also scheduled to meet with Dr Bernarda Zenker at [facility] in [place] to address the Alzheimers from a functional medicine, nutritional, and would like to have Dr Moncada as her PCP as part of the process.  Provider Preference: Dr Moncada  How soon do you need to be seen?: Patient requests a call back as soon as possible to find out if Dr Moncada is willing to accept her as a patient, and would then schedule an initial appointment at a time that works for Dr Moncada.  Waitlist offered?: No  Okay to leave a detailed message:  Yes

## 2021-06-22 NOTE — TELEPHONE ENCOUNTER
"Left message to call back for: Pt  Information to relay to patient:  Voice mail is answered by \"Yanelis.\"  I left message to call back if this was the correct person.   Please relay message from provider as noted below.   "

## 2021-08-16 ENCOUNTER — VIRTUAL VISIT (OUTPATIENT)
Dept: FAMILY MEDICINE | Facility: CLINIC | Age: 66
End: 2021-08-16
Payer: COMMERCIAL

## 2021-08-16 DIAGNOSIS — Z86.19 HISTORY OF LYME DISEASE: ICD-10-CM

## 2021-08-16 DIAGNOSIS — R51.9 CHRONIC NONINTRACTABLE HEADACHE, UNSPECIFIED HEADACHE TYPE: Primary | ICD-10-CM

## 2021-08-16 DIAGNOSIS — G89.29 CHRONIC NONINTRACTABLE HEADACHE, UNSPECIFIED HEADACHE TYPE: Primary | ICD-10-CM

## 2021-08-16 PROCEDURE — 99214 OFFICE O/P EST MOD 30 MIN: CPT | Mod: 95 | Performed by: NURSE PRACTITIONER

## 2021-08-16 ASSESSMENT — PATIENT HEALTH QUESTIONNAIRE - PHQ9: SUM OF ALL RESPONSES TO PHQ QUESTIONS 1-9: 0

## 2021-08-16 NOTE — PROGRESS NOTES
"Debby is a 65 year old who is being evaluated via a billable video visit.      How would you like to obtain your AVS? Mail a copy  If the video visit is dropped, the invitation should be resent by:  [email or web address]  Will anyone else be joining your video visit? No    Video Start Time: 5:32 PM    Assessment & Plan     Chronic nonintractable headache, unspecified headache type  Will refer to PT for craniosacral therapy.  If symptoms are not improving, will refer to headache clinic.   - [facility] Referral; Future    History of Lyme disease  Discussed possible limitations of retesting.  If positive, will refer to ID.   - Lyme Disease Joellen with reflex to WB Serum; Future      38 minutes spent on the date of the encounter doing patient visit and documentation        BMI:   Estimated body mass index is 30.62 kg/m  as calculated from the following:    Height as of 10/5/20: 1.651 m (5' 5\").    Weight as of 11/11/20: 83.5 kg (184 lb).           Return if symptoms worsen or fail to improve.    Sharri Lacey NP  [facility]   Debby is a 65 year old who presents for the following health issues  accompanied by her friend, Josie:    HPI   Patient with Josie     Referral for Headaches     Update asthma diagnosis. Patient doesn't have asthma     She has fallen about 4-5 times since she fell last November and fractured her left wrist.    She has been having daily headaches for several years, but can wax and wane.   She has been trying acupuncture and chiropractor.  Stress can worsen her headache.   She denies any dizziness, vision changes.  Craniosacral therapy has been helpful in the past.  Insurance said they will cover, but she needs a referral.    She was treated for Lyme's disease a few years ago.  Would like to be retested.              Review of Systems         Objective           Vitals:  No vitals were obtained today due to virtual visit.    Physical Exam   GENERAL: " Healthy, alert and no distress  EYES: Eyes grossly normal to inspection.  No discharge or erythema, or obvious scleral/conjunctival abnormalities.  RESP: No audible wheeze, cough, or visible cyanosis.  No visible retractions or increased work of breathing.    SKIN: abrasion on face  NEURO: Cranial nerves grossly intact.  Mentation and speech appropriate for age.                  Video-Visit Details    Type of service:  Video Visit    Video End Time:5:57 PM    Originating Location (pt. Location): Home    Distant Location (provider location):  [facility]     Platform used for Video Visit: [employer]

## 2021-08-17 ASSESSMENT — ASTHMA QUESTIONNAIRES: ACT_TOTALSCORE: 25

## 2021-08-24 ENCOUNTER — HOSPITAL ENCOUNTER (OUTPATIENT)
Dept: MAMMOGRAPHY | Facility: CLINIC | Age: 66
Discharge: HOME | End: 2021-08-24
Attending: NURSE PRACTITIONER | Admitting: NURSE PRACTITIONER
Payer: COMMERCIAL

## 2021-08-24 DIAGNOSIS — Z12.31 SCREENING MAMMOGRAM, ENCOUNTER FOR: ICD-10-CM

## 2021-08-24 PROCEDURE — 77067 SCR MAMMO BI INCL CAD: CPT

## 2021-09-02 ENCOUNTER — LAB (OUTPATIENT)
Dept: LAB | Facility: CLINIC | Age: 66
End: 2021-09-02
Payer: COMMERCIAL

## 2021-09-02 DIAGNOSIS — Z86.19 HISTORY OF LYME DISEASE: ICD-10-CM

## 2021-09-02 PROCEDURE — 86618 LYME DISEASE ANTIBODY: CPT

## 2021-09-02 PROCEDURE — 36415 COLL VENOUS BLD VENIPUNCTURE: CPT

## 2021-09-03 LAB — B BURGDOR IGG+IGM SER QL: 0.11

## 2021-09-21 ENCOUNTER — THERAPY VISIT (OUTPATIENT)
Dept: PHYSICAL THERAPY | Facility: CLINIC | Age: 66
End: 2021-09-21
Attending: NURSE PRACTITIONER
Payer: COMMERCIAL

## 2021-09-21 DIAGNOSIS — R51.9 CHRONIC NONINTRACTABLE HEADACHE, UNSPECIFIED HEADACHE TYPE: ICD-10-CM

## 2021-09-21 DIAGNOSIS — G89.29 CHRONIC NONINTRACTABLE HEADACHE, UNSPECIFIED HEADACHE TYPE: ICD-10-CM

## 2021-09-21 PROCEDURE — 97140 MANUAL THERAPY 1/> REGIONS: CPT | Mod: GP | Performed by: PHYSICAL THERAPIST

## 2021-09-21 PROCEDURE — 97161 PT EVAL LOW COMPLEX 20 MIN: CPT | Mod: GP | Performed by: PHYSICAL THERAPIST

## 2021-09-21 PROCEDURE — 97112 NEUROMUSCULAR REEDUCATION: CPT | Mod: GP | Performed by: PHYSICAL THERAPIST

## 2021-09-21 NOTE — PROGRESS NOTES
Physical Therapy Initial Evaluation  Subjective:    Patient Health History  Debby Barriga being seen for crainal sacrial lexi.     Date of Onset: chronic    Problem occurred: dont know   Pain is reported as 5/10 on pain scale.  General health as reported by patient is good.  Pertinent medical history includes: chemical dependency, depression, history of fractures, migraines/headaches and overweight.     Medical allergies: none.   Surgeries include:  Orthopedic surgery. Other surgery history details: broken wrist.    Current medications:  Anti-depressants.    Current occupation is retired .                     Therapist Generated HPI Evaluation  Problem details: Pt arrives with her partner to assist the interview, she describes chronic HAs coming from stress, 8/10 at worst. Cold packs do help. Can be daily up to 5 HAs a wk.  Debby has been dx'd with Alzheimers. .         Type of problem:  Cervical spine.    This is a chronic condition.  Condition occurred with:  Other reason.  Where condition occurred: other.  Patient reports pain:  Upper cervical spine, lower cervical spine and mid cervical spine.  Pain is described as aching and is intermittent.  Pain radiates to:  Head. Pain is the same all the time.  Since onset symptoms are gradually worsening.  Associated symptoms:  Loss of motion/stiffness, headache and dizziness. Symptoms are exacerbated by other (stress)  and relieved by ice.    Previous treatment includes other.   Barriers include:  None as reported by patient.                        Objective:  System              Cervical/Thoracic Evaluation    AROM:  AROM Cervical:    Flexion:        25% loss  Extension:       50% loss retxn, 50% loss extension   Rotation:         Left:     Right:  Side Bend:      Left:     Right:       Headaches: cervical and migraine  Cervical Myotomes:  normal                                                                          General     ROS    Assessment/Plan:     Patient is a 66 year old female with cervical complaints.    Patient has the following significant findings with corresponding treatment plan.                Diagnosis 1:  Intractable HAs   Pain -  manual therapy, self management, home program and CST   Decreased ROM/flexibility - manual therapy, therapeutic exercise and home program  Decreased proprioception - neuro re-education, therapeutic activities and home program  Impaired muscle performance - neuro re-education  Decreased function - therapeutic activities  Diagnosis 2:  Neck pain    Pain -  manual therapy, self management and home program  Decreased ROM/flexibility - manual therapy, therapeutic exercise and home program  Decreased joint mobility - manual therapy and therapeutic exercise  Decreased proprioception - neuro re-education and therapeutic activities  Impaired muscle performance - neuro re-education    Therapy Evaluation Codes:   1) History comprised of:   Personal factors that impact the plan of care:      Coping style, Overall behavior pattern and Past/current experiences.    Comorbidity factors that impact the plan of care are:      Alzheimers.     Medications impacting care: Pain.  2) Examination of Body Systems comprised of:   Body structures and functions that impact the plan of care:      Cervical spine.   Activity limitations that impact the plan of care are:      Lifting, Reading/Computer work and Sleeping.  3) Clinical presentation characteristics are:   Stable/Uncomplicated.  4) Decision-Making    Low complexity using standardized patient assessment instrument and/or measureable assessment of functional outcome.  Cumulative Therapy Evaluation is: Low complexity.    Previous and current functional limitations:  (See Goal Flow Sheet for this information)    Short term and Long term goals: (See Goal Flow Sheet for this information)     Communication ability:  Patient appears to be able to clearly communicate and understand verbal and  written communication and follow directions correctly.  Treatment Explanation - The following has been discussed with the patient:   RX ordered/plan of care  Anticipated outcomes  Possible risks and side effects  This patient would benefit from PT intervention to resume normal activities.   Rehab potential is good.    Frequency:  1 X week, once daily  Duration:  for 12 weeks  Discharge Plan:  Achieve all LTG.  Independent in home treatment program.  Reach maximal therapeutic benefit.    Please refer to the daily flowsheet for treatment today, total treatment time and time spent performing 1:1 timed codes.

## 2021-09-28 ENCOUNTER — THERAPY VISIT (OUTPATIENT)
Dept: PHYSICAL THERAPY | Facility: CLINIC | Age: 66
End: 2021-09-28
Attending: NURSE PRACTITIONER
Payer: COMMERCIAL

## 2021-09-28 DIAGNOSIS — G44.219 EPISODIC TENSION-TYPE HEADACHE, NOT INTRACTABLE: ICD-10-CM

## 2021-09-28 DIAGNOSIS — S06.0X0D CONCUSSION WITHOUT LOSS OF CONSCIOUSNESS, SUBSEQUENT ENCOUNTER: Primary | ICD-10-CM

## 2021-09-28 PROCEDURE — 97112 NEUROMUSCULAR REEDUCATION: CPT | Mod: GP | Performed by: PHYSICAL THERAPIST

## 2021-09-28 PROCEDURE — 97140 MANUAL THERAPY 1/> REGIONS: CPT | Mod: GP | Performed by: PHYSICAL THERAPIST

## 2021-10-05 ENCOUNTER — THERAPY VISIT (OUTPATIENT)
Dept: PHYSICAL THERAPY | Facility: CLINIC | Age: 66
End: 2021-10-05
Attending: NURSE PRACTITIONER
Payer: COMMERCIAL

## 2021-10-05 DIAGNOSIS — G44.219 EPISODIC TENSION-TYPE HEADACHE, NOT INTRACTABLE: ICD-10-CM

## 2021-10-05 PROCEDURE — 97112 NEUROMUSCULAR REEDUCATION: CPT | Mod: GP | Performed by: PHYSICAL THERAPIST

## 2021-10-05 PROCEDURE — 97140 MANUAL THERAPY 1/> REGIONS: CPT | Mod: GP | Performed by: PHYSICAL THERAPIST

## 2021-10-11 ENCOUNTER — OFFICE VISIT (OUTPATIENT)
Dept: FAMILY MEDICINE | Facility: CLINIC | Age: 66
End: 2021-10-11
Payer: COMMERCIAL

## 2021-10-11 VITALS
TEMPERATURE: 98.5 F | RESPIRATION RATE: 18 BRPM | OXYGEN SATURATION: 95 % | SYSTOLIC BLOOD PRESSURE: 105 MMHG | DIASTOLIC BLOOD PRESSURE: 69 MMHG | WEIGHT: 171 LBS | HEIGHT: 65 IN | HEART RATE: 74 BPM | BODY MASS INDEX: 28.49 KG/M2

## 2021-10-11 DIAGNOSIS — F41.1 GENERALIZED ANXIETY DISORDER: ICD-10-CM

## 2021-10-11 DIAGNOSIS — Z74.09 IMPAIRED MOBILITY: ICD-10-CM

## 2021-10-11 DIAGNOSIS — R29.6 FALLS FREQUENTLY: ICD-10-CM

## 2021-10-11 DIAGNOSIS — Z12.11 SPECIAL SCREENING FOR MALIGNANT NEOPLASMS, COLON: ICD-10-CM

## 2021-10-11 DIAGNOSIS — G30.9 ALZHEIMER'S DEMENTIA WITHOUT BEHAVIORAL DISTURBANCE, UNSPECIFIED TIMING OF DEMENTIA ONSET: ICD-10-CM

## 2021-10-11 DIAGNOSIS — Z00.00 ENCOUNTER FOR MEDICARE ANNUAL WELLNESS EXAM: Primary | ICD-10-CM

## 2021-10-11 DIAGNOSIS — F02.80 ALZHEIMER'S DEMENTIA WITHOUT BEHAVIORAL DISTURBANCE, UNSPECIFIED TIMING OF DEMENTIA ONSET: ICD-10-CM

## 2021-10-11 DIAGNOSIS — Z23 NEED FOR PROPHYLACTIC VACCINATION AND INOCULATION AGAINST INFLUENZA: ICD-10-CM

## 2021-10-11 DIAGNOSIS — F33.1 MODERATE EPISODE OF RECURRENT MAJOR DEPRESSIVE DISORDER (H): ICD-10-CM

## 2021-10-11 LAB
CHOLEST SERPL-MCNC: 296 MG/DL
FASTING STATUS PATIENT QL REPORTED: YES
HDLC SERPL-MCNC: 54 MG/DL
LDLC SERPL CALC-MCNC: 223 MG/DL
NONHDLC SERPL-MCNC: 242 MG/DL
TRIGL SERPL-MCNC: 93 MG/DL

## 2021-10-11 PROCEDURE — 90682 RIV4 VACC RECOMBINANT DNA IM: CPT | Performed by: NURSE PRACTITIONER

## 2021-10-11 PROCEDURE — 36415 COLL VENOUS BLD VENIPUNCTURE: CPT | Performed by: NURSE PRACTITIONER

## 2021-10-11 PROCEDURE — 99214 OFFICE O/P EST MOD 30 MIN: CPT | Mod: 25 | Performed by: NURSE PRACTITIONER

## 2021-10-11 PROCEDURE — 80061 LIPID PANEL: CPT | Performed by: NURSE PRACTITIONER

## 2021-10-11 PROCEDURE — 90471 IMMUNIZATION ADMIN: CPT | Performed by: NURSE PRACTITIONER

## 2021-10-11 PROCEDURE — 99396 PREV VISIT EST AGE 40-64: CPT | Mod: 25 | Performed by: NURSE PRACTITIONER

## 2021-10-11 RX ORDER — VENLAFAXINE HYDROCHLORIDE 150 MG/1
CAPSULE, EXTENDED RELEASE ORAL
Qty: 90 CAPSULE | Refills: 3 | Status: SHIPPED | OUTPATIENT
Start: 2021-10-11 | End: 2022-10-24

## 2021-10-11 ASSESSMENT — ENCOUNTER SYMPTOMS
EYE PAIN: 0
PALPITATIONS: 0
COUGH: 0
JOINT SWELLING: 0
HEMATOCHEZIA: 0
ARTHRALGIAS: 0
ABDOMINAL PAIN: 0
PARESTHESIAS: 0
WEAKNESS: 0
SORE THROAT: 0
NERVOUS/ANXIOUS: 0
HEARTBURN: 0
HEADACHES: 1
SHORTNESS OF BREATH: 0
DIARRHEA: 0
MYALGIAS: 0
NAUSEA: 0
FREQUENCY: 0
FEVER: 0
DYSURIA: 0
BREAST MASS: 0
HEMATURIA: 0
CONSTIPATION: 0
CHILLS: 0
DIZZINESS: 0

## 2021-10-11 ASSESSMENT — MIFFLIN-ST. JEOR: SCORE: 1322.56

## 2021-10-11 NOTE — PATIENT INSTRUCTIONS
At your visit today, we discussed your risk for falls and preventive options.    Fall Prevention  Falls often occur due to slipping, tripping or losing your balance. Millions of people fall every year and injure themselves. Here are ways to reduce your risk of falling again.     Think about your fall, was there anything that caused your fall that can be fixed, removed, or replaced?    Make your home safe by keeping walkways clear of objects you may trip over, such as electric cords.    Use non-slip pads under rugs. Don't use area rugs or small throw rugs.    Use non-slip mats in bathtubs and showers.    Install handrails and lights on staircases. The handrails should be on both sides of the stairs.    Don't walk in poorly lit areas.    Don't stand on chairs or wobbly ladders.    Use caution when reaching overhead or looking upward. This position can cause a loss of balance.    Be sure your shoes fit properly, have non-slip bottoms and are in good condition.     Wear shoes both inside and out. Don't go barefoot or wear slippers.    Be cautious when going up and down stairs, curbs, and when walking on uneven sidewalks.    If your balance is poor, consider using a cane or walker.    If your fall was related to alcohol use, stop or limit alcohol intake.     If your fall was related to use of sleeping medicines, talk to your healthcare provider about this. You may need to reduce your dosage at bedtime if you awaken during the night to go to the bathroom.      To reduce the need for nighttime bathroom trips:  ? Don't drink fluids for several hours before going to bed  ? Empty your bladder before going to bed  ? Men can keep a urinal at the bedside    Stay as active as you can. Balance, flexibility, strength, and endurance all come from exercise. They all play a role in preventing falls. Ask your healthcare provider which types of activity are right for you.    Get your vision checked on a regular basis.    If you have  pets, know where they are before you stand up or walk so you don't trip over them.    Use night lights.    Go over all your medicines with a pharmacist or other healthcare provider to see if any of them could make you more likely to fall.  [employer] last reviewed this educational content on 4/1/2018 2000-2021 [employer]. All rights reserved. This information is not intended as a substitute for professional medical care. Always follow your healthcare professional's instructions.        Patient Education   Personalized Prevention Plan  You are due for the preventive services outlined below.  Your care team is available to assist you in scheduling these services.  If you have already completed any of these items, please share that information with your care team to update in your medical record.  Health Maintenance Due   Topic Date Due     ANNUAL REVIEW OF  ORDERS  Never done     Zoster (Shingles) Vaccine (1 of 2) Never done     Flu Vaccine (1) 09/01/2021     Preventive Care Visit  10/05/2021       Preventing Falls at Home  A person can fall for many reasons. Older adults may fall because reaction time slows down as we age. Your muscles and joints may get stiff, weak, or less flexible because of illness, medicines, or a physical condition.   Other health problems that make falls more likely include:     Arthritis    Dizziness or lightheadedness when you stand up (orthostatic hypotension)    History of a stroke    Dizziness    Anemia    Certain medicines taken for mental illness or to control blood pressure.    Problems with balance or gait    Bladder or urinary problems    History of falling    Changes in vision (vision impairment)    Changes in thinking skills and memory (cognitive impairment)  Injuries from a fall can include serious injuries such as broken bones, dislocated joints, internal bleeding and cuts. Injuries like these can limit your independence.   Prevention tips  To help prevent falls  and fall-related injuries, follow the tips below.    Floors  To make floors safer:     Put nonskid pads under area rugs.    Remove small rugs.    Replace worn floor coverings.    Tack carpets firmly to each step on carpeted stairs. Put nonskid strips on the edges of uncarpeted stairs.    Keep floors and stairs free of clutter and cords.    Arrange furniture so there are clear pathways.    Clean up any spills right away.  Bathrooms    To make bathrooms safer:     Install grab bars in the tub or shower.    Apply nonskid strips or put a nonskid rubber mat in the tub or shower.    Sit on a bath chair to bathe.    Use bathmats with nonskid backing.  Lighting  To improve visibility in your home:      Keep a flashlight in each room. Or put a lamp next to the bed within easy reach.    Put nightlights in the bedrooms, hallways, kitchen, and bathrooms.    Make sure all stairways have good lighting.    Take your time when going up and down stairs.    Put handrails on both sides of stairs and in walkways for more support. To prevent injury to your wrist or arm, don t use handrails to pull yourself up.    Install grab bars to pull yourself up.    Move or rearrange items that you use often. This will make them easier to find or reach.    Look at your home to find any safety hazards. Especially look at doorways, walkways, and the driveway. Remove or repair any safety problems that you find.  Other changes to make    Look around to find any safety hazards. Look closely at doorways, walkways, and the driveway. Remove or repair any safety problems that you find.    Wear shoes that fit well.    Take your time when going up and down stairs.    Put handrails on both sides of stairs and in walkways for more support. To prevent injury to your wrist or arm, don t use handrails to pull yourself up.    Install grab bars wherever needed to pull yourself up.    Arrange items that you use often. This will make them easier to find or  robbin Maria last reviewed this educational content on 3/1/2020    5297-8484 [employer]. All rights reserved. This information is not intended as a substitute for professional medical care. Always follow your healthcare professional's instructions.

## 2021-10-11 NOTE — LETTER
October 12, 2021      Debby Barriga  [address] [facility] [address]        Dear ,    We are writing to inform you of your test results.    Cholesterol level continues to be elevated and is higher than time.   Continue to work on dietary changes, which include avoiding saturated fats (palm and coconut oil, cheese, red meat) and trans-fat (partially hydrogenated oils, margarine, processed foods, fried foods and fast food), and include more unsaturated fats (such as olive oil), soy protein, omega three fatty acids (as found in supplements or fish), high fiber diet, and more nuts as protein may help bring down cholesterol.       Resulted Orders   Lipid panel reflex to direct LDL Non-fasting   Result Value Ref Range    Cholesterol 296 (H) <200 mg/dL    Triglycerides 93 <150 mg/dL    Direct Measure HDL 54 >=50 mg/dL    LDL Cholesterol Calculated 223 (H) <=100 mg/dL    Non HDL Cholesterol 242 (H) <130 mg/dL    Patient Fasting > 8hrs? Yes     Narrative    Cholesterol  Desirable:  <200 mg/dL    Triglycerides  Normal:  Less than 150 mg/dL  Borderline High:  150-199 mg/dL  High:  200-499 mg/dL  Very High:  Greater than or equal to 500 mg/dL    Direct Measure HDL  Female:  Greater than or equal to 50 mg/dL   Male:  Greater than or equal to 40 mg/dL    LDL Cholesterol  Desirable:  <100mg/dL  Above Desirable:  100-129 mg/dL   Borderline High:  130-159 mg/dL   High:  160-189 mg/dL   Very High:  >= 190 mg/dL    Non HDL Cholesterol  Desirable:  130 mg/dL  Above Desirable:  130-159 mg/dL  Borderline High:  160-189 mg/dL  High:  190-219 mg/dL  Very High:  Greater than or equal to 220 mg/dL       If you have any questions or concerns, please call the clinic at the number listed above.       Sincerely,      Sharri Lacey NP

## 2021-10-11 NOTE — PROGRESS NOTES
SUBJECTIVE:   Debby Barriga is a 66 year old female who presents for Preventive Visit.      Patient has been advised of split billing requirements and indicates understanding: Yes   Are you in the first 12 months of your Medicare coverage?  Yes,  Visual Acuity:  Right Eye: 20/30   Left Eye: 20/100  Both Eyes: 20/40 (eye screening preformed by Aniyah QUINTANA)    Healthy Habits:     Getting at least 3 servings of Calcium per day:  Yes    Bi-annual eye exam:  Yes    Dental care twice a year:  Yes    Sleep apnea or symptoms of sleep apnea:  None    Diet:  Carbohydrate counting    Frequency of exercise:  4-5 days/week    Duration of exercise:  45-60 minutes    Taking medications regularly:  Yes    Medication side effects:  Not applicable    PHQ-2 Total Score: 0    Additional concerns today:  No    Do you feel safe in your environment? Yes    Have you ever done Advance Care Planning? (For example, a Health Directive, POLST, or a discussion with a medical provider or your loved ones about your wishes): No, advance care planning information given to patient to review.  Patient plans to discuss their wishes with loved ones or provider.         Fall risk  Fallen 2 or more times in the past year?: Yes  Any fall with injury in the past year?: Yes    Cognitive Screening Not appropriate due to known dementia    She is working with a functional medicine specialist for her Alzheimer's and is on different supplements.    Craniosacral therapy is helping with her headaches.     She has fallen several times - trips over the sidewalk on walks.     She has been on Effexor for many years.  Her mood is stable.     Reviewed and updated as needed this visit by clinical staff  Tobacco  Allergies  Meds              Reviewed and updated as needed this visit by Provider                Social History     Tobacco Use     Smoking status: Former Smoker     Types: Cigarettes     Quit date: 1982     Years since quittin.2     Smokeless tobacco:  Never Used   Substance Use Topics     Alcohol use: No         Alcohol Use 10/11/2021   Prescreen: >3 drinks/day or >7 drinks/week? No   Prescreen: >3 drinks/day or >7 drinks/week? -               Current providers sharing in care for this patient include:   Patient Care Team:  Sharri Lacey NP as PCP - General (Nurse Practitioner - Family)  Tayler Dey MD as MD (Ophthalmology)  Canelo Guerra OD as MD (Optometry)  Sharri Lacey NP as Assigned PCP    The following health maintenance items are reviewed in Epic and correct as of today:  Health Maintenance Due   Topic Date Due     ANNUAL REVIEW OF HM ORDERS  Never done     ZOSTER IMMUNIZATION (1 of 2) Never done     INFLUENZA VACCINE (1) 09/01/2021     PREVENTIVE CARE VISIT  10/05/2021           Breast CA Risk Assessment (FHS-7) 10/11/2021   Do you have a family history of breast, colon, or ovarian cancer? No / Unknown           Pertinent mammograms are reviewed under the imaging tab.    Review of Systems   Constitutional: Negative for chills and fever.   HENT: Positive for hearing loss. Negative for congestion, ear pain and sore throat.    Eyes: Negative for pain and visual disturbance.   Respiratory: Negative for cough and shortness of breath.    Cardiovascular: Negative for chest pain, palpitations and peripheral edema.   Gastrointestinal: Negative for abdominal pain, constipation, diarrhea, heartburn, hematochezia and nausea.   Breasts:  Negative for tenderness, breast mass and discharge.   Genitourinary: Negative for dysuria, frequency, genital sores, hematuria, pelvic pain, urgency, vaginal bleeding and vaginal discharge.   Musculoskeletal: Negative for arthralgias, joint swelling and myalgias.   Skin: Negative for rash.   Neurological: Positive for headaches. Negative for dizziness, weakness and paresthesias.   Psychiatric/Behavioral: Negative for mood changes. The patient is not nervous/anxious.          OBJECTIVE:   /69   Pulse 74  "  Temp 98.5  F (36.9  C) (Tympanic)   Resp 18   Ht 1.645 m (5' 4.75\")   Wt 77.6 kg (171 lb)   SpO2 95%   BMI 28.68 kg/m   Estimated body mass index is 28.68 kg/m  as calculated from the following:    Height as of this encounter: 1.645 m (5' 4.75\").    Weight as of this encounter: 77.6 kg (171 lb).  Physical Exam  GENERAL: healthy, alert and no distress  EYES: Eyes grossly normal to inspection, PERRL and conjunctivae and sclerae normal  HENT: ear canals and TM's normal, nose and mouth without ulcers or lesions  NECK: no adenopathy, no asymmetry, masses, or scars and thyroid normal to palpation  RESP: lungs clear to auscultation - no rales, rhonchi or wheezes  BREAST: normal without masses, tenderness or nipple discharge and no palpable axillary masses or adenopathy  CV: regular rate and rhythm, normal S1 S2, no S3 or S4, no murmur, click or rub, no peripheral edema and peripheral pulses strong  ABDOMEN: soft, nontender, no hepatosplenomegaly, no masses and bowel sounds normal  MS: no gross musculoskeletal defects noted, no edema  SKIN: no suspicious lesions or rashes  NEURO: Normal strength and tone, mentation intact and speech normal  PSYCH:  affect normal/bright        ASSESSMENT / PLAN:   (Z00.00) Encounter for Medicare annual wellness exam  (primary encounter diagnosis)  Comment:   Plan: Lipid panel reflex to direct LDL Non-fasting            (G30.9,  F02.80) Alzheimer's dementia without behavioral disturbance, unspecified timing of dementia onset (H)  Comment: stable  Plan: She will continue to work with her functional medicine specialist.     (F33.1) Moderate episode of recurrent major depressive disorder (H)  Comment: in complete remission  Plan: venlafaxine (EFFEXOR-XR) 150 MG 24 hr capsule        The current medical regimen is effective;  continue present plan and medications.     (R29.6) Falls frequently  Comment:   Plan: Walker Order        Order for walker given.     (Z74.09) Impaired " "mobility  Comment:   Plan: Walker Order        See above.     (F41.1) Generalized anxiety disorder  Comment:   Plan: venlafaxine (EFFEXOR-XR) 150 MG 24 hr capsule        See above.     (Z12.11) Special screening for malignant neoplasms, colon  Comment:   Plan: COLOGUARD([employer])            (Z23) Need for prophylactic vaccination and inoculation against influenza  Comment:   Plan:     Patient has been advised of split billing requirements and indicates understanding:   COUNSELING:  Reviewed preventive health counseling, as reflected in patient instructions    Estimated body mass index is 28.68 kg/m  as calculated from the following:    Height as of this encounter: 1.645 m (5' 4.75\").    Weight as of this encounter: 77.6 kg (171 lb).    Weight management plan: Discussed healthy diet and exercise guidelines    She reports that she quit smoking about 39 years ago. Her smoking use included cigarettes. She has never used smokeless tobacco.      Appropriate preventive services were discussed with this patient, including applicable screening as appropriate for cardiovascular disease, diabetes, osteopenia/osteoporosis, and glaucoma.  As appropriate for age/gender, discussed screening for colorectal cancer, prostate cancer, breast cancer, and cervical cancer. Checklist reviewing preventive services available has been given to the patient.    Reviewed patients plan of care and provided an AVS. The Basic Care Plan (routine screening as documented in Health Maintenance) for Debby meets the Care Plan requirement. This Care Plan has been established and reviewed with the Patient and caregiver.    Counseling Resources:  ATP IV Guidelines  Pooled Cohorts Equation Calculator  Breast Cancer Risk Calculator  Breast Cancer: Medication to Reduce Risk  FRAX Risk Assessment  ICSI Preventive Guidelines  Dietary Guidelines for Americans, 2010  USDA's MyPlate  ASA Prophylaxis  Lung CA Screening    Sharri Lacey NP  [facility]    Identified Health Risks:    She is at risk for falling and has been provided with information to reduce the risk of falling at home.  DME (Durable Medical Equipment) Orders and Documentation  Orders Placed This Encounter   Procedures     Walker Order      The patient was assessed and it was determined the patient is in need of the following listed DME Supplies/Equipment. Please complete supporting documentation below to demonstrate medical necessity.      DME All Other Item(s) Documentation    List reason for need and supporting documentation for medical necessity below for each DME item.     1. Frequent falls  Impaired mobility

## 2021-10-15 ENCOUNTER — THERAPY VISIT (OUTPATIENT)
Dept: PHYSICAL THERAPY | Facility: CLINIC | Age: 66
End: 2021-10-15
Payer: COMMERCIAL

## 2021-10-15 DIAGNOSIS — G44.219 EPISODIC TENSION-TYPE HEADACHE, NOT INTRACTABLE: ICD-10-CM

## 2021-10-15 PROCEDURE — 97140 MANUAL THERAPY 1/> REGIONS: CPT | Mod: GP | Performed by: PHYSICAL THERAPIST

## 2021-10-15 PROCEDURE — 97112 NEUROMUSCULAR REEDUCATION: CPT | Mod: GP | Performed by: PHYSICAL THERAPIST

## 2021-10-18 ENCOUNTER — MYC MEDICAL ADVICE (OUTPATIENT)
Dept: FAMILY MEDICINE | Facility: CLINIC | Age: 66
End: 2021-10-18

## 2021-10-18 DIAGNOSIS — F02.80 ALZHEIMER'S DEMENTIA WITHOUT BEHAVIORAL DISTURBANCE, UNSPECIFIED TIMING OF DEMENTIA ONSET: Primary | ICD-10-CM

## 2021-10-18 DIAGNOSIS — G30.9 ALZHEIMER'S DEMENTIA WITHOUT BEHAVIORAL DISTURBANCE, UNSPECIFIED TIMING OF DEMENTIA ONSET: Primary | ICD-10-CM

## 2021-10-19 ENCOUNTER — THERAPY VISIT (OUTPATIENT)
Dept: PHYSICAL THERAPY | Facility: CLINIC | Age: 66
End: 2021-10-19
Payer: COMMERCIAL

## 2021-10-19 DIAGNOSIS — G44.219 EPISODIC TENSION-TYPE HEADACHE, NOT INTRACTABLE: ICD-10-CM

## 2021-10-19 PROCEDURE — 97112 NEUROMUSCULAR REEDUCATION: CPT | Mod: GP | Performed by: PHYSICAL THERAPIST

## 2021-10-19 PROCEDURE — 97140 MANUAL THERAPY 1/> REGIONS: CPT | Mod: GP | Performed by: PHYSICAL THERAPIST

## 2021-10-20 NOTE — TELEPHONE ENCOUNTER
I have never ordered this, but printed a generic DME order (placed in basket).  Can you mail it to pt please?

## 2021-10-20 NOTE — TELEPHONE ENCOUNTER
Latonya: I have not heard of this device but [employer] shows this:    [email or web address]    Please advise if this is something we can do.    EUNICE Kraus RN  [facility]

## 2021-10-21 NOTE — TELEPHONE ENCOUNTER
DME order placed in outgoing mail basket.    Writer responded via Meludia.    GABY Florez, RN  [facility]

## 2021-10-29 LAB — COLOGUARD-ABSTRACT: NEGATIVE

## 2021-11-02 ENCOUNTER — THERAPY VISIT (OUTPATIENT)
Dept: PHYSICAL THERAPY | Facility: CLINIC | Age: 66
End: 2021-11-02
Payer: COMMERCIAL

## 2021-11-02 DIAGNOSIS — G44.219 EPISODIC TENSION-TYPE HEADACHE, NOT INTRACTABLE: ICD-10-CM

## 2021-11-02 PROCEDURE — 97112 NEUROMUSCULAR REEDUCATION: CPT | Mod: GP | Performed by: PHYSICAL THERAPIST

## 2021-11-02 PROCEDURE — 97140 MANUAL THERAPY 1/> REGIONS: CPT | Mod: GP | Performed by: PHYSICAL THERAPIST

## 2021-11-09 ENCOUNTER — THERAPY VISIT (OUTPATIENT)
Dept: PHYSICAL THERAPY | Facility: CLINIC | Age: 66
End: 2021-11-09
Payer: COMMERCIAL

## 2021-11-09 DIAGNOSIS — G44.219 EPISODIC TENSION-TYPE HEADACHE, NOT INTRACTABLE: ICD-10-CM

## 2021-11-09 PROCEDURE — 97140 MANUAL THERAPY 1/> REGIONS: CPT | Mod: GP | Performed by: PHYSICAL THERAPIST

## 2021-11-09 PROCEDURE — 97112 NEUROMUSCULAR REEDUCATION: CPT | Mod: GP | Performed by: PHYSICAL THERAPIST

## 2021-11-16 ENCOUNTER — THERAPY VISIT (OUTPATIENT)
Dept: PHYSICAL THERAPY | Facility: CLINIC | Age: 66
End: 2021-11-16
Payer: COMMERCIAL

## 2021-11-16 DIAGNOSIS — G44.219 EPISODIC TENSION-TYPE HEADACHE, NOT INTRACTABLE: ICD-10-CM

## 2021-11-16 PROCEDURE — 97112 NEUROMUSCULAR REEDUCATION: CPT | Mod: GP | Performed by: PHYSICAL THERAPIST

## 2021-11-16 PROCEDURE — 97140 MANUAL THERAPY 1/> REGIONS: CPT | Mod: GP | Performed by: PHYSICAL THERAPIST

## 2021-11-23 ENCOUNTER — THERAPY VISIT (OUTPATIENT)
Dept: PHYSICAL THERAPY | Facility: CLINIC | Age: 66
End: 2021-11-23
Payer: COMMERCIAL

## 2021-11-23 DIAGNOSIS — G44.219 EPISODIC TENSION-TYPE HEADACHE, NOT INTRACTABLE: ICD-10-CM

## 2021-11-23 PROCEDURE — 97112 NEUROMUSCULAR REEDUCATION: CPT | Mod: GP | Performed by: PHYSICAL THERAPIST

## 2021-11-23 PROCEDURE — 97140 MANUAL THERAPY 1/> REGIONS: CPT | Mod: GP | Performed by: PHYSICAL THERAPIST

## 2021-12-20 ENCOUNTER — THERAPY VISIT (OUTPATIENT)
Dept: PHYSICAL THERAPY | Facility: CLINIC | Age: 66
End: 2021-12-20
Payer: COMMERCIAL

## 2021-12-20 DIAGNOSIS — G44.219 EPISODIC TENSION-TYPE HEADACHE, NOT INTRACTABLE: ICD-10-CM

## 2021-12-20 PROCEDURE — 97140 MANUAL THERAPY 1/> REGIONS: CPT | Mod: GP | Performed by: PHYSICAL THERAPIST

## 2022-01-04 ENCOUNTER — THERAPY VISIT (OUTPATIENT)
Dept: PHYSICAL THERAPY | Facility: CLINIC | Age: 67
End: 2022-01-04
Payer: COMMERCIAL

## 2022-01-04 ENCOUNTER — TRANSFERRED RECORDS (OUTPATIENT)
Dept: HEALTH INFORMATION MANAGEMENT | Facility: CLINIC | Age: 67
End: 2022-01-04

## 2022-01-04 DIAGNOSIS — G44.219 EPISODIC TENSION-TYPE HEADACHE, NOT INTRACTABLE: ICD-10-CM

## 2022-01-04 PROCEDURE — 97140 MANUAL THERAPY 1/> REGIONS: CPT | Mod: GP | Performed by: PHYSICAL THERAPIST

## 2022-01-05 ENCOUNTER — TELEPHONE (OUTPATIENT)
Dept: FAMILY MEDICINE | Facility: CLINIC | Age: 67
End: 2022-01-05
Payer: COMMERCIAL

## 2022-01-05 NOTE — TELEPHONE ENCOUNTER
Reason for Call:  Form, our goal is to have forms completed with 72 hours, however, some forms may require a visit or additional information.    Type of letter, form or note:  medical    Who is the form from?: Patient    Where did the form come from: Patient or family brought in       What clinic location was the form placed at?: [facility]    Where the form was placed: Providers box Box/Folder    What number is listed as a contact on the form?: [phone]        Additional comments: Patient is in need of an autograph to get hearing aids.     Call taken on 1/5/2022 at 1:47 PM by Susy Ramos

## 2022-01-06 NOTE — TELEPHONE ENCOUNTER
Reason for Call:  Form, our goal is to have forms completed with 72 hours, however, some forms may require a visit or additional information.    Type of letter, form or note:  medical    Who is the form from?: Patient    Where did the form come from: Patient or family brought in       What clinic location was the form placed at?: [facility]    Where the form was placed: placed in pod C providers form folder Box/Folder    What number is listed as a contact on the form?: [phone]       Additional comments:     Call taken on 1/6/2022 at 7:11 AM by Pili Burton

## 2022-01-10 ENCOUNTER — THERAPY VISIT (OUTPATIENT)
Dept: PHYSICAL THERAPY | Facility: CLINIC | Age: 67
End: 2022-01-10
Payer: COMMERCIAL

## 2022-01-10 DIAGNOSIS — G44.219 EPISODIC TENSION-TYPE HEADACHE, NOT INTRACTABLE: ICD-10-CM

## 2022-01-10 PROCEDURE — 97140 MANUAL THERAPY 1/> REGIONS: CPT | Mod: GP | Performed by: PHYSICAL THERAPIST

## 2022-01-13 ENCOUNTER — LAB (OUTPATIENT)
Dept: LAB | Facility: CLINIC | Age: 67
End: 2022-01-13
Payer: COMMERCIAL

## 2022-01-13 ENCOUNTER — OFFICE VISIT (OUTPATIENT)
Dept: URGENT CARE | Facility: URGENT CARE | Age: 67
End: 2022-01-13
Payer: COMMERCIAL

## 2022-01-13 VITALS
RESPIRATION RATE: 16 BRPM | HEART RATE: 78 BPM | TEMPERATURE: 97.7 F | OXYGEN SATURATION: 98 % | DIASTOLIC BLOOD PRESSURE: 72 MMHG | SYSTOLIC BLOOD PRESSURE: 110 MMHG

## 2022-01-13 DIAGNOSIS — R19.7 DIARRHEA, UNSPECIFIED TYPE: Primary | ICD-10-CM

## 2022-01-13 DIAGNOSIS — R30.0 DYSURIA: ICD-10-CM

## 2022-01-13 DIAGNOSIS — R19.7 DIARRHEA, UNSPECIFIED TYPE: ICD-10-CM

## 2022-01-13 LAB
ALBUMIN SERPL-MCNC: 3.4 G/DL (ref 3.4–5)
ALBUMIN UR-MCNC: NEGATIVE MG/DL
ALP SERPL-CCNC: 41 U/L (ref 40–150)
ALT SERPL W P-5'-P-CCNC: 53 U/L (ref 0–50)
ANION GAP SERPL CALCULATED.3IONS-SCNC: 7 MMOL/L (ref 3–14)
APPEARANCE UR: CLEAR
AST SERPL W P-5'-P-CCNC: 42 U/L (ref 0–45)
BACTERIA #/AREA URNS HPF: ABNORMAL /HPF
BASOPHILS # BLD AUTO: 0 10E3/UL (ref 0–0.2)
BASOPHILS NFR BLD AUTO: 0 %
BILIRUB SERPL-MCNC: 0.2 MG/DL (ref 0.2–1.3)
BILIRUB UR QL STRIP: NEGATIVE
BUN SERPL-MCNC: 11 MG/DL (ref 7–30)
CALCIUM SERPL-MCNC: 9 MG/DL (ref 8.5–10.1)
CHLORIDE BLD-SCNC: 106 MMOL/L (ref 94–109)
CO2 SERPL-SCNC: 25 MMOL/L (ref 20–32)
COLOR UR AUTO: YELLOW
CREAT SERPL-MCNC: 0.68 MG/DL (ref 0.52–1.04)
EOSINOPHIL # BLD AUTO: 0.1 10E3/UL (ref 0–0.7)
EOSINOPHIL NFR BLD AUTO: 1 %
ERYTHROCYTE [DISTWIDTH] IN BLOOD BY AUTOMATED COUNT: 13.5 % (ref 10–15)
GFR SERPL CREATININE-BSD FRML MDRD: >90 ML/MIN/1.73M2
GLUCOSE BLD-MCNC: 98 MG/DL (ref 70–99)
GLUCOSE UR STRIP-MCNC: NEGATIVE MG/DL
HCT VFR BLD AUTO: 40.1 % (ref 35–47)
HGB BLD-MCNC: 13 G/DL (ref 11.7–15.7)
HGB UR QL STRIP: NEGATIVE
IMM GRANULOCYTES # BLD: 0 10E3/UL
IMM GRANULOCYTES NFR BLD: 0 %
KETONES UR STRIP-MCNC: NEGATIVE MG/DL
LEUKOCYTE ESTERASE UR QL STRIP: NEGATIVE
LYMPHOCYTES # BLD AUTO: 1.5 10E3/UL (ref 0.8–5.3)
LYMPHOCYTES NFR BLD AUTO: 17 %
MCH RBC QN AUTO: 28.4 PG (ref 26.5–33)
MCHC RBC AUTO-ENTMCNC: 32.4 G/DL (ref 31.5–36.5)
MCV RBC AUTO: 88 FL (ref 78–100)
MONOCYTES # BLD AUTO: 1 10E3/UL (ref 0–1.3)
MONOCYTES NFR BLD AUTO: 11 %
NEUTROPHILS # BLD AUTO: 6.3 10E3/UL (ref 1.6–8.3)
NEUTROPHILS NFR BLD AUTO: 71 %
NITRATE UR QL: NEGATIVE
PH UR STRIP: 6 [PH] (ref 5–7)
PLATELET # BLD AUTO: 318 10E3/UL (ref 150–450)
POTASSIUM BLD-SCNC: 3.4 MMOL/L (ref 3.4–5.3)
PROT SERPL-MCNC: 6.7 G/DL (ref 6.8–8.8)
RBC # BLD AUTO: 4.58 10E6/UL (ref 3.8–5.2)
RBC #/AREA URNS AUTO: ABNORMAL /HPF
SODIUM SERPL-SCNC: 138 MMOL/L (ref 133–144)
SP GR UR STRIP: 1.01 (ref 1–1.03)
SQUAMOUS #/AREA URNS AUTO: ABNORMAL /LPF
UROBILINOGEN UR STRIP-ACNC: 0.2 E.U./DL
WBC # BLD AUTO: 9 10E3/UL (ref 4–11)
WBC #/AREA URNS AUTO: ABNORMAL /HPF

## 2022-01-13 PROCEDURE — 87493 C DIFF AMPLIFIED PROBE: CPT | Mod: 59

## 2022-01-13 PROCEDURE — 80053 COMPREHEN METABOLIC PANEL: CPT | Performed by: NURSE PRACTITIONER

## 2022-01-13 PROCEDURE — 99214 OFFICE O/P EST MOD 30 MIN: CPT | Performed by: NURSE PRACTITIONER

## 2022-01-13 PROCEDURE — 85025 COMPLETE CBC W/AUTO DIFF WBC: CPT | Performed by: NURSE PRACTITIONER

## 2022-01-13 PROCEDURE — 81001 URINALYSIS AUTO W/SCOPE: CPT | Performed by: NURSE PRACTITIONER

## 2022-01-13 PROCEDURE — 87209 SMEAR COMPLEX STAIN: CPT

## 2022-01-13 PROCEDURE — 87506 IADNA-DNA/RNA PROBE TQ 6-11: CPT

## 2022-01-13 PROCEDURE — 36415 COLL VENOUS BLD VENIPUNCTURE: CPT | Performed by: NURSE PRACTITIONER

## 2022-01-13 PROCEDURE — 87177 OVA AND PARASITES SMEARS: CPT

## 2022-01-13 NOTE — PROGRESS NOTES
Assessment & Plan     Diarrhea, unspecified type  CBC to rule out infection or bleeding  CMP to eval electrolytes and renal/liver function  Stool samples pending.    - CBC with platelets and differential; Future  - Comprehensive metabolic panel (BMP + Alb, Alk Phos, ALT, AST, Total. Bili, TP); Future  - Enteric Bacteria and Virus Panel by ANAHY Stool; Future  - Clostridium difficile Toxin B PCR; Future  - Ova and Parasite Exam Routine; Future  - CBC with platelets and differential  - Comprehensive metabolic panel (BMP + Alb, Alk Phos, ALT, AST, Total. Bili, TP)    Dysuria  - UA with Microscopic reflex to Culture - lab collect; Future  - UA with Microscopic reflex to Culture - lab collect    VSS and normal physical exam.    Recommend BRAT diet until stool samples/labs done.    Push fluids  Plan pending labs results.    F/u if persists or worsens    Return in about 1 week (around 1/20/2022), or if symptoms worsen or fail to improve, for with PCP.    KATLYN Harrell [facility]   Debby is a 66 year old who presents for the following health issues  accompanied by her spouse.    HPI     Diarrhea  Onset/Duration: 3 weeks  Description:  Frequency of bowel movements: on a daily basis  Consistency of stool: watery  Progression of Symptoms: same  Accompanying signs and symptoms:    Abdominal pain: YES   Rectal pain: no   Blood in stool: no   Nausea/Vomiting: no   Weight loss or gain: no  History:   Similar problems in past: no  History of abdominal surgery: no  Chronic laxative use: no  New medications: no  Precipitating or alleviating factors: n/a  Therapies tried and outcome: None    Had been on stool softeners for long term and has weaned off this while she has had diarrhea.    No new medications or foods  Denies recent travel.   No fevers and no blood in the stool.         Review of Systems   Constitutional, HEENT, cardiovascular, pulmonary, gi and gu systems are  negative, except as otherwise noted.      Objective    /72   Pulse 78   Temp 97.7  F (36.5  C) (Temporal)   Resp 16   SpO2 98%   There is no height or weight on file to calculate BMI.  Physical Exam   GENERAL: healthy, alert and no distress  EYES: Eyes grossly normal to inspection, PERRL and conjunctivae and sclerae normal  HENT: ear canals and TM's normal, nose and mouth without ulcers or lesions  NECK: no adenopathy, no asymmetry, masses, or scars and thyroid normal to palpation  RESP: lungs clear to auscultation - no rales, rhonchi or wheezes  CV: regular rate and rhythm, normal S1 S2, no S3 or S4, no murmur, click or rub, no peripheral edema and peripheral pulses strong  ABDOMEN: tenderness umbilical, bowel sounds normal and no palpable or pulsatile masses  MS: no gross musculoskeletal defects noted, no edema  SKIN: no suspicious lesions or rashes

## 2022-01-14 LAB
C COLI+JEJUNI+LARI FUSA STL QL NAA+PROBE: NOT DETECTED
C DIFF TOX B STL QL: NEGATIVE
EC STX1 GENE STL QL NAA+PROBE: NOT DETECTED
EC STX2 GENE STL QL NAA+PROBE: NOT DETECTED
NOROV GI+II ORF1-ORF2 JNC STL QL NAA+PR: NOT DETECTED
O+P STL MICRO: NEGATIVE
RVA NSP5 STL QL NAA+PROBE: NOT DETECTED
SALMONELLA SP RPOD STL QL NAA+PROBE: NOT DETECTED
SHIGELLA SP+EIEC IPAH STL QL NAA+PROBE: NOT DETECTED
TRI STN SPEC: NORMAL
V CHOL+PARA RFBL+TRKH+TNAA STL QL NAA+PR: NOT DETECTED
Y ENTERO RECN STL QL NAA+PROBE: NOT DETECTED

## 2022-01-18 ENCOUNTER — HOSPITAL ENCOUNTER (OUTPATIENT)
Dept: CT IMAGING | Facility: CLINIC | Age: 67
Discharge: HOME | End: 2022-01-18
Attending: FAMILY MEDICINE | Admitting: FAMILY MEDICINE
Payer: COMMERCIAL

## 2022-01-18 ENCOUNTER — OFFICE VISIT (OUTPATIENT)
Dept: URGENT CARE | Facility: URGENT CARE | Age: 67
End: 2022-01-18
Payer: COMMERCIAL

## 2022-01-18 ENCOUNTER — OFFICE VISIT (OUTPATIENT)
Dept: PEDIATRICS | Facility: CLINIC | Age: 67
End: 2022-01-18
Payer: COMMERCIAL

## 2022-01-18 VITALS
WEIGHT: 155 LBS | HEART RATE: 81 BPM | OXYGEN SATURATION: 96 % | SYSTOLIC BLOOD PRESSURE: 115 MMHG | TEMPERATURE: 98.2 F | DIASTOLIC BLOOD PRESSURE: 77 MMHG | RESPIRATION RATE: 18 BRPM | BODY MASS INDEX: 25.99 KG/M2

## 2022-01-18 VITALS
HEART RATE: 96 BPM | TEMPERATURE: 98.4 F | SYSTOLIC BLOOD PRESSURE: 90 MMHG | DIASTOLIC BLOOD PRESSURE: 67 MMHG | OXYGEN SATURATION: 98 % | WEIGHT: 155 LBS | BODY MASS INDEX: 25.99 KG/M2

## 2022-01-18 DIAGNOSIS — R19.7 DIARRHEA, UNSPECIFIED TYPE: ICD-10-CM

## 2022-01-18 DIAGNOSIS — R19.7 DIARRHEA, UNSPECIFIED TYPE: Primary | ICD-10-CM

## 2022-01-18 DIAGNOSIS — R10.84 ABDOMINAL PAIN, GENERALIZED: ICD-10-CM

## 2022-01-18 DIAGNOSIS — K52.9 COLITIS: Primary | ICD-10-CM

## 2022-01-18 LAB
ANION GAP SERPL CALCULATED.3IONS-SCNC: 6 MMOL/L (ref 3–14)
BASOPHILS # BLD AUTO: 0 10E3/UL (ref 0–0.2)
BASOPHILS NFR BLD AUTO: 1 %
BUN SERPL-MCNC: 11 MG/DL (ref 7–30)
CALCIUM SERPL-MCNC: 9.2 MG/DL (ref 8.5–10.1)
CHLORIDE BLD-SCNC: 107 MMOL/L (ref 94–109)
CO2 SERPL-SCNC: 26 MMOL/L (ref 20–32)
CREAT SERPL-MCNC: 0.63 MG/DL (ref 0.52–1.04)
EOSINOPHIL # BLD AUTO: 0.1 10E3/UL (ref 0–0.7)
EOSINOPHIL NFR BLD AUTO: 1 %
ERYTHROCYTE [DISTWIDTH] IN BLOOD BY AUTOMATED COUNT: 13.7 % (ref 10–15)
ERYTHROCYTE [SEDIMENTATION RATE] IN BLOOD BY WESTERGREN METHOD: 10 MM/HR (ref 0–30)
GFR SERPL CREATININE-BSD FRML MDRD: >90 ML/MIN/1.73M2
GLUCOSE BLD-MCNC: 97 MG/DL (ref 70–99)
HCT VFR BLD AUTO: 41.5 % (ref 35–47)
HGB BLD-MCNC: 13.1 G/DL (ref 11.7–15.7)
IMM GRANULOCYTES # BLD: 0 10E3/UL
IMM GRANULOCYTES NFR BLD: 0 %
LYMPHOCYTES # BLD AUTO: 1.6 10E3/UL (ref 0.8–5.3)
LYMPHOCYTES NFR BLD AUTO: 18 %
MCH RBC QN AUTO: 28.8 PG (ref 26.5–33)
MCHC RBC AUTO-ENTMCNC: 31.6 G/DL (ref 31.5–36.5)
MCV RBC AUTO: 91 FL (ref 78–100)
MONOCYTES # BLD AUTO: 0.9 10E3/UL (ref 0–1.3)
MONOCYTES NFR BLD AUTO: 11 %
NEUTROPHILS # BLD AUTO: 6 10E3/UL (ref 1.6–8.3)
NEUTROPHILS NFR BLD AUTO: 69 %
NRBC # BLD AUTO: 0 10E3/UL
NRBC BLD AUTO-RTO: 0 /100
PLATELET # BLD AUTO: 328 10E3/UL (ref 150–450)
POTASSIUM BLD-SCNC: 3.9 MMOL/L (ref 3.4–5.3)
RBC # BLD AUTO: 4.55 10E6/UL (ref 3.8–5.2)
SODIUM SERPL-SCNC: 139 MMOL/L (ref 133–144)
WBC # BLD AUTO: 8.7 10E3/UL (ref 4–11)

## 2022-01-18 PROCEDURE — 99207 PR FIRST ORDER ACUTE REFERRAL: CPT | Performed by: PHYSICIAN ASSISTANT

## 2022-01-18 PROCEDURE — 85025 COMPLETE CBC W/AUTO DIFF WBC: CPT | Performed by: FAMILY MEDICINE

## 2022-01-18 PROCEDURE — 36415 COLL VENOUS BLD VENIPUNCTURE: CPT | Performed by: FAMILY MEDICINE

## 2022-01-18 PROCEDURE — 99215 OFFICE O/P EST HI 40 MIN: CPT | Mod: 25 | Performed by: FAMILY MEDICINE

## 2022-01-18 PROCEDURE — 250N000009 HC RX 250: Performed by: RADIOLOGY

## 2022-01-18 PROCEDURE — 96360 HYDRATION IV INFUSION INIT: CPT | Performed by: FAMILY MEDICINE

## 2022-01-18 PROCEDURE — 85652 RBC SED RATE AUTOMATED: CPT | Performed by: FAMILY MEDICINE

## 2022-01-18 PROCEDURE — 74177 CT ABD & PELVIS W/CONTRAST: CPT

## 2022-01-18 PROCEDURE — 80048 BASIC METABOLIC PNL TOTAL CA: CPT | Performed by: FAMILY MEDICINE

## 2022-01-18 PROCEDURE — 250N000011 HC RX IP 250 OP 636: Performed by: RADIOLOGY

## 2022-01-18 RX ORDER — IOPAMIDOL 755 MG/ML
500 INJECTION, SOLUTION INTRAVASCULAR ONCE
Status: COMPLETED | OUTPATIENT
Start: 2022-01-18 | End: 2022-01-18

## 2022-01-18 RX ADMIN — IOPAMIDOL 78 ML: 755 INJECTION, SOLUTION INTRAVENOUS at 12:29

## 2022-01-18 RX ADMIN — Medication 1000 ML: at 12:50

## 2022-01-18 RX ADMIN — SODIUM CHLORIDE 59 ML: 9 INJECTION, SOLUTION INTRAVENOUS at 12:29

## 2022-01-18 NOTE — PATIENT INSTRUCTIONS
Continue to stay hydrated as you have been doing      To reduce frequency of loose stools take loperamide (over the counter) once in the morning and once in the late afternoon      If pain, loose stools worsen or developing any new symptoms please seek medical attention right away      Keep appointment with Latonya for early next week

## 2022-01-18 NOTE — PROGRESS NOTES
Assessment/Plan:    Pt with persistent diarrhea/abdominal pain and negative work-up thus far. I have recommended she be seen at the [facility] for CT abd/pelvis to rule out colitis. Her BP is lower than baseline today as well, may be dehydrated- may benefit from rechecking labs to assess for dehydration. Pt will go by private vehicle.    See patient instructions below.    At the end of the encounter, I discussed results, diagnosis, medications. Discussed red flags for immediate return to clinic/ER, as well as indications for follow up if no improvement. Patient understood and agreed to plan. Patient was stable for discharge.      ICD-10-CM    1. Diarrhea, unspecified type  R19.7 Referral to Acute and Diagnostic Services (Day of diagnostic / First order acute)   2. Abdominal pain, generalized  R10.84          Return in about 1 day (around 1/19/2022) for Go to [facility] today.    YUE Mason, RC  [facility]  -----------------------------------------------------------------------------------------------------------------------------------------------------    HPI:  Debby Barriga is a 66 year old female with hx of Alzheimer's who presents for evaluation of intermittent generalized abdominal pain and diarrhea onset 1 month ago. Abdominal pain occurs following a BM. She notes about 4-5 loose, watery stools per day with urgency and occasional episodes of fecal incontinence. She has had 1 episode of dark black colored stool but no other episodes of hematochezia or melena. She hasn't had much appetite and has lost some weight- 16 lb since October. She was seen in urgent care on 1/13, had stool studies and labs done which were normal. Prior to onset of diarrhea, pt was constipated and had been taking a stool softener but has not taken this in many weeks. Patient reports no fever/chills, nausea, vomiting, or any other symptoms. She has never had a colonoscopy but had  Cologuard test 10/29/21 which was negative.      Past Medical History:   Diagnosis Date     Corneal ulcer of right eye 3-2015     Mild major depression (H) 2/7/2013     Tear of retina     right eye       Vitals:    01/18/22 1005   BP: 90/67   Pulse: 96   Temp: 98.4  F (36.9  C)   TempSrc: Temporal   SpO2: 98%   Weight: 70.3 kg (155 lb)       Physical Exam  Vitals and nursing note reviewed.   Pulmonary:      Effort: Pulmonary effort is normal.   Abdominal:      General: Bowel sounds are normal.      Palpations: Abdomen is soft.      Tenderness: There is abdominal tenderness in the right upper quadrant and epigastric area. There is no guarding or rebound.   Neurological:      Mental Status: She is alert.         Labs/Imaging:  No results found for this or any previous visit (from the past 24 hour(s)).      There are no Patient Instructions on file for this visit.       Quality 226: Preventive Care And Screening: Tobacco Use: Screening And Cessation Intervention: Patient screened for tobacco use and is an ex/non-smoker Detail Level: Detailed

## 2022-01-18 NOTE — PROGRESS NOTES
Assessment & Plan     Diarrhea, unspecified type  Colitis  - Referral to Acute and Diagnostic Services (Day of diagnostic / First order acute)  - 0.9% sodium chloride BOLUS  - Erythrocyte sedimentation rate auto  - CBC with platelets differential  - Basic metabolic panel  - CT Abdomen Pelvis w Contrast  - sodium chloride (PF) 0.9% PF flush 3 mL    Patient received approximately 400mL of IVF in clinic today. Blood work reassuring showing no evidence of electrolyte loss or ANTONIO. Defer repeat stool studies as O&P/bacterial/cdiff/viral studies returned negative.   Vital signs stable.     Her partner has already scheduled up a f/u with PCP early next week  Defer bentyl at this time as discomfort is only present the moment before sudden urge to have a BM    Schedule loperamide twice daily to help reduce loose stools. Good oral hydration of fluids encouraged as they have done well previously at home.     Possible Rectal Mass  Will recommend they discuss with PCP to coordinate colonoscopy to investigate mass ( has not done colonoscopy in past has done cologuard screening)     Jacoby Jon MD   [facility]    Víctor Guardado is a 66 year old female who presents to clinic today for the following health issues:  Chief Complaint   Patient presents with     Abdominal Pain     X 1 month, lower abdomen      Diarrhea     HPI  Patient is referred to us from urgent care for further evaluation to investigate prolonged diarrhea symptoms:   1/13/22 stool studies were collected and no abnormalities were discovered.   There has been some noted weight loss  No new fevers  No recent travel   Denies recent antibiotic use  Appetite has consisted of veggies and meat as usual but overall appetite is less than normal    No hx of abdominal surgeries.   Stools have been 4-5 times a day and vary from loose to watery    She has not used any antidiarrheal medications    She is accompanied by her partner, Josie    No hx of  IBD/IBS      Patient Active Problem List    Diagnosis Date Noted     Episodic tension-type headache, not intractable 2021     Priority: Medium     Alzheimer's dementia without behavioral disturbance, unspecified timing of dementia onset (H) 2019     Priority: Medium     Working with a functional medicine provider at [facility] in [place].        Myopic astigmatism of both eyes 2015     Priority: Medium     Presbyopia 2015     Priority: Medium     Generalized anxiety disorder 2013     Priority: Medium     Insomnia, unspecified 10/31/2011     Priority: Medium     Hyperlipidemia 2010     Priority: Medium     Major depression, recurrent (H) 2009     Priority: Medium     Health maintenance examination 2009     Priority: Medium     Overview:   Last PE, 2009  Last Pap, 07  Last Lipids:  Chol: 149    3/17/2011  T    3/17/2011  HDL:   44    3/17/2011  LDL:  91    3/17/2011  Mammo, 10/2002, (elsewhere), 2009-neg  Dexa:2009-osteopenia  Colonoscopy, 08       ETOH abuse 2009     Priority: Medium     Plantar fasciitis 2009     Priority: Medium     Overview:   Chronic Left Plantar Fasciitis       Tobacco use disorder 2009     Priority: Medium       Current Outpatient Medications   Medication     NUTRITIONAL SUPPLEMENTS PO     Omega-3 Fish Oil 500 MG capsule     venlafaxine (EFFEXOR-XR) 150 MG 24 hr capsule     Current Facility-Administered Medications   Medication     sodium chloride (PF) 0.9% PF flush 3 mL           Objective    /77 (BP Location: Left arm, Patient Position: Chair, Cuff Size: Adult Regular)   Pulse 81   Temp 98.2  F (36.8  C) (Oral)   Resp 18   Wt 70.3 kg (155 lb)   SpO2 96%   BMI 25.99 kg/m    Physical Exam     CV: HDS  Abd: no guarding, no pain with palpation, soft, no obvious masses    Results for orders placed or performed during the hospital encounter of 22   CT Abdomen Pelvis w Contrast      Status: None (Preliminary result)    Narrative    CT ABDOMEN/PELVIS WITH CONTRAST January 18, 2022 12:41 PM    CLINICAL HISTORY: Diarrhea for more than three weeks. Diarrhea  negative stool studies. Diarrhea, unspecified type.    TECHNIQUE: CT scan of the abdomen and pelvis was performed following  injection of IV contrast. Multiplanar reformats were obtained. Dose  reduction techniques were used.  CONTRAST: 78mL Isovue-370.    COMPARISON: None.    FINDINGS:   LOWER CHEST: Left lower lobe posterolateral 0.7 cm solid nodule series  3 image 24.    HEPATOBILIARY: Normal.    PANCREAS: Normal.    SPLEEN: Normal.    ADRENAL GLANDS: Normal.    KIDNEYS/BLADDER: No stones or hydronephrosis. No acute renal  abnormality identified.    BOWEL: There is prominent wall thickening at the rectum and mild wall  thickening of the adjacent sigmoid colon and descending colon. This is  somewhat focal at the rectum, around series 3 image 171. Moderate  stool at the proximal colon. Normal appendix. No small bowel  obstruction is seen. No abscess or free air.    PELVIC ORGANS: No acute abnormality. Calcifications may be an  ill-defined fibroid at the uterus image 158. No adnexal lesion can be  seen.    ADDITIONAL FINDINGS: No enlarged lymph nodes. Mild vascular  calcifications. Pelvic lymph nodes are small in size near the inflamed  sigmoid colon and rectum.    MUSCULOSKELETAL: Spine degenerative changes.      Impression    IMPRESSION:   1.  Wall thickening of the rectum and distal colon including the  descending and sigmoid may be a distal colitis and proctitis. No  abscess is currently seen. Please note that a mass at the rectum  remains a possibility. Recommend correlation with endoscopic  assessment.  2.  No other acute abnormality can be seen.   Results for orders placed or performed in visit on 01/18/22   Erythrocyte sedimentation rate auto     Status: Normal   Result Value Ref Range    Erythrocyte Sedimentation Rate 10 0 - 30 mm/hr    Basic metabolic panel     Status: Normal   Result Value Ref Range    Sodium 139 133 - 144 mmol/L    Potassium 3.9 3.4 - 5.3 mmol/L    Chloride 107 94 - 109 mmol/L    Carbon Dioxide (CO2) 26 20 - 32 mmol/L    Anion Gap 6 3 - 14 mmol/L    Urea Nitrogen 11 7 - 30 mg/dL    Creatinine 0.63 0.52 - 1.04 mg/dL    Calcium 9.2 8.5 - 10.1 mg/dL    Glucose 97 70 - 99 mg/dL    GFR Estimate >90 >60 mL/min/1.73m2   CBC with platelets and differential     Status: None   Result Value Ref Range    WBC Count 8.7 4.0 - 11.0 10e3/uL    RBC Count 4.55 3.80 - 5.20 10e6/uL    Hemoglobin 13.1 11.7 - 15.7 g/dL    Hematocrit 41.5 35.0 - 47.0 %    MCV 91 78 - 100 fL    MCH 28.8 26.5 - 33.0 pg    MCHC 31.6 31.5 - 36.5 g/dL    RDW 13.7 10.0 - 15.0 %    Platelet Count 328 150 - 450 10e3/uL    % Neutrophils 69 %    % Lymphocytes 18 %    % Monocytes 11 %    % Eosinophils 1 %    % Basophils 1 %    % Immature Granulocytes 0 %    NRBCs per 100 WBC 0 <1 /100    Absolute Neutrophils 6.0 1.6 - 8.3 10e3/uL    Absolute Lymphocytes 1.6 0.8 - 5.3 10e3/uL    Absolute Monocytes 0.9 0.0 - 1.3 10e3/uL    Absolute Eosinophils 0.1 0.0 - 0.7 10e3/uL    Absolute Basophils 0.0 0.0 - 0.2 10e3/uL    Absolute Immature Granulocytes 0.0 <=0.4 10e3/uL    Absolute NRBCs 0.0 10e3/uL   CBC with platelets differential     Status: None    Narrative    The following orders were created for panel order CBC with platelets differential.  Procedure                               Abnormality         Status                     ---------                               -----------         ------                     CBC with platelets and d...[[id number]]                      Final result                 Please view results for these tests on the individual orders.                     The use of Dragon/PowerMic dictation services may have been used to construct the content in this note; any grammatical or spelling errors are non-intentional. Please contact the author of this note directly  if you are in need of any clarification.

## 2022-01-18 NOTE — PROGRESS NOTES
Subjective   Debby is a 66 year old who presents for the following health issues   HPI     Abdominal/Flank Pain  Onset/Duration: X 1 month  Description:   Character: Sharp, cramping  Location: left lower quadrant  Radiation: None  Intensity: severe- at times, episodic  Progression of Symptoms:  worsening  Accompanying Signs & Symptoms:  Fever/Chills: no   Gas/Bloating: YES- bloated  Nausea: YES  Vomitting: no   Diarrhea: YES  Constipation: no   Dysuria or Hematuria: no   History:   Trauma: YES- possibly from previous fall X 2 months ago  Previous similar pain: no   Previous tests done: YES- UA, Labs and Stool tests  Previous Abdominal Surgery: no   Precipitating factors:   Does the pain change with:     Food: YES- worsens within 20 minutes after eating    Bowel Movement: YES- worsens    Urination: no    Other factors:  no   Therapies tried and outcome: Tylenol and IBU PRN, laying flat gives her the most comfort.

## 2022-01-20 NOTE — TELEPHONE ENCOUNTER
Pt spouse Josie called in saying she had just talked to [employer] and that they had not received anything. From notes it was faxed on the 17th. Relayed fax number and she said she was not sure. I refaxed and [phone] is a phone number. It was listed incorrectly on the form from [employer]. Did reach out to no answer and LVM for them to give us a call with correct fax number for resending

## 2022-01-24 ENCOUNTER — THERAPY VISIT (OUTPATIENT)
Dept: PHYSICAL THERAPY | Facility: CLINIC | Age: 67
End: 2022-01-24
Payer: COMMERCIAL

## 2022-01-24 DIAGNOSIS — G44.219 EPISODIC TENSION-TYPE HEADACHE, NOT INTRACTABLE: ICD-10-CM

## 2022-01-24 PROCEDURE — 97112 NEUROMUSCULAR REEDUCATION: CPT | Mod: GP | Performed by: PHYSICAL THERAPIST

## 2022-01-24 PROCEDURE — 97140 MANUAL THERAPY 1/> REGIONS: CPT | Mod: GP | Performed by: PHYSICAL THERAPIST

## 2022-01-25 ENCOUNTER — OFFICE VISIT (OUTPATIENT)
Dept: FAMILY MEDICINE | Facility: CLINIC | Age: 67
End: 2022-01-25
Payer: COMMERCIAL

## 2022-01-25 VITALS
HEART RATE: 68 BPM | TEMPERATURE: 97.5 F | OXYGEN SATURATION: 100 % | SYSTOLIC BLOOD PRESSURE: 100 MMHG | BODY MASS INDEX: 26.33 KG/M2 | WEIGHT: 157 LBS | DIASTOLIC BLOOD PRESSURE: 62 MMHG | RESPIRATION RATE: 16 BRPM

## 2022-01-25 DIAGNOSIS — F02.818 EARLY ONSET ALZHEIMER'S DISEASE WITH BEHAVIORAL DISTURBANCE (H): ICD-10-CM

## 2022-01-25 DIAGNOSIS — G30.0 EARLY ONSET ALZHEIMER'S DISEASE WITH BEHAVIORAL DISTURBANCE (H): ICD-10-CM

## 2022-01-25 DIAGNOSIS — K62.89 PROCTITIS: Primary | ICD-10-CM

## 2022-01-25 DIAGNOSIS — F33.1 MODERATE EPISODE OF RECURRENT MAJOR DEPRESSIVE DISORDER (H): ICD-10-CM

## 2022-01-25 DIAGNOSIS — K52.9 COLITIS: ICD-10-CM

## 2022-01-25 PROCEDURE — 99215 OFFICE O/P EST HI 40 MIN: CPT | Performed by: NURSE PRACTITIONER

## 2022-01-25 NOTE — PROGRESS NOTES
Assessment & Plan     (K62.89) Proctitis  (primary encounter diagnosis)  Comment:   Plan: Adult Gastro Ref - Procedure Only        Will refer for colonoscopy and follow up after procedure.  Discussed low-fiber diet.     (K52.9) Colitis  Comment:   Plan: Adult Gastro Ref - Procedure Only        See above.     (G30.0,  F02.81) Early onset Alzheimer's disease with behavioral disturbance (H)  Comment:   Plan: She chooses to continue to work with her functional medicine specialist.     (F33.1) Moderate episode of recurrent major depressive disorder (H)  Comment: stable  Plan: The current medical regimen is effective;  continue present plan and medications.        41 minutes spent on the date of the encounter doing chart review, patient visit and documentation            No follow-ups on file.    Sharri Lacey NP  [facility]    Víctor Guardado is a 66 year old who presents for the following health issues  accompanied by her friend.    HPI      Diarrhea - 5-6 times a day and lower abdominal pain for the past month.  No fevers or blood in stool.   Started taking Imodium twice daily after their visit to [facility] a week ago and diarrhea stopped 4 days ago.  Now having small stools.   Still having abdominal discomfort.    Stool tests have been negative.  CT scan did show proctitis and distal colitis, unable to rule out mass.    She does not take any NSAIDs or PPIs.  She has been on Venlafaxine for many years.  Mood is stable.  She does have early-onset Alzheimers and works with a functional medicine specialist.           Review of Systems         Objective    /62   Pulse 68   Temp 97.5  F (36.4  C) (Temporal)   Resp 16   Wt 71.2 kg (157 lb)   SpO2 100%   Breastfeeding No   BMI 26.33 kg/m    Body mass index is 26.33 kg/m .  Physical Exam   GENERAL: healthy, alert and no distress  PSYCH: affect normal/bright

## 2022-01-25 NOTE — PATIENT INSTRUCTIONS
Patient Education     Understanding Colitis   Colitis is when a part of your colon becomes inflamed or swollen. The colon is also called the large intestine. It helps with digestion and waste removal.    What causes colitis?  Colitis can be caused by many things. The most common causes are:    Viral or bacterial infections    Inflammatory bowel disease (ulcerative colitis or Crohn s disease)    Certain medicines, such as antibiotics    Radiation therapy to the colon  Symptoms of colitis  The symptoms of colitis may last a short time. Or they can be chronic. The most common symptoms are:     Diarrhea, sometimes bloody    Stomach pain or cramping    Fever    Weight loss in severe cases   Diagnosing colitis  Your healthcare provider will take a full health history and family history. He or she will also give you a physical exam. Depending on the results of your history and physical exam, your provider may also order certain tests to help find out the cause of your colitis. These may include:     Lab tests. Your blood and stool will be checked.    Endoscopy and biopsy.  Endoscopy uses a long, flexible tube with a tiny light and camera on one end to check the inside of your large intestine. When only the colon is checked, this is called a colonoscopy. During an endoscopy, your provider may take a small sample of your tissue to look at under a microscope. This is called a biopsy.    Imaging tests. These include X-ray, CT scan, MRI, and capsule endoscopy.  Treatment for colitis  Treatment for colitis depends on what is causing it and how serious your symptoms are. In some cases, you may not need treatment. For example, colitis from an infection may go away without care.    Treatment may include:     Medicines. You may take these by mouth (oral) or  as a rectal suppository or enema. Some medicines are given by injection. They can lessen swelling and ease symptoms.    Changes in your diet. Some foods can make symptoms  worse. Common triggers are milk, coffee, alcohol, and fried foods.    Surgery. In some cases, you may need surgery to remove a damaged part of the colon.  Call 911  Call 911 if any of these occur:     Trouble breathing    Confusion    Very drowsy or trouble awakening    Fainting or loss of consciousness    Rapid heart rate    Chest pain  When to call your healthcare provider   Call your healthcare provider right away if you have any of these:    Symptoms that don t get better, or get worse    Fever of 100.4 F (38 C) or higher, or as directed by your healthcare provider    Pain that gets worse    Bloody diarrhea    Bleeding from your rectum    New symptoms      Crow last reviewed this educational content on 4/1/2020 2000-2021 [employer]. All rights reserved. This information is not intended as a substitute for professional medical care. Always follow your healthcare professional's instructions.

## 2022-01-31 ENCOUNTER — THERAPY VISIT (OUTPATIENT)
Dept: PHYSICAL THERAPY | Facility: CLINIC | Age: 67
End: 2022-01-31
Payer: COMMERCIAL

## 2022-01-31 DIAGNOSIS — G44.219 EPISODIC TENSION-TYPE HEADACHE, NOT INTRACTABLE: ICD-10-CM

## 2022-01-31 PROCEDURE — 97140 MANUAL THERAPY 1/> REGIONS: CPT | Mod: GP | Performed by: PHYSICAL THERAPIST

## 2022-02-07 ENCOUNTER — THERAPY VISIT (OUTPATIENT)
Dept: PHYSICAL THERAPY | Facility: CLINIC | Age: 67
End: 2022-02-07
Payer: COMMERCIAL

## 2022-02-07 DIAGNOSIS — G44.219 EPISODIC TENSION-TYPE HEADACHE, NOT INTRACTABLE: ICD-10-CM

## 2022-02-07 PROCEDURE — 97140 MANUAL THERAPY 1/> REGIONS: CPT | Mod: GP | Performed by: PHYSICAL THERAPIST

## 2022-02-10 ENCOUNTER — TRANSFERRED RECORDS (OUTPATIENT)
Dept: HEALTH INFORMATION MANAGEMENT | Facility: CLINIC | Age: 67
End: 2022-02-10
Payer: COMMERCIAL

## 2022-02-14 ENCOUNTER — NURSE TRIAGE (OUTPATIENT)
Dept: FAMILY MEDICINE | Facility: CLINIC | Age: 67
End: 2022-02-14
Payer: COMMERCIAL

## 2022-02-14 NOTE — TELEPHONE ENCOUNTER
Call received from caregiver, Josie.  Consent to communicate on file.    Per Josie, patient:  1. Had colonoscopy Thursday, 2/10/22   A. Mild inflammatory colitis found per GI doctor's report-[facility] in [place]   B. Prescribed a couple medications but has not picked them up and forgets their names  2. Not able to have bowel movement since colonoscopy   A. Was passing flatus two days post colonoscopy and now patient unsure about passing flatus-patient has Alzheimer's and sometimes difficulty with historical reporting  3. Lower abdominal pain since colonoscopy   A. Better at rest and some movements make it worse   B. Pain seems mild   C. Unsure if pain increases with palpation  4. No abdominal distention/swelling  5. No emesis  6. Last bowel movement was with colonoscopy prep on 2/10/22  7. Has not tried OTC stool softening medications  8. Needs follow up appt with PCP to review colonoscopy results  9. Waiting call back from GI    Writer recommended:  1. Warm bath and/or warm coffee or tea to help stimulate movement of bowels  2. Drink plenty of water  3. Activity as tolerated  4. Call [facility] again if have not heard back by noon today to get their input on lack of bowel movement since colonoscopy and whether to start/hold medications prescribed  5. Follow up appt scheduled with NELLI Lacey CNP, on 2/23/22.  Appt date, time and location confirmed with Josie Galindo verbalized understanding and in agreement with plan.              Reason for Disposition    Patient wants to be seen    Additional Information    Negative: Abdomen pain is the main symptom and adult male    Negative: Abdomen pain is the main symptom and adult female    Negative: Rectal bleeding or blood in stool is the main symptom    Negative: Patient sounds very sick or weak to the triager    Negative: Constant abdominal pain lasting > 2 hours    Negative: Vomiting bile (green color)    Negative: Vomiting and abdomen looks much more swollen than usual     Negative: Rectal pain or fullness from fecal impaction (rectum full of stool) and NOT better after SITZ bath, suppository or enema    Negative: Abdomen is more swollen than usual    Negative: Last bowel movement (BM) > 4 days ago    Negative: Leaking stool    Negative: Intermittent mild abdominal pain and fever    Negative: Unable to have a bowel movement (BM) without manually removing stool (using finger to pull out stool or perform disimpaction)    Negative: Unable to have a bowel movement (BM) without using a laxative, suppository, or enema    Negative: Constipation persists > 1 week and no improvement after using CARE ADVICE    Negative: Weight loss greater than 10 pounds (5 kg) and not dieting    Negative: Pencil-like, narrow stools    Protocols used: CONSTIPATION-MIKE-OZ BenavidezN, RN  [employer]

## 2022-02-14 NOTE — TELEPHONE ENCOUNTER
Reason for Call:  Same Day Appointment, Requested Provider:  Sharri Lacey    PCP: Sharri Lacey    Reason for visit: follow-up colonoscopy constipation    Duration of symptoms:     Have you been treated for this in the past? no    Additional comments: Is wondering if she could be worked in with Sharri for follow-up     Can we leave a detailed message on this number? YES    Phone number patient can be reached at: Josie [phone]    Best Time: any      Call taken on 2/14/2022 at 7:35 AM by Jolly Stephens

## 2022-02-23 ENCOUNTER — OFFICE VISIT (OUTPATIENT)
Dept: FAMILY MEDICINE | Facility: CLINIC | Age: 67
End: 2022-02-23
Payer: COMMERCIAL

## 2022-02-23 VITALS
WEIGHT: 151 LBS | RESPIRATION RATE: 16 BRPM | HEART RATE: 83 BPM | DIASTOLIC BLOOD PRESSURE: 74 MMHG | SYSTOLIC BLOOD PRESSURE: 116 MMHG | HEIGHT: 65 IN | TEMPERATURE: 97.3 F | OXYGEN SATURATION: 98 % | BODY MASS INDEX: 25.16 KG/M2

## 2022-02-23 DIAGNOSIS — K52.832 LYMPHOCYTIC COLITIS: Primary | ICD-10-CM

## 2022-02-23 PROCEDURE — 99214 OFFICE O/P EST MOD 30 MIN: CPT | Performed by: NURSE PRACTITIONER

## 2022-02-23 ASSESSMENT — PATIENT HEALTH QUESTIONNAIRE - PHQ9
10. IF YOU CHECKED OFF ANY PROBLEMS, HOW DIFFICULT HAVE THESE PROBLEMS MADE IT FOR YOU TO DO YOUR WORK, TAKE CARE OF THINGS AT HOME, OR GET ALONG WITH OTHER PEOPLE: NOT DIFFICULT AT ALL
SUM OF ALL RESPONSES TO PHQ QUESTIONS 1-9: 1
SUM OF ALL RESPONSES TO PHQ QUESTIONS 1-9: 1

## 2022-02-23 NOTE — PROGRESS NOTES
Assessment & Plan     (K52.832) Lymphocytic colitis  (primary encounter diagnosis)  Comment: resolved  Plan: No treatment indicated at this time.  Reviewed course of condition, discussed statistics for possible recurrence.  If symptoms recur, they will contact me for treatment.       31 minutes spent on the date of the encounter doing chart review, patient visit and documentation            No follow-ups on file.    Sharri Lacey NP  [facility]    Víctor Guardado is a 66 year old who presents for the following health issues     History of Present Illness     Reason for visit:  Follow up    She eats 0-1 servings of fruits and vegetables daily.She consumes 1 sweetened beverage(s) daily.She exercises with enough effort to increase her heart rate 9 or less minutes per day.  She exercises with enough effort to increase her heart rate 3 or less days per week.   She is taking medications regularly.       She was previously seen on 1/25:  (K62.89) Proctitis  (primary encounter diagnosis)  Comment:   Plan: Adult Gastro Ref - Procedure Only        Will refer for colonoscopy and follow up after procedure.  Discussed low-fiber diet.      (K52.9) Colitis  Comment:   Plan: Adult Gastro Ref - Procedure Only        See above.      (G30.0,  F02.81) Early onset Alzheimer's disease with behavioral disturbance (H)  Comment:   Plan: She chooses to continue to work with her functional medicine specialist.      (F33.1) Moderate episode of recurrent major depressive disorder (H)  Comment: stable  Plan: The current medical regimen is effective;  continue present plan and medications.          41 minutes spent on the date of the encounter doing chart review, patient visit and documentation               No follow-ups on file.     Sharri Lacey NP  [facility]           Víctor Guardado is a 66 year old who presents for the following health issues  accompanied by her  "friend.     HPI      Diarrhea - 5-6 times a day and lower abdominal pain for the past month.  No fevers or blood in stool.   Started taking Imodium twice daily after their visit to [facility] a week ago and diarrhea stopped 4 days ago.  Now having small stools.   Still having abdominal discomfort.     Stool tests have been negative.  CT scan did show proctitis and distal colitis, unable to rule out mass.     She does not take any NSAIDs or PPIs.  She has been on Venlafaxine for many years.  Mood is stable.  She does have early-onset Alzheimers and works with a functional medicine specialist.            Colonoscopy on 2/10 showed:  Findings:  Normal finding. Location - Terminal ileum.  Polyp location: rectum. Quantity: 1. Size: 4 mm. Polyp shape: sessile.   Maneuver: polypectomy was performed with a cold snare.  Removal: complete. Retrieval: complete. Bleeding: minimal/oozing.  Diverticulosis. Location: - sigmoid. Description: mild. Size: small. Quantity: few.  Anal canal: normal  Melanosis coli. Location - sigmoid - rectum.  Remainder of the exam is normal.  Random biopsies were taken throughout the colon to rule out microscopic colitis.    Impression:  Colorectal polyp detected on colonoscopy  Diverticulosis of colon without diverticulitis  Melanosis coli       Her diarrhea has resolved.  No fevers or abdominal pain.         Review of Systems         Objective    /74 (BP Location: Right arm, Patient Position: Sitting, Cuff Size: Adult Regular)   Pulse 83   Temp 97.3  F (36.3  C) (Temporal)   Resp 16   Ht 1.645 m (5' 4.75\")   Wt 68.5 kg (151 lb)   SpO2 98%   BMI 25.32 kg/m    Body mass index is 25.32 kg/m .  Physical Exam   GENERAL: healthy, alert and no distress                Answers for HPI/ROS submitted by the patient on 2/23/2022  If you checked off any problems, how difficult have these problems made it for you to do your work, take care of things at home, or get along with other people?: Not difficult " at all  PHQ9 TOTAL SCORE: 1

## 2022-02-24 ASSESSMENT — PATIENT HEALTH QUESTIONNAIRE - PHQ9: SUM OF ALL RESPONSES TO PHQ QUESTIONS 1-9: 1

## 2022-03-14 ENCOUNTER — THERAPY VISIT (OUTPATIENT)
Dept: PHYSICAL THERAPY | Facility: CLINIC | Age: 67
End: 2022-03-14
Payer: COMMERCIAL

## 2022-03-14 DIAGNOSIS — G44.219 EPISODIC TENSION-TYPE HEADACHE, NOT INTRACTABLE: ICD-10-CM

## 2022-03-14 PROCEDURE — 97140 MANUAL THERAPY 1/> REGIONS: CPT | Mod: GP | Performed by: PHYSICAL THERAPIST

## 2022-03-14 PROCEDURE — 97112 NEUROMUSCULAR REEDUCATION: CPT | Mod: GP | Performed by: PHYSICAL THERAPIST

## 2022-03-28 ENCOUNTER — THERAPY VISIT (OUTPATIENT)
Dept: PHYSICAL THERAPY | Facility: CLINIC | Age: 67
End: 2022-03-28
Payer: COMMERCIAL

## 2022-03-28 DIAGNOSIS — G44.219 EPISODIC TENSION-TYPE HEADACHE, NOT INTRACTABLE: ICD-10-CM

## 2022-04-12 ENCOUNTER — THERAPY VISIT (OUTPATIENT)
Dept: PHYSICAL THERAPY | Facility: CLINIC | Age: 67
End: 2022-04-12
Payer: COMMERCIAL

## 2022-04-12 DIAGNOSIS — G44.219 EPISODIC TENSION-TYPE HEADACHE, NOT INTRACTABLE: Primary | ICD-10-CM

## 2022-04-12 PROCEDURE — 97112 NEUROMUSCULAR REEDUCATION: CPT | Mod: GP | Performed by: PHYSICAL THERAPIST

## 2022-04-12 PROCEDURE — 97140 MANUAL THERAPY 1/> REGIONS: CPT | Mod: GP | Performed by: PHYSICAL THERAPIST

## 2022-04-26 ENCOUNTER — THERAPY VISIT (OUTPATIENT)
Dept: PHYSICAL THERAPY | Facility: CLINIC | Age: 67
End: 2022-04-26
Payer: COMMERCIAL

## 2022-04-26 DIAGNOSIS — G44.219 EPISODIC TENSION-TYPE HEADACHE, NOT INTRACTABLE: Primary | ICD-10-CM

## 2022-04-26 PROCEDURE — 97112 NEUROMUSCULAR REEDUCATION: CPT | Mod: GP | Performed by: PHYSICAL THERAPIST

## 2022-04-26 PROCEDURE — 97140 MANUAL THERAPY 1/> REGIONS: CPT | Mod: GP | Performed by: PHYSICAL THERAPIST

## 2022-05-03 ENCOUNTER — THERAPY VISIT (OUTPATIENT)
Dept: PHYSICAL THERAPY | Facility: CLINIC | Age: 67
End: 2022-05-03
Payer: COMMERCIAL

## 2022-05-03 DIAGNOSIS — G44.219 EPISODIC TENSION-TYPE HEADACHE, NOT INTRACTABLE: Primary | ICD-10-CM

## 2022-05-03 PROCEDURE — 97140 MANUAL THERAPY 1/> REGIONS: CPT | Mod: GP | Performed by: PHYSICAL THERAPIST

## 2022-05-03 PROCEDURE — 97112 NEUROMUSCULAR REEDUCATION: CPT | Mod: GP | Performed by: PHYSICAL THERAPIST

## 2022-05-17 ENCOUNTER — THERAPY VISIT (OUTPATIENT)
Dept: PHYSICAL THERAPY | Facility: CLINIC | Age: 67
End: 2022-05-17
Payer: COMMERCIAL

## 2022-05-17 DIAGNOSIS — G44.219 EPISODIC TENSION-TYPE HEADACHE, NOT INTRACTABLE: Primary | ICD-10-CM

## 2022-05-17 PROCEDURE — 97140 MANUAL THERAPY 1/> REGIONS: CPT | Mod: GP | Performed by: PHYSICAL THERAPIST

## 2022-05-17 PROCEDURE — 97112 NEUROMUSCULAR REEDUCATION: CPT | Mod: GP | Performed by: PHYSICAL THERAPIST

## 2022-05-23 ENCOUNTER — OFFICE VISIT (OUTPATIENT)
Dept: FAMILY MEDICINE | Facility: CLINIC | Age: 67
End: 2022-05-23
Payer: COMMERCIAL

## 2022-05-23 ENCOUNTER — TELEPHONE (OUTPATIENT)
Dept: FAMILY MEDICINE | Facility: CLINIC | Age: 67
End: 2022-05-23

## 2022-05-23 VITALS
DIASTOLIC BLOOD PRESSURE: 62 MMHG | WEIGHT: 143 LBS | OXYGEN SATURATION: 100 % | HEART RATE: 64 BPM | BODY MASS INDEX: 22.98 KG/M2 | SYSTOLIC BLOOD PRESSURE: 100 MMHG | RESPIRATION RATE: 16 BRPM | TEMPERATURE: 97.2 F | HEIGHT: 66 IN

## 2022-05-23 DIAGNOSIS — H25.9 AGE-RELATED CATARACT OF BOTH EYES, UNSPECIFIED AGE-RELATED CATARACT TYPE: ICD-10-CM

## 2022-05-23 DIAGNOSIS — K64.4 EXTERNAL HEMORRHOIDS: ICD-10-CM

## 2022-05-23 DIAGNOSIS — G30.9 ALZHEIMER'S DEMENTIA WITHOUT BEHAVIORAL DISTURBANCE, UNSPECIFIED TIMING OF DEMENTIA ONSET: ICD-10-CM

## 2022-05-23 DIAGNOSIS — Z01.818 PREOP GENERAL PHYSICAL EXAM: Primary | ICD-10-CM

## 2022-05-23 DIAGNOSIS — F02.80 ALZHEIMER'S DEMENTIA WITHOUT BEHAVIORAL DISTURBANCE, UNSPECIFIED TIMING OF DEMENTIA ONSET: ICD-10-CM

## 2022-05-23 DIAGNOSIS — Z23 HIGH PRIORITY FOR 2019-NCOV VACCINE: ICD-10-CM

## 2022-05-23 DIAGNOSIS — F33.9 RECURRENT MAJOR DEPRESSIVE DISORDER, REMISSION STATUS UNSPECIFIED (H): ICD-10-CM

## 2022-05-23 DIAGNOSIS — F41.1 GENERALIZED ANXIETY DISORDER: ICD-10-CM

## 2022-05-23 DIAGNOSIS — L60.8 NAIL DEFORMITY: ICD-10-CM

## 2022-05-23 PROCEDURE — 0054A COVID-19,PF,PFIZER (12+ YRS): CPT | Performed by: FAMILY MEDICINE

## 2022-05-23 PROCEDURE — 91305 COVID-19,PF,PFIZER (12+ YRS): CPT | Performed by: FAMILY MEDICINE

## 2022-05-23 PROCEDURE — 99214 OFFICE O/P EST MOD 30 MIN: CPT | Mod: 25 | Performed by: FAMILY MEDICINE

## 2022-05-23 NOTE — PATIENT INSTRUCTIONS
Surgery -   No Advil one day before or Aleve 4 days before surgery.  No Aspirin 7 days before surgery.  Ok to take your medications with small sip of water on the morning of the surgery.    For rectal symptoms -   You can stop lidocaine and start preparation H small amount twice elias for 10 to 14 days.   Follow if symptoms worsen or fail to improve.     Nails -   You can take hair,skin and nails vitamin but you would need to stop multivitamin  Also making sure you are getting adequate calcium and vitamin D (1, 000 mg calcium and 800 U vitamin D)     Preparing for Your Surgery  Getting started  A nurse will call you to review your health history and instructions. They will give you an arrival time based on your scheduled surgery time. Please be ready to share:  Your doctor's clinic name and phone number  Your medical, surgical and anesthesia history  A list of allergies and sensitivities  A list of medicines, including herbal treatments and over-the-counter drugs  Whether the patient has a legal guardian (ask how to send us the papers in advance)  Please tell us if you're pregnant--or if there's any chance you might be pregnant. Some surgeries may injure a fetus (unborn baby), so they require a pregnancy test. Surgeries that are safe for a fetus don't always need a test, and you can choose whether to have one.   If you have a child who's having surgery, please ask for a copy of Preparing for Your Child's Surgery.    Preparing for surgery  Within 30 days of surgery: Have a pre-op exam (sometimes called an H&P, or History and Physical). This can be done at a clinic or pre-operative center.  If you're having a , you may not need this exam. Talk to your care team.  At your pre-op exam, talk to your care team about all medicines you take. If you need to stop any medicines before surgery, ask when to start taking them again.  We do this for your safety. Many medicines can make you bleed too much during surgery.  Some change how well surgery (anesthesia) drugs work.  Call your insurance company to let them know you're having surgery. (If you don't have insurance, call [phone].)  Call your clinic if there's any change in your health. This includes signs of a cold or flu (sore throat, runny nose, cough, rash, fever). It also includes a scrape or scratch near the surgery site.  If you have questions on the day of surgery, call your hospital or surgery center.  COVID testing  You may need to be tested for COVID-19 before having surgery. If so, we will give you instructions.  Eating and drinking guidelines  For your safety: Unless your surgeon tells you otherwise, follow the guidelines below.  Eat and drink as usual until 8 hours before surgery. After that, no food or milk.  Drink clear liquids until 2 hours before surgery. These are liquids you can see through, like water, Gatorade and Propel Water. You may also have black coffee and tea (no cream or milk).  Nothing by mouth within 2 hours of surgery. This includes gum, candy and breath mints.  If you drink alcohol: Stop drinking it the night before surgery.  If your care team tells you to take medicine on the morning of surgery, it's okay to take it with a sip of water.  Preventing infection  Shower or bathe the night before and morning of your surgery. Follow the instructions your clinic gave you. (If no instructions, use regular soap.)  Don't shave or clip hair near your surgery site. We'll remove the hair if needed.  Don't smoke or vape the morning of surgery. You may chew nicotine gum up to 2 hours before surgery. A nicotine patch is okay.  Note: Some surgeries require you to completely quit smoking and nicotine. Check with your surgeon.  Your care team will make every effort to keep you safe from infection. We will:  Clean our hands often with soap and water (or an alcohol-based hand rub).  Clean the skin at your surgery site with a special soap that kills  germs.  Give you a special gown to keep you warm. (Cold raises the risk of infection.)  Wear special hair covers, masks, gowns and gloves during surgery.  Give antibiotic medicine, if prescribed. Not all surgeries need antibiotics.  What to bring on the day of surgery  Photo ID and insurance card  Copy of your health care directive, if you have one  Glasses and hearing aides (bring cases)  You can't wear contacts during surgery  Inhaler and eye drops, if you use them (tell us about these when you arrive)  CPAP machine or breathing device, if you use them  A few personal items, if spending the night  If you have . . .  A pacemaker, ICD (cardiac defibrillator) or other implant: Bring the ID card.  An implanted stimulator: Bring the remote control.  A legal guardian: Bring a copy of the certified (court-stamped) guardianship papers.  Please remove any jewelry, including body piercings. Leave jewelry and other valuables at home.  If you're going home the day of surgery  You must have a responsible adult drive you home. They should stay with you overnight as well.  If you don't have someone to stay with you, and you aren't safe to go home alone, we may keep you overnight. Insurance often won't pay for this.  After surgery  If it's hard to control your pain or you need more pain medicine, please call your surgeon's office.  Questions?   If you have any questions for your care team, list them here: _________________________________________________________________________________________________________________________________________________________________________ ____________________________________ ____________________________________ ____________________________________  For informational purposes only. Not to replace the advice of your health care provider. Copyright   2003, 2019 [facility]. All rights reserved. Clinically reviewed by Amanda Kim MD. [employer] 630288 - REV 07/21.

## 2022-05-23 NOTE — PROGRESS NOTES
[facility] [address] [place] [address]  Phone: [phone]  Primary Provider: Sharri aLcey  Pre-op Performing Provider: SHANIKA MAYS      PREOPERATIVE EVALUATION:  Today's date: 5/23/2022    Debby Barriga is a 66 year old female who presents for a preoperative evaluation.    Surgical Information:  Surgery/Procedure: Bilateral Cataracts   Surgery Location: [facility]  Surgeon: Dr. Mitchell  Surgery Date: 6/7/22, unsure when 2nd surgery is scheduled   Time of Surgery: TBD  Where patient plans to recover: At home with family  Fax number for surgical facility: [phone]; [phone]    Type of Anesthesia Anticipated: to be determined    Assessment & Plan     The proposed surgical procedure is considered LOW risk.    Preop general physical exam    Age-related cataract of both eyes, unspecified age-related cataract type    Alzheimer's dementia without behavioral disturbance, unspecified timing of dementia onset (H)    Recurrent major depressive disorder, remission status unspecified (H)  Generalized anxiety disorder      High priority for 2019-nCoV vaccine  - COVID-19,PF,PFIZER (12+ Yrs GRAY LABEL)    External hemorrhoids  You can stop lidocaine and start preparation H small amount twice elias for 10 to 14 days.   Follow if symptoms worsen or fail to improve.     Nail deformity  - Nails are breaking easily  - she is currently taking collagen supplements  You can take hair,skin and nails vitamin but you would need to stop multivitamin  Also making sure you are getting adequate calcium and vitamin D (1, 000 mg calcium and 800 U vitamin D)            Risks and Recommendations:  The patient has the following additional risks and recommendations for perioperative complications:   - No identified additional risk factors other than previously addressed    Medication Instructions:  Patient is to take all scheduled medications on the day of surgery EXCEPT  for modifications listed below:   - ibuprofen (Advil, Motrin): HOLD 1 day before surgery.    - naproxen (Aleve, Naprosyn): HOLD 4 days before surgery.     RECOMMENDATION:  APPROVAL GIVEN to proceed with proposed procedure, without further diagnostic evaluation.        Subjective     HPI related to upcoming procedure: bilateral cataract surgery     Preop Questions 5/23/2022   1. Have you ever had a heart attack or stroke? No   2. Have you ever had surgery on your heart or blood vessels, such as a stent placement, a coronary artery bypass, or surgery on an artery in your head, neck, heart, or legs? No   3. Do you have chest pain with activity? No   4. Do you have a history of  heart failure? No   5. Do you currently have a cold, bronchitis or symptoms of other infection? No   6. Do you have a cough, shortness of breath, or wheezing? No   7. Do you or anyone in your family have previous history of blood clots? No   8. Do you or does anyone in your family have a serious bleeding problem such as prolonged bleeding following surgeries or cuts? No   9. Have you ever had problems with anemia or been told to take iron pills? No   10. Have you had any abnormal blood loss such as black, tarry or bloody stools, or abnormal vaginal bleeding? No   11. Have you ever had a blood transfusion? No   12. Are you willing to have a blood transfusion if it is medically needed before, during, or after your surgery? Yes   13. Have you or any of your relatives ever had problems with anesthesia? No   14. Do you have sleep apnea, excessive snoring or daytime drowsiness? No   15. Do you have any artifical heart valves or other implanted medical devices like a pacemaker, defibrillator, or continuous glucose monitor? No   16. Do you have artificial joints? No   17. Are you allergic to latex? No       Health Care Directive:  Patient does not have a Health Care Directive or Living Will: information previously given to patient     Preoperative  Review of :   reviewed - no record of controlled substances prescribed.      Status of Chronic Conditions:  See problem list for active medical problems.  Problems all longstanding and stable, except as noted/documented.  See ROS for pertinent symptoms related to these conditions.      Colonoscopy done 2/2022 - concerned about hemorrhoids. She has itching and discomfort. BM formed last few days. She has been working with functional medicine provider. She recently stopped nystatin. No rectal bleeding, pain or melena.      She is also seeing a functional medicine provider for Alzheimer. She was previously seeing neurologist.     No falls for one year.    Review of Systems  CONSTITUTIONAL: NEGATIVE for fever, chills, change in weight  INTEGUMENTARY/SKIN: NEGATIVE for worrisome rashes, moles or lesions  ENT/MOUTH: NEGATIVE for ear, mouth and throat problems  RESP: NEGATIVE for significant cough or SOB  CV: NEGATIVE for chest pain, palpitations or peripheral edema  : NEGATIVE for frequency, dysuria, or hematuria  MUSCULOSKELETAL: NEGATIVE for significant arthralgias or myalgia  NEURO: NEGATIVE for weakness, dizziness or paresthesias  ENDOCRINE: NEGATIVE for temperature intolerance, skin/hair changes  HEME: NEGATIVE for bleeding problems  PSYCHIATRIC: NEGATIVE for changes in mood or affect    Patient Active Problem List    Diagnosis Date Noted     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        Myopic astigmatism of both eyes 03/18/2015     Priority: Medium     Presbyopia 03/18/2015     Priority: Medium     Generalized anxiety disorder 07/23/2013     Priority: Medium      Insomnia, unspecified 10/31/2011     Priority: Medium     Hyperlipidemia 2010     Priority: Medium     Major depression, recurrent (H) 2009     Priority: Medium     Health maintenance examination 2009     Priority: Medium     Overview:   Last PE, 2009  Last Pap, 07  Last Lipids:  Chol: 149    3/17/2011  T    3/17/2011  HDL:   44    3/17/2011  LDL:  91    3/17/2011  Mammo, 10/2002, (elsewhere), 2009-neg  Dexa:2009-osteopenia  Colonoscopy, 08       ETOH abuse 2009     Priority: Medium     Plantar fasciitis 2009     Priority: Medium     Overview:   Chronic Left Plantar Fasciitis       Tobacco use disorder 2009     Priority: Medium      Past Medical History:   Diagnosis Date     Corneal ulcer of right eye 3-     Mild major depression (H) 2013     Tear of retina     right eye     Past Surgical History:   Procedure Laterality Date     LAPAROSCOPIC TUBAL LIGATION       Current Outpatient Medications   Medication Sig Dispense Refill     NUTRITIONAL SUPPLEMENTS PO Multiple various types       Omega-3 Fish Oil 500 MG capsule Take 4 capsules (2,000 mg) by mouth daily       venlafaxine (EFFEXOR-XR) 150 MG 24 hr capsule TAKE 1 CAPSULE BY MOUTH EVERY DAY 90 capsule 3       Allergies   Allergen Reactions     Hydrocodone      Ears ringing     Trazodone      Other reaction(s): Headache        Social History     Tobacco Use     Smoking status: Former Smoker     Types: Cigarettes     Quit date: 1982     Years since quittin.8     Smokeless tobacco: Never Used   Substance Use Topics     Alcohol use: No     Family History   Problem Relation Age of Onset     Alzheimer Disease Mother      Prostate Cancer Father      Glaucoma No family hx of      Macular Degeneration No family hx of      Diabetes No family hx of      Coronary Artery Disease No family hx of      Hypertension No family hx of      Hyperlipidemia No family hx of      Cerebrovascular Disease No  "family hx of      Breast Cancer No family hx of      History   Drug Use Unknown         Objective     There were no vitals taken for this visit.    Physical Exam   /62   Pulse 64   Temp 97.2  F (36.2  C) (Temporal)   Resp 16   Ht 1.676 m (5' 6\")   Wt 64.9 kg (143 lb)   SpO2 100%   BMI 23.08 kg/m      GENERAL APPEARANCE: healthy, alert and no distress     EYES: EOMI     NECK: no adenopathy, no asymmetry, masses, or scars and thyroid normal to palpation     RESP: lungs clear to auscultation - no rales, rhonchi or wheezes     CV: regular rates and rhythm, normal S1 S2     ABDOMEN:  soft, nontender, no HSM or masses and bowel sounds normal     MS: extremities normal- no gross deformities noted, no evidence of inflammation in joints, FROM in all extremities.     SKIN: no suspicious lesions or rashes     NEURO: Normal strength and tone, sensory exam grossly normal, mentation intact and speech normal     PSYCH: mentation appears normal. and affect normal     LYMPHATICS: No cervical adenopathy    Recent Labs   Lab Test 01/18/22  1130 01/13/22  1036   HGB 13.1 13.0    318    138   POTASSIUM 3.9 3.4   CR 0.63 0.68        Diagnostics:  No labs were ordered during this visit.   No EKG required for low risk surgery (cataract, skin procedure, breast biopsy, etc).    Revised Cardiac Risk Index (RCRI):  The patient has the following serious cardiovascular risks for perioperative complications:   - No serious cardiac risks = 0 points     RCRI Interpretation: 0 points: Class I (very low risk - 0.4% complication rate)           Signed Electronically by: Galo Rebollar MD  Copy of this evaluation report is provided to requesting physician.      "

## 2022-05-23 NOTE — TELEPHONE ENCOUNTER
Faxed Pre-Op note and place in Provider folder in the POD.  Perry Pierce MA on 5/23/2022 at 11:21 AM

## 2022-05-27 ENCOUNTER — MYC MEDICAL ADVICE (OUTPATIENT)
Dept: FAMILY MEDICINE | Facility: CLINIC | Age: 67
End: 2022-05-27
Payer: COMMERCIAL

## 2022-05-27 DIAGNOSIS — E55.9 AVITAMINOSIS D: ICD-10-CM

## 2022-05-27 DIAGNOSIS — F02.80 ALZHEIMER'S DISEASE (H): Primary | ICD-10-CM

## 2022-05-27 DIAGNOSIS — G30.9 ALZHEIMER'S DISEASE (H): Primary | ICD-10-CM

## 2022-05-31 ENCOUNTER — THERAPY VISIT (OUTPATIENT)
Dept: PHYSICAL THERAPY | Facility: CLINIC | Age: 67
End: 2022-05-31
Payer: COMMERCIAL

## 2022-05-31 DIAGNOSIS — G44.219 EPISODIC TENSION-TYPE HEADACHE, NOT INTRACTABLE: Primary | ICD-10-CM

## 2022-05-31 PROCEDURE — 97112 NEUROMUSCULAR REEDUCATION: CPT | Mod: GP | Performed by: PHYSICAL THERAPIST

## 2022-05-31 PROCEDURE — 97140 MANUAL THERAPY 1/> REGIONS: CPT | Mod: GP | Performed by: PHYSICAL THERAPIST

## 2022-05-31 NOTE — TELEPHONE ENCOUNTER
Writer responded via [employer]    OZ FlorezN, RN  [facility]

## 2022-06-01 ENCOUNTER — TRANSFERRED RECORDS (OUTPATIENT)
Dept: HEALTH INFORMATION MANAGEMENT | Facility: CLINIC | Age: 67
End: 2022-06-01
Payer: COMMERCIAL

## 2022-06-14 ENCOUNTER — THERAPY VISIT (OUTPATIENT)
Dept: PHYSICAL THERAPY | Facility: CLINIC | Age: 67
End: 2022-06-14
Payer: COMMERCIAL

## 2022-06-14 DIAGNOSIS — G44.219 EPISODIC TENSION-TYPE HEADACHE, NOT INTRACTABLE: Primary | ICD-10-CM

## 2022-06-14 PROCEDURE — 97112 NEUROMUSCULAR REEDUCATION: CPT | Mod: GP | Performed by: PHYSICAL THERAPIST

## 2022-06-14 PROCEDURE — 97140 MANUAL THERAPY 1/> REGIONS: CPT | Mod: GP | Performed by: PHYSICAL THERAPIST

## 2022-07-12 ENCOUNTER — THERAPY VISIT (OUTPATIENT)
Dept: PHYSICAL THERAPY | Facility: CLINIC | Age: 67
End: 2022-07-12
Payer: COMMERCIAL

## 2022-07-12 DIAGNOSIS — G44.219 EPISODIC TENSION-TYPE HEADACHE, NOT INTRACTABLE: Primary | ICD-10-CM

## 2022-07-12 DIAGNOSIS — M54.2 NECK PAIN: ICD-10-CM

## 2022-07-12 PROCEDURE — 97140 MANUAL THERAPY 1/> REGIONS: CPT | Mod: GP | Performed by: PHYSICAL THERAPIST

## 2022-07-26 ENCOUNTER — THERAPY VISIT (OUTPATIENT)
Dept: PHYSICAL THERAPY | Facility: CLINIC | Age: 67
End: 2022-07-26
Payer: COMMERCIAL

## 2022-07-26 DIAGNOSIS — G44.219 EPISODIC TENSION-TYPE HEADACHE, NOT INTRACTABLE: Primary | ICD-10-CM

## 2022-07-26 PROCEDURE — 97140 MANUAL THERAPY 1/> REGIONS: CPT | Mod: GP | Performed by: PHYSICAL THERAPIST

## 2022-07-26 PROCEDURE — 97112 NEUROMUSCULAR REEDUCATION: CPT | Mod: GP | Performed by: PHYSICAL THERAPIST

## 2022-08-02 ENCOUNTER — THERAPY VISIT (OUTPATIENT)
Dept: PHYSICAL THERAPY | Facility: CLINIC | Age: 67
End: 2022-08-02
Payer: COMMERCIAL

## 2022-08-02 DIAGNOSIS — G44.219 EPISODIC TENSION-TYPE HEADACHE, NOT INTRACTABLE: Primary | ICD-10-CM

## 2022-08-02 PROCEDURE — 97112 NEUROMUSCULAR REEDUCATION: CPT | Mod: GP | Performed by: PHYSICAL THERAPIST

## 2022-08-02 PROCEDURE — 97140 MANUAL THERAPY 1/> REGIONS: CPT | Mod: GP | Performed by: PHYSICAL THERAPIST

## 2022-08-08 ENCOUNTER — PATIENT OUTREACH (OUTPATIENT)
Dept: GERIATRIC MEDICINE | Facility: CLINIC | Age: 67
End: 2022-08-08

## 2022-08-08 NOTE — LETTER
August 8, 2022    YOSI NOEL BYE  [address]  [place] [address]      Dear Yosi    Welcome to [facility] ([facility]) ([facility]) health program. My name is Yary Francois RN. I am your [facility] care coordinator. You are eligible for Care Coordination through [facility] [employer].    Here is how Care Coordination works:    I will meet with you in person to determine your care coordination needs    We will develop a plan of care to meet your needs    We will create a service plan showing the services you will receive    We will talk about and coordinate any preventive care needs you have    I will call you soon to see how you are doing and determine what needs you may have. Our goal is to keep you as healthy and independent as possible.     [facility] combines the benefits you may already receive from Medical Assistance, Medicare and the Prescription Drug Coverage Program.    Soon you will receive a new [facility] member identification (ID) card from [facility]. When you receive it, please use this card where you get your health services.    Being in the [place] [facility] ([facility]) ([facility]) Care Coordination program is voluntary and offered to you at no cost. If you ever wish to stop being in the Care Coordination program or have questions, call me at [phone]. If you reach my voice mail, leave a message and your phone number. If you are hearing impaired, call the [place] [employer] at 284 or [phone] (rczvgs-ju-pgwqld relay service).  Sincerely,    Yary Francois RN  [phone]  [email or web address] ([facility]) is a health plan that contracts with both Medicare and the [place] Medical Assistance (Medicaid) program to provide benefits of both programs to enrollees. Enrollment in [facility] depends on contract renewal.    [id number] accepted       (12/2019)

## 2022-08-08 NOTE — PROGRESS NOTES
[facility] Care Coordination Contact    Member became effective with [facility] on 8/1/2022 with [facility].  Previous Health Plan: [facility]  Previous Care System: [facility]  Previous care coordinators name and number: Debby Smith  [place] Type: N/A  Last MMIS Entry: Date 4/1/22 and Type Not Loc  MMIS visit date (and type) if different from above: n/a  Services Listed in MMIS: no services  UTF received: No UTF to request     No services and was [facility] last month - no UTF to request.    Cristina Campbell  Case Management Specialist  [facility]  [phone]

## 2022-08-10 ENCOUNTER — TELEPHONE (OUTPATIENT)
Dept: FAMILY MEDICINE | Facility: CLINIC | Age: 67
End: 2022-08-10

## 2022-08-10 NOTE — TELEPHONE ENCOUNTER
Forms/Letter Request    Type of form/letter: Medical    Have you been seen for this request: N/A    Do we have the form/letter: Yes: In form folder POD C. Problem and med list attached    When is form/letter needed by: N/A    How would you like the form/letter returned: Fax

## 2022-08-16 ENCOUNTER — THERAPY VISIT (OUTPATIENT)
Dept: PHYSICAL THERAPY | Facility: CLINIC | Age: 67
End: 2022-08-16
Payer: COMMERCIAL

## 2022-08-16 DIAGNOSIS — G44.219 EPISODIC TENSION-TYPE HEADACHE, NOT INTRACTABLE: Primary | ICD-10-CM

## 2022-08-16 PROCEDURE — 97112 NEUROMUSCULAR REEDUCATION: CPT | Mod: GP | Performed by: PHYSICAL THERAPIST

## 2022-08-16 PROCEDURE — 97140 MANUAL THERAPY 1/> REGIONS: CPT | Mod: GP | Performed by: PHYSICAL THERAPIST

## 2022-08-25 ENCOUNTER — ANCILLARY PROCEDURE (OUTPATIENT)
Dept: MAMMOGRAPHY | Facility: CLINIC | Age: 67
End: 2022-08-25
Attending: NURSE PRACTITIONER
Payer: COMMERCIAL

## 2022-08-25 DIAGNOSIS — Z12.31 VISIT FOR SCREENING MAMMOGRAM: ICD-10-CM

## 2022-08-25 PROCEDURE — 77067 SCR MAMMO BI INCL CAD: CPT | Mod: TC | Performed by: RADIOLOGY

## 2022-08-25 PROCEDURE — 77063 BREAST TOMOSYNTHESIS BI: CPT | Mod: TC | Performed by: RADIOLOGY

## 2022-08-30 ENCOUNTER — PATIENT OUTREACH (OUTPATIENT)
Dept: GERIATRIC MEDICINE | Facility: CLINIC | Age: 67
End: 2022-08-30

## 2022-08-30 ENCOUNTER — THERAPY VISIT (OUTPATIENT)
Dept: PHYSICAL THERAPY | Facility: CLINIC | Age: 67
End: 2022-08-30
Payer: COMMERCIAL

## 2022-08-30 DIAGNOSIS — G44.219 EPISODIC TENSION-TYPE HEADACHE, NOT INTRACTABLE: Primary | ICD-10-CM

## 2022-08-30 PROCEDURE — 97112 NEUROMUSCULAR REEDUCATION: CPT | Mod: GP | Performed by: PHYSICAL THERAPIST

## 2022-08-30 PROCEDURE — 97140 MANUAL THERAPY 1/> REGIONS: CPT | Mod: GP | Performed by: PHYSICAL THERAPIST

## 2022-08-30 NOTE — LETTER
September 16, 2022    YOSI SANTOS  [address]  [facility] [address]        Dear Yosi:    As a member of [facility], we offer a health risk assessment at no cost to you. I know you don't want to have the assessment right now. If you change your mind, please call me at the number below.    Who performs the health risk assessment?  A [facility] Care Coordinator performs the assessment. Our Care Coordinators can also help you understand your benefits. They can tell you about services to aid you at home, such as managing your care with your doctors if your health worsens.    Our Care Coordinators are here for you if you need:    Support for activities you used to do by yourself (including making meals, bathing, and paying bills)    Equipment for bathroom or home safety    Help finding a new place to live    Information on staying healthy, preventing falls and immunizations    Questions?  If you have questions, or you would like to do the assessment, call me at [phone]. TTY users call [phone]. I'm here from 8am to 5pm. I may reach out to you again soon.      Sincerely,        Yary Francois RN  [phone]  [email or web address]    <[id number] accepted    [id number] (12/2021)  [id number]>

## 2022-08-30 NOTE — PROGRESS NOTES
[facility] Care Coordination Contact    Called member and significant other Josie to schedule annual HRA home visit. Per Josie, the member had an assessment a few weeks ago. Per MMIS review, member had [employer] assessment on 8/8/22, but it appeared the  was not aware member had enrolled in [facility]. She was SNBC and just turned 67 on 8/23/22.    Reached out to the  who is working with member, Emanuel Arce, at [facility]. Email: [email or web address]. Also called her at [phone] and left message asking for return call and asking for any documents she has related to the assessment on 8/8/22, so we do not have to start from scratch.    Per Josie, Theodore is helping them start PCA services and Josie will be the caregiver.  Yary Francois RN  [facility]   [phone]    Member became effective with  [facility] on 8/1/2022 with [facility].  Previous Health Plan: [facility]  Previous Care System: [facility]

## 2022-08-30 NOTE — PROGRESS NOTES
Noted. See care coordinator note dated 8/30/22.  Yary Francois RN  [facility]   [phone]

## 2022-08-30 NOTE — Clinical Note
"Armando Harrell - This is just an FYI - no action needed from you. :) Debby had an assessment through the [facility] and they are working on getting PCA services going for her. She will be opening to CADI waiver, which is for people with disabilities.  I am her care coordinator through [employer] and can assist with things related to her medical care and things covered by her insurance ([employer]). If she hadn't just been assessed on 8/8 by Sander Sharp, I would have done an assessment. It looks like Debby is \"refusing\" an assessment, but it is just the insurance company's wording. We are required to let you know about these things - just FYI.  Thanks, Yary Francois RN [address]  [phone] "

## 2022-08-31 NOTE — PROGRESS NOTES
[facility] Care Coordination Contact    Spoke with BOUCHRA Castellanos from [facility], who assessed the member on 8/8/22. Since it was before her 67th birthday, the member and her wife, Josie, have opted to go with CADI waiver instead of EW.    Emanuel sent me all the documents she has completed from the 8/8/22 assessment, including the CSP today.  She will send the CSSP when it is done.    Per Emanuel, she has not been able to open member to waiver yet, due to a pending asset evaluation (per the financial worker).     Due to having an assessment less than a month ago, member and wife decline another one from this care coordinator at this time. Will process as a refusal.  -----------------------------------------------------------------------------------------------------------------------------    [facility] Refusal Telephone Assessment    Member refused home visit HRA on 8/31/22 (reason: Had [employer] assessment on 8/8/22 and CADI waiver is pending).    ER visits: No  Hospitalizations: No  Health concerns: updated 9/15/22: abnormal finding on mammogram & US 9/8/22. Biopsy scheduled for 9/20/222  Falls/Injuries: No  ADL/IADL Dependencies:         Member currently receiving the following home care services: NA    Member currently receiving the following community resources: NA: CADI waiver pending and  from [facility] said they are going to authorize PCA, homemaking and IHS with training services.   Informal support(s): spouse      Advanced Care Planning discussion, complete code section.    [facility] Plan sponsored benefits: Shared information re: Silver Sneakers/gym memberships, ASA, Calcium +D.    Follow-Up Plan: Member informed of future contact, plan to f/u with member with a 6 month telephone assessment and offer a home visit.  Contact information shared with member and family, encouraged member to call with any questions or concerns at any time.    This CC note routed to PCP.  Yary Francois,  EILEEN  [facility]   [phone]

## 2022-09-06 ENCOUNTER — THERAPY VISIT (OUTPATIENT)
Dept: PHYSICAL THERAPY | Facility: CLINIC | Age: 67
End: 2022-09-06
Payer: COMMERCIAL

## 2022-09-06 DIAGNOSIS — G44.219 EPISODIC TENSION-TYPE HEADACHE, NOT INTRACTABLE: Primary | ICD-10-CM

## 2022-09-06 PROCEDURE — 97140 MANUAL THERAPY 1/> REGIONS: CPT | Mod: GP | Performed by: PHYSICAL THERAPIST

## 2022-09-06 PROCEDURE — 97112 NEUROMUSCULAR REEDUCATION: CPT | Mod: GP | Performed by: PHYSICAL THERAPIST

## 2022-09-08 ENCOUNTER — ANCILLARY PROCEDURE (OUTPATIENT)
Dept: MAMMOGRAPHY | Facility: CLINIC | Age: 67
End: 2022-09-08
Attending: NURSE PRACTITIONER
Payer: COMMERCIAL

## 2022-09-08 DIAGNOSIS — N64.89 BREAST ASYMMETRY: ICD-10-CM

## 2022-09-08 PROCEDURE — G0279 TOMOSYNTHESIS, MAMMO: HCPCS | Mod: LT | Performed by: STUDENT IN AN ORGANIZED HEALTH CARE EDUCATION/TRAINING PROGRAM

## 2022-09-08 PROCEDURE — 77065 DX MAMMO INCL CAD UNI: CPT | Mod: LT | Performed by: STUDENT IN AN ORGANIZED HEALTH CARE EDUCATION/TRAINING PROGRAM

## 2022-09-13 ENCOUNTER — THERAPY VISIT (OUTPATIENT)
Dept: PHYSICAL THERAPY | Facility: CLINIC | Age: 67
End: 2022-09-13
Payer: COMMERCIAL

## 2022-09-13 DIAGNOSIS — G44.219 EPISODIC TENSION-TYPE HEADACHE, NOT INTRACTABLE: Primary | ICD-10-CM

## 2022-09-13 PROCEDURE — 97140 MANUAL THERAPY 1/> REGIONS: CPT | Mod: GP | Performed by: PHYSICAL THERAPIST

## 2022-09-13 PROCEDURE — 97112 NEUROMUSCULAR REEDUCATION: CPT | Mod: GP | Performed by: PHYSICAL THERAPIST

## 2022-09-16 NOTE — PROGRESS NOTES
"[facility] Care Coordination Contact    Per CC, mailed client a \"Refusal of Home Visit\" letter.        "

## 2022-09-16 NOTE — PROGRESS NOTES
[facility] Care Coordination Contact    This writer emailed [facility] working on this case, and asked her what the status of CADI waiver is. Per MMIS, not open to waiver yet.    Called Josie, member's spouse and caregiver, and updated her on what I know about Debby's assessment results. CADI waiver is still pending and Josie said they mailed in the financial info the [facility] requested a couple weeks ago. I told her that I reached out to [facility] about waiver status and Josie was appreciative.    Informed Josie that this care coordinator will continue to follow member as long as she gets primary care at a Only clinic. Also told her I will reach out, after CADI waiver has opened, to schedule a CCDB assessment.  Josie voiced understanding and agreed with the plan.  Yary Francois RN  [facility]   [phone]

## 2022-09-20 ENCOUNTER — ANCILLARY PROCEDURE (OUTPATIENT)
Dept: MAMMOGRAPHY | Facility: CLINIC | Age: 67
End: 2022-09-20
Attending: NURSE PRACTITIONER
Payer: COMMERCIAL

## 2022-09-20 DIAGNOSIS — N64.89 BREAST ASYMMETRY: ICD-10-CM

## 2022-09-20 PROCEDURE — G0279 TOMOSYNTHESIS, MAMMO: HCPCS | Performed by: STUDENT IN AN ORGANIZED HEALTH CARE EDUCATION/TRAINING PROGRAM

## 2022-09-20 PROCEDURE — 88377 M/PHMTRC ALYS ISHQUANT/SEMIQ: CPT | Performed by: NURSE PRACTITIONER

## 2022-09-20 PROCEDURE — 88377 M/PHMTRC ALYS ISHQUANT/SEMIQ: CPT | Mod: 26 | Performed by: MEDICAL GENETICS

## 2022-09-20 PROCEDURE — 77066 DX MAMMO INCL CAD BI: CPT | Performed by: STUDENT IN AN ORGANIZED HEALTH CARE EDUCATION/TRAINING PROGRAM

## 2022-09-20 PROCEDURE — 88305 TISSUE EXAM BY PATHOLOGIST: CPT | Mod: 26 | Performed by: PATHOLOGY

## 2022-09-20 PROCEDURE — 88360 TUMOR IMMUNOHISTOCHEM/MANUAL: CPT | Mod: 26 | Performed by: PATHOLOGY

## 2022-09-20 PROCEDURE — 88305 TISSUE EXAM BY PATHOLOGIST: CPT | Mod: TC | Performed by: NURSE PRACTITIONER

## 2022-09-20 RX ORDER — IOPAMIDOL 612 MG/ML
100 INJECTION, SOLUTION INTRAVASCULAR ONCE
Status: COMPLETED | OUTPATIENT
Start: 2022-09-20 | End: 2022-09-20

## 2022-09-20 RX ADMIN — IOPAMIDOL 97 ML: 612 INJECTION, SOLUTION INTRAVASCULAR at 13:17

## 2022-09-23 ENCOUNTER — TELEPHONE (OUTPATIENT)
Dept: MAMMOGRAPHY | Facility: CLINIC | Age: 67
End: 2022-09-23

## 2022-09-23 NOTE — TELEPHONE ENCOUNTER
Left a message for Josie, Debby's partner, regarding availability of Debby's biopsy results.  Awaiting return phone call.  Call back number left was [phone]

## 2022-09-26 ENCOUNTER — TELEPHONE (OUTPATIENT)
Dept: FAMILY MEDICINE | Facility: CLINIC | Age: 67
End: 2022-09-26

## 2022-09-26 ENCOUNTER — TELEPHONE (OUTPATIENT)
Dept: MAMMOGRAPHY | Facility: CLINIC | Age: 67
End: 2022-09-26

## 2022-09-26 ENCOUNTER — PATIENT OUTREACH (OUTPATIENT)
Dept: ONCOLOGY | Facility: CLINIC | Age: 67
End: 2022-09-26

## 2022-09-26 DIAGNOSIS — C50.912 INVASIVE DUCTAL CARCINOMA OF BREAST, FEMALE, LEFT (H): Primary | ICD-10-CM

## 2022-09-26 NOTE — PROGRESS NOTES
New Patient Oncology Nurse Navigator Note     Referring provider: Sharri Lacey NP     Referring Clinic/Organization: [facility]     Referred to (specialty:) Medical Oncology and Cancer Surgery      Date Referral Received: September 26, 2022     Evaluation for:  Breast cancer     Clinical History (per Nurse review of records provided):      Patient had a bilateral screening mammogram on 8/25/22 and a possible asymmetry was identified in the left breast on the CC view in the retroareolar plane position, subareolar depth. Diagnostic breast imaging followed on 9/8/22 and additional mammogram views confirm subareolar focal asymmetry that correlates with callback. Targeted ultrasound demonstrates  irregular hypoechoic shadowing/mass, 6 x 5 x 5 mm, inferior subareolar region near 6:00 favored to correlate with mammogram finding.  No suspicious left axillary lymph nodes.     9/20/22 - Contrast-enhanced mammogram preceded breast biopsy and this showed minimal bilateral background parenchymal enhancement. Equivocal 6 mm focal enhancement 6:00 periareolar region at site of mammogram finding described on 9/8/2022.  Otherwise negative for abnormal enhancement in either breast.    9/20/22 -   LEFT breast, 6:00, subareolar, ultrasound guided core biopsy:  -INVASIVE BREAST CARCINOMA OF NO SPECIAL TYPE (INVASIVE DUCTAL CARCINOMA), Cam grade 1  -Ductal carcinoma in situ (DCIS), nuclear grade 2, cribriform and solid type(s)  -Calcifications associated with invasive carcinoma  -Invasive carcinoma is estrogen receptor positive, progesterone receptor positive and HER2 equivocal (score 2+)  by immunohistochemistry (see biomarker reporting template below)  -HER2 FISH results will be reported separately by cytogenetics    HER2 by FISH subsequently reported as negative.    Records Location: See Bookmarked material     Records Needed:   Breast imaging for past 5 years    Patient resides in [place]  [place] to [place]

## 2022-09-26 NOTE — TELEPHONE ENCOUNTER
Spoke to Josie, Debby's partner, and she requested that I call her back at 3pm today (9/26) to discuss Debby's results.  Writer will call back at that time.

## 2022-09-26 NOTE — TELEPHONE ENCOUNTER
Spoke to Josie about Debby's new diagnosis of Invasive Ductal Carcinoma found in her left breast.  We discussed the Radiologist's recommendation of surgical and oncology consultation.  A referral has been placed and someone will be reaching out to help coordinate the appointments.  Josie verbalized understanding and all questions and concerns were answered at this time.

## 2022-09-26 NOTE — TELEPHONE ENCOUNTER
Call received from patient's partner, Josie, requesting breast biopsy results.    Consent to communicate with Josie on file.    Writer reviewed other 9/26/22 telephone encounter and informed Josie Jefferson RN, documented plan to call today at 1500 and offered to send a message to Li that Josie called back.    Josie verbalized understanding and declined writer sending a message to EILEEN Jefferson.    No further action needed from triage.    OZ FlorezN, RN  [facility]

## 2022-09-27 ENCOUNTER — THERAPY VISIT (OUTPATIENT)
Dept: PHYSICAL THERAPY | Facility: CLINIC | Age: 67
End: 2022-09-27
Payer: COMMERCIAL

## 2022-09-27 DIAGNOSIS — G44.219 EPISODIC TENSION-TYPE HEADACHE, NOT INTRACTABLE: Primary | ICD-10-CM

## 2022-09-27 PROCEDURE — 97140 MANUAL THERAPY 1/> REGIONS: CPT | Mod: GP | Performed by: PHYSICAL THERAPIST

## 2022-09-27 PROCEDURE — 97112 NEUROMUSCULAR REEDUCATION: CPT | Mod: GP | Performed by: PHYSICAL THERAPIST

## 2022-09-27 NOTE — PROGRESS NOTES
Telephoned and spoke with Debby to assist in scheduling medical oncology and cancer surgery consults for her new diagnosis of breast cancer.  She had some limited parameters for scheduling but we agreed upon 10/10 with Dr. Morrow and 10/11 with Dr. Brooks. Jeanette Dumont RN

## 2022-09-29 NOTE — PROGRESS NOTES
[facility] Care Coordination Contact    Received message from BOUCHRA Castellanos from [facility], who stated she was able to enter the member's MMIS CADI & 67th birthday assessments, so I could enter the refusal of home visit (CCDB) from 8/31/22. Done.    Plan: care coordinator will follow up with member in 6 months.   Yary Francois RN  [facility]   [phone]

## 2022-10-07 ENCOUNTER — TRANSCRIBE ORDERS (OUTPATIENT)
Dept: ONCOLOGY | Facility: CLINIC | Age: 67
End: 2022-10-07

## 2022-10-07 DIAGNOSIS — C50.919 BREAST CANCER (H): Primary | ICD-10-CM

## 2022-10-07 NOTE — PROGRESS NOTES
Telephoned and spoke with patient's partner Josie Chavis to discuss cancelled appointment with Dr. Juan Brooks on 10/11.  Josie was not aware of this cancellation and did not initiate.  Writer to investigate and call Josie back.      (Original consult with Dr. Brooks was scheduled for 10/11 at 2:30pm.  That appointment time is no longer available, but hold placed on 10/11 at 3:30pm for now.  In basket message sent to cancelling  to investigate circumstance. Appt appears to have been cancelled just over one hour after originally scheduled.)    10/7/22 13:03 - Telephoned and left voice message for patient's partner Josie requesting call back to reschedule consult with Dr. Brooks or another breast oncologist

## 2022-10-09 DIAGNOSIS — F02.80 ALZHEIMER'S DISEASE (H): ICD-10-CM

## 2022-10-09 DIAGNOSIS — G30.9 ALZHEIMER'S DISEASE (H): ICD-10-CM

## 2022-10-10 ENCOUNTER — OFFICE VISIT (OUTPATIENT)
Dept: ONCOLOGY | Facility: CLINIC | Age: 67
End: 2022-10-10
Attending: NURSE PRACTITIONER
Payer: COMMERCIAL

## 2022-10-10 VITALS
DIASTOLIC BLOOD PRESSURE: 73 MMHG | TEMPERATURE: 98.4 F | WEIGHT: 136 LBS | SYSTOLIC BLOOD PRESSURE: 108 MMHG | HEART RATE: 74 BPM | HEIGHT: 66 IN | BODY MASS INDEX: 21.86 KG/M2 | OXYGEN SATURATION: 98 %

## 2022-10-10 DIAGNOSIS — C50.912 INVASIVE DUCTAL CARCINOMA OF BREAST, FEMALE, LEFT (H): ICD-10-CM

## 2022-10-10 PROCEDURE — G0463 HOSPITAL OUTPT CLINIC VISIT: HCPCS

## 2022-10-10 PROCEDURE — 99204 OFFICE O/P NEW MOD 45 MIN: CPT | Performed by: SURGERY

## 2022-10-10 ASSESSMENT — PAIN SCALES - GENERAL: PAINLEVEL: NO PAIN (0)

## 2022-10-10 NOTE — NURSING NOTE
"Oncology Rooming Note    October 10, 2022 5:23 PM   Debby Barriga is a 67 year old female who presents for:    Chief Complaint   Patient presents with     Oncology Clinic Visit     Left breast invasive ductal carcinoma     Initial Vitals: /73   Pulse 74   Temp 98.4  F (36.9  C) (Oral)   Ht 1.679 m (5' 6.1\")   Wt 61.7 kg (136 lb)   SpO2 98%   BMI 21.88 kg/m   Estimated body mass index is 21.88 kg/m  as calculated from the following:    Height as of this encounter: 1.679 m (5' 6.1\").    Weight as of this encounter: 61.7 kg (136 lb). Body surface area is 1.7 meters squared.  No Pain (0) Comment: Data Unavailable   No LMP recorded. Patient is postmenopausal.  Allergies reviewed: Yes  Medications reviewed: Yes    Medications: Medication refills not needed today.  Pharmacy name entered into EPIC:    [facility] [place], [place] - [address] [facility] [place], [place] - [address] [place] [address] [phone]  [facility] [address] [place] [address] DRUG STORE #[id number] - [place], [place] - [address] AT [facility] & [address] [place] [address] PHARMACY [place], [place] - [address]    Clinical concerns: none       Carolyn Francois            "

## 2022-10-10 NOTE — LETTER
10/10/2022         RE: Debby Barriga  [address]  [place] [address]        Dear Colleague,    Thank you for referring your patient, Debby Barriga, to the [facility]. Please see a copy of my visit note below.    HISTORY OF PRESENT ILLNESS:  Debby Barriga is a 67-year-old woman with a new diagnosis of a left breast cancer.  On 08/25 she underwent a screening mammogram, which revealed a new asymmetry in her left breast.  On 09/08 she underwent a diagnostic mammogram and ultrasound, which demonstrated a 6 mm mass.  On 09/20 she underwent a contrast-enhanced mammography, which demonstrated 6 mm of enhancement.  On 09/20 she underwent an image-guided needle biopsy, which demonstrated a grade 1 invasive ductal cancer, ER positive, OH positive, HER-2 negative.  She is now here to talk about treatment options.  She denies any previous breast problems.  She has no breast symptoms at the present time.    PAST MEDICAL HISTORY:  Significant for Alzheimer's.  She has no other major medical problems.    FAMILY HISTORY:  Negative as far she knows regarding breast or ovarian cancer.    IMPRESSION:  Stage I, ER-positive breast cancer.    PLAN:  I talked with her about the various treatment options including mastectomy with or without reconstruction versus a lumpectomy plus radiation therapy.  She is interested in breast-conserving surgery.  I did recommend a sentinel lymph node biopsy.  I told her that endocrine therapy would be recommended.  I told her that it would be very unlikely that she would need chemotherapy.  She was accompanied by her partner today, and they understand and wish to proceed with a seed-localized lumpectomy and sentinel lymph node biopsy.    Again, thank you for allowing me to participate in the care of your patient.      Sincerely,    Darin Morrow MD

## 2022-10-10 NOTE — TELEPHONE ENCOUNTER
5/23/22 was last OV.  Prescription approved per [facility] Refill Protocol.  EILEEN Woods

## 2022-10-11 ENCOUNTER — PATIENT OUTREACH (OUTPATIENT)
Dept: ONCOLOGY | Facility: CLINIC | Age: 67
End: 2022-10-11

## 2022-10-11 ENCOUNTER — ONCOLOGY VISIT (OUTPATIENT)
Dept: ONCOLOGY | Facility: CLINIC | Age: 67
End: 2022-10-11
Attending: INTERNAL MEDICINE
Payer: COMMERCIAL

## 2022-10-11 ENCOUNTER — PRE VISIT (OUTPATIENT)
Dept: ONCOLOGY | Facility: CLINIC | Age: 67
End: 2022-10-11

## 2022-10-11 VITALS
TEMPERATURE: 98.1 F | SYSTOLIC BLOOD PRESSURE: 100 MMHG | HEART RATE: 65 BPM | RESPIRATION RATE: 16 BRPM | DIASTOLIC BLOOD PRESSURE: 67 MMHG | WEIGHT: 134.5 LBS | OXYGEN SATURATION: 98 % | BODY MASS INDEX: 22.96 KG/M2 | HEIGHT: 64 IN

## 2022-10-11 DIAGNOSIS — G30.9 ALZHEIMER'S DEMENTIA WITHOUT BEHAVIORAL DISTURBANCE (H): ICD-10-CM

## 2022-10-11 DIAGNOSIS — F02.80 ALZHEIMER'S DEMENTIA WITHOUT BEHAVIORAL DISTURBANCE (H): ICD-10-CM

## 2022-10-11 DIAGNOSIS — C50.919 BREAST CANCER (H): ICD-10-CM

## 2022-10-11 PROCEDURE — G0463 HOSPITAL OUTPT CLINIC VISIT: HCPCS

## 2022-10-11 PROCEDURE — 99205 OFFICE O/P NEW HI 60 MIN: CPT | Performed by: INTERNAL MEDICINE

## 2022-10-11 ASSESSMENT — PAIN SCALES - GENERAL: PAINLEVEL: NO PAIN (0)

## 2022-10-11 NOTE — PROGRESS NOTES
RN CARE COORDINATION  Surgical Oncology    Met with Debby and her partner, Josie,  following her visit with Dr. Morrow on 10/10/2022. Introduced self and explained role of RNCC. Reviewed plan for LEFT tag localized lumpectomy at the [facility]. Provided appropriate OR location map. Discussed pre-op requirements including H&P and Covid test. Debby prefers a video PAC visit and home Covid test. Reviewed shower instructions and provided written hand-out and bottle of surgical scrub.     Discussed tag placement would take place 4-5 days prior to [facility]. Li Finney RN will call to schedule this appointment.     Informed patient they should get a call within the next three business days from our OR  with surgery date, H&P date and date of post-op visit with their surgeon. Writer answered all questions and concerns to the best of her ability and provided her contact information. Reviewed use of [employer] as alternative way to contact team.  Encouraged patient to contact with questions.         GABY Fonseca, RN  RN Care Coordinator  [facility]  Surgical Onolcogy      Approximately 15 minutes was spent in discussion with patient.

## 2022-10-11 NOTE — PROGRESS NOTES
Debby Barriga is a 67 year old female patient with a new diagnosis of Stage I T1N0M0 ER +ve MD+ve HER2 -ve invasive ductal carcinoma coming in for establishment of medical oncology care.     She comes in with her significant other, who provided further history given she has memory problems.     Debby states she doesn't have significant pain on the local site, no chest tightness or any form of discharge. No cough or chest pain reported. No pelvic or back pain reported. No significant headache. No sleep problem. She has past history of anxiety for which she takes venlafaxine. No bleeding from any site. 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 No smoking of cigarette currently. No use of recreational drugs. She doesn't know of close family members with breast or other forms of cancer.     Oncologic history     Patient had a bilateral screening mammogram on 8/25/22 and a possible asymmetry was identified in the left breast on the CC view in the retroareolar plane position, subareolar depth. Diagnostic breast imaging followed on 9/8/22 and additional mammogram views confirm subareolar focal asymmetry that correlates with callback. Targeted ultrasound demonstrates irregular hypoechoic shadowing/mass, 6 x 5 x 5 mm, inferior subareolar region near 6:00 favored to correlate with mammogram finding.  No suspicious left axillary lymph nodes.     9/20/22 - Contrast-enhanced mammogram preceded breast biopsy and this showed minimal bilateral background parenchymal enhancement. Equivocal 6 mm focal enhancement 6:00 periareolar region at site of mammogram finding described on 9/8/2022.  Otherwise negative for abnormal enhancement in either breast.     9/20/22 -   LEFT breast, 6:00, subareolar, ultrasound guided core biopsy:  -INVASIVE BREAST CARCINOMA OF NO SPECIAL TYPE (INVASIVE DUCTAL CARCINOMA), Cam grade 1  -Ductal carcinoma in situ (DCIS), nuclear grade 2, cribriform  and solid type(s)  -Calcifications associated with invasive carcinoma  -Invasive carcinoma is estrogen receptor positive, progesterone receptor positive and HER2 equivocal (score 2+)  by immunohistochemistry     Assessment & Plan      Debby Barriga is a 67 year old female patient with a new diagnosis of Stage I T1N0M0 ER +ve IL+ve HER2 -ve invasive ductal carcinoma coming in for establishment of medical oncology care.       T1N0M0 ER +ve IL+ve HER2 -ve invasive ductal carcinoma    We discussed that moving forward surgery is the first line of therapy coupled with radiation treatment. Given the location of the mass, possibility of removal of the nipple might be there. I explained the goal of treatment of surgery and radiation is prevention of local recurrence of the cancer. I discussed the idea of oncotype testing and the rationale behind doing the test in relation to chemotherapy. Given the nature of her cancer, the likelihood of a high oncotype cancer is low. I also explained the risks of chemotherapy in general, including febrile neutropenia, cardiac toxicity and neurotoxicity. Debby and her significant other expressed their desire not to pursue chemotherapy and the oncotype testing. I informed them that she still needs to do the endocrine therapy and explained the benefit of which is prevent systemic recurrence. They mentioned a friend who wasn't able to tolerate the endocrine therapy and asked if there are multiple endocrine treatment options. I informed them about the available drugs and the side effect profiles of such treatment.     Alzheimer's disease     Debby is not on follow-up for her memory problems. Will recommend establishment of care in that regard.       Carolina Conti  Internal Medicine Resident (PGY1)

## 2022-10-11 NOTE — NURSING NOTE
"Oncology Rooming Note    October 11, 2022 3:45 PM   Debby Barriga is a 67 year old female who presents for:    Chief Complaint   Patient presents with     Oncology Clinic Visit     New patient: Left breast invasive ductal carcinoma, [place] grade 1, ER/WI+, HER2     Initial Vitals: /67 (BP Location: Right arm, Patient Position: Sitting, Cuff Size: Adult Small)   Pulse 65   Temp 98.1  F (36.7  C) (Oral)   Resp 16   Ht 1.632 m (5' 4.25\")   Wt 61 kg (134 lb 8 oz)   SpO2 98%   BMI 22.91 kg/m   Estimated body mass index is 22.91 kg/m  as calculated from the following:    Height as of this encounter: 1.632 m (5' 4.25\").    Weight as of this encounter: 61 kg (134 lb 8 oz). Body surface area is 1.66 meters squared.  No Pain (0) Comment: Data Unavailable   No LMP recorded. Patient is postmenopausal.  Allergies reviewed: Yes  Medications reviewed: Yes    Medications: Medication refills not needed today.  Pharmacy name entered into EPIC:    [facility] - [place], [place] - [address] [facility] PH[facility]ACY [place], [place] - [address] [place] [address] [phone]  [place] - [address] [facility] #[id number] - [place], [place] - [address] AT [address] PHARMACY [place], [place] - [address]    Clinical concerns: New patient-states that she is not allergic to Trazadone-please address as it is currently listed as an allergy. Patient states that she is occasionally unsteady on her feet and has alzheimer's.        Cinthia Land LPN October 11, 2022 3:49 PM              "

## 2022-10-11 NOTE — PROGRESS NOTES
Darin Morrow MD  [facility]  [address] [place], [facility] [address]  [place] [address]    RE:  Debby Barriga  MRN:  [id number]  :  1955    Dear Dr. Morrow:    Thank you for referring Debby Barriga to our clinic.  I saw her with her significant other, Josie.    Debby Barriga is a 67-year-old woman with early Alzheimer's who had a mammographic asymmetry noted in the left breast near the nipple-areolar complex.  The mammogram showed a left breast asymmetry in the retroareolar plane at subareolar depth.  She underwent an ultrasound, which showed a 6 x 5 x 5 mm nodule at the 6 o'clock position adjacent to the nipple areolar complex.  There was no lymphadenopathy by ultrasound.  She had a contrast mammogram performed before her breast biopsy, which showed an equivocal area of enhancement 6 mm in size at the 6 o'clock position in the left periareolar area.  She underwent a biopsy, which showed invasive ductal carcinoma, grade 1, ER positive, PA positive.  ER was positive in 95% of cells, PA positive in 70% of the cells with moderate staining.  She now comes to clinic for our recommendations.  Debby has no pain, fatigue or depression but does have mild anxiety, which is treated with venlafaxine.  She and her partner, Josie, have been on a ketogenic diet for the last 3 years.  She has had some weight loss related to the diet and this was intentional weight loss.  She has no loss of energy.  She does not sleep during the day.  She can do some of her household chores but not all.  She did work in the past as a .  She does have some cognitive impairment related to early onset Alzheimer's.    She did not notice any abnormality of either breast.    IMAGING AND BIOPSY:  On  she underwent a screening mammogram, which revealed a new asymmetry in her left breast.  On  she underwent a diagnostic mammogram and ultrasound, which demonstrated a 6 mm mass.  On  she underwent a  contrast-enhanced mammography, which demonstrated 6 mm of enhancement.  On 09/20 she underwent an image-guided needle biopsy, which demonstrated a grade 1 invasive ductal cancer, ER positive, [place] positive, HER-2 negative.      REVIEW OF SYSTEMS:  She denies fever, does have mild headaches from time to time and an occasional cough.  She has had COVID vaccination x3.  She denies chest pain, shortness of breath, hemoptysis, loss of appetite, nausea, vomiting, abdominal pain, constipation, diarrhea, bone pain, back pain, muscle or joint complaints.  She does have some hearing loss and early numbness and tingling in the hands and feet.  Denies depression but does have anxiety.  The remainder of a 14-point review of systems is negative.    PAST MEDICAL HISTORY:  She has had no history of breast surgery in the past.  No history of breast cancer in the past.  No history of radiation therapy of any kind or radiation exposure.  No history of tumor of any kind.  No history of diabetes.  No history of heart problems, heart attack, breathing problems, blood clots, seizures, arthritis, peptic ulcer disease, osteoporosis or bone fractures.  She is not currently participating in a clinical trial.    She did break her wrist 2 years ago related to a fall.    She was diagnosed with Alzheimer's in 2018, and she and her partner are treating this with holistic medications, diet and exercise.    Her mother has had no history of breast cancer.  She has no contact with other family members, does not know her family history.    ALLERGIES:  She does have a 2-drug allergies:  1.  Hydrocodone when she develops paranoid mental status.  2.  Trazodone, which causes headache.    No allergy to seafood, iodine, or contrast dye.  She takes aspirin only occasionally.      PAST MENSTRUAL HISTORY:  She has never been pregnant.  First menstrual period was age 17.  Uterus and ovaries are in place.  She has never used oral contraceptives.    She has never  "had hormone replacement therapy.    HABITS:  1.  She did smoke in her 20s and quit then.  She does have a history of heavy drinking in her 20s but does not consume alcohol at this time.    TREATMENT HISTORY:  A. LEFT breast, 6:00, subareolar, ultrasound guided core biopsy:  -INVASIVE BREAST CARCINOMA OF NO SPECIAL TYPE (INVASIVE DUCTAL CARCINOMA), Cam grade 1  -Ductal carcinoma in situ (DCIS), nuclear grade 2, cribriform and solid type(s)  -Calcifications associated with invasive carcinoma  -Invasive carcinoma is estrogen receptor positive, progesterone receptor positive and HER2 equivocal (score 2+) by immunohistochemistry (see biomarker reporting template below)  -HER2 FISH results will be reported separately by cytogenetics  -See comment     PHYSICAL EXAMINATION:    VITAL SIGNS:  /67 (BP Location: Right arm, Patient Position: Sitting, Cuff Size: Adult Small)   Pulse 65   Temp 98.1  F (36.7  C) (Oral)   Resp 16   Ht 1.632 m (5' 4.25\")   Wt 61 kg (134 lb 8 oz)   SpO2 98%   BMI 22.91 kg/m    GENERAL:  Debby appeared generally well.  She has no alopecia.  HEENT:  Oropharynx is without lesions.  LYMPH:  There is no palpable cervical, supraclavicular, subclavicular or axillary lymphadenopathy.  BREASTS:  Examination of the right breast is without masses.  Examination of the left breast reveals a 5 mm palpable nodule at the 6 o'clock position adjacent to the nipple areolar complex.  There are no other masses in the right breast.  LUNGS:  Clear to percussion and auscultation.  HEART:  There is a regular rate and rhythm.  S1, S2.  ABDOMEN:  Soft, nontender, without hepatosplenomegaly.  EXTREMITIES:  Without edema.  PSYCH:  Mood and affect were normal.  NEUROLOGIC:  She was able to remember 3 items at 5 minutes.    LABORATORY DATA:  None today.    ASSESSMENT AND PLAN:  1.  Debby Barriga is a 67-year-old woman with a clinical T1b N0 MX invasive ductal carcinoma of the left breast lower outer quadrant adjacent " to the nipple areolar complex, measuring by ultrasound 6 x 5 x 5 mm, which when biopsied showed invasive ductal carcinoma, grade 1, as well as DCIS, grade 2, with cribriform and solid features.  The invasive cancer was ER positive in 95% strong and OK positive 70% moderate, and HER2 2+ by IHC and HER2 negative by FISH.  She has discussed mastectomy with or without reconstruction vs. Lumpectomy followed by radiation. [place] LN biopsy recommended.   2.  I discussed with Debby and her partner, Josie, that I do not recommend adjuvant chemotherapy given her Alzheimer's and that for this reason I do not recommend sending an Oncotype test.  After some discussion with Debby and her partner, Josie, they were in agreement.  Our plan is to have Debby proceed with surgery with Dr. Morrow for a lumpectomy or mastectomy, and then this would be followed by post-lumpectomy radiation if lumpectomy is performed.  If it is a mastectomy, no radiation would be recommended unless there were unexpected findings in the surgical specimen.  3.  Discussion of hormonal therapy.  I discussed that 5 or perhaps 10 years of hormonal therapy would be recommended.  I discussed that there is a test called the Breast Cancer Index that can help us determine the length of treatment with hormonal therapy.  4.  Discussion of exercise.  I recommended 150 minutes a week of exercise, and it sounds like Debby and her partner do this anyway.  I also recommended a diet that is low in saturated fat but not low in fat, with more fruits and vegetables, less red meat, Mediterranean-style with helpful oils.  They are already doing this.  5.  Discussion of bone health.  I discussed that bone health will be very important, especially since Debby has already had a wrist fracture in the past.  I do recommend stretch band, hand weights and yoga, vitamin D 2000 International Units daily and calcium 1000 mg daily.  We will need to obtain a DEXA scan.  6.  COVID vaccination.   Debby has had COVID vaccination x3.  I did recommend an omicron booster.  7.  Followup.  We will see Debby in followup in our clinic in 1 month.    Thank you for allowing us to continue to participate in Debby Barriga's care.    Thank you for allowing us to participate in this patient's care.  The patient was seen and evaluated by me.  I discussed the patient with the fellow and agree with the findings and plan in the note, which was edited by me.    Sincerely,     Juan Brooks MD  Professor  [facility]  [phone].      I spent 50 minutes with the patient more than 50% of which was in counseling and coordination of care.

## 2022-10-11 NOTE — LETTER
10/11/2022         RE: Debby Barriga  [address] [facility] [address]        Dear Colleague,    Thank you for referring your patient, Debby Barriga, to the [facility]. Please see a copy of my visit note below.      Debby Barriga is a 67 year old female patient with a new diagnosis of Stage I T1N0M0 ER +ve HI+ve HER2 -ve invasive ductal carcinoma coming in for establishment of medical oncology care.     She comes in with her significant other, who provided further history given she has memory problems.     Debby states she doesn't have significant pain on the local site, no chest tightness or any form of discharge. No cough or chest pain reported. No pelvic or back pain reported. No significant headache. No sleep problem. She has past history of anxiety for which she takes venlafaxine. No bleeding from any site. 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 No smoking of cigarette currently. No use of recreational drugs. She doesn't know of close family members with breast or other forms of cancer.     Oncologic history     Patient had a bilateral screening mammogram on 8/25/22 and a possible asymmetry was identified in the left breast on the CC view in the retroareolar plane position, subareolar depth. Diagnostic breast imaging followed on 9/8/22 and additional mammogram views confirm subareolar focal asymmetry that correlates with callback. Targeted ultrasound demonstrates irregular hypoechoic shadowing/mass, 6 x 5 x 5 mm, inferior subareolar region near 6:00 favored to correlate with mammogram finding.  No suspicious left axillary lymph nodes.     9/20/22 - Contrast-enhanced mammogram preceded breast biopsy and this showed minimal bilateral background parenchymal enhancement. Equivocal 6 mm focal enhancement 6:00 periareolar region at site of mammogram finding described on 9/8/2022.  Otherwise negative for abnormal enhancement in either  breast.     9/20/22 -   LEFT breast, 6:00, subareolar, ultrasound guided core biopsy:  -INVASIVE BREAST CARCINOMA OF NO SPECIAL TYPE (INVASIVE DUCTAL CARCINOMA), Cam grade 1  -Ductal carcinoma in situ (DCIS), nuclear grade 2, cribriform and solid type(s)  -Calcifications associated with invasive carcinoma  -Invasive carcinoma is estrogen receptor positive, progesterone receptor positive and HER2 equivocal (score 2+)  by immunohistochemistry     Assessment & Plan      Debby Barriga is a 67 year old female patient with a new diagnosis of Stage I T1N0M0 ER +ve MA+ve HER2 -ve invasive ductal carcinoma coming in for establishment of medical oncology care.       T1N0M0 ER +ve MA+ve HER2 -ve invasive ductal carcinoma    We discussed that moving forward surgery is the first line of therapy coupled with radiation treatment. Given the location of the mass, possibility of removal of the nipple might be there. I explained the goal of treatment of surgery and radiation is prevention of local recurrence of the cancer. I discussed the idea of oncotype testing and the rationale behind doing the test in relation to chemotherapy. Given the nature of her cancer, the likelihood of a high oncotype cancer is low. I also explained the risks of chemotherapy in general, including febrile neutropenia, cardiac toxicity and neurotoxicity. Debby and her significant other expressed their desire not to pursue chemotherapy and the oncotype testing. I informed them that she still needs to do the endocrine therapy and explained the benefit of which is prevent systemic recurrence. They mentioned a friend who wasn't able to tolerate the endocrine therapy and asked if there are multiple endocrine treatment options. I informed them about the available drugs and the side effect profiles of such treatment.     Alzheimer's disease     Debby is not on follow-up for her memory problems. Will recommend establishment of care in that regard.       Carolina  Sushila  Internal Medicine Resident (PGY1)    Darin Morrow MD  [facility]  [address] [place] [facility] [address]  [place] [address]    RE:  Debby Barriga  MRN:  [id number]  :  1955    Dear Dr. Morrow:    Thank you for referring Debby Barriga to our clinic.  I saw her with her significant other, Josie.    Debby Barriga is a 67-year-old woman with early Alzheimer's who had a mammographic asymmetry noted in the left breast near the nipple-areolar complex.  The mammogram showed a left breast asymmetry in the retroareolar plane at subareolar depth.  She underwent an ultrasound, which showed a 6 x 5 x 5 mm nodule at the 6 o'clock position adjacent to the nipple areolar complex.  There was no lymphadenopathy by ultrasound.  She had a contrast mammogram performed before her breast biopsy, which showed an equivocal area of enhancement 6 mm in size at the 6 o'clock position in the left periareolar area.  She underwent a biopsy, which showed invasive ductal carcinoma, grade 1, ER positive, [place] positive.  ER was positive in 95% of cells, [place] positive in 70% of the cells with moderate staining.  She now comes to clinic for our recommendations.  Debby has no pain, fatigue or depression but does have mild anxiety, which is treated with venlafaxine.  She and her partner, Josie, have been on a ketogenic diet for the last 3 years.  She has had some weight loss related to the diet and this was intentional weight loss.  She has no loss of energy.  She does not sleep during the day.  She can do some of her household chores but not all.  She did work in the past as a .  She does have some cognitive impairment related to early onset Alzheimer's.    She did not notice any abnormality of either breast.    IMAGING AND BIOPSY:  On  she underwent a screening mammogram, which revealed a new asymmetry in her left breast.  On  she underwent a diagnostic mammogram and ultrasound, which demonstrated  a 6 mm mass.  On 09/20 she underwent a contrast-enhanced mammography, which demonstrated 6 mm of enhancement.  On 09/20 she underwent an image-guided needle biopsy, which demonstrated a grade 1 invasive ductal cancer, ER positive, TN positive, HER-2 negative.      REVIEW OF SYSTEMS:  She denies fever, does have mild headaches from time to time and an occasional cough.  She has had COVID vaccination x3.  She denies chest pain, shortness of breath, hemoptysis, loss of appetite, nausea, vomiting, abdominal pain, constipation, diarrhea, bone pain, back pain, muscle or joint complaints.  She does have some hearing loss and early numbness and tingling in the hands and feet.  Denies depression but does have anxiety.  The remainder of a 14-point review of systems is negative.    PAST MEDICAL HISTORY:  She has had no history of breast surgery in the past.  No history of breast cancer in the past.  No history of radiation therapy of any kind or radiation exposure.  No history of tumor of any kind.  No history of diabetes.  No history of heart problems, heart attack, breathing problems, blood clots, seizures, arthritis, peptic ulcer disease, osteoporosis or bone fractures.  She is not currently participating in a clinical trial.    She did break her wrist 2 years ago related to a fall.    She was diagnosed with Alzheimer's in 2018, and she and her partner are treating this with holistic medications, diet and exercise.    Her mother has had no history of breast cancer.  She has no contact with other family members, does not know her family history.    ALLERGIES:  She does have a 2-drug allergies:  1.  Hydrocodone when she develops paranoid mental status.  2.  Trazodone, which causes headache.    No allergy to seafood, iodine, or contrast dye.  She takes aspirin only occasionally.      PAST MENSTRUAL HISTORY:  She has never been pregnant.  First menstrual period was age 17.  Uterus and ovaries are in place.  She has never used  "oral contraceptives.    She has never had hormone replacement therapy.    HABITS:  1.  She did smoke in her 20s and quit then.  She does have a history of heavy drinking in her 20s but does not consume alcohol at this time.    TREATMENT HISTORY:  A. LEFT breast, 6:00, subareolar, ultrasound guided core biopsy:  -INVASIVE BREAST CARCINOMA OF NO SPECIAL TYPE (INVASIVE DUCTAL CARCINOMA), [place] grade 1  -Ductal carcinoma in situ (DCIS), nuclear grade 2, cribriform and solid type(s)  -Calcifications associated with invasive carcinoma  -Invasive carcinoma is estrogen receptor positive, progesterone receptor positive and HER2 equivocal (score 2+) by immunohistochemistry (see biomarker reporting template below)  -HER2 FISH results will be reported separately by cytogenetics  -See comment     PHYSICAL EXAMINATION:    VITAL SIGNS:  /67 (BP Location: Right arm, Patient Position: Sitting, Cuff Size: Adult Small)   Pulse 65   Temp 98.1  F (36.7  C) (Oral)   Resp 16   Ht 1.632 m (5' 4.25\")   Wt 61 kg (134 lb 8 oz)   SpO2 98%   BMI 22.91 kg/m    GENERAL:  Debby appeared generally well.  She has no alopecia.  HEENT:  Oropharynx is without lesions.  LYMPH:  There is no palpable cervical, supraclavicular, subclavicular or axillary lymphadenopathy.  BREASTS:  Examination of the right breast is without masses.  Examination of the left breast reveals a 5 mm palpable nodule at the 6 o'clock position adjacent to the nipple areolar complex.  There are no other masses in the right breast.  LUNGS:  Clear to percussion and auscultation.  HEART:  There is a regular rate and rhythm.  S1, S2.  ABDOMEN:  Soft, nontender, without hepatosplenomegaly.  EXTREMITIES:  Without edema.  PSYCH:  Mood and affect were normal.  NEUROLOGIC:  She was able to remember 3 items at 5 minutes.    LABORATORY DATA:  None today.    ASSESSMENT AND PLAN:  1.  Debby Barriga is a 67-year-old woman with a clinical T1b N0 MX invasive ductal carcinoma of the left " breast lower outer quadrant adjacent to the nipple areolar complex, measuring by ultrasound 6 x 5 x 5 mm, which when biopsied showed invasive ductal carcinoma, grade 1, as well as DCIS, grade 2, with cribriform and solid features.  The invasive cancer was ER positive in 95% strong and MD positive 70% moderate, and HER2 2+ by IHC and HER2 negative by FISH.  She has discussed mastectomy with or without reconstruction vs. Lumpectomy followed by radiation. [place] LN biopsy recommended.   2.  I discussed with Debby and her partner, Josie, that I do not recommend adjuvant chemotherapy given her Alzheimer's and that for this reason I do not recommend sending an Oncotype test.  After some discussion with Debby and her partner, Josie, they were in agreement.  Our plan is to have Debby proceed with surgery with Dr. Morrow for a lumpectomy or mastectomy, and then this would be followed by post-lumpectomy radiation if lumpectomy is performed.  If it is a mastectomy, no radiation would be recommended unless there were unexpected findings in the surgical specimen.  3.  Discussion of hormonal therapy.  I discussed that 5 or perhaps 10 years of hormonal therapy would be recommended.  I discussed that there is a test called the Breast Cancer Index that can help us determine the length of treatment with hormonal therapy.  4.  Discussion of exercise.  I recommended 150 minutes a week of exercise, and it sounds like Debby and her partner do this anyway.  I also recommended a diet that is low in saturated fat but not low in fat, with more fruits and vegetables, less red meat, Mediterranean-style with helpful oils.  They are already doing this.  5.  Discussion of bone health.  I discussed that bone health will be very important, especially since Debby has already had a wrist fracture in the past.  I do recommend stretch band, hand weights and yoga, vitamin D 2000 International Units daily and calcium 1000 mg daily.  We will need to obtain a  DEXA scan.  6.  COVID vaccination.  Debby has had COVID vaccination x3.  I did recommend an omicron booster.  7.  Followup.  We will see Debby in followup in our clinic in 1 month.    Thank you for allowing us to continue to participate in Debby Barriga's care.    Thank you for allowing us to participate in this patient's care.  The patient was seen and evaluated by me.  I discussed the patient with the fellow and agree with the findings and plan in the note, which was edited by me.    I spent 50 minutes with the patient more than 50% of which was in counseling and coordination of care.        Again, thank you for allowing me to participate in the care of your patient.      Sincerely,    Juan Brooks MD

## 2022-10-12 ENCOUNTER — TELEPHONE (OUTPATIENT)
Dept: ONCOLOGY | Facility: CLINIC | Age: 67
End: 2022-10-12

## 2022-10-12 ENCOUNTER — PATIENT OUTREACH (OUTPATIENT)
Dept: GERIATRIC MEDICINE | Facility: CLINIC | Age: 67
End: 2022-10-12

## 2022-10-12 NOTE — PROGRESS NOTES
"[facility] Care Coordination Contact    Received email from Emanuel Arce the SW from [facility] who did [employer] assessment on 8/9/22. She said she was told the member's PCA service were \"not approved\" and she told Josie, significant other, to call me to discuss.    I discussed situation with my supervisor, as it is complicated. Member was assessed on 8/9/22, which was before her 67th birthday. She is still pending CAD[employer] waiver and assessment shows that she qualifies for 3.5 hours of PCA daily.    Rufina told me we need to send the [employer] PCA assessment report to [facility] and they will do the auth. Tasked Raz, covering CMS, to send to [facility] today or tomorrow.    Informed Emanuel and Josie of the above.     Per chart review, I see that Debby was also just diagnosed with invasive ductal carcinoma of left breast. She has surgery scheduled for 10/24/22.    Yary Francois RN  [facility]   [phone]     "

## 2022-10-12 NOTE — TELEPHONE ENCOUNTER
FUTURE VISIT INFORMATION      SURGERY INFORMATION:    Date: 10/24/22    [facility]    Surgeon:  Darin Morrow MD    Anesthesia Type:  general    Procedure: Left seed-localized  lumpectomy with sentinel node biopsy     Consult: ov 10/10    RECORDS REQUESTED FROM:       Primary Care Provider: Sharri Lacey NP- [facility]    Most recent EKG+ Tracin19- [place]    Most recent Sleep Study:  1/3/16

## 2022-10-12 NOTE — TELEPHONE ENCOUNTER
Called patient to schedule surgery with [facility]  Date of Surgery: 10/24  Approximate arrival time given:  Yes  Location of surgery: [facility]  Pre-Op H&P: 10/17  Post-Op Appt Date: 11/18   Imaging needed:  No  Discussed COVID-19 Testing: Yes     Patient aware that pre-op RN will call 2-3 days prior to surgery with arrival time and instructions Yes  Packet sent out: Yes 10/12/22      Additional Comments:       All patients questions were answered and was instructed to review surgical packet and call back with any questions or concerns.       Dada Hannon on 10/12/2022 at 10:00 AM

## 2022-10-13 ENCOUNTER — PATIENT OUTREACH (OUTPATIENT)
Dept: GERIATRIC MEDICINE | Facility: CLINIC | Age: 67
End: 2022-10-13

## 2022-10-13 NOTE — PROGRESS NOTES
[facility] Care Coordination Contact    Faxed [employer] PCA assmt to [facility].     Raz Barnes  [facility]  Case Management Specialist  [phone]

## 2022-10-13 NOTE — PROGRESS NOTES
[facility] Care Coordination Contact    Spoke with Josie, member's significant other, and she would like information on the followin. Dietician/nutritionist  2. Caregiver Assurance Program  3. Meal delivery - can any agencies provide keto diet  4. Support groups for alzheimer's, caregivers, breast cancer    She also asked about switching to [facility] from [facility]. Looked into it and [facility] has a larger budget with more programs, so it would not be in her best interest to switch.    The frustrating thing is they are waiting for a [facility] CM to be assigned and it has been more than 2 months since her [employer] assessment.    Josie voiced understanding that this care coordinator would contact her in the next week with some resources. She asked that I email them to her at:  [email or web address]     Yary Francois RN  [facility]   [phone]

## 2022-10-15 ENCOUNTER — HEALTH MAINTENANCE LETTER (OUTPATIENT)
Age: 67
End: 2022-10-15

## 2022-10-17 ENCOUNTER — TELEPHONE (OUTPATIENT)
Dept: ONCOLOGY | Facility: CLINIC | Age: 67
End: 2022-10-17

## 2022-10-17 ENCOUNTER — TELEPHONE (OUTPATIENT)
Dept: GERIATRIC MEDICINE | Facility: CLINIC | Age: 67
End: 2022-10-17

## 2022-10-17 ENCOUNTER — VIRTUAL VISIT (OUTPATIENT)
Dept: SURGERY | Facility: CLINIC | Age: 67
End: 2022-10-17
Payer: COMMERCIAL

## 2022-10-17 ENCOUNTER — PRE VISIT (OUTPATIENT)
Dept: SURGERY | Facility: CLINIC | Age: 67
End: 2022-10-17

## 2022-10-17 ENCOUNTER — ANESTHESIA EVENT (OUTPATIENT)
Dept: SURGERY | Facility: AMBULATORY SURGERY CENTER | Age: 67
End: 2022-10-17
Payer: COMMERCIAL

## 2022-10-17 DIAGNOSIS — F02.80 ALZHEIMER'S DEMENTIA WITHOUT BEHAVIORAL DISTURBANCE (H): Primary | ICD-10-CM

## 2022-10-17 DIAGNOSIS — Z01.818 PRE-OP EVALUATION: Primary | ICD-10-CM

## 2022-10-17 DIAGNOSIS — G30.9 ALZHEIMER'S DEMENTIA WITHOUT BEHAVIORAL DISTURBANCE (H): Primary | ICD-10-CM

## 2022-10-17 PROCEDURE — 99202 OFFICE O/P NEW SF 15 MIN: CPT | Mod: 95 | Performed by: PHYSICIAN ASSISTANT

## 2022-10-17 RX ORDER — ACETAMINOPHEN 325 MG/1
325-650 TABLET ORAL EVERY 6 HOURS PRN
COMMUNITY

## 2022-10-17 RX ORDER — MULTIVITAMIN WITH IRON
1 TABLET ORAL 2 TIMES DAILY
COMMUNITY

## 2022-10-17 RX ORDER — MULTIPLE VITAMINS W/ MINERALS TAB 9MG-400MCG
1 TAB ORAL EVERY MORNING
COMMUNITY

## 2022-10-17 ASSESSMENT — LIFESTYLE VARIABLES: TOBACCO_USE: 1

## 2022-10-17 NOTE — H&P (VIEW-ONLY)
Pre-Operative H & P     CC:  Preoperative exam to [id number] for increased cardiopulmonary risk while undergoing surgery and anesthesia.    Date of Encounter: 10/17/2022  Primary Care Physician:  Sharri Lacey     Reason for visit:   Encounter Diagnosis   Name Primary?     Pre-op evaluation Yes       HPI  Debby Barriga is a 67 year old female who presents for pre-operative H & P in preparation for  Procedure Information     Case: [id number] Date/Time: 10/24/22 1225    Procedures:       Left seed-localized  lumpectomy with sentinel node biopsy (Left: Breast)      Left seed-localized  lumpectomy with sentinel node biopsy (Left: Shoulder)    Anesthesia type: General    Diagnosis: Invasive ductal carcinoma of breast, female, left (H) [C50.912]    Pre-op diagnosis: Invasive ductal carcinoma of breast, female, left (H) [C50.912]    Location: [place] [address] / [facility]    Providers: Darin Morrow MD          Patient is being evaluated for comorbid conditions of dyslipidemia, Alzheimer, depression, anxiety    Ms. Barriga was recently diagnosed with left breast cancer after abnormal mammogram 8/25/22. She is s/p biopsy that showed grade 1 invasive ductal cancer. She was seen by Dr. Morrow for further evaluation and is now scheduled for the above procedure.     History is obtained from the patient and chart review    Hx of abnormal bleeding or anti-platelet use: denies    Menstrual history: No LMP recorded. Patient is postmenopausal.     Past Medical History  Past Medical History:   Diagnosis Date     Corneal ulcer of right eye 3-2015     Mild major depression (H) 2/7/2013     Tear of retina     right eye       Past Surgical History  Past Surgical History:   Procedure Laterality Date     LAPAROSCOPIC TUBAL LIGATION       ORTHOPEDIC SURGERY      wrist       Prior to Admission Medications  Current Outpatient Medications   Medication Sig Dispense Refill     acetaminophen (TYLENOL) 325  MG tablet Take 325-650 mg by mouth every 6 hours as needed for mild pain       Barberry-Oreg Grape-Goldenseal (BERBERINE COMPLEX PO) Take by mouth 2 times daily       cholecalciferol (VITAMIN D3) 125 mcg (5000 units) capsule TAKE ONE CAPSULE BY MOUTH DAILY (Patient taking differently: Take by mouth every morning) 90 capsule 1     magnesium 250 MG tablet Take 1 tablet by mouth 2 times daily       medium chain triglycerides, MCT OIL, (MCT OIL) oil Take 15 mLs by mouth every morning       multivitamin w/minerals (MULTI-VITAMIN) tablet Take 1 tablet by mouth every morning       Omega-3 Fish Oil 500 MG capsule Take 2,000 mg by mouth 2 times daily       UNABLE TO FIND daily (with lunch) MEDICATION NAME: Tox-cse       venlafaxine (EFFEXOR-XR) 150 MG 24 hr capsule TAKE 1 CAPSULE BY MOUTH EVERY DAY (Patient taking differently: 150 mg every morning TAKE 1 CAPSULE BY MOUTH EVERY DAY) 90 capsule 3       Allergies  Allergies   Allergen Reactions     Hydrocodone      Ears ringing  Questionable allergy Ears ringing  Ears ringing     Trazodone      Other reaction(s): Headache       Social History  Social History     Socioeconomic History     Marital status:      Spouse name: Not on file     Number of children: Not on file     Years of education: Not on file     Highest education level: Not on file   Occupational History     Not on file   Tobacco Use     Smoking status: Former     Types: Cigarettes     Quit date: 1982     Years since quittin.2     Smokeless tobacco: Never   Vaping Use     Vaping Use: Never used   Substance and Sexual Activity     Alcohol use: No     Drug use: Not Currently     Sexual activity: Yes     Partners: Female   Other Topics Concern     Parent/sibling w/ CABG, MI or angioplasty before 65F 55M? No   Social History Narrative     Not on file     Social Determinants of Health     Financial Resource Strain: Not on file   Food Insecurity: Not on file   Transportation Needs: Not on file   Physical  Activity: Not on file   Stress: Not on file   Social Connections: Not on file   Intimate Partner Violence: Not on file   Housing Stability: Not on file       Family History  Family History   Problem Relation Age of Onset     Alzheimer Disease Mother      Prostate Cancer Father      Glaucoma No family hx of      Macular Degeneration No family hx of      Diabetes No family hx of      Coronary Artery Disease No family hx of      Hypertension No family hx of      Hyperlipidemia No family hx of      Cerebrovascular Disease No family hx of      Breast Cancer No family hx of      Anesthesia Reaction No family hx of      Deep Vein Thrombosis (DVT) No family hx of        Review of Systems  The complete review of systems is negative other than noted in the HPI or here.   Anesthesia Evaluation   Pt has had prior anesthetic.     No history of anesthetic complications       ROS/MED HX  ENT/Pulmonary:     (+) tobacco use, Past use,     Neurologic:     (+) dementia,     Cardiovascular:     (+) -----Previous cardiac testing   Echo: Date: Results:    Stress Test: Date: Results:    ECG Reviewed: Date: 2019 Results:  NSR  Cath: Date: Results:   (-) taking anticoagulants/antiplatelets   METS/Exercise Tolerance: >4 METS Comment: Walking outside or treadmill 1 hour daily   Hematologic:  - neg hematologic  ROS  (-) history of blood clots and history of blood transfusion   Musculoskeletal:  - neg musculoskeletal ROS     GI/Hepatic:  - neg GI/hepatic ROS  (-) GERD   Renal/Genitourinary:  - neg Renal ROS     Endo:  - neg endo ROS  (-) chronic steroid usage   Psychiatric/Substance Use:     (+) psychiatric history anxiety and depression     Infectious Disease:  - neg infectious disease ROS     Malignancy:   (+) Malignancy, History of Breast.    Other:            Virtual visit -  No vitals were obtained    Physical Exam  Constitutional: Awake, alert, cooperative, no apparent distress, and appears stated age.  HENT: Normocephalic  Respiratory:  non labored breathing   Neurologic: Awake, alert, oriented to name, place and time.   Neuropsychiatric: Calm, cooperative. Normal affect.      Prior Labs/Diagnostic Studies   All labs and imaging personally reviewed     EKG/ stress test - if available please see in ROS above   No results found.  No flowsheet data found.      The patient's records and results personally reviewed by this provider.     Outside records reviewed from: Care Everywhere      Assessment      Debby Barriga is a 67 year old female seen as a PAC referral for risk assessment and optimization for anesthesia.    Plan/Recommendations  Pt will be optimized for the proposed procedure.  See below for details on the assessment, risk, and preoperative recommendations    NEUROLOGY  - No history of TIA, CVA or seizure  -alzheimer's. Able to give medical history. Patient's partner present for visit   -Post Op delirium risk factors:  History of pre-existing cognitive dysfunction    ENT  - No current airway concerns.  Will need to be reassessed day of surgery.  Mallampati: Unable to assess  TM: Unable to assess    CARDIAC  - No history of CAD, Hypertension and Afib   -denies cardiac history or symptoms   - METS (Metabolic Equivalents)  Patient performs 4 or more METS exercise without symptoms            Total Score: 0      RCRI-Very low risk: Class 1 0.4% complication rate            Total Score: 0        PULMONARY  GUNJAN Low Risk            Total Score: 1    GUNJAN: Over 50 ys old      - Denies asthma or inhaler use  - Tobacco History      History   Smoking Status     Former     Types: Cigarettes     Quit date: 7/23/1982   Smokeless Tobacco     Never       GI  PONV High Risk  Total Score: 3           1 AN PONV: Pt is Female    1 AN PONV: Patient is not a current smoker    1 AN PONV: Intended Post Op Opioids        /RENAL  - Baseline Creatinine WNL, will update prior to surgery    ENDOCRINE    - BMI: Estimated body mass index is 22.91 kg/m  as calculated from the  "following:    Height as of 10/11/22: 1.632 m (5' 4.25\").    Weight as of 10/11/22: 61 kg (134 lb 8 oz).  Healthy Weight (BMI 18.5-24.9)  - No history of Diabetes Mellitus    HEME  VTE Medium Risk 1.8%            Total Score: 6    VTE: Greater than 59 yrs old    VTE: Current cancer      - No history of abnormal bleeding or antiplatelet use.    PSYCH  - depression and anxiety, stable    ONC  Breast cancer with above procedure planned     The patient is optimized for their procedure. AVS with information on surgery time/arrival time, meds and NPO status given by nursing staff. No further diagnostic testing indicated.    Please refer to the physical examination documented by the anesthesiologist in the anesthesia record on the day of surgery.    Video-Visit Details    Type of service:  Video Visit    Patient verbally consented to video service today: YES    Video Start Time: 1503  Video End Time (time video stopped): 1514    Originating Location (pt. Location): Home    Distant Location (provider location): home    Mode of Communication:  Video Conference via [employer]  On the day of service:     Prep time: 8 minutes  Visit time: 11 minutes  Documentation time: 6 minutes  ------------------------------------------  Total time: 25 minutes      Vandana Scherer PA-C  Preoperative Assessment Center  [facility]  Clinic and Surgery Center  Phone: [phone]  Fax: [phone]  "

## 2022-10-17 NOTE — TELEPHONE ENCOUNTER
Pended nutrition referral, per patient's request. She currently eats keto diet, due to benefits she has noted related to early onset alzheimer's. She and her spouse would like to learn more about diet and nutrition and her disease process.     Patient was also just diagnosed with breast cancer last month.    Please sign referral if you agree. Thank you.    Yary Francois RN Care Coordinator  [facility]   [phone]

## 2022-10-17 NOTE — H&P
Pre-Operative H & P     CC:  Preoperative exam to [id number] for increased cardiopulmonary risk while undergoing surgery and anesthesia.    Date of Encounter: 10/17/2022  Primary Care Physician:  Sharri Lacey     Reason for visit:   Encounter Diagnosis   Name Primary?     Pre-op evaluation Yes       HPI  Debby Barriga is a 67 year old female who presents for pre-operative H & P in preparation for  Procedure Information     Case: [id number] Date/Time: 10/24/22 1225    Procedures:       Left seed-localized  lumpectomy with sentinel node biopsy (Left: Breast)      Left seed-localized  lumpectomy with sentinel node biopsy (Left: Shoulder)    Anesthesia type: General    Diagnosis: Invasive ductal carcinoma of breast, female, left (H) [C50.912]    Pre-op diagnosis: Invasive ductal carcinoma of breast, female, left (H) [C50.912]    Location: [place] [address] / [facility]    Providers: Darin Morrow MD          Patient is being evaluated for comorbid conditions of dyslipidemia, Alzheimer, depression, anxiety    Ms. Barriga was recently diagnosed with left breast cancer after abnormal mammogram 8/25/22. She is s/p biopsy that showed grade 1 invasive ductal cancer. She was seen by Dr. Morrow for further evaluation and is now scheduled for the above procedure.     History is obtained from the patient and chart review    Hx of abnormal bleeding or anti-platelet use: denies    Menstrual history: No LMP recorded. Patient is postmenopausal.     Past Medical History  Past Medical History:   Diagnosis Date     Corneal ulcer of right eye 3-2015     Mild major depression (H) 2/7/2013     Tear of retina     right eye       Past Surgical History  Past Surgical History:   Procedure Laterality Date     LAPAROSCOPIC TUBAL LIGATION       ORTHOPEDIC SURGERY      wrist       Prior to Admission Medications  Current Outpatient Medications   Medication Sig Dispense Refill     acetaminophen (TYLENOL) 325  MG tablet Take 325-650 mg by mouth every 6 hours as needed for mild pain       Barberry-Oreg Grape-Goldenseal (BERBERINE COMPLEX PO) Take by mouth 2 times daily       cholecalciferol (VITAMIN D3) 125 mcg (5000 units) capsule TAKE ONE CAPSULE BY MOUTH DAILY (Patient taking differently: Take by mouth every morning) 90 capsule 1     magnesium 250 MG tablet Take 1 tablet by mouth 2 times daily       medium chain triglycerides, MCT OIL, (MCT OIL) oil Take 15 mLs by mouth every morning       multivitamin w/minerals (MULTI-VITAMIN) tablet Take 1 tablet by mouth every morning       Omega-3 Fish Oil 500 MG capsule Take 2,000 mg by mouth 2 times daily       UNABLE TO FIND daily (with lunch) MEDICATION NAME: Tox-cse       venlafaxine (EFFEXOR-XR) 150 MG 24 hr capsule TAKE 1 CAPSULE BY MOUTH EVERY DAY (Patient taking differently: 150 mg every morning TAKE 1 CAPSULE BY MOUTH EVERY DAY) 90 capsule 3       Allergies  Allergies   Allergen Reactions     Hydrocodone      Ears ringing  Questionable allergy Ears ringing  Ears ringing     Trazodone      Other reaction(s): Headache       Social History  Social History     Socioeconomic History     Marital status:      Spouse name: Not on file     Number of children: Not on file     Years of education: Not on file     Highest education level: Not on file   Occupational History     Not on file   Tobacco Use     Smoking status: Former     Types: Cigarettes     Quit date: 1982     Years since quittin.2     Smokeless tobacco: Never   Vaping Use     Vaping Use: Never used   Substance and Sexual Activity     Alcohol use: No     Drug use: Not Currently     Sexual activity: Yes     Partners: Female   Other Topics Concern     Parent/sibling w/ CABG, MI or angioplasty before 65F 55M? No   Social History Narrative     Not on file     Social Determinants of Health     Financial Resource Strain: Not on file   Food Insecurity: Not on file   Transportation Needs: Not on file   Physical  Activity: Not on file   Stress: Not on file   Social Connections: Not on file   Intimate Partner Violence: Not on file   Housing Stability: Not on file       Family History  Family History   Problem Relation Age of Onset     Alzheimer Disease Mother      Prostate Cancer Father      Glaucoma No family hx of      Macular Degeneration No family hx of      Diabetes No family hx of      Coronary Artery Disease No family hx of      Hypertension No family hx of      Hyperlipidemia No family hx of      Cerebrovascular Disease No family hx of      Breast Cancer No family hx of      Anesthesia Reaction No family hx of      Deep Vein Thrombosis (DVT) No family hx of        Review of Systems  The complete review of systems is negative other than noted in the HPI or here.   Anesthesia Evaluation   Pt has had prior anesthetic.     No history of anesthetic complications       ROS/MED HX  ENT/Pulmonary:     (+) tobacco use, Past use,     Neurologic:     (+) dementia,     Cardiovascular:     (+) -----Previous cardiac testing   Echo: Date: Results:    Stress Test: Date: Results:    ECG Reviewed: Date: 2019 Results:  NSR  Cath: Date: Results:   (-) taking anticoagulants/antiplatelets   METS/Exercise Tolerance: >4 METS Comment: Walking outside or treadmill 1 hour daily   Hematologic:  - neg hematologic  ROS  (-) history of blood clots and history of blood transfusion   Musculoskeletal:  - neg musculoskeletal ROS     GI/Hepatic:  - neg GI/hepatic ROS  (-) GERD   Renal/Genitourinary:  - neg Renal ROS     Endo:  - neg endo ROS  (-) chronic steroid usage   Psychiatric/Substance Use:     (+) psychiatric history anxiety and depression     Infectious Disease:  - neg infectious disease ROS     Malignancy:   (+) Malignancy, History of Breast.    Other:            Virtual visit -  No vitals were obtained    Physical Exam  Constitutional: Awake, alert, cooperative, no apparent distress, and appears stated age.  HENT: Normocephalic  Respiratory:  non labored breathing   Neurologic: Awake, alert, oriented to name, place and time.   Neuropsychiatric: Calm, cooperative. Normal affect.      Prior Labs/Diagnostic Studies   All labs and imaging personally reviewed     EKG/ stress test - if available please see in ROS above   No results found.  No flowsheet data found.      The patient's records and results personally reviewed by this provider.     Outside records reviewed from: Care Everywhere      Assessment      Debby Barriga is a 67 year old female seen as a PAC referral for risk assessment and optimization for anesthesia.    Plan/Recommendations  Pt will be optimized for the proposed procedure.  See below for details on the assessment, risk, and preoperative recommendations    NEUROLOGY  - No history of TIA, CVA or seizure  -alzheimer's. Able to give medical history. Patient's partner present for visit   -Post Op delirium risk factors:  History of pre-existing cognitive dysfunction    ENT  - No current airway concerns.  Will need to be reassessed day of surgery.  Mallampati: Unable to assess  TM: Unable to assess    CARDIAC  - No history of CAD, Hypertension and Afib   -denies cardiac history or symptoms   - METS (Metabolic Equivalents)  Patient performs 4 or more METS exercise without symptoms            Total Score: 0      RCRI-Very low risk: Class 1 0.4% complication rate            Total Score: 0        PULMONARY  GUNJAN Low Risk            Total Score: 1    GUNJAN: Over 50 ys old      - Denies asthma or inhaler use  - Tobacco History      History   Smoking Status     Former     Types: Cigarettes     Quit date: 7/23/1982   Smokeless Tobacco     Never       GI  PONV High Risk  Total Score: 3           1 AN PONV: Pt is Female    1 AN PONV: Patient is not a current smoker    1 AN PONV: Intended Post Op Opioids        /RENAL  - Baseline Creatinine WNL, will update prior to surgery    ENDOCRINE    - BMI: Estimated body mass index is 22.91 kg/m  as calculated from the  "following:    Height as of 10/11/22: 1.632 m (5' 4.25\").    Weight as of 10/11/22: 61 kg (134 lb 8 oz).  Healthy Weight (BMI 18.5-24.9)  - No history of Diabetes Mellitus    HEME  VTE Medium Risk 1.8%            Total Score: 6    VTE: Greater than 59 yrs old    VTE: Current cancer      - No history of abnormal bleeding or antiplatelet use.    PSYCH  - depression and anxiety, stable    ONC  Breast cancer with above procedure planned     The patient is optimized for their procedure. AVS with information on surgery time/arrival time, meds and NPO status given by nursing staff. No further diagnostic testing indicated.    Please refer to the physical examination documented by the anesthesiologist in the anesthesia record on the day of surgery.    Video-Visit Details    Type of service:  Video Visit    Patient verbally consented to video service today: YES    Video Start Time: 1503  Video End Time (time video stopped): 1514    Originating Location (pt. Location): Home    Distant Location (provider location): home    Mode of Communication:  Video Conference via [employer]  On the day of service:     Prep time: 8 minutes  Visit time: 11 minutes  Documentation time: 6 minutes  ------------------------------------------  Total time: 25 minutes      Vandana Scherer PA-C  Preoperative Assessment Center  [facility]  Clinic and Surgery Center  Phone: [phone]  Fax: [phone]  "

## 2022-10-17 NOTE — TELEPHONE ENCOUNTER
Josie (spouse) had questions about any restrictions/preop instructions.    This writer read off appt notes for tomorrows 10/18/22 procedure.     Also sent via [email or web address] message per Josie request.

## 2022-10-17 NOTE — PROGRESS NOTES
Debby is a 67 year old who is being evaluated via a billable video visit.      How would you like to obtain your AVS? MyChart    HPI         Review of Systems       Physical Exam       BERT Foley LPN

## 2022-10-17 NOTE — TELEPHONE ENCOUNTER
[facility] only    Pt's spouse Josie  notified that provider can't sign nutrition referral as it is not covered by her insurance ([employer] on record with Josie)    It was recommended to Josie to call her insurance and if there is anything else we can do to help with this  to call us back     Nuha Avila, RN, BSN  [facility]

## 2022-10-17 NOTE — TELEPHONE ENCOUNTER
It is not letting me sign the referral because an ABN pops up that it is not covered.  Usually I can bypass it and select not signed and it will have to be signed in person at the visit, but it looks like perhaps the upgrade has changed that.  I'm not sure what to do.

## 2022-10-17 NOTE — PROGRESS NOTES
Referred to Caregiver Assurance Program today.    Also pended nutrition referral & routed to PCP to sign.     Emailed Josie, pt's spouse, with above information, as well as informing her that the [facility] waiver has a bigger budget than [facility]. Also advised them to contact Emanuel about the  situation. I emailed Emanuel about it and have not heard back yet.  Yary Francois RN  [facility]   [phone]

## 2022-10-17 NOTE — PATIENT INSTRUCTIONS
Preparing for Your Surgery      Name:  Debby Barriga   MRN:  [id number]   :  1955   Today's Date:  10/17/2022         Arriving for surgery:  Surgery date:  10/24/22  Arrival time:  10 am  (Per Clinic Instructions)    Restrictions due to COVID 19:    Effective 2022:  2 visitors may accompany patient and wait in the Surgery Waiting Room.  All visitors must wear a mask and social distance.      parking is available for anyone with mobility limitations or disabilities. (Monday- Friday 7 am- 5 pm)    Please come to:    [facility]  [address] [place] [phone]    Please check in on the 5th floor at the Ambulatory Surgery Center.      What can I eat or drink?    -  You may eat and drink normally until 8 hours prior to arrival time. (Until 2 am)  -  You may have clear liquids until 4 hour before surgery. (Until 8:25 am)    Examples of clear liquids:  Water  Clear broth  Juices (apple, white grape, white cranberry  and cider) without pulp  Noncarbonated, powder based beverages  (lemonade and Mike-Aid)  Sodas (Sprite, 7-Up, ginger ale and seltzer)  Coffee or tea (without milk or cream)  Gatorade    --No alcohol for at least 24 hours before surgery.    Which medicines can I take?    Hold Aspirin for 7 days before surgery.   Hold Multivitamins for 7 days before surgery.  Hold Supplements for 7 days before surgery. (Edmond-3 Fish Oil, Berberine Complex, Medium Chain Triglycerides, Tox-cse)  Hold Ibuprofen (Advil, Motrin) for 1 day before surgery--unless otherwise directed by surgeon.  Hold Naproxen (Aleve) for 4 days before surgery.      -  DO NOT take the following medications the day of surgery:  Vitamin D3, Magnesium,     -  PLEASE TAKE the following medications the day of surgery:   Venlafaxine (Effexor-XR),   Acetaminophen (Tylenol) if needed    How do I prepare myself?  - Please take 2 showers before surgery using Scrubcare or Hibiclens soap.    Use this soap only from  the neck to your toes.     Leave the soap on your skin for one minute--then rinse thoroughly.      You may use your own shampoo and conditioner. No other hair products.   - Please remove all jewelry and body piercings.  - No lotions, deodorants or fragrance.  - No makeup or fingernail polish.   - Bring your ID and insurance card.    -If you have a Deep Brain Stimulator, a Spinal Cord Stimulator, or any implanted Neuro Device, you must bring the remote to the Surgery Center.         ALL PATIENTS ARE REQUIRED TO HAVE A RESPONSIBLE ADULT TO DRIVE AND BE IN ATTENDANCE WITH THEM FOR 24 HOURS FOLLOWING SURGERY.       Questions or Concerns:    -For questions regarding the day of surgery, please contact the Ambulatory Surgery Center at [phone].    -If you have health changes between today and your surgery, please contact your surgeon.     - For questions after surgery, please contact your surgeon's office.

## 2022-10-18 ENCOUNTER — LAB (OUTPATIENT)
Dept: LAB | Facility: CLINIC | Age: 67
End: 2022-10-18
Payer: COMMERCIAL

## 2022-10-18 ENCOUNTER — ANCILLARY PROCEDURE (OUTPATIENT)
Dept: MAMMOGRAPHY | Facility: CLINIC | Age: 67
End: 2022-10-18
Attending: SURGERY
Payer: COMMERCIAL

## 2022-10-18 DIAGNOSIS — C50.912 INVASIVE DUCTAL CARCINOMA OF BREAST, FEMALE, LEFT (H): ICD-10-CM

## 2022-10-18 DIAGNOSIS — Z01.818 PRE-OP EVALUATION: ICD-10-CM

## 2022-10-18 LAB
CREAT SERPL-MCNC: 0.69 MG/DL (ref 0.51–0.95)
GFR SERPL CREATININE-BSD FRML MDRD: >90 ML/MIN/1.73M2
HGB BLD-MCNC: 12.3 G/DL (ref 11.7–15.7)
POTASSIUM SERPL-SCNC: 3.9 MMOL/L (ref 3.4–5.3)

## 2022-10-18 PROCEDURE — 85018 HEMOGLOBIN: CPT | Performed by: PATHOLOGY

## 2022-10-18 PROCEDURE — 36415 COLL VENOUS BLD VENIPUNCTURE: CPT | Performed by: PATHOLOGY

## 2022-10-18 PROCEDURE — 19285 PERQ DEV BREAST 1ST US IMAG: CPT | Mod: LT | Performed by: RADIOLOGY

## 2022-10-18 PROCEDURE — 84132 ASSAY OF SERUM POTASSIUM: CPT | Performed by: PATHOLOGY

## 2022-10-18 PROCEDURE — 82565 ASSAY OF CREATININE: CPT | Performed by: PATHOLOGY

## 2022-10-18 NOTE — TELEPHONE ENCOUNTER
Care Coordination Note    Verified with [facility] liaison and nutritional counseling is no longer a supplemental benefit and unfortunately Medicare does not cover for her diagnosis of early onset alzheimer's.    Writer followed up with member's spouse, Josie, to inform her of this and she voiced understanding.    aYry Francois RN  [facility]   [phone]

## 2022-10-19 NOTE — PROGRESS NOTES
Josie, member's caregiver/spouse, was already contacted by the Care[employer]iver [employer] and scheduled a visit.They will provide resources for support groups, etc.    Emailed Josie the websites for [employer], Meals on Wheels & [employer]. She said she will look at them and see if they are interested. I told her that home delivered meals would be a CADI waiver benefit, so they will need to work with Emanuel to start them if they want them.    Per care coordinator liaison from [facility], nutrition counseling is no longer an [facility] supplemental benefit. And the nutrition referral sent by member's PCP flagged that it is not covered by member's insurance either. Informed Josie of this and gave her the [facility] customer service number to contact if they want to see if a dietician or nutritionist is covered through oncology clinic, as Medicare and [place] will cover for certain diagnoses.     Tasked CMS to please mail Healthy Savings information to the member & Josie.    Yary Francois RN  [facility]   [phone]

## 2022-10-20 ENCOUNTER — THERAPY VISIT (OUTPATIENT)
Dept: PHYSICAL THERAPY | Facility: CLINIC | Age: 67
End: 2022-10-20
Payer: COMMERCIAL

## 2022-10-20 DIAGNOSIS — G44.219 EPISODIC TENSION-TYPE HEADACHE, NOT INTRACTABLE: Primary | ICD-10-CM

## 2022-10-20 PROCEDURE — 97112 NEUROMUSCULAR REEDUCATION: CPT | Mod: GP | Performed by: PHYSICAL THERAPIST

## 2022-10-20 PROCEDURE — 97140 MANUAL THERAPY 1/> REGIONS: CPT | Mod: GP | Performed by: PHYSICAL THERAPIST

## 2022-10-24 ENCOUNTER — ANESTHESIA (OUTPATIENT)
Dept: SURGERY | Facility: AMBULATORY SURGERY CENTER | Age: 67
End: 2022-10-24
Payer: COMMERCIAL

## 2022-10-24 ENCOUNTER — ANCILLARY PROCEDURE (OUTPATIENT)
Dept: MAMMOGRAPHY | Facility: CLINIC | Age: 67
End: 2022-10-24
Attending: SURGERY
Payer: COMMERCIAL

## 2022-10-24 ENCOUNTER — HOSPITAL ENCOUNTER (OUTPATIENT)
Dept: NUCLEAR MEDICINE | Facility: CLINIC | Age: 67
Setting detail: NUCLEAR MEDICINE
Discharge: HOME | End: 2022-10-24
Attending: SURGERY | Admitting: SURGERY
Payer: COMMERCIAL

## 2022-10-24 ENCOUNTER — HOSPITAL ENCOUNTER (OUTPATIENT)
Facility: AMBULATORY SURGERY CENTER | Age: 67
Discharge: HOME | End: 2022-10-24
Attending: SURGERY
Payer: COMMERCIAL

## 2022-10-24 VITALS
BODY MASS INDEX: 21.35 KG/M2 | DIASTOLIC BLOOD PRESSURE: 64 MMHG | WEIGHT: 136 LBS | OXYGEN SATURATION: 98 % | SYSTOLIC BLOOD PRESSURE: 149 MMHG | HEART RATE: 68 BPM | RESPIRATION RATE: 16 BRPM | TEMPERATURE: 98.5 F | HEIGHT: 67 IN

## 2022-10-24 DIAGNOSIS — C50.912 INVASIVE DUCTAL CARCINOMA OF BREAST, FEMALE, LEFT (H): ICD-10-CM

## 2022-10-24 DIAGNOSIS — F33.1 MODERATE EPISODE OF RECURRENT MAJOR DEPRESSIVE DISORDER (H): ICD-10-CM

## 2022-10-24 DIAGNOSIS — F41.1 GENERALIZED ANXIETY DISORDER: ICD-10-CM

## 2022-10-24 PROCEDURE — 38792 RA TRACER ID OF SENTINL NODE: CPT

## 2022-10-24 PROCEDURE — 38525 BIOPSY/REMOVAL LYMPH NODES: CPT | Mod: LT

## 2022-10-24 PROCEDURE — 19301 PARTIAL MASTECTOMY: CPT | Mod: LT

## 2022-10-24 PROCEDURE — 88305 TISSUE EXAM BY PATHOLOGIST: CPT | Mod: TC | Performed by: SURGERY

## 2022-10-24 PROCEDURE — 76098 X-RAY EXAM SURGICAL SPECIMEN: CPT | Performed by: RADIOLOGY

## 2022-10-24 PROCEDURE — 88307 TISSUE EXAM BY PATHOLOGIST: CPT | Mod: 26 | Performed by: PATHOLOGY

## 2022-10-24 PROCEDURE — 19301 PARTIAL MASTECTOMY: CPT | Mod: LT | Performed by: SURGERY

## 2022-10-24 PROCEDURE — 99207 NM LYMPHOSCINTIGRAPHY INJECTION ONLY: CPT | Performed by: RADIOLOGY

## 2022-10-24 PROCEDURE — 38900 IO MAP OF SENT LYMPH NODE: CPT | Mod: LT | Performed by: SURGERY

## 2022-10-24 PROCEDURE — 38525 BIOPSY/REMOVAL LYMPH NODES: CPT | Mod: LT | Performed by: SURGERY

## 2022-10-24 PROCEDURE — 88305 TISSUE EXAM BY PATHOLOGIST: CPT | Mod: 26 | Performed by: PATHOLOGY

## 2022-10-24 RX ORDER — CEFAZOLIN SODIUM 1 G/3ML
INJECTION, POWDER, FOR SOLUTION INTRAMUSCULAR; INTRAVENOUS PRN
Status: DISCONTINUED | OUTPATIENT
Start: 2022-10-24 | End: 2022-10-24

## 2022-10-24 RX ORDER — LIDOCAINE HYDROCHLORIDE 20 MG/ML
INJECTION, SOLUTION INFILTRATION; PERINEURAL PRN
Status: DISCONTINUED | OUTPATIENT
Start: 2022-10-24 | End: 2022-10-24

## 2022-10-24 RX ORDER — ISOSULFAN BLUE 50 MG/5ML
INJECTION, SOLUTION SUBCUTANEOUS PRN
Status: DISCONTINUED | OUTPATIENT
Start: 2022-10-24 | End: 2022-10-24 | Stop reason: HOSPADM

## 2022-10-24 RX ORDER — FENTANYL CITRATE 50 UG/ML
INJECTION, SOLUTION INTRAMUSCULAR; INTRAVENOUS PRN
Status: DISCONTINUED | OUTPATIENT
Start: 2022-10-24 | End: 2022-10-24

## 2022-10-24 RX ORDER — ONDANSETRON 2 MG/ML
4 INJECTION INTRAMUSCULAR; INTRAVENOUS EVERY 30 MIN PRN
Status: DISCONTINUED | OUTPATIENT
Start: 2022-10-24 | End: 2022-10-25 | Stop reason: HOSPADM

## 2022-10-24 RX ORDER — SODIUM CHLORIDE, SODIUM LACTATE, POTASSIUM CHLORIDE, CALCIUM CHLORIDE 600; 310; 30; 20 MG/100ML; MG/100ML; MG/100ML; MG/100ML
INJECTION, SOLUTION INTRAVENOUS CONTINUOUS
Status: DISCONTINUED | OUTPATIENT
Start: 2022-10-24 | End: 2022-10-24 | Stop reason: HOSPADM

## 2022-10-24 RX ORDER — ACETAMINOPHEN 325 MG/1
975 TABLET ORAL ONCE
Status: COMPLETED | OUTPATIENT
Start: 2022-10-24 | End: 2022-10-24

## 2022-10-24 RX ORDER — SODIUM CHLORIDE, SODIUM LACTATE, POTASSIUM CHLORIDE, CALCIUM CHLORIDE 600; 310; 30; 20 MG/100ML; MG/100ML; MG/100ML; MG/100ML
INJECTION, SOLUTION INTRAVENOUS CONTINUOUS
Status: DISCONTINUED | OUTPATIENT
Start: 2022-10-24 | End: 2022-10-25 | Stop reason: HOSPADM

## 2022-10-24 RX ORDER — PROPOFOL 10 MG/ML
INJECTION, EMULSION INTRAVENOUS CONTINUOUS PRN
Status: DISCONTINUED | OUTPATIENT
Start: 2022-10-24 | End: 2022-10-24

## 2022-10-24 RX ORDER — EPHEDRINE SULFATE 50 MG/ML
INJECTION, SOLUTION INTRAMUSCULAR; INTRAVENOUS; SUBCUTANEOUS PRN
Status: DISCONTINUED | OUTPATIENT
Start: 2022-10-24 | End: 2022-10-24

## 2022-10-24 RX ORDER — DEXAMETHASONE SODIUM PHOSPHATE 4 MG/ML
INJECTION, SOLUTION INTRA-ARTICULAR; INTRALESIONAL; INTRAMUSCULAR; INTRAVENOUS; SOFT TISSUE PRN
Status: DISCONTINUED | OUTPATIENT
Start: 2022-10-24 | End: 2022-10-24

## 2022-10-24 RX ORDER — MEPERIDINE HYDROCHLORIDE 25 MG/ML
12.5 INJECTION INTRAMUSCULAR; INTRAVENOUS; SUBCUTANEOUS
Status: DISCONTINUED | OUTPATIENT
Start: 2022-10-24 | End: 2022-10-25 | Stop reason: HOSPADM

## 2022-10-24 RX ORDER — LIDOCAINE 40 MG/G
CREAM TOPICAL
Status: DISCONTINUED | OUTPATIENT
Start: 2022-10-24 | End: 2022-10-24 | Stop reason: HOSPADM

## 2022-10-24 RX ORDER — PROPOFOL 10 MG/ML
INJECTION, EMULSION INTRAVENOUS PRN
Status: DISCONTINUED | OUTPATIENT
Start: 2022-10-24 | End: 2022-10-24

## 2022-10-24 RX ORDER — ONDANSETRON 2 MG/ML
INJECTION INTRAMUSCULAR; INTRAVENOUS PRN
Status: DISCONTINUED | OUTPATIENT
Start: 2022-10-24 | End: 2022-10-24

## 2022-10-24 RX ORDER — ONDANSETRON 4 MG/1
4 TABLET, ORALLY DISINTEGRATING ORAL EVERY 30 MIN PRN
Status: DISCONTINUED | OUTPATIENT
Start: 2022-10-24 | End: 2022-10-25 | Stop reason: HOSPADM

## 2022-10-24 RX ORDER — FENTANYL CITRATE 50 UG/ML
25 INJECTION, SOLUTION INTRAMUSCULAR; INTRAVENOUS EVERY 5 MIN PRN
Status: DISCONTINUED | OUTPATIENT
Start: 2022-10-24 | End: 2022-10-24 | Stop reason: HOSPADM

## 2022-10-24 RX ORDER — GLYCOPYRROLATE 0.2 MG/ML
INJECTION, SOLUTION INTRAMUSCULAR; INTRAVENOUS PRN
Status: DISCONTINUED | OUTPATIENT
Start: 2022-10-24 | End: 2022-10-24

## 2022-10-24 RX ORDER — FENTANYL CITRATE 50 UG/ML
25 INJECTION, SOLUTION INTRAMUSCULAR; INTRAVENOUS
Status: DISCONTINUED | OUTPATIENT
Start: 2022-10-24 | End: 2022-10-25 | Stop reason: HOSPADM

## 2022-10-24 RX ORDER — MAGNESIUM HYDROXIDE 1200 MG/15ML
LIQUID ORAL PRN
Status: DISCONTINUED | OUTPATIENT
Start: 2022-10-24 | End: 2022-10-24 | Stop reason: HOSPADM

## 2022-10-24 RX ORDER — KETOROLAC TROMETHAMINE 30 MG/ML
15 INJECTION, SOLUTION INTRAMUSCULAR; INTRAVENOUS ONCE
Status: COMPLETED | OUTPATIENT
Start: 2022-10-24 | End: 2022-10-24

## 2022-10-24 RX ADMIN — GLYCOPYRROLATE 0.2 MG: 0.2 INJECTION, SOLUTION INTRAMUSCULAR; INTRAVENOUS at 12:18

## 2022-10-24 RX ADMIN — PROPOFOL 150 MCG/KG/MIN: 10 INJECTION, EMULSION INTRAVENOUS at 12:09

## 2022-10-24 RX ADMIN — CEFAZOLIN SODIUM 2 G: 1 INJECTION, POWDER, FOR SOLUTION INTRAMUSCULAR; INTRAVENOUS at 12:02

## 2022-10-24 RX ADMIN — SODIUM CHLORIDE, SODIUM LACTATE, POTASSIUM CHLORIDE, CALCIUM CHLORIDE: 600; 310; 30; 20 INJECTION, SOLUTION INTRAVENOUS at 13:17

## 2022-10-24 RX ADMIN — SODIUM CHLORIDE, SODIUM LACTATE, POTASSIUM CHLORIDE, CALCIUM CHLORIDE: 600; 310; 30; 20 INJECTION, SOLUTION INTRAVENOUS at 09:48

## 2022-10-24 RX ADMIN — FENTANYL CITRATE 50 MCG: 50 INJECTION, SOLUTION INTRAMUSCULAR; INTRAVENOUS at 12:15

## 2022-10-24 RX ADMIN — Medication 100 MCG: at 12:43

## 2022-10-24 RX ADMIN — ACETAMINOPHEN 975 MG: 325 TABLET ORAL at 09:46

## 2022-10-24 RX ADMIN — DEXAMETHASONE SODIUM PHOSPHATE 4 MG: 4 INJECTION, SOLUTION INTRA-ARTICULAR; INTRALESIONAL; INTRAMUSCULAR; INTRAVENOUS; SOFT TISSUE at 12:42

## 2022-10-24 RX ADMIN — Medication 0.2 MCG/KG/MIN: at 12:46

## 2022-10-24 RX ADMIN — EPHEDRINE SULFATE 10 MG: 50 INJECTION, SOLUTION INTRAMUSCULAR; INTRAVENOUS; SUBCUTANEOUS at 12:22

## 2022-10-24 RX ADMIN — PROPOFOL 150 MG: 10 INJECTION, EMULSION INTRAVENOUS at 12:09

## 2022-10-24 RX ADMIN — EPHEDRINE SULFATE 10 MG: 50 INJECTION, SOLUTION INTRAMUSCULAR; INTRAVENOUS; SUBCUTANEOUS at 12:16

## 2022-10-24 RX ADMIN — Medication 100 MCG: at 12:27

## 2022-10-24 RX ADMIN — FENTANYL CITRATE 25 MCG: 50 INJECTION, SOLUTION INTRAMUSCULAR; INTRAVENOUS at 13:50

## 2022-10-24 RX ADMIN — KETOROLAC TROMETHAMINE 15 MG: 30 INJECTION, SOLUTION INTRAMUSCULAR; INTRAVENOUS at 14:24

## 2022-10-24 RX ADMIN — ONDANSETRON 4 MG: 2 INJECTION INTRAMUSCULAR; INTRAVENOUS at 13:03

## 2022-10-24 RX ADMIN — Medication 50 MCG: at 12:26

## 2022-10-24 RX ADMIN — LIDOCAINE HYDROCHLORIDE 60 MG: 20 INJECTION, SOLUTION INFILTRATION; PERINEURAL at 12:09

## 2022-10-24 RX ADMIN — FENTANYL CITRATE 50 MCG: 50 INJECTION, SOLUTION INTRAMUSCULAR; INTRAVENOUS at 12:38

## 2022-10-24 NOTE — ANESTHESIA CARE TRANSFER NOTE
Patient: Debby Barriga    Procedure: Procedure(s):  Left seed-localized  lumpectomy with sentinel node biopsy  Left seed-localized  lumpectomy with sentinel node biopsy       Diagnosis: Invasive ductal carcinoma of breast, female, left (H) [C50.912]  Diagnosis Additional Information: No value filed.    Anesthesia Type:   General     Note:    Oropharynx: oropharynx clear of all foreign objects and spontaneously breathing  Level of Consciousness: awake  Oxygen Supplementation: nasal cannula  Level of Supplemental Oxygen (L/min / FiO2): 3  Independent Airway: airway patency satisfactory and stable  Dentition: dentition unchanged  Vital Signs Stable: post-procedure vital signs reviewed and stable  Report to RN Given: handoff report given  Patient transferred to: PACU    Handoff Report: Identifed the Patient, Identified the Reponsible Provider, Reviewed the pertinent medical history, Discussed the surgical course, Reviewed Intra-OP anesthesia mangement and issues during anesthesia, Set expectations for post-procedure period and Allowed opportunity for questions and acknowledgement of understanding      Vitals:  Vitals Value Taken Time   BP 92/53 10/24/22 1320   Temp 97    Pulse 68 10/24/22 1322   Resp 14 10/24/22 1322   SpO2 92 % 10/24/22 1322   Vitals shown include unvalidated device data.    Electronically Signed By: KATLYN Barragan CRNA  October 24, 2022  1:23 PM

## 2022-10-24 NOTE — ANESTHESIA PREPROCEDURE EVALUATION
Anesthesia Pre-Procedure Evaluation    Patient: Debby Barriga   MRN: [id number] : 1955        Procedure : Procedure(s):  Left seed-localized  lumpectomy with sentinel node biopsy  Left seed-localized  lumpectomy with sentinel node biopsy          Past Medical History:   Diagnosis Date     Corneal ulcer of right eye 3-     Mild major depression (H) 2013     Tear of retina     right eye      Past Surgical History:   Procedure Laterality Date     LAPAROSCOPIC TUBAL LIGATION       ORTHOPEDIC SURGERY      wrist      Allergies   Allergen Reactions     Hydrocodone      Ears ringing  Questionable allergy Ears ringing  Ears ringing     Trazodone      Other reaction(s): Headache      Social History     Tobacco Use     Smoking status: Former     Types: Cigarettes     Quit date: 1982     Years since quittin.2     Smokeless tobacco: Never   Substance Use Topics     Alcohol use: No      Wt Readings from Last 1 Encounters:   10/24/22 61.7 kg (136 lb)        Anesthesia Evaluation   Pt has not had prior anesthetic         ROS/MED HX  ENT/Pulmonary:  - neg pulmonary ROS     Neurologic:  - neg neurologic ROS     Cardiovascular:  - neg cardiovascular ROS     METS/Exercise Tolerance: >4 METS    Hematologic:  - neg hematologic  ROS     Musculoskeletal:  - neg musculoskeletal ROS     GI/Hepatic:  - neg GI/hepatic ROS     Renal/Genitourinary:  - neg Renal ROS     Endo:  - neg endo ROS     Psychiatric/Substance Use:  - neg psychiatric ROS     Infectious Disease:  - neg infectious disease ROS     Malignancy:       Other:            Physical Exam    Airway  airway exam normal      Mallampati: II   TM distance: > 3 FB   Neck ROM: full   Mouth opening: > 3 cm    Respiratory Devices and Support         Dental       (+) lower dentures      Cardiovascular   cardiovascular exam normal          Pulmonary   pulmonary exam normal                OUTSIDE LABS:  CBC:   Lab Results   Component Value Date    WBC 8.7 2022    WBC  9.0 01/13/2022    HGB 12.3 10/18/2022    HGB 13.1 01/18/2022    HCT 41.5 01/18/2022    HCT 40.1 01/13/2022     01/18/2022     01/13/2022     BMP:   Lab Results   Component Value Date     01/18/2022     01/13/2022    POTASSIUM 3.9 10/18/2022    POTASSIUM 3.9 01/18/2022    CHLORIDE 107 01/18/2022    CHLORIDE 106 01/13/2022    CO2 26 01/18/2022    CO2 25 01/13/2022    BUN 11 01/18/2022    BUN 11 01/13/2022    CR 0.69 10/18/2022    CR 0.63 01/18/2022    GLC 97 01/18/2022    GLC 98 01/13/2022     COAGS: No results found for: PTT, INR, FIBR  POC: No results found for: BGM, HCG, HCGS  HEPATIC:   Lab Results   Component Value Date    ALBUMIN 3.4 01/13/2022    PROTTOTAL 6.7 (L) 01/13/2022    ALT 53 (H) 01/13/2022    AST 42 01/13/2022    ALKPHOS 41 01/13/2022    BILITOTAL 0.2 01/13/2022     OTHER:   Lab Results   Component Value Date    A1C 5.5 02/11/2020    KAMAR 9.2 01/18/2022    TSH 0.67 02/11/2020    T4 0.98 02/11/2020    CRP 0.4 02/11/2020    SED 10 01/18/2022       Anesthesia Plan    ASA Status:  2      Anesthesia Type: General.     - Airway: LMA   Induction: Propofol.   Maintenance: TIVA.        Consents            Postoperative Care    Pain management: IV analgesics, Multi-modal analgesia.   PONV prophylaxis: Ondansetron (or other 5HT-3), Background Propofol Infusion, Dexamethasone or Solumedrol     Comments:                Nick Webber DO

## 2022-10-24 NOTE — ANESTHESIA POSTPROCEDURE EVALUATION
Patient: Debby Barriga    Procedure: Procedure(s):  Left seed-localized  lumpectomy with sentinel node biopsy  Left seed-localized  lumpectomy with sentinel node biopsy       Anesthesia Type:  General    Note:  Disposition: Outpatient   Postop Pain Control: Uneventful            Sign Out: Well controlled pain   PONV: No   Neuro/Psych: Uneventful            Sign Out: Acceptable/Baseline neuro status   Airway/Respiratory: Uneventful            Sign Out: Acceptable/Baseline resp. status   CV/Hemodynamics: Uneventful            Sign Out: Acceptable CV status; No obvious hypovolemia; No obvious fluid overload   Other NRE: NONE   DID A NON-ROUTINE EVENT OCCUR? No           Last vitals:  Vitals Value Taken Time   /66 10/24/22 1335   Temp 36.2  C (97.2  F) 10/24/22 1335   Pulse 68 10/24/22 1335   Resp 16 10/24/22 1335   SpO2 98 % 10/24/22 1335       Electronically Signed By: Nick Webber DO  October 24, 2022  1:49 PM

## 2022-10-24 NOTE — DISCHARGE INSTRUCTIONS
[facility]  Home Care Following Anesthesia  For 24 hours after surgery:  Get plenty of rest.  A responsible adult must stay with you for at least 24 hours after you leave the surgery center.  Do not drive or use heavy equipment.  If you have weakness or tingling, don't drive or use heavy equipment until this feeling goes away.   Do not drink alcohol.   Avoid strenuous or risky activities.  Ask for help when climbing stairs.  You may feel lightheaded.  IF so, sit for a few minutes before standing.  Have someone help you get up.   If you have nausea (feel sick to your stomach): Drink only clear liquids such as apple juice, ginger ale, broth or 7-Up.  Rest may also help.  Be sure to drink enough fluids.  Move to a regular diet as you feel able.   You may have a slight fever.  Call the doctor if your fever is over 100 F (37.7 C) (taken under the tongue) or lasts longer than 24 hours.  You may have a dry mouth, a sore throat, muscle aches or trouble sleeping. These should go away after 24 hours.  Do not make important or legal decisions.   It is recommended to avoid smoking.               Tips for taking pain medications  To get the best pain relief possible, remember these points:  Take pain medications as directed, before pain becomes severe.  Pain medication can upset your stomach: taking it with food may help.  Constipation is a common side effect of pain medication. Drink plenty of  fluids.  Eat foods high in fiber. Take a stool softener if recommended by your doctor or pharmacist.  Do not drink alcohol, drive or operate machinery while taking pain medications.  Ask about other ways to control pain, such as with heat, ice or relaxation.    Tylenol/Acetaminophen Consumption  To help encourage the safe use of acetaminophen, the makers of TYLENOL  have lowered the maximum daily dose for single-ingredient Extra Strength TYLENOL  (acetaminophen) products sold in the [place] from 8 pills  per day (4,000 mg) to 6 pills per day (3,000 mg). The dosing interval has also changed from 2 pills every 4-6 hours to 2 pills every 6 hours.  If you feel your pain relief is insufficient, you may take Tylenol/Acetaminophen in addition to your narcotic pain medication.   Be careful not to exceed 3,000 mg of Tylenol/Acetaminophen in a 24 hour period from all sources.  If you are taking extra strength Tylenol/acetaminophen (500 mg), the maximum dose is 6 tablets in 24 hours.  If you are taking regular strength acetaminophen (325 mg), the maximum dose is 9 tablets in 24 hours.    Call a doctor for any of the following:  Signs of infection (fever, growing tenderness at the surgery site, a large amount of drainage or bleeding, severe pain, foul-smelling drainage, redness, swelling).  It has been over 8 to 10 hours since surgery and you are still not able to urinate (pass water).  Headache for over 24 hours.  Numbness, tingling or weakness the day after surgery (if you had spinal anesthesia).  Signs of Covid-19 infection (temperature over 100 degrees, shortness of breath, cough, loss of taste/smell, generalized body aches, persistent headache, chills, sore throat, nausea/vomiting/diarrhea)  Your doctor is:       Dr. Darin Morrow, [facility]: [phone]               Or dial [phone] and ask for the resident on call for:  [facility]  For emergency care, call the:  [facility]:  [phone] (TTY for hearing impaired: [phone])  Tylenol 975 mg given at 945 am.  Next available dose at 345 pm.

## 2022-10-24 NOTE — PROGRESS NOTES
Mailed Healthy Savings information to member.    Juan F Hester  Care Management Specialist  [facility]  [phone]

## 2022-10-25 ENCOUNTER — PATIENT OUTREACH (OUTPATIENT)
Dept: ONCOLOGY | Facility: CLINIC | Age: 67
End: 2022-10-25

## 2022-10-25 RX ORDER — VENLAFAXINE HYDROCHLORIDE 150 MG/1
CAPSULE, EXTENDED RELEASE ORAL
Qty: 90 CAPSULE | Refills: 0 | Status: SHIPPED | OUTPATIENT
Start: 2022-10-25 | End: 2023-01-27

## 2022-10-25 NOTE — PROGRESS NOTES
POST-OP CALL  Surgical Oncology     Date of call: 10/25/2022     Surgery Date: 10/24/2022  Surgery:  Lymphatic mapping, left seed localized lumpectomy and left axillary sentinel lymph node biopsy x 2.       Current concerns:       Pain: Well controlled with Tylenol and Ibuprophen    Incisions: No concerns    Fevers/chills: Denies    Eating/drinking: Good PO intake        Reviewed follow up appointment scheduled on 11/18/2022 with Dr. Morrow.      Patient will call with any questions or concerns.         Anitha Brooks, RNCC  Surgical Oncology

## 2022-10-25 NOTE — OP NOTE
Procedure Date: 10/24/2022    PREOPERATIVE DIAGNOSIS:  Left breast cancer.    POSTOPERATIVE DIAGNOSIS:  Left breast cancer.    PROCEDURE:  Lymphatic mapping, left seed localized lumpectomy and left axillary sentinel lymph node biopsy x 2.    ATTENDING SURGEON:  Darin Morrow MD    ANESTHESIA:  General with LMA.    INDICATIONS FOR PROCEDURE:  The patient is a 67-year-old woman who was diagnosed with a nonpalpable stage I breast cancer.  She had a seed placed in the tumor for localization.  She now presents for surgery.    DESCRIPTION OF PROCEDURE:  After informed consent, the patient was brought to the operating room, given a general anesthetic, and an LMA was placed.  I injected technetium sulfur colloid and isosulfan blue in her left breast.  She was prepped and draped in the usual fashion.  I used the localizer to identify the mass at the 6 o'clock position of her left breast.  Local anesthetic was administered and I made a radial incision over the tumor.  The Bovie cautery was used to incise subcutaneous tissues.  I dissected circumferentially around the seed using the localizer as a in order to obtain negative surgical margins.  The specimen was removed, oriented and sent the Breast Center.  I did excise a new anterior margin and oriented it appropriately.  Surgical clips were placed in all 4 quadrants of the resection bed.  The specimen radiograph demonstrated complete excision of the target lesion.     Next, I made an incision in the left axilla after administering local anesthetic.  The Bovie cautery was used to incise subcutaneous tissues.  I identified 2 blue sentinel nodes, but neither of them were radioactivity. After removal of these 2 sentinel lymph nodes, there were no additional blue, radioactive or palpable lymph nodes.     Both incisions were closed with interrupted 3-0 Vicryl suture for the dermal layer and a running 4-0 PDS subcuticular stitch for the skin.  Dermabond was placed.  The patient was  taken to the recovery room in stable condition.    Darin Morrow MD        D: 10/24/2022   T: 10/24/2022   MT: [id number]    Name:     YOSI SANTOS  MRN:      [id number]        Account:        [id number]   :      1955           Procedure Date: 10/24/2022     Document: [id number]

## 2022-10-25 NOTE — TELEPHONE ENCOUNTER
Medication is being filled for 1 time refill only due to:  Patient needs to complete PHQ 9.  Sent in My Chart 10/25/22.   Analy Willis RN  [facility]

## 2022-11-08 ENCOUNTER — NURSE TRIAGE (OUTPATIENT)
Dept: ONCOLOGY | Facility: CLINIC | Age: 67
End: 2022-11-08

## 2022-11-08 NOTE — TELEPHONE ENCOUNTER
[facility]    10/24/22 PROCEDURE:  Lymphatic mapping, left seed localized lumpectomy and left axillary sentinel lymph node biopsy x 2.    Patient's partner, Josie calls in to report that Debby has left sided axilla tenderness around the surgical site. Debby also reports 2 inches above the sutures, the area \"feels hard.\"    She reports there is no drainage and no redness at the surgical site. Debby does not have fevers or chills.     Advised that tenderness is expected but should be improving. Firmness around the surgical site is also expected. In the absence of signs and symptoms of infection, continue to monitor for now. Call Triage if patient develops signs and symptoms of infection or increased pain/pain does not improve. Will request RNCC follow up in a week to check in with symptoms.  "

## 2022-11-09 NOTE — TELEPHONE ENCOUNTER
No surgical problems.   Has red cheeks. They look wind burned.   Did go out for a walk.   No fever Temp was 96.0  Cheeks are not chapped.   Not feeling ill at all.   No swelling of face.   Watch for temp, put lotion on cheeks.

## 2022-11-11 ENCOUNTER — PATIENT OUTREACH (OUTPATIENT)
Dept: GERIATRIC MEDICINE | Facility: CLINIC | Age: 67
End: 2022-11-11

## 2022-11-11 NOTE — PROGRESS NOTES
[facility] Care Coordination Contact  See below from [facility] site:    Yary  [id number] YOSI BYE [birth year]  [facility] PCA: Initial Assessment PCA:   8/9/22 1/31/23 Medically Necessary 2464   Yary  [id number] YOSI BYE [birth year]  [facility] PCA: Initial Assessment PCA:   2/1/23 7/31/23 Medically Necessary 2534     POC updated.    Yary Francois RN  [facility]   [phone]

## 2022-11-14 ENCOUNTER — PATIENT OUTREACH (OUTPATIENT)
Dept: ONCOLOGY | Facility: CLINIC | Age: 67
End: 2022-11-14

## 2022-11-14 NOTE — PROGRESS NOTES
Spoke to patient's spouse Armani returning call to re-schedule currently with Dr Brooks 11/29/2022 requesting an Oncologist that she would prefer. Writer will send a message to scheduling to arrange that appointment.  Answered all patient's spouses questions and verbalized understanding. Melina Negro RN, BSN.

## 2022-11-15 ENCOUNTER — OFFICE VISIT (OUTPATIENT)
Dept: FAMILY MEDICINE | Facility: CLINIC | Age: 67
End: 2022-11-15
Payer: COMMERCIAL

## 2022-11-15 VITALS
RESPIRATION RATE: 16 BRPM | WEIGHT: 135 LBS | OXYGEN SATURATION: 100 % | DIASTOLIC BLOOD PRESSURE: 69 MMHG | HEART RATE: 68 BPM | BODY MASS INDEX: 22.49 KG/M2 | SYSTOLIC BLOOD PRESSURE: 113 MMHG | HEIGHT: 65 IN | TEMPERATURE: 97.3 F

## 2022-11-15 DIAGNOSIS — Z78.0 ASYMPTOMATIC MENOPAUSAL STATE: ICD-10-CM

## 2022-11-15 DIAGNOSIS — G30.9 ALZHEIMER'S DISEASE (H): ICD-10-CM

## 2022-11-15 DIAGNOSIS — Z00.00 ENCOUNTER FOR MEDICARE ANNUAL WELLNESS EXAM: Primary | ICD-10-CM

## 2022-11-15 DIAGNOSIS — E55.9 AVITAMINOSIS D: ICD-10-CM

## 2022-11-15 DIAGNOSIS — R39.9 SYMPTOMS INVOLVING URINARY SYSTEM: ICD-10-CM

## 2022-11-15 DIAGNOSIS — F02.80 ALZHEIMER'S DISEASE (H): ICD-10-CM

## 2022-11-15 LAB
ALBUMIN UR-MCNC: NEGATIVE MG/DL
APPEARANCE UR: CLEAR
BACTERIA #/AREA URNS HPF: NORMAL /HPF
BILIRUB UR QL STRIP: NEGATIVE
COLOR UR AUTO: YELLOW
GLUCOSE UR STRIP-MCNC: NEGATIVE MG/DL
HGB UR QL STRIP: ABNORMAL
KETONES UR STRIP-MCNC: NEGATIVE MG/DL
LEUKOCYTE ESTERASE UR QL STRIP: NEGATIVE
NITRATE UR QL: NEGATIVE
PH UR STRIP: 5.5 [PH] (ref 5–7)
RBC #/AREA URNS AUTO: NORMAL /HPF
SP GR UR STRIP: 1.01 (ref 1–1.03)
UROBILINOGEN UR STRIP-ACNC: 0.2 E.U./DL
WBC #/AREA URNS AUTO: NORMAL /HPF

## 2022-11-15 PROCEDURE — G0402 INITIAL PREVENTIVE EXAM: HCPCS | Performed by: NURSE PRACTITIONER

## 2022-11-15 PROCEDURE — 99213 OFFICE O/P EST LOW 20 MIN: CPT | Mod: 25 | Performed by: NURSE PRACTITIONER

## 2022-11-15 PROCEDURE — 81001 URINALYSIS AUTO W/SCOPE: CPT | Performed by: NURSE PRACTITIONER

## 2022-11-15 PROCEDURE — G0009 ADMIN PNEUMOCOCCAL VACCINE: HCPCS | Performed by: NURSE PRACTITIONER

## 2022-11-15 PROCEDURE — 90677 PCV20 VACCINE IM: CPT | Performed by: NURSE PRACTITIONER

## 2022-11-15 ASSESSMENT — ACTIVITIES OF DAILY LIVING (ADL)
CURRENT_FUNCTION: MEDICATION ADMINISTRATION REQUIRES ASSISTANCE
CURRENT_FUNCTION: BATHING REQUIRES ASSISTANCE
CURRENT_FUNCTION: LAUNDRY REQUIRES ASSISTANCE
CURRENT_FUNCTION: SHOPPING REQUIRES ASSISTANCE
CURRENT_FUNCTION: TELEPHONE REQUIRES ASSISTANCE
CURRENT_FUNCTION: MONEY MANAGEMENT REQUIRES ASSISTANCE
CURRENT_FUNCTION: PREPARING MEALS REQUIRES ASSISTANCE
CURRENT_FUNCTION: TRANSPORTATION REQUIRES ASSISTANCE
CURRENT_FUNCTION: HOUSEWORK REQUIRES ASSISTANCE

## 2022-11-15 ASSESSMENT — ENCOUNTER SYMPTOMS
HEMATOCHEZIA: 0
PALPITATIONS: 0
SORE THROAT: 0
CONSTIPATION: 0
DIARRHEA: 0
WEAKNESS: 0
HEADACHES: 1
MYALGIAS: 0
COUGH: 0
PARESTHESIAS: 0
EYE PAIN: 0
HEARTBURN: 0
DYSURIA: 0
DIZZINESS: 1
NERVOUS/ANXIOUS: 1
HEMATURIA: 0
FREQUENCY: 0
ARTHRALGIAS: 0
JOINT SWELLING: 0
CHILLS: 0
ABDOMINAL PAIN: 1
NAUSEA: 0
FEVER: 0
BREAST MASS: 0
SHORTNESS OF BREATH: 0

## 2022-11-15 ASSESSMENT — PATIENT HEALTH QUESTIONNAIRE - PHQ9
SUM OF ALL RESPONSES TO PHQ QUESTIONS 1-9: 5
SUM OF ALL RESPONSES TO PHQ QUESTIONS 1-9: 5
10. IF YOU CHECKED OFF ANY PROBLEMS, HOW DIFFICULT HAVE THESE PROBLEMS MADE IT FOR YOU TO DO YOUR WORK, TAKE CARE OF THINGS AT HOME, OR GET ALONG WITH OTHER PEOPLE: NOT DIFFICULT AT ALL

## 2022-11-15 ASSESSMENT — PAIN SCALES - GENERAL: PAINLEVEL: NO PAIN (0)

## 2022-11-15 NOTE — PROGRESS NOTES
"  The patient's PHQ-9 score is consistent with mild depression. She was provided with information regarding depression and was advised to schedule a follow up appointment in 52 weeks to further address this issue.      SUBJECTIVE:   Debby is a 67 year old who presents for Preventive Visit.      Patient has been advised of split billing requirements and indicates understanding: Yes  Are you in the first 12 months of your Medicare coverage?  Yes,  Visual Acuity:  Right Eye: 20/20   Left Eye: 20/20  Both Eyes: 20/20     Healthy Habits:     In general, how would you rate your overall health?  Good    Frequency of exercise:  4-5 days/week    Duration of exercise:  30-45 minutes    Do you usually eat at least 4 servings of fruit and vegetables a day, include whole grains    & fiber and avoid regularly eating high fat or \"junk\" foods?  Yes    Taking medications regularly:  Yes    Medication side effects:  None    Ability to successfully perform activities of daily living:  Telephone requires assistance, transportation requires assistance, shopping requires assistance, preparing meals requires assistance, housework requires assistance, bathing requires assistance, laundry requires assistance, medication administration requires assistance and money management requires assistance    Home Safety:  No safety concerns identified    Hearing Impairment:  No hearing concerns    In the past 6 months, have you been bothered by leaking of urine?  No    In general, how would you rate your overall mental or emotional health?  Fair      PHQ-2 Total Score: 2    Additional concerns today:  No    Do you feel safe in your environment? Yes    Have you ever done Advance Care Planning? (For example, a Health Directive, POLST, or a discussion with a medical provider or your loved ones about your wishes):         Fall risk  Fallen 2 or more times in the past year?: No  Any fall with injury in the past year?: No    Cognitive Screening   1) Repeat 3 " items (Leader, Season, Table)    2) Clock draw: ABNORMAL unable to put down the numbers   3) 3 item recall: Recalls NO objects   Results: 0 items recalled: PROBABLE COGNITIVE IMPAIRMENT, **INFORM PROVIDER**    Mini-CogTM Copyright S Gail. Licensed by the author for use in [facility]; reprinted with permission (jacque@[facility]). All rights reserved.      Do you have sleep apnea, excessive snoring or daytime drowsiness?: no    Reviewed and updated as needed this visit by clinical staff   Tobacco  Allergies  Meds  Problems  Med Hx  Surg Hx  Fam Hx  Soc   Hx        Reviewed and updated as needed this visit by Provider   Tobacco  Allergies  Meds  Problems  Med Hx  Surg Hx  Fam Hx         Social History     Tobacco Use     Smoking status: Former     Types: Cigarettes     Quit date: 1982     Years since quittin.3     Smokeless tobacco: Never   Substance Use Topics     Alcohol use: No         Alcohol Use 11/15/2022   Prescreen: >3 drinks/day or >7 drinks/week? Not Applicable   Prescreen: >3 drinks/day or >7 drinks/week? -               Current providers sharing in care for this patient include:   Patient Care Team:  Sharri Lacey NP as PCP - General (Nurse Practitioner - Family)  Tayler Dey MD as MD (Ophthalmology)  Canelo Guerra OD as MD (Optometry)  Sharri Lacey NP as Assigned PCP  Yary Francois, RN as Lead Care Coordinator (Primary Care - CC)  Anitha Brooks, RN as Specialty Care Coordinator (Hematology & Oncology)  Darin Morrow MD as Assigned Surgical Provider  Juan Brooks MD as Assigned Cancer Care Provider    The following health maintenance items are reviewed in Epic and correct as of today:  Health Maintenance   Topic Date Due     DEXA  Never done     ZOSTER IMMUNIZATION (1 of 2) Never done     MEDICARE ANNUAL WELLNESS VISIT  10/11/2022     PHQ-9  05/15/2023     DTAP/TDAP/TD IMMUNIZATION (4 - Td or Tdap) 2023     ANNUAL REVIEW OF   "ORDERS  11/15/2023     FALL RISK ASSESSMENT  11/15/2023     MAMMO SCREENING  09/08/2024     LIPID  10/11/2026     ADVANCE CARE PLANNING  10/11/2026     COLORECTAL CANCER SCREENING  02/10/2032     HEPATITIS C SCREENING  Completed     HIV SCREENING  Completed     DEPRESSION ACTION PLAN  Completed     INFLUENZA VACCINE  Completed     Pneumococcal Vaccine: 65+ Years  Completed     COVID-19 Vaccine  Completed     IPV IMMUNIZATION  Aged Out     MENINGITIS IMMUNIZATION  Aged Out     PAP  Discontinued           Breast CA Risk Assessment (FHS-7) 10/11/2021   Do you have a family history of breast, colon, or ovarian cancer? No / Unknown           Pertinent mammograms are reviewed under the imaging tab.    Review of Systems   Constitutional: Negative for chills and fever.   HENT: Positive for hearing loss. Negative for congestion, ear pain and sore throat.    Eyes: Negative for pain and visual disturbance.   Respiratory: Negative for cough and shortness of breath.    Cardiovascular: Negative for chest pain, palpitations and peripheral edema.   Gastrointestinal: Positive for abdominal pain. Negative for constipation, diarrhea, heartburn, hematochezia and nausea.   Breasts:  Positive for tenderness. Negative for breast mass and discharge.   Genitourinary: Positive for urgency. Negative for dysuria, frequency, genital sores, hematuria, pelvic pain, vaginal bleeding and vaginal discharge.   Musculoskeletal: Negative for arthralgias, joint swelling and myalgias.   Skin: Negative for rash.   Neurological: Positive for dizziness and headaches. Negative for weakness and paresthesias.   Psychiatric/Behavioral: Positive for mood changes. The patient is nervous/anxious.          OBJECTIVE:   /69 (BP Location: Left arm, Patient Position: Sitting, Cuff Size: Adult Regular)   Pulse 68   Temp 97.3  F (36.3  C) (Tympanic)   Resp 16   Ht 1.643 m (5' 4.69\")   Wt 61.2 kg (135 lb)   SpO2 100%   BMI 22.68 kg/m   Estimated body mass " "index is 22.68 kg/m  as calculated from the following:    Height as of this encounter: 1.643 m (5' 4.69\").    Weight as of this encounter: 61.2 kg (135 lb).  Physical Exam  GENERAL APPEARANCE: healthy, alert and no distress  EYES: Eyes grossly normal to inspection, PERRL and conjunctivae and sclerae normal  HENT: ear canals and TM's normal, nose and mouth without ulcers or lesions, oropharynx clear and oral mucous membranes moist  NECK: no adenopathy, no asymmetry, masses, or scars and thyroid normal to palpation  RESP: lungs clear to auscultation - no rales, rhonchi or wheezes  BREAST: normal without masses, tenderness or nipple discharge and no palpable axillary masses or adenopathy  CV: regular rate and rhythm, normal S1 S2, no S3 or S4, no murmur, click or rub, no peripheral edema and peripheral pulses strong  ABDOMEN: soft, nontender, no hepatosplenomegaly, no masses and bowel sounds normal  MS: no musculoskeletal defects are noted and gait is age appropriate without ataxia  SKIN: no suspicious lesions or rashes  NEURO: Normal strength and tone, sensory exam grossly normal, mentation intact and speech normal    Diagnostic Test Results:  Labs reviewed in Epic  Results for orders placed or performed in visit on 11/15/22 (from the past 24 hour(s))   UA with Microscopic reflex to Culture - lab collect    Specimen: Urine, Midstream   Result Value Ref Range    Color Urine Yellow Colorless, Straw, Light Yellow, Yellow    Appearance Urine Clear Clear    Glucose Urine Negative Negative, 1000 , >=2000 mg/dL    Bilirubin Urine Negative Negative    Ketones Urine Negative Negative, 160  mg/dL    Specific Gravity Urine 1.010 1.003 - 1.035    Blood Urine Trace (A) Negative    pH Urine 5.5 5.0 - 7.0    Protein Albumin Urine Negative Negative, 300 , >=2000 mg/dL    Urobilinogen Urine 0.2 0.2, 1.0 E.U./dL    Nitrite Urine Negative Negative    Leukocyte Esterase Urine Negative Negative   Urine Microscopic   Result Value Ref Range " "   Bacteria Urine None Seen None Seen /HPF    RBC Urine 0-2 0-2 /HPF /HPF    WBC Urine 0-5 0-5 /HPF /HPF    Narrative    Urine Culture not indicated       ASSESSMENT / PLAN:   (Z00.00) Encounter for Medicare annual wellness exam  (primary encounter diagnosis)  Comment:   Plan:     (E55.9) Avitaminosis D  Comment:   Plan: Continue on vitamin D.     (Z78.0) Asymptomatic menopausal state  Comment:   Plan: DEXA HIP/PELVIS/SPINE - Future            (G30.9,  F02.80) Alzheimer's disease (H)  Comment: fairly stable  Plan: cholecalciferol (VITAMIN D3) 125 mcg (5000         units) capsule        She will continue to follow up with her functional medicine specialist.     (R39.9) Symptoms involving urinary system  Comment:   Plan: UA with Microscopic reflex to Culture - lab         collect, Urine Microscopic        UA is negative.             COUNSELING:  Reviewed preventive health counseling, as reflected in patient instructions    Estimated body mass index is 22.68 kg/m  as calculated from the following:    Height as of this encounter: 1.643 m (5' 4.69\").    Weight as of this encounter: 61.2 kg (135 lb).        She reports that she quit smoking about 40 years ago. Her smoking use included cigarettes. She has never used smokeless tobacco.      Appropriate preventive services were discussed with this patient, including applicable screening as appropriate for cardiovascular disease, diabetes, osteopenia/osteoporosis, and glaucoma.  As appropriate for age/gender, discussed screening for colorectal cancer, prostate cancer, breast cancer, and cervical cancer. Checklist reviewing preventive services available has been given to the patient.    Reviewed patients plan of care and provided an AVS. The Basic Care Plan (routine screening as documented in Health Maintenance) for Debby meets the Care Plan requirement. This Care Plan has been established and reviewed with the Patient.    Counseling Resources:  ATP IV Guidelines  Pooled " Cohorts Equation Calculator  Breast Cancer Risk Calculator  Breast Cancer: Medication to Reduce Risk  FRAX Risk Assessment  ICSI Preventive Guidelines  Dietary Guidelines for Americans, 2010  USDA's MyPlate  ASA Prophylaxis  Lung CA Screening    Sharri Lacey NP  [facility]    Identified Health Risks:  Answers for HPI/ROS submitted by the patient on 11/15/2022  If you checked off any problems, how difficult have these problems made it for you to do your work, take care of things at home, or get along with other people?: Not difficult at all  PHQ9 TOTAL SCORE: 5

## 2022-11-15 NOTE — PATIENT INSTRUCTIONS
"Shingrix (shingles shot) - get at a pharmacy after Jan 1st.  Schedule DEXA scan (bone density test).    Patient Education   Personalized Prevention Plan  You are due for the preventive services outlined below.  Your care team is available to assist you in scheduling these services.  If you have already completed any of these items, please share that information with your care team to update in your medical record.  Health Maintenance Due   Topic Date Due    Osteoporosis Screening  Never done    Pneumococcal Vaccine (2 - PCV) 08/17/2006    Zoster (Shingles) Vaccine (1 of 2) Never done    Annual Wellness Visit  10/11/2022    ANNUAL REVIEW OF HM ORDERS  10/11/2022       Depression and Suicide in Older Adults    Nearly 2 million older Americans have some type of depression. Some of them even take their own lives. Yet depression among older adults is often ignored. Learn the warning signs. You may help spare a loved one needless pain. You may also save a life.   What is depression?  Depression is a common and serious illness that affects the way you think and feel. It is not a normal part of aging, nor is it a sign of weakness, a character flaw, or something you can snap out of. Most people with depression need treatment to get better. The most common symptom is a feeling of deep sadness. People who are depressed also may seem tired and listless. And nothing seems to give them pleasure. It s normal to grieve or be sad sometimes. But sadness lessens or passes with time. Depression rarely goes away or improves on its own. A person with clinical depression can't \"snap out of it.\" Other symptoms of depression are:   Sleeping more or less than normal  Eating more or less than normal  Having headaches, stomachaches, or other pains that don t go away  Feeling nervous,  empty,  or worthless  Crying a great deal  Thinking or talking about suicide or death  Loss of interest in activities previously enjoyed  Social " isolation  Feeling confused or forgetful  What causes it?  The causes of depression aren t fully known. But it is thought to result from a complex blend of these factors:   Biochemistry. Certain chemicals in the brain play a role.  Genes. Depression does run in families.  Life stress. Life stresses can also trigger depression in some people. Older adults often face many stressors, such as death of friends or a spouse, health problems, and financial concerns.  Chronic conditions. This includes conditions such as diabetes, heart disease, or cancer. These can cause symptoms of depression. Medicine side effects can cause changes in thoughts and behaviors.  How you can help  Often, depressed people may not want to ask for help. When they do, they may be ignored. Or, they may receive the wrong treatment. You can help by showing parents and older friends love and support. If they seem depressed, don t lecture the person, ignore the symptoms, or discount the symptoms as a  normal  part of aging -which they are not. Get involved, listen, and show interest and support.   Help them understand that depression is a treatable illness. Tell them you can help them find the right treatment. Offer to go to their healthcare provider's appointment with them for support when the symptoms are discussed. With their approval, contact a local mental health center, social service agency, or hospital about services.   You can be an advocate for him or her at healthcare appointments. Many older adults have chronic illnesses that can cause symptoms of depression. Medicine side effects can change thoughts and behaviors. You can help make sure that the healthcare provider looks at all of these factors. He or she should refer your family member or friend to a mental healthcare provider when needed. in some cases, untreated depression can lead to a misdiagnosis. A person may be diagnosed with a brain disorder such as dementia. If the healthcare  provider does not take the issue of depression seriously, help your family member or friend to find another provider.   Don't be afraid to ask  If you think an older person you care about could be suicidal, ask,  Have you thought about suicide?  Most people will tell you the truth. If they say  yes,  they may already have a plan for how and when they will attempt it. Find out as much as you can. The more detailed the plan, and the easier it is to carry out, the more danger the person is in right now. Tell the person you are there for them and do not want them to harm him or herself. Don't wait to get help for the person. Call the person's healthcare provider, local hospital, or emergency services.   To learn more  National Suicide Prevention Lifeline (crisis hotline) 697-711-PXTL (638-811-3120)  National [place] of Mental Mjuvxg658-007-7254dth.[facility].nih.gov  National [place] on Mental Xxgyyyb603-980-2153aga.timo.org  Mental Health Hduyvls598-861-0730osg.[facility].org  National Suicide Hxxuegv440-LXHMTXO (397-037-1495)    Call 911  Never leave the person alone. A person who is actively suicidal needs psychiatric care right away. They will need constant supervision. Never leave the person out of sight. Call 911 or the national 24-hour suicide crisis hotline at 800-819-KIIA (521-145-9284). You can also take the person to the closest emergency room.   [place] last reviewed this educational content on 5/1/2020 2000-2021 [employer]. All rights reserved. This information is not intended as a substitute for professional medical care. Always follow your healthcare professional's instructions.

## 2022-11-16 ENCOUNTER — TELEPHONE (OUTPATIENT)
Dept: ONCOLOGY | Facility: CLINIC | Age: 67
End: 2022-11-16

## 2022-11-17 LAB
PATH REPORT.COMMENTS IMP SPEC: ABNORMAL
PATH REPORT.COMMENTS IMP SPEC: ABNORMAL
PATH REPORT.COMMENTS IMP SPEC: YES
PATH REPORT.FINAL DX SPEC: ABNORMAL
PATH REPORT.GROSS SPEC: ABNORMAL
PATH REPORT.MICROSCOPIC SPEC OTHER STN: ABNORMAL
PATH REPORT.RELEVANT HX SPEC: ABNORMAL
PATHOLOGY SYNOPTIC REPORT: ABNORMAL
PHOTO IMAGE: ABNORMAL

## 2022-11-17 NOTE — TELEPHONE ENCOUNTER
I called and left a message that I will be out for medical leave and we can arrange follow up. I left a number to call: [phone].    Juan Brooks MD

## 2022-11-18 ENCOUNTER — ONCOLOGY VISIT (OUTPATIENT)
Dept: ONCOLOGY | Facility: CLINIC | Age: 67
End: 2022-11-18
Attending: SURGERY
Payer: COMMERCIAL

## 2022-11-18 ENCOUNTER — PATIENT OUTREACH (OUTPATIENT)
Dept: GERIATRIC MEDICINE | Facility: CLINIC | Age: 67
End: 2022-11-18

## 2022-11-18 VITALS
SYSTOLIC BLOOD PRESSURE: 104 MMHG | WEIGHT: 135 LBS | RESPIRATION RATE: 16 BRPM | BODY MASS INDEX: 22.68 KG/M2 | DIASTOLIC BLOOD PRESSURE: 69 MMHG | HEART RATE: 67 BPM | TEMPERATURE: 97.7 F | OXYGEN SATURATION: 99 %

## 2022-11-18 DIAGNOSIS — C50.912 INVASIVE DUCTAL CARCINOMA OF BREAST, FEMALE, LEFT (H): Primary | ICD-10-CM

## 2022-11-18 PROCEDURE — G0463 HOSPITAL OUTPT CLINIC VISIT: HCPCS

## 2022-11-18 ASSESSMENT — PAIN SCALES - GENERAL: PAINLEVEL: NO PAIN (0)

## 2022-11-18 NOTE — PROGRESS NOTES
[facility] Care Coordination Contact    Per [facility] access hospital site - members PCA units:    YOSI BYE [birth year]  [employer] PCA: Initial Assessment PCA:   8/9/2022 1/31/2023 Medically Necessary [id number]   YOSI BYE [birth year]  [employer] PCA: Initial Assessment PCA:   2/1/2023 7/31/2023 Medically Necessary [id number]        The total units breakdown to 14 units per day, which is 3.5 hours per day.  Updated member's plan of care.  Yary Francois RN  [facility]   [phone]

## 2022-11-18 NOTE — NURSING NOTE
"Oncology Rooming Note    November 18, 2022 9:40 AM   Debby Barriga is a 67 year old female who presents for:    Chief Complaint   Patient presents with     Oncology Clinic Visit     Rtn for Breast Cancer     Initial Vitals: Blood Pressure 104/69   Pulse 67   Temperature 97.7  F (36.5  C) (Oral)   Respiration 16   Weight 61.2 kg (135 lb)   Oxygen Saturation 99%   Body Mass Index 22.68 kg/m   Estimated body mass index is 22.68 kg/m  as calculated from the following:    Height as of 11/15/22: 1.643 m (5' 4.69\").    Weight as of this encounter: 61.2 kg (135 lb). Body surface area is 1.67 meters squared.  No Pain (0) Comment: Data Unavailable   No LMP recorded. Patient is postmenopausal.  Allergies reviewed: Yes  Medications reviewed: Yes    Medications: Medication refills not needed today.  Pharmacy name entered into EPIC:    [facility] [place], [place] - [address] [facility] [place], [place] - [address] [place] [address] [phone]  [facility] [address] [id number] - [place], [place] - [address] AT [facility] & [address] [place] [address] PHARMACY [place], [place] - [address]    Clinical concerns: none       Ashleigh Avila MA            "

## 2022-11-18 NOTE — PROGRESS NOTES
HISTORY OF PRESENT ILLNESS:  Debby Barriga is here for a postoperative visit after undergoing a left lumpectomy and a sentinel lymph node biopsy.  Her final pathology report demonstrated an 8 mm invasive ductal cancer, grade 1, ER positive, CO positive, HER2/roxy negative.  Her sentinel lymph node was negative.  She has done well since her surgery with no postoperative complications.    PHYSICAL EXAMINATION:  She has a small seroma in her left axilla.    IMPRESSION:  Postoperative check.    PLAN:  She has a follow-up with Oncology in about 2 weeks.  She knows she is going to need endocrine therapy.  I told her that chemotherapy would likely not be recommended.  She will probably need radiotherapy, but I will speak with the radiation oncologist.  I will see her in the future if any problems arise.

## 2022-11-18 NOTE — LETTER
11/18/2022         RE: Debby Barriga  [address]  [facility] [address]        Dear Colleague,    Thank you for referring your patient, Debby Barriga, to the [facility]. Please see a copy of my visit note below.    HISTORY OF PRESENT ILLNESS:  Debby Barriga is here for a postoperative visit after undergoing a left lumpectomy and a sentinel lymph node biopsy.  Her final pathology report demonstrated an 8 mm invasive ductal cancer, grade 1, ER positive, MA positive, HER2/roxy negative.  Her sentinel lymph node was negative.  She has done well since her surgery with no postoperative complications.    PHYSICAL EXAMINATION:  She has a small seroma in her left axilla.    IMPRESSION:  Postoperative check.    PLAN:  She has a follow-up with Oncology in about 2 weeks.  She knows she is going to need endocrine therapy.  I told her that chemotherapy would likely not be recommended.  She will probably need radiotherapy, but I will speak with the radiation oncologist.  I will see her in the future if any problems arise.    Sincerely,        Darin Morrow MD

## 2022-11-22 ENCOUNTER — TELEPHONE (OUTPATIENT)
Dept: FAMILY MEDICINE | Facility: CLINIC | Age: 67
End: 2022-11-22

## 2022-11-22 DIAGNOSIS — U07.1 INFECTION DUE TO 2019 NOVEL CORONAVIRUS: Primary | ICD-10-CM

## 2022-11-22 NOTE — TELEPHONE ENCOUNTER
Reason for Call:  Other prescription    Detailed comments: Josie Chavis called she wanted a prescription for covid treatment for Linus Guardado. The first appointment for treatment is until 11/30 but she wants the provider to send medication to the pharmacy. Per Josie Chavis- She can't wait that long, the patient had covid since Sunday.     Phone Number: [phone]- Josie Waddellw    Best Time: Any time     Can we leave a detailed message on this number? YES    Call taken on 11/22/2022 at 7:53 AM by Kaity Crockett

## 2022-11-22 NOTE — TELEPHONE ENCOUNTER
Latonya-Please advise if patient can be double booked onto your schedule for a virtual visit to address Paxlovid treatment?    Thank you!  OZ FlorezN, RN-BC  [facility]

## 2022-12-05 ENCOUNTER — ONCOLOGY VISIT (OUTPATIENT)
Dept: ONCOLOGY | Facility: CLINIC | Age: 67
End: 2022-12-05
Attending: INTERNAL MEDICINE
Payer: COMMERCIAL

## 2022-12-05 ENCOUNTER — APPOINTMENT (OUTPATIENT)
Dept: LAB | Facility: CLINIC | Age: 67
End: 2022-12-05
Attending: INTERNAL MEDICINE
Payer: COMMERCIAL

## 2022-12-05 VITALS
TEMPERATURE: 98.3 F | RESPIRATION RATE: 16 BRPM | SYSTOLIC BLOOD PRESSURE: 122 MMHG | HEART RATE: 65 BPM | WEIGHT: 133.1 LBS | BODY MASS INDEX: 22.37 KG/M2 | DIASTOLIC BLOOD PRESSURE: 66 MMHG | OXYGEN SATURATION: 99 %

## 2022-12-05 DIAGNOSIS — M81.0 AGE-RELATED OSTEOPOROSIS WITHOUT CURRENT PATHOLOGICAL FRACTURE: ICD-10-CM

## 2022-12-05 DIAGNOSIS — E55.9 AVITAMINOSIS D: ICD-10-CM

## 2022-12-05 DIAGNOSIS — C50.912 INVASIVE DUCTAL CARCINOMA OF BREAST, FEMALE, LEFT (H): Primary | ICD-10-CM

## 2022-12-05 DIAGNOSIS — M85.80 OSTEOPENIA, UNSPECIFIED LOCATION: ICD-10-CM

## 2022-12-05 LAB
DEPRECATED CALCIDIOL+CALCIFEROL SERPL-MC: 85 UG/L (ref 20–75)
HOLD SPECIMEN: NORMAL
HOLD SPECIMEN: NORMAL

## 2022-12-05 PROCEDURE — G0463 HOSPITAL OUTPT CLINIC VISIT: HCPCS

## 2022-12-05 PROCEDURE — 82306 VITAMIN D 25 HYDROXY: CPT | Performed by: INTERNAL MEDICINE

## 2022-12-05 PROCEDURE — 36415 COLL VENOUS BLD VENIPUNCTURE: CPT | Performed by: INTERNAL MEDICINE

## 2022-12-05 PROCEDURE — 99215 OFFICE O/P EST HI 40 MIN: CPT | Performed by: INTERNAL MEDICINE

## 2022-12-05 RX ORDER — LETROZOLE 2.5 MG/1
2.5 TABLET, FILM COATED ORAL DAILY
Qty: 90 TABLET | Refills: 3 | Status: SHIPPED | OUTPATIENT
Start: 2022-12-05 | End: 2023-10-24

## 2022-12-05 RX ORDER — CALCIUM CARBONATE 500(1250)
1 TABLET ORAL 2 TIMES DAILY
Qty: 180 TABLET | Refills: 3 | Status: SHIPPED | OUTPATIENT
Start: 2022-12-05 | End: 2024-01-18

## 2022-12-05 ASSESSMENT — PAIN SCALES - GENERAL: PAINLEVEL: NO PAIN (0)

## 2022-12-05 NOTE — LETTER
12/5/2022         RE: Debby Barriga  [address]  [facility] [address]        Dear Colleague,    Thank you for referring your patient, Debby Barriga, to the [facility]. Please see a copy of my visit note below.    [facility]  Hematology/Oncology Consultation    Debby Barriga MRN# [id number]   Age: 67 year old YOB: 1955       CC: Breast Cancer    HPI: Debby Barriga is a 67 year old postmenopausal woman with history of early Alzheimer's disease who had a screen detected [id number] HR+/HER2- IDC of the left breast. She is s/p partial mastectomy with SNLB with [id number] disease. She presents today for follow up post surgery.       Her full oncologic history is as follows:   Oncologic History  Patient Active Problem List    Diagnosis Date Noted     Invasive ductal carcinoma of breast, female, left (H) 10/12/2022     Priority: High     Left breast cancer    11/25/2022 screening mammogram showing possible asymmetry in the left breast, in the retroareolar plane position.  9/8/2022 diagnostic mammogram confirmed subareolar focal asymmetry shown on screening mammogram.  On targeted ultrasound the mass measured 6 x 5 x 5 mm at 6:00, no suspicious left axillary lymph nodes were noted.  9/20/2022 contrast-enhanced mammogram showing equivocal 6 mm focal enhancements at 6:00 in the periareolar region  9/20/2022 ultrasound-guided core biopsy at 6:00 showing grade 1 IDC.  Grade 2 DCIS.  Calcs associated with invasive carcinoma.  ER (3+, 95%), ME(2+, 70%), HER2 2+ on IHC and FISH 3.5/2.5=1.4  10/24/2022 partial mastectomy w/SLNB.  8 mm of grade 1 IDC.  Grade 2 DCIS.  No LVI.  Negative margins.  Markers were not repeated.  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        Myopic astigmatism of both eyes 2015     Priority: Medium     Presbyopia 2015     Priority: Medium     Generalized anxiety disorder 2013     Priority: Medium     Hyperlipidemia 2010     Priority: Medium     Major depression, recurrent (H) 2009     Priority: Medium     Health maintenance examination 2009     Priority: Medium     Overview:   Last PE, 2009  Last Pap, 07  Last Lipids:  Chol: 149    3/17/2011  T    3/17/2011  HDL:   44    3/17/2011  LDL:  91    3/17/2011  Mammo, 10/2002, (elsewhere), 2009-neg  Dexa:2009-osteopenia  Colonoscopy, 08       Plantar fasciitis 2009     Priority: Medium     Overview:   Chronic Left Plantar Fasciitis            Interval History  She is overall doing well since her surgery and has recovered well. She notes some mild sharp pain in her breast post surgery, but this seems to have largely resolved at this time.     She has otherwise been feeling well without any headaches, visual changes, cough, SOB, chest pain, n/v, constipation/diarrhea, abdominal pain, focal weakness or numbness.       Past Medical History  Past Medical History:   Diagnosis Date     Corneal ulcer of right eye 3-     Mild major depression (H) 2013     Tear of retina     right eye         Past Surgical History  Past Surgical History:   Procedure Laterality Date     BIOPSY NODE SENTINEL Left 10/24/2022    Procedure: Left seed-localized  lumpectomy with sentinel node biopsy;  Surgeon: Darin Morrow MD;  [place] [facility] [place]     LAPAROSCOPIC TUBAL LIGATION       LUMPECTOMY BREAST WITH SENTINEL NODE, COMBINED Left 10/24/2022    Procedure: Left seed-localized  lumpectomy with sentinel node biopsy;  Surgeon: Darin Morrow MD;  Location: [facility] [place]     ORTHOPEDIC  SURGERY      wrist         Family History  Family History   Problem Relation Age of Onset     Alzheimer Disease Mother      Prostate Cancer Father      Glaucoma No family hx of      Macular Degeneration No family hx of      Diabetes No family hx of      Coronary Artery Disease No family hx of      Hypertension No family hx of      Hyperlipidemia No family hx of      Cerebrovascular Disease No family hx of      Breast Cancer No family hx of      Anesthesia Reaction No family hx of      Deep Vein Thrombosis (DVT) No family hx of           Social History  Social History     Tobacco Use     Smoking status: Former     Types: Cigarettes     Quit date: 1982     Years since quittin.3     Smokeless tobacco: Never   Vaping Use     Vaping Use: Never used   Substance Use Topics     Alcohol use: No     Drug use: Not Currently      Social History     Social History Narrative     Not on file         Current Medications:  Current Outpatient Medications   Medication Sig Dispense Refill     acetaminophen (TYLENOL) 325 MG tablet Take 325-650 mg by mouth every 6 hours as needed for mild pain       Barberry-Oreg Grape-Goldenseal (BERBERINE COMPLEX PO) Take by mouth 2 times daily       calcium carbonate (OS-KAMAR) 500 MG tablet Take 1 tablet (500 mg) by mouth 2 times daily 180 tablet 3     cholecalciferol (VITAMIN D3) 125 mcg (5000 units) capsule Take 1 capsule (125 mcg) by mouth daily 90 capsule 3     letrozole (FEMARA) 2.5 MG tablet Take 1 tablet (2.5 mg) by mouth daily 90 tablet 3     magnesium 250 MG tablet Take 1 tablet by mouth 2 times daily       medium chain triglycerides, MCT OIL, (MCT OIL) oil Take 15 mLs by mouth every morning       multivitamin w/minerals (THERA-VIT-M) tablet Take 1 tablet by mouth every morning       Omega-3 Fish Oil 500 MG capsule Take 2,000 mg by mouth 2 times daily       venlafaxine (EFFEXOR XR) 150 MG 24 hr capsule TAKE 1 CAPSULE BY MOUTH EVERY DAY 90 capsule 0         ECOG Performance  Status:    Physical Examination:  /66   Pulse 65   Temp 98.3  F (36.8  C) (Oral)   Resp 16   Wt 60.4 kg (133 lb 1.6 oz)   SpO2 99%   BMI 22.37 kg/m    General:  Well appearing, well-nourished adult female in NAD.  HEENT:  Normocephalic.  Sclera anicteric.  MMM.  No lesions of the oropharynx.  Breast: s/p left partial mastectomy. Well healed   Lymph:  No cervical, supraclavicular, or axillary LAD.  Chest:  CTA bilaterally.  No wheezes or crackles.  CV:  RRR.  No murmurs or gallops  Abd:  Soft/NT/ND.  BS normoactive.  No hepatosplenomegaly.  Ext:  No pitting edema of the bilateral lower extremities.  Pulses 2+ and symmetric.  Musculo:  Strength 5/5 throughout.  Neuro:  Cranial nerves grossly intact.  Psych:  Mood and affect appear normal.    Laboratory Data:  Lab Results   Component Value Date    WBC 8.7 01/18/2022    HGB 12.3 10/18/2022    HCT 41.5 01/18/2022    MCV 91 01/18/2022     01/18/2022      Lab Results   Component Value Date     01/18/2022    BUN 11 01/18/2022    ANIONGAP 6 01/18/2022     Lab Results   Component Value Date    ALT 53 (H) 01/13/2022    AST 42 01/13/2022    ALKPHOS 41 01/13/2022     No results found for: INR, PT      Radiology data:  I have personally reviewed the images of / or the following radiology data:  Per oncology timeline    Pathology and other data:  I have personally reviewed the following data: Per oncology timeline      Assessment and Recommendations  Debby Barriga  is a 67 year old postmenopausal woman with history of early Alzheimer's disease who had a screen detected [id number] HR+/HER2- IDC of the left breast. She is s/p partial mastectomy with SNLB with [id number] disease. She presents today for follow up post surgery.     I had a lengthy discussion with the patient in which we reviewed her breast cancer diagnosis, workup and treatment history to date. We reviewed all of the relevant and available clinic notes, imaging, and pathology reports. Because she has  HR+ disease, she will need at least 5 years of hormonal therapy. We discussed the role of molecular testing to help us determine the additional benefit from chemotherapy. However in light of her having clinically low risk disease and underlying alzheimer's disease, chemotherapy is likely to cause more harm than offer additional benefit. In addition, even in the clinically higher risk patient population in Rxponder trial, there was no additional benefit of chemotherapy in patients who were postmenopausal. Therefore, we will proceed with hormone therapy alone without the addition of chemotherapy. Debby is agreeable to this plan.     We reviewed possible side effects from AI, including but not limited to urogenital atrophy, vasomotor symptoms, musculoskeletal/joint stiffness or pain, and the potential for acceleration of bone density loss. We also discussed the importance of exercise in helping to alleviate these symptoms.    We will obtain a baseline bone density scan and consider the addition of bone modifying agents. We discussed the role of adjuvant bisphosphonates in both preventing bone density loss and also their impact on breast cancer outcomes. Adjuvant bisphosphonates reduces the rate of breast cancer recurrence in the bone and improve breast cancer survival, but there is definite benefit only in women who were postmenopausal when treatment began.. We will decide on this after she has her DEXA scan. She will need dental clearance if we proceed with this.         RECOMMENDATIONS  - Start Letrozole  - Continue calcium and vitamin D  - DEXA scan   - Will consider bisphosphonates or Denosumab   - RTC in 3 months      45 minutes spent on the date of the encounter doing chart review, review of test results, interpretation of tests, patient visit, documentation and discussion with other provider(s)       Dame Pia MD   of Medicine  Division of Hematology, Oncology and Transplantation  [place]  [facility]  December 5, 2022

## 2022-12-05 NOTE — PROGRESS NOTES
[facility]  Hematology/Oncology Consultation    Debby Barriga MRN# [id number]   Age: 67 year old YOB: 1955       CC: Breast Cancer    HPI: Debby Barriga is a 67 year old postmenopausal woman with history of early Alzheimer's disease who had a screen detected [id number] HR+/HER2- IDC of the left breast. She is s/p partial mastectomy with SNLB with [id number] disease. She presents today for follow up post surgery.       Her full oncologic history is as follows:   Oncologic History  Patient Active Problem List    Diagnosis Date Noted     Invasive ductal carcinoma of breast, female, left (H) 10/12/2022     Priority: High     Left breast cancer    11/25/2022 screening mammogram showing possible asymmetry in the left breast, in the retroareolar plane position.  9/8/2022 diagnostic mammogram confirmed subareolar focal asymmetry shown on screening mammogram.  On targeted ultrasound the mass measured 6 x 5 x 5 mm at 6:00, no suspicious left axillary lymph nodes were noted.  9/20/2022 contrast-enhanced mammogram showing equivocal 6 mm focal enhancements at 6:00 in the periareolar region  9/20/2022 ultrasound-guided core biopsy at 6:00 showing grade 1 IDC.  Grade 2 DCIS.  Calcs associated with invasive carcinoma.  ER (3+, 95%), IN(2+, 70%), HER2 2+ on IHC and FISH 3.5/2.5=1.4  10/24/2022 partial mastectomy w/SLNB.  8 mm of grade 1 IDC.  Grade 2 DCIS.  No LVI.  Negative margins.  Markers were not repeated.  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        Myopic astigmatism of both eyes 03/18/2015     Priority: Medium      Presbyopia 2015     Priority: Medium     Generalized anxiety disorder 2013     Priority: Medium     Hyperlipidemia 2010     Priority: Medium     Major depression, recurrent (H) 2009     Priority: Medium     Health maintenance examination 2009     Priority: Medium     Overview:   Last PE, 2009  Last Pap, 07  Last Lipids:  Chol: 149    3/17/2011  T    3/17/2011  HDL:   44    3/17/2011  LDL:  91    3/17/2011  Mammo, 10/2002, (elsewhere), 2009-neg  Dexa:2009-osteopenia  Colonoscopy, 08       Plantar fasciitis 2009     Priority: Medium     Overview:   Chronic Left Plantar Fasciitis            Interval History  She is overall doing well since her surgery and has recovered well. She notes some mild sharp pain in her breast post surgery, but this seems to have largely resolved at this time.     She has otherwise been feeling well without any headaches, visual changes, cough, SOB, chest pain, n/v, constipation/diarrhea, abdominal pain, focal weakness or numbness.       Past Medical History  Past Medical History:   Diagnosis Date     Corneal ulcer of right eye 3-2015     Mild major depression (H) 2013     Tear of retina     right eye         Past Surgical History  Past Surgical History:   Procedure Laterality Date     BIOPSY NODE SENTINEL Left 10/24/2022    Procedure: Left seed-localized  lumpectomy with sentinel node biopsy;  Surgeon: Darin Morrow MD;  [place]: [facility] [place]     LAPAROSCOPIC TUBAL LIGATION       LUMPECTOMY BREAST WITH SENTINEL NODE, COMBINED Left 10/24/2022    Procedure: Left seed-localized  lumpectomy with sentinel node biopsy;  Surgeon: Darin Morrow MD;  Location: [facility] [place]     ORTHOPEDIC SURGERY      wrist         Family History  Family History   Problem Relation Age of Onset     Alzheimer Disease Mother      Prostate Cancer Father      Glaucoma No family hx of      Macular Degeneration No family hx of      Diabetes No family hx of       Coronary Artery Disease No family hx of      Hypertension No family hx of      Hyperlipidemia No family hx of      Cerebrovascular Disease No family hx of      Breast Cancer No family hx of      Anesthesia Reaction No family hx of      Deep Vein Thrombosis (DVT) No family hx of           Social History  Social History     Tobacco Use     Smoking status: Former     Types: Cigarettes     Quit date: 1982     Years since quittin.3     Smokeless tobacco: Never   Vaping Use     Vaping Use: Never used   Substance Use Topics     Alcohol use: No     Drug use: Not Currently      Social History     Social History Narrative     Not on file         Current Medications:  Current Outpatient Medications   Medication Sig Dispense Refill     acetaminophen (TYLENOL) 325 MG tablet Take 325-650 mg by mouth every 6 hours as needed for mild pain       Barberry-Oreg Grape-Goldenseal (BERBERINE COMPLEX PO) Take by mouth 2 times daily       calcium carbonate (OS-KAMAR) 500 MG tablet Take 1 tablet (500 mg) by mouth 2 times daily 180 tablet 3     cholecalciferol (VITAMIN D3) 125 mcg (5000 units) capsule Take 1 capsule (125 mcg) by mouth daily 90 capsule 3     letrozole (FEMARA) 2.5 MG tablet Take 1 tablet (2.5 mg) by mouth daily 90 tablet 3     magnesium 250 MG tablet Take 1 tablet by mouth 2 times daily       medium chain triglycerides, MCT OIL, (MCT OIL) oil Take 15 mLs by mouth every morning       multivitamin w/minerals (THERA-VIT-M) tablet Take 1 tablet by mouth every morning       Omega-3 Fish Oil 500 MG capsule Take 2,000 mg by mouth 2 times daily       venlafaxine (EFFEXOR XR) 150 MG 24 hr capsule TAKE 1 CAPSULE BY MOUTH EVERY DAY 90 capsule 0         ECOG Performance Status:    Physical Examination:  /66   Pulse 65   Temp 98.3  F (36.8  C) (Oral)   Resp 16   Wt 60.4 kg (133 lb 1.6 oz)   SpO2 99%   BMI 22.37 kg/m    General:  Well appearing, well-nourished adult female in NAD.  HEENT:  Normocephalic.  Sclera  anicteric.  MMM.  No lesions of the oropharynx.  Breast: s/p left partial mastectomy. Well healed   Lymph:  No cervical, supraclavicular, or axillary LAD.  Chest:  CTA bilaterally.  No wheezes or crackles.  CV:  RRR.  No murmurs or gallops  Abd:  Soft/NT/ND.  BS normoactive.  No hepatosplenomegaly.  Ext:  No pitting edema of the bilateral lower extremities.  Pulses 2+ and symmetric.  Musculo:  Strength 5/5 throughout.  Neuro:  Cranial nerves grossly intact.  Psych:  Mood and affect appear normal.    Laboratory Data:  Lab Results   Component Value Date    WBC 8.7 01/18/2022    HGB 12.3 10/18/2022    HCT 41.5 01/18/2022    MCV 91 01/18/2022     01/18/2022      Lab Results   Component Value Date     01/18/2022    BUN 11 01/18/2022    ANIONGAP 6 01/18/2022     Lab Results   Component Value Date    ALT 53 (H) 01/13/2022    AST 42 01/13/2022    ALKPHOS 41 01/13/2022     No results found for: INR, PT      Radiology data:  I have personally reviewed the images of / or the following radiology data:  Per oncology timeline    Pathology and other data:  I have personally reviewed the following data: Per oncology timeline      Assessment and Recommendations  Debby Barriga  is a 67 year old postmenopausal woman with history of early Alzheimer's disease who had a screen detected [id number] HR+/HER2- IDC of the left breast. She is s/p partial mastectomy with SNLB with [id number] disease. She presents today for follow up post surgery.     I had a lengthy discussion with the patient in which we reviewed her breast cancer diagnosis, workup and treatment history to date. We reviewed all of the relevant and available clinic notes, imaging, and pathology reports. Because she has HR+ disease, she will need at least 5 years of hormonal therapy. We discussed the role of molecular testing to help us determine the additional benefit from chemotherapy. However in light of her having clinically low risk disease and underlying alzheimer's  disease, chemotherapy is likely to cause more harm than offer additional benefit. In addition, even in the clinically higher risk patient population in Rxponder trial, there was no additional benefit of chemotherapy in patients who were postmenopausal. Therefore, we will proceed with hormone therapy alone without the addition of chemotherapy. Debby is agreeable to this plan.     We reviewed possible side effects from AI, including but not limited to urogenital atrophy, vasomotor symptoms, musculoskeletal/joint stiffness or pain, and the potential for acceleration of bone density loss. We also discussed the importance of exercise in helping to alleviate these symptoms.    We will obtain a baseline bone density scan and consider the addition of bone modifying agents. We discussed the role of adjuvant bisphosphonates in both preventing bone density loss and also their impact on breast cancer outcomes. Adjuvant bisphosphonates reduces the rate of breast cancer recurrence in the bone and improve breast cancer survival, but there is definite benefit only in women who were postmenopausal when treatment began.. We will decide on this after she has her DEXA scan. She will need dental clearance if we proceed with this.         RECOMMENDATIONS  - Start Letrozole  - Continue calcium and vitamin D  - DEXA scan   - Will consider bisphosphonates or Denosumab   - RTC in 3 months      45 minutes spent on the date of the encounter doing chart review, review of test results, interpretation of tests, patient visit, documentation and discussion with other provider(s)       Dame Pia MD   of Medicine  Division of Hematology, Oncology and Transplantation  [facility]  December 5, 2022

## 2022-12-05 NOTE — NURSING NOTE
"Oncology Rooming Note    December 5, 2022 11:39 AM   Debby Barriga is a 67 year old female who presents for:    Chief Complaint   Patient presents with     Blood Draw     Labs collected from venipuncture by RN. Vitals taken. Checked in for appointment(s).      Oncology Clinic Visit     BREAST CANCER     Initial Vitals: /66   Pulse 65   Temp 98.3  F (36.8  C) (Oral)   Resp 16   Wt 60.4 kg (133 lb 1.6 oz)   SpO2 99%   BMI 22.37 kg/m   Estimated body mass index is 22.37 kg/m  as calculated from the following:    Height as of 11/15/22: 1.643 m (5' 4.69\").    Weight as of this encounter: 60.4 kg (133 lb 1.6 oz). Body surface area is 1.66 meters squared.  No Pain (0) Comment: Data Unavailable   No LMP recorded. Patient is postmenopausal.  Allergies reviewed: Yes  Medications reviewed: Yes    Medications: Medication refills not needed today.  Pharmacy name entered into AIM:    [facility] - [place], [place] - [address] [facility] [place], [place] - [address] [place] [address] [phone]  [email or web address] - [facility] [address] [place] [address] DRUG STORE #[id number] - [place], [place] - [address] AT [facility] & [address] [place] [address] PHARMACY [place], [place] - [address]    Clinical concerns: none       Lesa Gaspar EMT            "

## 2022-12-13 ENCOUNTER — THERAPY VISIT (OUTPATIENT)
Dept: PHYSICAL THERAPY | Facility: CLINIC | Age: 67
End: 2022-12-13
Payer: COMMERCIAL

## 2022-12-13 DIAGNOSIS — G44.219 EPISODIC TENSION-TYPE HEADACHE, NOT INTRACTABLE: Primary | ICD-10-CM

## 2022-12-13 PROCEDURE — 97112 NEUROMUSCULAR REEDUCATION: CPT | Mod: GP | Performed by: PHYSICAL THERAPIST

## 2022-12-13 PROCEDURE — 97140 MANUAL THERAPY 1/> REGIONS: CPT | Mod: GP | Performed by: PHYSICAL THERAPIST

## 2022-12-19 ENCOUNTER — TELEPHONE (OUTPATIENT)
Dept: ONCOLOGY | Facility: CLINIC | Age: 67
End: 2022-12-19

## 2022-12-19 ENCOUNTER — PATIENT OUTREACH (OUTPATIENT)
Dept: ONCOLOGY | Facility: CLINIC | Age: 67
End: 2022-12-19

## 2022-12-19 DIAGNOSIS — C50.912 INVASIVE DUCTAL CARCINOMA OF BREAST, FEMALE, LEFT (H): Primary | ICD-10-CM

## 2022-12-19 NOTE — PROGRESS NOTES
Returned call to patient writer will reach out to Dr Brooks for plan with Radiation Oncology post surgery. Patient has started Letrozole two weeks ago and has no symptoms or side effects at this time. Will return call to patient with Dr Brooks's recommendations. Melina Negro RN, BSN Breast Center Nurse Coordinator  Dr Brooks recommended a Radiation Oncology appointment and he will see her this Thursday in clinic. Radiation Oncology referral sent and message sent to scheduling to schedule appointment for lab work and appointment with Dr Brooks 12/22/2022. Scheduling will clarify if she will see Dr Brooks or continue with Dr Palacio. Melina Negro RN, BSN  Breast Center Nurse Coordinator

## 2022-12-19 NOTE — TELEPHONE ENCOUNTER
I spoke with Debby Galindo's partner and she would like to follow up with Dr. Palacio.  Radiation Oncology request for consult was ordered but not scheduled and I'll check with scheduling.    Juan

## 2022-12-20 NOTE — PROGRESS NOTES
2022    Debby Barriga  [phone] (home)   : 1955  MRN: [id number]       RADIATION ONCOLOGY CONSULT    CC: Dame Pia MD (Hematology Oncology)    Identification and Purpose of Visit:  Debby Barriga is a 67 year old woman with recently diagnosed invasive ductal carcinoma of the central lower aspect of the left breast, ER95%, PR70%. HER2/roxy 2+ by IHC, Ki-67 not reported, status post partial mastectomy and sentinel lymph node biopsy on 10/24/2022. Pathology revealed pStage  pT1b (8mm), pN0 (0/2 SLN),cM0,  Negative LVI, grade 1, resected to negative margins. She is referred by Dr. Juan Brooks for consultation to discuss the potential role for radiotherapy.    Past radiation oncologist: None  Medical oncologist: Dame Pia JEFF  Surgical oncologist: Darin Morrow MD  Reconstructive surgeon: NA    History of Present Illness:  Ms. Barriga is a 67 year old woman with a new diagnosis of invasive ductal carcinoma of the left lower central breast that was detected on routine screening mammogram on 22, demonstrating an asymmetry along the retroareolar plane at subareolar depth of the left breast. Diagnostic mammogram and ultrasound performed on 22 demonstrated a 6 mm mass at the 6 o'clock position, with contrast-enhanced mammogram on 22 demonstrating an equivocal 6 mm area of enhancement. Biopsy on 22 demonstrated Grade 1 invasive ductal carcinoma, ER+(95%)/MD+(70%), HER2-roxy 2+ by IHC and HER2-roxy negative by FISH, and Nuclear Grade 2 DCIS with cribiform and solid features. She was seen by Dr. Darin Morrow of Surgical Oncology who discussed options including mastectomy with or without reconstruction versus breast-conserving surgery, with the patient. The patient then saw Dr. Juan Brooks of Medical Oncology who discussed treatment options.     She underwent left seed localized lumpectomy and left axillary sentinel lyph node biopsy on 10/24/22 with Dr. Morrow. An additional anterior margin was taken. 2  blue sentinel lymph nodes (neither radioactive) were taken. Pathology ([id number]) demonstrated an 8 mm Grade 1 invasive ductal carcinoma without lymphovascular invasion. There was an admixed and adjacent associated Nuclear Grade 2 DCIS with cribiform and solid types that measured 6 mm. Margins for the invasive component were clear, 3 mm from the nearest (anterior) margin, and the DCIS margin was 4 mm from the nearest (anterior) margin. 0/2 sentinel lymph nodes. Pathologic stage IA,  T1bN0(sn)M0.     She was seen by Dr. Dame Palacio of Medical Oncology post-operatively on 22 who has recommended at least 5 years of hormonal therapy. She was started on letrozole on 22. Given post-menopausal status as well as Alzheimer's disease, Dr. Palacio discussed the utility of adjuvant chemotherapy is low.     At the time of diagnosis, she did not note pain or associated changes to the skin or nipple.    On interview today, the patient and Josie noted some swelling in the axilla following surgery. She had some pain after surgery, but currently not having any pain. She started Letrozole and has not noted any side effects. No range of motion issues or swelling in the arm. Ms. Barriga is seen in consultation to discuss the potential role for radiotherapy. She is accompanied by Josie, who corroborated the above history.        Breast cancer risk factors:  Menarche: 17 years old  Menopause: Mid-50s  Obstetrics:   Age at first birth: NA  Breast feeding: NA  Contraceptive use: No  Hormone replacement use: None  Family history: See below.    Review of Systems: As per HPI; otherwise, a 10 system review is unremarkable    Past Medical History    Corneal ulcer of the right eye    Mild major depression    Right eye retinal tear    Early onset Alzheimer's disease    Past Surgical History    Laparascopic tubal ligation    Left seed-localized lumpectomy with sentinel node biopsy    Wrist surgery     Current Medications:    Current  Outpatient Medications:      acetaminophen (TYLENOL) 325 MG tablet, Take 325-650 mg by mouth every 6 hours as needed for mild pain, Disp: , Rfl:      Barberry-Oreg Grape-Goldenseal (BERBERINE COMPLEX PO), Take by mouth 2 times daily, Disp: , Rfl:      calcium carbonate (OS-KAMAR) 500 MG tablet, Take 1 tablet (500 mg) by mouth 2 times daily, Disp: 180 tablet, Rfl: 3     cholecalciferol (VITAMIN D3) 125 mcg (5000 units) capsule, Take 1 capsule (125 mcg) by mouth daily, Disp: 90 capsule, Rfl: 3     letrozole (FEMARA) 2.5 MG tablet, Take 1 tablet (2.5 mg) by mouth daily, Disp: 90 tablet, Rfl: 3     magnesium 250 MG tablet, Take 1 tablet by mouth 2 times daily, Disp: , Rfl:      medium chain triglycerides, MCT OIL, (MCT OIL) oil, Take 15 mLs by mouth every morning, Disp: , Rfl:      multivitamin w/minerals (THERA-VIT-M) tablet, Take 1 tablet by mouth every morning, Disp: , Rfl:      Omega-3 Fish Oil 500 MG capsule, Take 2,000 mg by mouth 2 times daily, Disp: , Rfl:      venlafaxine (EFFEXOR XR) 150 MG 24 hr capsule, TAKE 1 CAPSULE BY MOUTH EVERY DAY, Disp: 90 capsule, Rfl: 0    Current Facility-Administered Medications:      sodium chloride (PF) 0.9% PF flush 3 mL, 3 mL, Intravenous, Q1H PRN, Jacoby Jon MD, 3 mL at 01/18/22 1248    Allergies:  Allergies   Allergen Reactions     Hydrocodone      Ears ringing  Questionable allergy Ears ringing  Ears ringing     Trazodone      Other reaction(s): Headache        Social History:    Former cigarette smoker in her 20s, quit in 1982    Previous history of significant alcohol use in her 20s, but no current alcohol use    Previously worked as a     Partner Josie and patient reside in [place], [place]    Her mother has had no history of breast cancer    Patient has no contact with other family members, and does not know family history    Mother had Alzheimer's disease, but unknown how she did    Physical Exam:  KPS: 90   /69   Pulse 84   Wt 60.7 kg  (133 lb 14.4 oz)   SpO2 96%   BMI 22.50 kg/m  , Pain Score Data Unavailable/10  General: Alert and in no acute distress.  HEENT: Normocephalic, atraumatic. No visible scleral icterus.  Neck: Apparent full range of motion.  CV: Appears well-perfused, with no visible cyanosis.  Lungs: Breathing easily on room air, with no difficulty completing full sentences  Extremities:  No visible edema of the upper extremities. Full range of motion at the shoulders and elbows.    Neuro: Alert and oriented; grossly nonfocal. Normal speech. Moving upper extremities equally.  Skin: No visible jaundice. No suspicious lesions of the visualized integuments.  Psych: Mood and affect are appropriate to given situation. Answers questions appropriately.      Breast Exam: Asymmetry noted with left being smaller than the right.   Right breast: No palpable masses or skin changes. Nipple normal and without discharge.    Left breast: No palpable masses or skin changes. Nipple normal and without discharge. Both axilla, infra and supraclavicular region without lymphadenopathy. No evidence of infection.     Labs:  Lab Results   Component Value Date    WBC 8.7 01/18/2022    HGB 12.3 10/18/2022    HCT 41.5 01/18/2022     01/18/2022    CHOL 296 (H) 10/11/2021    ALT 53 (H) 01/13/2022    AST 42 01/13/2022     01/18/2022    BUN 11 01/18/2022    CO2 26 01/18/2022    TSH 0.67 02/11/2020     Imaging:  Screening Mammogram 8/25/22  BILATERAL FULL FIELD DIGITAL SCREENING MAMMOGRAM WITH TOMOSYNTHESIS     Performed on: 8/25/22     Compared to: 08/24/2021, 01/20/2020, 01/06/2020, and 11/10/2011     Technique:  This study was evaluated with the assistance of Computer-Aided Detection.  Breast Tomosynthesis was used in interpretation.     Findings: The breasts have scattered areas of fibroglandular density.     There is a possible asymmetry in the left breast on the CC view in the retroareolar plane position, subareolar depth.     The remainder of the  breast tissue is unremarkable.  There is no suspicious finding of the right breast.     IMPRESSION: BI-RADS CATEGORY: 0 - Incomplete - Need Additional Imaging     RECOMMENDED FOLLOW-UP: Additional mammographic images and possible ultrasound of the left breast.     The results and recommendations of this examination will be communicated to the patient and the imaging center will attempt to contact the patient within 2-3 business days to schedule follow-up imaging.      Diagnostic Left Mammogram 9/8/22  BREAST DENSITY: Scattered fibroglandular densities.     Findings:     Additional mammogram views obtained to further evaluate possible  asymmetry described on 8/25/2022 screening mammogram.  Additional  views confirm subareolar focal asymmetry that correlates with  callback.     Targeted ultrasound by the technologist and radiologist demonstrates  irregular hypoechoic shadowing/mass, 6 x 5 x 5 mm, inferior subareolar  region near 6:00 favored to correlate with mammogram finding.  No  suspicious left axillary lymph nodes .                                                                      IMPRESSION: BI-RADS CATEGORY: 4 - Suspicious.     RECOMMENDED FOLLOW-UP: Biopsy.     Contrast enhanced mammogram followed by ultrasound guided core needle  left breast biopsy.     The patient and spouse were given the results of the examination.     Contrast Enhanced Mammogram 9/20/22  BREAST DENSITY: Scattered fibroglandular densities.     Findings: Minimal bilateral background parenchymal enhancement.   Equivocal 6 mm focal enhancement 6:00 periareolar region at site of  mammogram finding described on 9/8/2022.  Otherwise negative for  abnormal enhancement in either breast.                                                                      IMPRESSION: BI-RADS CATEGORY: 4 - Suspicious.     RECOMMENDED FOLLOW-UP: Biopsy.    Pathology:  Case: [id number]                                   Authorizing Provider:  Darin Morrow MD          Collected:           10/24/2022 12:42 PM           Ordering Location:     [facility] OR  Received:            10/24/2022 01:09 PM                                  [place]                                                                   Pathologist:           Dafne Williamson MD                                                    Specimens:   A) - Breast, Left, LEFT BREAST MASS                                                                  B) - Lymph Node(s), Axillary, Left, LEFT AXILLARY SENTINEL LYMPH NODE #1                             C) - Lymph Node(s), Axillary, Left, LEFT AXILLARY SENTINEL LYMPH NODE #2                             D) - Breast, Left, LEFT ANTERIOR MARGIN OF LUMPECTOMY / STITCH AT NEW MARGIN               Final Diagnosis   A. LEFT breast, seed-localized lumpectomy:  -INVASIVE BREAST CARCINOMA OF NO SPECIAL TYPE (INVASIVE DUCTAL CARCINOMA), RAGHAVENDRA GRADE 1, size 8 mm  -Ductal carcinoma in situ (DCIS), nuclear grade 2, cribriform and solid type(s)  -DCIS is admixed with and adjacent to invasive carcinoma, and is a minor component of the tumor (size 6 mm)  -No lymphovascular invasion identified  -Margins are uninvolved by invasive carcinoma and DCIS  -Invasive carcinoma is 3 mm from the nearest (anterior) margin of this specimen (not a final margin, see specimen D), 5 mm from the superior and medial margins, and > 5 mm from the posterior, inferior and lateral margin(s)  -DCIS is 4 mm from the nearest (anterior) margin of this specimen (not a final margin, see specimen D) and > 5 mm from the posterior, superior, inferior, medial and lateral margin(s)  -Other findings: fibrocystic change (including microcysts with apocrine metaplasia) and duct ectasia  -Calcifications associated with invasive carcinoma  -Prior core biopsy site changes  -Invasive carcinoma is estrogen receptor positive (95%, strong intensity), progesterone receptor positive (70%, moderate intensity) and  HER2 equivocal (score 2+) by immunohistochemistry and is HER2 non-amplified (group 5) by FISH (HER2 signals/nucleus 3.5, HER2:CEN17 ratio 1.4) (performed on prior core biopsy, see report [id number])  -See tumor synoptic below     B. Lymph node, LEFT axillary, sentinel #1, excision:  -One benign lymph node (0/1)     C. Lymph node, LEFT axillary, sentinel #2, excision:  -One benign lymph node (0/1)     D. LEFT breast, new anterior margin, excision:  -Benign breast tissue with fibrocystic change (including microcysts with apocrine metaplasia)  -Negative for atypia or malignancy               Radiation Oncology Pre-Treatment Evaluation:  Pacemaker: denies  Prior radiation: denies  Pregnancy status: NA post menopausal    History of lupus, scleroderma, connective tissue disorders and collagen vascular disease: denies  Pain: 0/10  Pain Plan: NA  Intent of treatment: curative/palliative: curative, adjuvant  Side Effects of Radiation: We discussed in detail the management of treatment side effects and provided educational materials regarding the self-care of the most common side effects.    Impression and Plan:   Debby Barriga is a 67 year old woman with recently diagnosed invasive ductal carcinoma of the lower central aspect of the left breast, ER (95%), ME (70%). HER2/roxy 2+ by IHC, HER2/roxy negative by FISH, Ki-67 not performed, status post lumpectomy and sentinel lymph node biopsy. Pathology revealed pStage IA, pT1b (8mm), pN0(0/2SLN),cM0, negative LVI, grade 1, resected to negative margins. She also had admixed Nuclear Grade 2 DCIS measuring 6 mm resected to negative margins. OncotypeDx not sent. The patient has been recommended adjuvant aromatase inhibitor which she has started and has been tolerating without side effects. She is seen in consultation approximately 8 weeks after surgery.    Ms. Barriga meets criteria for PRIME II with a tumor that is less than 3 cm, margins negative, lymph node negative, ER+/ME+ without  adverse pathologic features, and has been initiated on hormonal therapy. 1.3% vs 4.1% difference in 5 year local relapse, but no excess of distant relapse or death. The 5 year overall survival was equivalently high in both groups, 93.9%. We also discussed the 10 year follow-up data that demonstrated sustained benefit to radiation, 1% versus 9% without radiation at 10 years. Towards this, we also discussed omission of radiation as a feasible option for the patient.     We reviewed the natural history of this diagnosis. We reviewed the options in general terms which include completion mastectomy versus adjuvant radiation treatment versus observation, with or without adjuvant systemic therapy (at the discretion of her Medical Oncology team).    To the breast and adjacent axilla, we discussed various regimens including hypofractionated radiation, partial breast radiation, and the FAST-Forward regimen. We discussed that we would recommend the FAST-Forward regimen given tumor biology and patient's quality of life. We also discussed that we would not recommend partial breast radiation  The risks discussed included, but were not limited to potential for fatigue, skin redness or peeling with desquamation in the short term and long-term risks of mild to moderate fibrosis, chance of inferior cosmesis, and lymphedema, as well as rare risk of symptomatic lung fibrosis, cardiotoxicity, rib fracture and secondary malignancy.    Plan:  1) We discussed options including no treatment, or ultrahypofractionation treatment of the whole left breast, 2600 cGy in 5 daily fractions, without boost, with deep inspiratory breath hold.   2) The patient was encouraged to contact us either via phone or MyChart about her decision whether she would like to pursue treatment.     The patient was seen and assessed by my attending, Dr. Barrera, who agrees with the above assessment and plan.    Peg Sy MD PGY4  Department of Radiation  Oncology  [phone] [facility]  [phone] Pager    Physician Attestation   I, Bri Barrera, saw this patient and agree with the findings and plan of care as documented in the note.   I was present for key portions of the history and physical exam. Briefly,Ms. Barriga is a 67 year old woman with recently diagnosed invasive ductal carcinoma of the central lower aspect of the left breast, ER95%, PR70%. HER2/roxy 2+ by IHC, Ki-67 not reported, status post partial mastectomy and sentinel lymph node biopsy on 10/24/2022. Pathology revealed pStage  pT1b (8mm), pN0 (0/2 SLN),cM0, negative LVI, grade 1, resected to negative margins. We discussed options including standard hypofractionation, ultra hypofractionated whole breast radiation as per FAST-Forward in 5 fractions, and ultra hypofractionated partial breast radiation in 5 fractions.  We also discussed omission of radiation as per Prime II.  She would be a reasonably eligible for any of these approaches.  We discussed the pros and cons of omission of radiation, including the fact that local recurrence is higher with omission of radiation but noting that the absolute risk remains low at about 10% at 10 years postsurgery with AI alone.  We also noted that these results should be balanced with the fact that life expectancy for the average 65-year-old is greater than 10 years and that some patients may not complete a full course of endocrine therapy.    As an addendum, Ms. Barriga and her partner have elected to omit radiation in favor of pursuing endocrine therapy alone.  We discussed that this is very reasonable in her case.  She will continue with surveillance imaging and follow-up with medical oncology.  She will return to our clinic on an as-needed basis.    We appreciate the opportunity to participate in Ms. Barriga's care. She was encouraged to contact me with questions or concerns should they arise.    I personally reviewed the available mammogram images. Laboratory data and  pathology as noted above was reviewed. I reviewed previous medical records, which are summarized in the HPI. A total of 45 minutes were spent (including face to face time) during this visit, and more than 50% of the time was spent in counseling and coordination of care.     Bri Barrera MD MPH PhD    Department of Radiation Oncology

## 2022-12-23 ENCOUNTER — OFFICE VISIT (OUTPATIENT)
Dept: RADIATION ONCOLOGY | Facility: CLINIC | Age: 67
End: 2022-12-23
Attending: INTERNAL MEDICINE
Payer: COMMERCIAL

## 2022-12-23 VITALS
DIASTOLIC BLOOD PRESSURE: 69 MMHG | BODY MASS INDEX: 22.5 KG/M2 | HEART RATE: 84 BPM | OXYGEN SATURATION: 96 % | WEIGHT: 133.9 LBS | SYSTOLIC BLOOD PRESSURE: 104 MMHG

## 2022-12-23 DIAGNOSIS — C50.912 INVASIVE DUCTAL CARCINOMA OF BREAST, FEMALE, LEFT (H): ICD-10-CM

## 2022-12-23 PROCEDURE — 99204 OFFICE O/P NEW MOD 45 MIN: CPT | Mod: GC | Performed by: RADIOLOGY

## 2022-12-23 PROCEDURE — G0463 HOSPITAL OUTPT CLINIC VISIT: HCPCS

## 2022-12-23 PROCEDURE — G0463 HOSPITAL OUTPT CLINIC VISIT: HCPCS | Performed by: RADIOLOGY

## 2022-12-23 ASSESSMENT — ENCOUNTER SYMPTOMS
DIZZINESS: 0
HEMATURIA: 0
FREQUENCY: 0
DEPRESSION: 0
INSOMNIA: 0
TINGLING: 0
FALLS: 0
BRUISES/BLEEDS EASILY: 0
NECK PAIN: 0
BLOOD IN STOOL: 0
SHORTNESS OF BREATH: 0
WEIGHT LOSS: 0
SEIZURES: 0
NAUSEA: 0
DIAPHORESIS: 0
VOMITING: 0
CHILLS: 0
DOUBLE VISION: 0
BACK PAIN: 0
CONSTIPATION: 0
COUGH: 0
SORE THROAT: 0
DYSURIA: 0
FEVER: 0
EYE PAIN: 0
BLURRED VISION: 0
NERVOUS/ANXIOUS: 0
HEADACHES: 0
DIARRHEA: 0

## 2022-12-23 NOTE — LETTER
2022         RE: Debby Barriga  [address]  [facility] [address]        Dear Colleague,    Thank you for referring your patient, Debby Barriga, to the [facility] RADIATION ONCOLOGY. Please see a copy of my visit note below.    2022    Debby Barriga  [phone] (home)   : 1955  MRN: [id number]       RADIATION ONCOLOGY CONSULT    CC: Dame Pia MD (Hematology Oncology)    Identification and Purpose of Visit:  Debby Barriga is a 67 year old woman with recently diagnosed invasive ductal carcinoma of the central lower aspect of the left breast, ER95%, PR70%. HER2/roxy 2+ by IHC, Ki-67 not reported, status post partial mastectomy and sentinel lymph node biopsy on 10/24/2022. Pathology revealed pStage  pT1b (8mm), pN0 (0/2 SLN),cM0,  Negative LVI, grade 1, resected to negative margins. She is referred by Dr. Juan Brooks for consultation to discuss the potential role for radiotherapy.    Past radiation oncologist: None  Medical oncologist: Dame Pia JEFF  Surgical oncologist: Darin Morrow MD  Reconstructive surgeon: NA    History of Present Illness:  Ms. Barriga is a 67 year old woman with a new diagnosis of invasive ductal carcinoma of the left lower central breast that was detected on routine screening mammogram on 22, demonstrating an asymmetry along the retroareolar plane at subareolar depth of the left breast. Diagnostic mammogram and ultrasound performed on 22 demonstrated a 6 mm mass at the 6 o'clock position, with contrast-enhanced mammogram on 22 demonstrating an equivocal 6 mm area of enhancement. Biopsy on 22 demonstrated Grade 1 invasive ductal carcinoma, ER+(95%)/OR+(70%), HER2-roxy 2+ by IHC and HER2-roxy negative by FISH, and Nuclear Grade 2 DCIS with cribiform and solid features. She was seen by Dr. Darin Morrow of Surgical Oncology who discussed options including mastectomy with or without reconstruction versus breast-conserving surgery, with the patient. The  patient then saw Dr. Juan Brooks of Medical Oncology who discussed treatment options.     She underwent left seed localized lumpectomy and left axillary sentinel lyph node biopsy on 10/24/22 with Dr. Morrow. An additional anterior margin was taken. 2 blue sentinel lymph nodes (neither radioactive) were taken. Pathology ([id number]) demonstrated an 8 mm Grade 1 invasive ductal carcinoma without lymphovascular invasion. There was an admixed and adjacent associated Nuclear Grade 2 DCIS with cribiform and solid types that measured 6 mm. Margins for the invasive component were clear, 3 mm from the nearest (anterior) margin, and the DCIS margin was 4 mm from the nearest (anterior) margin. 0/2 sentinel lymph nodes. Pathologic stage IA,  T1bN0(sn)M0.     She was seen by Dr. Dame Palacio of Medical Oncology post-operatively on 22 who has recommended at least 5 years of hormonal therapy. She was started on letrozole on 22. Given post-menopausal status as well as Alzheimer's disease, Dr. Palacio discussed the utility of adjuvant chemotherapy is low.     At the time of diagnosis, she did not note pain or associated changes to the skin or nipple.    On interview today, the patient and Josie noted some swelling in the axilla following surgery. She had some pain after surgery, but currently not having any pain. She started Letrozole and has not noted any side effects. No range of motion issues or swelling in the arm. Ms. Barriga is seen in consultation to discuss the potential role for radiotherapy. She is accompanied by Josie, who corroborated the above history.        Breast cancer risk factors:  Menarche: 17 years old  Menopause: Mid-50s  Obstetrics:   Age at first birth: NA  Breast feeding: NA  Contraceptive use: No  Hormone replacement use: None  Family history: See below.    Review of Systems: As per HPI; otherwise, a 10 system review is unremarkable    Past Medical History    Corneal ulcer of the right eye    Mild  major depression    Right eye retinal tear    Early onset Alzheimer's disease    Past Surgical History    Laparascopic tubal ligation    Left seed-localized lumpectomy with sentinel node biopsy    Wrist surgery     Current Medications:    Current Outpatient Medications:      acetaminophen (TYLENOL) 325 MG tablet, Take 325-650 mg by mouth every 6 hours as needed for mild pain, Disp: , Rfl:      Barberry-Oreg Grape-Goldenseal (BERBERINE COMPLEX PO), Take by mouth 2 times daily, Disp: , Rfl:      calcium carbonate (OS-KAMAR) 500 MG tablet, Take 1 tablet (500 mg) by mouth 2 times daily, Disp: 180 tablet, Rfl: 3     cholecalciferol (VITAMIN D3) 125 mcg (5000 units) capsule, Take 1 capsule (125 mcg) by mouth daily, Disp: 90 capsule, Rfl: 3     letrozole (FEMARA) 2.5 MG tablet, Take 1 tablet (2.5 mg) by mouth daily, Disp: 90 tablet, Rfl: 3     magnesium 250 MG tablet, Take 1 tablet by mouth 2 times daily, Disp: , Rfl:      medium chain triglycerides, MCT OIL, (MCT OIL) oil, Take 15 mLs by mouth every morning, Disp: , Rfl:      multivitamin w/minerals (THERA-VIT-M) tablet, Take 1 tablet by mouth every morning, Disp: , Rfl:      Omega-3 Fish Oil 500 MG capsule, Take 2,000 mg by mouth 2 times daily, Disp: , Rfl:      venlafaxine (EFFEXOR XR) 150 MG 24 hr capsule, TAKE 1 CAPSULE BY MOUTH EVERY DAY, Disp: 90 capsule, Rfl: 0    Current Facility-Administered Medications:      sodium chloride (PF) 0.9% PF flush 3 mL, 3 mL, Intravenous, Q1H PRN, Jacoby Jon MD, 3 mL at 01/18/22 1248    Allergies:  Allergies   Allergen Reactions     Hydrocodone      Ears ringing  Questionable allergy Ears ringing  Ears ringing     Trazodone      Other reaction(s): Headache        Social History:    Former cigarette smoker in her 20s, quit in 1982    Previous history of significant alcohol use in her 20s, but no current alcohol use    Previously worked as a     Partner Josie and patient reside in [place], [place]    Her mother  has had no history of breast cancer    Patient has no contact with other family members, and does not know family history    Mother had Alzheimer's disease, but unknown how she did    Physical Exam:  KPS: 90   /69   Pulse 84   Wt 60.7 kg (133 lb 14.4 oz)   SpO2 96%   BMI 22.50 kg/m  , Pain Score Data Unavailable/10  General: Alert and in no acute distress.  HEENT: Normocephalic, atraumatic. No visible scleral icterus.  Neck: Apparent full range of motion.  CV: Appears well-perfused, with no visible cyanosis.  Lungs: Breathing easily on room air, with no difficulty completing full sentences  Extremities:  No visible edema of the upper extremities. Full range of motion at the shoulders and elbows.    Neuro: Alert and oriented; grossly nonfocal. Normal speech. Moving upper extremities equally.  Skin: No visible jaundice. No suspicious lesions of the visualized integuments.  Psych: Mood and affect are appropriate to given situation. Answers questions appropriately.      Breast Exam: Asymmetry noted with left being smaller than the right.   Right breast: No palpable masses or skin changes. Nipple normal and without discharge.    Left breast: No palpable masses or skin changes. Nipple normal and without discharge. Both axilla, infra and supraclavicular region without lymphadenopathy. No evidence of infection.     Labs:  Lab Results   Component Value Date    WBC 8.7 01/18/2022    HGB 12.3 10/18/2022    HCT 41.5 01/18/2022     01/18/2022    CHOL 296 (H) 10/11/2021    ALT 53 (H) 01/13/2022    AST 42 01/13/2022     01/18/2022    BUN 11 01/18/2022    CO2 26 01/18/2022    TSH 0.67 02/11/2020     Imaging:  Screening Mammogram 8/25/22  BILATERAL FULL FIELD DIGITAL SCREENING MAMMOGRAM WITH TOMOSYNTHESIS     Performed on: 8/25/22     Compared to: 08/24/2021, 01/20/2020, 01/06/2020, and 11/10/2011     Technique:  This study was evaluated with the assistance of Computer-Aided Detection.  Breast Tomosynthesis  was used in interpretation.     Findings: The breasts have scattered areas of fibroglandular density.     There is a possible asymmetry in the left breast on the CC view in the retroareolar plane position, subareolar depth.     The remainder of the breast tissue is unremarkable.  There is no suspicious finding of the right breast.     IMPRESSION: BI-RADS CATEGORY: 0 - Incomplete - Need Additional Imaging     RECOMMENDED FOLLOW-UP: Additional mammographic images and possible ultrasound of the left breast.     The results and recommendations of this examination will be communicated to the patient and the imaging center will attempt to contact the patient within 2-3 business days to schedule follow-up imaging.      Diagnostic Left Mammogram 9/8/22  BREAST DENSITY: Scattered fibroglandular densities.     Findings:     Additional mammogram views obtained to further evaluate possible  asymmetry described on 8/25/2022 screening mammogram.  Additional  views confirm subareolar focal asymmetry that correlates with  callback.     Targeted ultrasound by the technologist and radiologist demonstrates  irregular hypoechoic shadowing/mass, 6 x 5 x 5 mm, inferior subareolar  region near 6:00 favored to correlate with mammogram finding.  No  suspicious left axillary lymph nodes .                                                                      IMPRESSION: BI-RADS CATEGORY: 4 - Suspicious.     RECOMMENDED FOLLOW-UP: Biopsy.     Contrast enhanced mammogram followed by ultrasound guided core needle  left breast biopsy.     The patient and spouse were given the results of the examination.     Contrast Enhanced Mammogram 9/20/22  BREAST DENSITY: Scattered fibroglandular densities.     Findings: Minimal bilateral background parenchymal enhancement.   Equivocal 6 mm focal enhancement 6:00 periareolar region at site of  mammogram finding described on 9/8/2022.  Otherwise negative for  abnormal enhancement in either breast.                                                                       IMPRESSION: BI-RADS CATEGORY: 4 - Suspicious.     RECOMMENDED FOLLOW-UP: Biopsy.    Pathology:  Case: [id number]                                   Authorizing Provider:  Darin Morrow MD         Collected:           10/24/2022 12:42 PM           Ordering Location:     [facility] OR  Received:            10/24/2022 01:09 PM                                  [place]                                                                   Pathologist:           Dafne Williamson MD                                                    Specimens:   A) - Breast, Left, LEFT BREAST MASS                                                                  B) - Lymph Node(s), Axillary, Left, LEFT AXILLARY SENTINEL LYMPH NODE #1                             C) - Lymph Node(s), Axillary, Left, LEFT AXILLARY SENTINEL LYMPH NODE #2                             D) - Breast, Left, LEFT ANTERIOR MARGIN OF LUMPECTOMY / STITCH AT NEW MARGIN               Final Diagnosis   A. LEFT breast, seed-localized lumpectomy:  -INVASIVE BREAST CARCINOMA OF NO SPECIAL TYPE (INVASIVE DUCTAL CARCINOMA), RAGHAVENDRA GRADE 1, size 8 mm  -Ductal carcinoma in situ (DCIS), nuclear grade 2, cribriform and solid type(s)  -DCIS is admixed with and adjacent to invasive carcinoma, and is a minor component of the tumor (size 6 mm)  -No lymphovascular invasion identified  -Margins are uninvolved by invasive carcinoma and DCIS  -Invasive carcinoma is 3 mm from the nearest (anterior) margin of this specimen (not a final margin, see specimen D), 5 mm from the superior and medial margins, and > 5 mm from the posterior, inferior and lateral margin(s)  -DCIS is 4 mm from the nearest (anterior) margin of this specimen (not a final margin, see specimen D) and > 5 mm from the posterior, superior, inferior, medial and lateral margin(s)  -Other findings: fibrocystic change (including microcysts with apocrine  metaplasia) and duct ectasia  -Calcifications associated with invasive carcinoma  -Prior core biopsy site changes  -Invasive carcinoma is estrogen receptor positive (95%, strong intensity), progesterone receptor positive (70%, moderate intensity) and HER2 equivocal (score 2+) by immunohistochemistry and is HER2 non-amplified (group 5) by FISH (HER2 signals/nucleus 3.5, HER2:CEN17 ratio 1.4) (performed on prior core biopsy, see report [id number])  -See tumor synoptic below     B. Lymph node, LEFT axillary, sentinel #1, excision:  -One benign lymph node (0/1)     C. Lymph node, LEFT axillary, sentinel #2, excision:  -One benign lymph node (0/1)     D. LEFT breast, new anterior margin, excision:  -Benign breast tissue with fibrocystic change (including microcysts with apocrine metaplasia)  -Negative for atypia or malignancy               Radiation Oncology Pre-Treatment Evaluation:  Pacemaker: denies  Prior radiation: denies  Pregnancy status: NA post menopausal    History of lupus, scleroderma, connective tissue disorders and collagen vascular disease: denies  Pain: 0/10  Pain Plan: NA  Intent of treatment: curative/palliative: curative, adjuvant  Side Effects of Radiation: We discussed in detail the management of treatment side effects and provided educational materials regarding the self-care of the most common side effects.    Impression and Plan:   Debby Barriga is a 67 year old woman with recently diagnosed invasive ductal carcinoma of the lower central aspect of the left breast, ER (95%), NH (70%). HER2/roxy 2+ by IHC, HER2/roxy negative by FISH, Ki-67 not performed, status post lumpectomy and sentinel lymph node biopsy. Pathology revealed pStage IA, pT1b (8mm), pN0(0/2SLN),cM0, negative LVI, grade 1, resected to negative margins. She also had admixed Nuclear Grade 2 DCIS measuring 6 mm resected to negative margins. OncotypeDx not sent. The patient has been recommended adjuvant aromatase inhibitor which she has  started and has been tolerating without side effects. She is seen in consultation approximately 8 weeks after surgery.    Ms. Barriga meets criteria for PRIME II with a tumor that is less than 3 cm, margins negative, lymph node negative, ER+/SC+ without adverse pathologic features, and has been initiated on hormonal therapy. 1.3% vs 4.1% difference in 5 year local relapse, but no excess of distant relapse or death. The 5 year overall survival was equivalently high in both groups, 93.9%. We also discussed the 10 year follow-up data that demonstrated sustained benefit to radiation, 1% versus 9% without radiation at 10 years. Towards this, we also discussed omission of radiation as a feasible option for the patient.     We reviewed the natural history of this diagnosis. We reviewed the options in general terms which include completion mastectomy versus adjuvant radiation treatment versus observation, with or without adjuvant systemic therapy (at the discretion of her Medical Oncology team).    To the breast and adjacent axilla, we discussed various regimens including hypofractionated radiation, partial breast radiation, and the FAST-Forward regimen. We discussed that we would recommend the FAST-Forward regimen given tumor biology and patient's quality of life. We also discussed that we would not recommend partial breast radiation  The risks discussed included, but were not limited to potential for fatigue, skin redness or peeling with desquamation in the short term and long-term risks of mild to moderate fibrosis, chance of inferior cosmesis, and lymphedema, as well as rare risk of symptomatic lung fibrosis, cardiotoxicity, rib fracture and secondary malignancy.    Plan:  1) We discussed options including no treatment, or ultrahypofractionation treatment of the whole left breast, 2600 cGy in 5 daily fractions, without boost, with deep inspiratory breath hold.   2) The patient was encouraged to contact us either via phone  or Rajinder about her decision whether she would like to pursue treatment.     The patient was seen and assessed by my attending, Dr. Barrera, who agrees with the above assessment and plan.    Peg Sy MD PGY4  Department of Radiation Oncology  [phone] [facility]  [phone] Pager    Physician Attestation   I, Bri Barrera, saw this patient and agree with the findings and plan of care as documented in the note.   I was present for key portions of the history and physical exam. Briefly,Ms. Barriga is a 67 year old woman with recently diagnosed invasive ductal carcinoma of the central lower aspect of the left breast, ER95%, PR70%. HER2/roxy 2+ by IHC, Ki-67 not reported, status post partial mastectomy and sentinel lymph node biopsy on 10/24/2022. Pathology revealed pStage  pT1b (8mm), pN0 (0/2 SLN),cM0, negative LVI, grade 1, resected to negative margins. We discussed options including standard hypofractionation, ultra hypofractionated whole breast radiation as per FAST-Forward in 5 fractions, and ultra hypofractionated partial breast radiation in 5 fractions.  We also discussed omission of radiation as per Prime II.  She would be a reasonably eligible for any of these approaches.  We discussed the pros and cons of omission of radiation, including the fact that local recurrence is higher with omission of radiation but noting that the absolute risk remains low at about 10% at 10 years postsurgery with AI alone.  We also noted that these results should be balanced with the fact that life expectancy for the average 65-year-old is greater than 10 years and that some patients may not complete a full course of endocrine therapy.    As an addendum, Ms. Barriga and her partner have elected to omit radiation in favor of pursuing endocrine therapy alone.  We discussed that this is very reasonable in her case.  She will continue with surveillance imaging and follow-up with medical oncology.  She will return to our clinic on an  as-needed basis.    We appreciate the opportunity to participate in Ms. Barriga's care. She was encouraged to contact me with questions or concerns should they arise.    I personally reviewed the available mammogram images. Laboratory data and pathology as noted above was reviewed. I reviewed previous medical records, which are summarized in the HPI. A total of 45 minutes were spent (including face to face time) during this visit, and more than 50% of the time was spent in counseling and coordination of care.     Bri Barrera MD MPH PhD    Department of Radiation Oncology            HPI  INITIAL PATIENT ASSESSMENT    Diagnosis: Breast cancer, 10/24/22 Lt lumpectomy    Prior radiation therapy: None    Prior chemotherapy: None    Prior hormonal therapy:Yes: Letrozole 12/5/22    Pain Eval:  Denies    Psychosocial  Living arrangements: Partner and dog and cat  Fall Risk: independent   referral needs: Not needed    Advanced Directive: No  Implantable Cardiac Device? No    Onset of menarche: @ age 12-13  LMP: No LMP recorded. Patient is postmenopausal.  Onset of menopause: @ mid 50's  Abnormal vaginal bleeding/discharge: No  Are you pregnant? No  Reproductive note: No children    Nurse face-to-face time: Level 4:  15 min face to face time    Review of Systems   Constitutional: Negative for chills, diaphoresis, fever, malaise/fatigue and weight loss.   HENT: Negative for ear pain, nosebleeds and sore throat.    Eyes: Negative for blurred vision, double vision and pain.   Respiratory: Negative for cough and shortness of breath.    Cardiovascular: Negative for chest pain and leg swelling.   Gastrointestinal: Negative for blood in stool, constipation, diarrhea, nausea and vomiting.   Genitourinary: Negative for dysuria, frequency, hematuria and urgency.   Musculoskeletal: Negative for back pain, falls, joint pain and neck pain.   Skin: Negative for rash.   Neurological: Negative for dizziness,  tingling, seizures and headaches.   Endo/Heme/Allergies: Does not bruise/bleed easily.   Psychiatric/Behavioral: Negative for depression (Hx of depression well controlled with effexor) and suicidal ideas. The patient is not nervous/anxious and does not have insomnia.

## 2022-12-23 NOTE — PROGRESS NOTES
HPI  INITIAL PATIENT ASSESSMENT    Diagnosis: Breast cancer, 10/24/22 Lt lumpectomy    Prior radiation therapy: None    Prior chemotherapy: None    Prior hormonal therapy:Yes: Letrozole 12/5/22    Pain Eval:  Denies    Psychosocial  Living arrangements: Partner and dog and cat  Fall Risk: independent   referral needs: Not needed    Advanced Directive: No  Implantable Cardiac Device? No    Onset of menarche: @ age 12-13  LMP: No LMP recorded. Patient is postmenopausal.  Onset of menopause: @ mid 50's  Abnormal vaginal bleeding/discharge: No  Are you pregnant? No  Reproductive note: No children    Nurse face-to-face time: Level 4:  15 min face to face time    Review of Systems   Constitutional: Negative for chills, diaphoresis, fever, malaise/fatigue and weight loss.   HENT: Negative for ear pain, nosebleeds and sore throat.    Eyes: Negative for blurred vision, double vision and pain.   Respiratory: Negative for cough and shortness of breath.    Cardiovascular: Negative for chest pain and leg swelling.   Gastrointestinal: Negative for blood in stool, constipation, diarrhea, nausea and vomiting.   Genitourinary: Negative for dysuria, frequency, hematuria and urgency.   Musculoskeletal: Negative for back pain, falls, joint pain and neck pain.   Skin: Negative for rash.   Neurological: Negative for dizziness, tingling, seizures and headaches.   Endo/Heme/Allergies: Does not bruise/bleed easily.   Psychiatric/Behavioral: Negative for depression (Hx of depression well controlled with effexor) and suicidal ideas. The patient is not nervous/anxious and does not have insomnia.

## 2022-12-23 NOTE — LETTER
2022         RE: Debby Barriga  [address]  [facility] [address]      2022    Debby Barriga  [phone] (home)   : 1955  MRN: [id number]       RADIATION ONCOLOGY CONSULT    CC: Dame Pia MD (Hematology Oncology)    Identification and Purpose of Visit:  Debby Barriga is a 67 year old woman with recently diagnosed invasive ductal carcinoma of the central lower aspect of the left breast, ER95%, PR70%. HER2/roxy 2+ by IHC, Ki-67 not reported, status post partial mastectomy and sentinel lymph node biopsy on 10/24/2022. Pathology revealed pStage  pT1b (8mm), pN0 (0/2 SLN),cM0,  Negative LVI, grade 1, resected to negative margins. She is referred by Dr. Juan Brooks for consultation to discuss the potential role for radiotherapy.    Past radiation oncologist: None  Medical oncologist: Dame Pia JEFF  Surgical oncologist: Darin Morrow MD  Reconstructive surgeon: NA    History of Present Illness:  Ms. Barriga is a 67 year old woman with a new diagnosis of invasive ductal carcinoma of the left lower central breast that was detected on routine screening mammogram on 22, demonstrating an asymmetry along the retroareolar plane at subareolar depth of the left breast. Diagnostic mammogram and ultrasound performed on 22 demonstrated a 6 mm mass at the 6 o'clock position, with contrast-enhanced mammogram on 22 demonstrating an equivocal 6 mm area of enhancement. Biopsy on 22 demonstrated Grade 1 invasive ductal carcinoma, ER+(95%)/OR+(70%), HER2-roxy 2+ by IHC and HER2-roxy negative by FISH, and Nuclear Grade 2 DCIS with cribiform and solid features. She was seen by Dr. Darin Morrow of Surgical Oncology who discussed options including mastectomy with or without reconstruction versus breast-conserving surgery, with the patient. The patient then saw Dr. Juan Brooks of Medical Oncology who discussed treatment options.     She underwent left seed localized lumpectomy and left axillary sentinel lyph  node biopsy on 10/24/22 with Dr. Morrow. An additional anterior margin was taken. 2 blue sentinel lymph nodes (neither radioactive) were taken. Pathology ([id number]) demonstrated an 8 mm Grade 1 invasive ductal carcinoma without lymphovascular invasion. There was an admixed and adjacent associated Nuclear Grade 2 DCIS with cribiform and solid types that measured 6 mm. Margins for the invasive component were clear, 3 mm from the nearest (anterior) margin, and the DCIS margin was 4 mm from the nearest (anterior) margin. 0/2 sentinel lymph nodes. Pathologic stage IA,  T1bN0(sn)M0.     She was seen by Dr. Dame Palacio of Medical Oncology post-operatively on 22 who has recommended at least 5 years of hormonal therapy. She was started on letrozole on 22. Given post-menopausal status as well as Alzheimer's disease, Dr. Palacio discussed the utility of adjuvant chemotherapy is low.     At the time of diagnosis, she did not note pain or associated changes to the skin or nipple.    On interview today, the patient and Josie noted some swelling in the axilla following surgery. She had some pain after surgery, but currently not having any pain. She started Letrozole and has not noted any side effects. No range of motion issues or swelling in the arm. Ms. Barriga is seen in consultation to discuss the potential role for radiotherapy. She is accompanied by Josie, who corroborated the above history.        Breast cancer risk factors:  Menarche: 17 years old  Menopause: Mid-50s  Obstetrics:   Age at first birth: NA  Breast feeding: NA  Contraceptive use: No  Hormone replacement use: None  Family history: See below.    Review of Systems: As per HPI; otherwise, a 10 system review is unremarkable    Past Medical History    Corneal ulcer of the right eye    Mild major depression    Right eye retinal tear    Early onset Alzheimer's disease    Past Surgical History    Laparascopic tubal ligation    Left seed-localized lumpectomy  with sentinel node biopsy    Wrist surgery     Current Medications:    Current Outpatient Medications:      acetaminophen (TYLENOL) 325 MG tablet, Take 325-650 mg by mouth every 6 hours as needed for mild pain, Disp: , Rfl:      Barberry-Oreg Grape-Goldenseal (BERBERINE COMPLEX PO), Take by mouth 2 times daily, Disp: , Rfl:      calcium carbonate (OS-KAMAR) 500 MG tablet, Take 1 tablet (500 mg) by mouth 2 times daily, Disp: 180 tablet, Rfl: 3     cholecalciferol (VITAMIN D3) 125 mcg (5000 units) capsule, Take 1 capsule (125 mcg) by mouth daily, Disp: 90 capsule, Rfl: 3     letrozole (FEMARA) 2.5 MG tablet, Take 1 tablet (2.5 mg) by mouth daily, Disp: 90 tablet, Rfl: 3     magnesium 250 MG tablet, Take 1 tablet by mouth 2 times daily, Disp: , Rfl:      medium chain triglycerides, MCT OIL, (MCT OIL) oil, Take 15 mLs by mouth every morning, Disp: , Rfl:      multivitamin w/minerals (THERA-VIT-M) tablet, Take 1 tablet by mouth every morning, Disp: , Rfl:      Omega-3 Fish Oil 500 MG capsule, Take 2,000 mg by mouth 2 times daily, Disp: , Rfl:      venlafaxine (EFFEXOR XR) 150 MG 24 hr capsule, TAKE 1 CAPSULE BY MOUTH EVERY DAY, Disp: 90 capsule, Rfl: 0    Current Facility-Administered Medications:      sodium chloride (PF) 0.9% PF flush 3 mL, 3 mL, Intravenous, Q1H PRN, Jacoby Jon MD, 3 mL at 01/18/22 1248    Allergies:  Allergies   Allergen Reactions     Hydrocodone      Ears ringing  Questionable allergy Ears ringing  Ears ringing     Trazodone      Other reaction(s): Headache        Social History:    Former cigarette smoker in her 20s, quit in 1982    Previous history of significant alcohol use in her 20s, but no current alcohol use    Previously worked as a     Partner Josie and patient reside in [place], [place]    Her mother has had no history of breast cancer    Patient has no contact with other family members, and does not know family history    Mother had Alzheimer's disease, but unknown  how she did    Physical Exam:  KPS: 90   /69   Pulse 84   Wt 60.7 kg (133 lb 14.4 oz)   SpO2 96%   BMI 22.50 kg/m  , Pain Score Data Unavailable/10  General: Alert and in no acute distress.  HEENT: Normocephalic, atraumatic. No visible scleral icterus.  Neck: Apparent full range of motion.  CV: Appears well-perfused, with no visible cyanosis.  Lungs: Breathing easily on room air, with no difficulty completing full sentences  Extremities:  No visible edema of the upper extremities. Full range of motion at the shoulders and elbows.    Neuro: Alert and oriented; grossly nonfocal. Normal speech. Moving upper extremities equally.  Skin: No visible jaundice. No suspicious lesions of the visualized integuments.  Psych: Mood and affect are appropriate to given situation. Answers questions appropriately.      Breast Exam: Asymmetry noted with left being smaller than the right.   Right breast: No palpable masses or skin changes. Nipple normal and without discharge.    Left breast: No palpable masses or skin changes. Nipple normal and without discharge. Both axilla, infra and supraclavicular region without lymphadenopathy. No evidence of infection.     Labs:  Lab Results   Component Value Date    WBC 8.7 01/18/2022    HGB 12.3 10/18/2022    HCT 41.5 01/18/2022     01/18/2022    CHOL 296 (H) 10/11/2021    ALT 53 (H) 01/13/2022    AST 42 01/13/2022     01/18/2022    BUN 11 01/18/2022    CO2 26 01/18/2022    TSH 0.67 02/11/2020     Imaging:  Screening Mammogram 8/25/22  BILATERAL FULL FIELD DIGITAL SCREENING MAMMOGRAM WITH TOMOSYNTHESIS     Performed on: 8/25/22     Compared to: 08/24/2021, 01/20/2020, 01/06/2020, and 11/10/2011     Technique:  This study was evaluated with the assistance of Computer-Aided Detection.  Breast Tomosynthesis was used in interpretation.     Findings: The breasts have scattered areas of fibroglandular density.     There is a possible asymmetry in the left breast on the CC view  in the retroareolar plane position, subareolar depth.     The remainder of the breast tissue is unremarkable.  There is no suspicious finding of the right breast.     IMPRESSION: BI-RADS CATEGORY: 0 - Incomplete - Need Additional Imaging     RECOMMENDED FOLLOW-UP: Additional mammographic images and possible ultrasound of the left breast.     The results and recommendations of this examination will be communicated to the patient and the imaging center will attempt to contact the patient within 2-3 business days to schedule follow-up imaging.      Diagnostic Left Mammogram 9/8/22  BREAST DENSITY: Scattered fibroglandular densities.     Findings:     Additional mammogram views obtained to further evaluate possible  asymmetry described on 8/25/2022 screening mammogram.  Additional  views confirm subareolar focal asymmetry that correlates with  callback.     Targeted ultrasound by the technologist and radiologist demonstrates  irregular hypoechoic shadowing/mass, 6 x 5 x 5 mm, inferior subareolar  region near 6:00 favored to correlate with mammogram finding.  No  suspicious left axillary lymph nodes .                                                                      IMPRESSION: BI-RADS CATEGORY: 4 - Suspicious.     RECOMMENDED FOLLOW-UP: Biopsy.     Contrast enhanced mammogram followed by ultrasound guided core needle  left breast biopsy.     The patient and spouse were given the results of the examination.     Contrast Enhanced Mammogram 9/20/22  BREAST DENSITY: Scattered fibroglandular densities.     Findings: Minimal bilateral background parenchymal enhancement.   Equivocal 6 mm focal enhancement 6:00 periareolar region at site of  mammogram finding described on 9/8/2022.  Otherwise negative for  abnormal enhancement in either breast.                                                                      IMPRESSION: BI-RADS CATEGORY: 4 - Suspicious.     RECOMMENDED FOLLOW-UP: Biopsy.    Pathology:  Case: [id number]                                    Authorizing Provider:  Darin Morrow MD         Collected:           10/24/2022 12:42 PM           Ordering Location:     [facility] OR  Received:            10/24/2022 01:09 PM                                  [place]                                                                   Pathologist:           Dafne Williamson MD                                                    Specimens:   A) - Breast, Left, LEFT BREAST MASS                                                                  B) - Lymph Node(s), Axillary, Left, LEFT AXILLARY SENTINEL LYMPH NODE #1                             C) - Lymph Node(s), Axillary, Left, LEFT AXILLARY SENTINEL LYMPH NODE #2                             D) - Breast, Left, LEFT ANTERIOR MARGIN OF LUMPECTOMY / STITCH AT NEW MARGIN               Final Diagnosis   A. LEFT breast, seed-localized lumpectomy:  -INVASIVE BREAST CARCINOMA OF NO SPECIAL TYPE (INVASIVE DUCTAL CARCINOMA), RAGHAVENDRA GRADE 1, size 8 mm  -Ductal carcinoma in situ (DCIS), nuclear grade 2, cribriform and solid type(s)  -DCIS is admixed with and adjacent to invasive carcinoma, and is a minor component of the tumor (size 6 mm)  -No lymphovascular invasion identified  -Margins are uninvolved by invasive carcinoma and DCIS  -Invasive carcinoma is 3 mm from the nearest (anterior) margin of this specimen (not a final margin, see specimen D), 5 mm from the superior and medial margins, and > 5 mm from the posterior, inferior and lateral margin(s)  -DCIS is 4 mm from the nearest (anterior) margin of this specimen (not a final margin, see specimen D) and > 5 mm from the posterior, superior, inferior, medial and lateral margin(s)  -Other findings: fibrocystic change (including microcysts with apocrine metaplasia) and duct ectasia  -Calcifications associated with invasive carcinoma  -Prior core biopsy site changes  -Invasive carcinoma is estrogen receptor positive (95%,  strong intensity), progesterone receptor positive (70%, moderate intensity) and HER2 equivocal (score 2+) by immunohistochemistry and is HER2 non-amplified (group 5) by FISH (HER2 signals/nucleus 3.5, HER2:CEN17 ratio 1.4) (performed on prior core biopsy, see report [id number])  -See tumor synoptic below     B. Lymph node, LEFT axillary, sentinel #1, excision:  -One benign lymph node (0/1)     C. Lymph node, LEFT axillary, sentinel #2, excision:  -One benign lymph node (0/1)     D. LEFT breast, new anterior margin, excision:  -Benign breast tissue with fibrocystic change (including microcysts with apocrine metaplasia)  -Negative for atypia or malignancy               Radiation Oncology Pre-Treatment Evaluation:  Pacemaker: denies  Prior radiation: denies  Pregnancy status: NA post menopausal    History of lupus, scleroderma, connective tissue disorders and collagen vascular disease: denies  Pain: 0/10  Pain Plan: NA  Intent of treatment: curative/palliative: curative, adjuvant  Side Effects of Radiation: We discussed in detail the management of treatment side effects and provided educational materials regarding the self-care of the most common side effects.    Impression and Plan:   Debby Barriga is a 67 year old woman with recently diagnosed invasive ductal carcinoma of the lower central aspect of the left breast, ER (95%), ND (70%). HER2/roxy 2+ by IHC, HER2/roxy negative by FISH, Ki-67 not performed, status post lumpectomy and sentinel lymph node biopsy. Pathology revealed pStage IA, pT1b (8mm), pN0(0/2SLN),cM0, negative LVI, grade 1, resected to negative margins. She also had admixed Nuclear Grade 2 DCIS measuring 6 mm resected to negative margins. OncotypeDx not sent. The patient has been recommended adjuvant aromatase inhibitor which she has started and has been tolerating without side effects. She is seen in consultation approximately 8 weeks after surgery.    Ms. Barriga meets criteria for PRIME II with a tumor that  is less than 3 cm, margins negative, lymph node negative, ER+/SC+ without adverse pathologic features, and has been initiated on hormonal therapy. 1.3% vs 4.1% difference in 5 year local relapse, but no excess of distant relapse or death. The 5 year overall survival was equivalently high in both groups, 93.9%. We also discussed the 10 year follow-up data that demonstrated sustained benefit to radiation, 1% versus 9% without radiation at 10 years. Towards this, we also discussed omission of radiation as a feasible option for the patient.     We reviewed the natural history of this diagnosis. We reviewed the options in general terms which include completion mastectomy versus adjuvant radiation treatment versus observation, with or without adjuvant systemic therapy (at the discretion of her Medical Oncology team).    To the breast and adjacent axilla, we discussed various regimens including hypofractionated radiation, partial breast radiation, and the FAST-Forward regimen. We discussed that we would recommend the FAST-Forward regimen given tumor biology and patient's quality of life. We also discussed that we would not recommend partial breast radiation  The risks discussed included, but were not limited to potential for fatigue, skin redness or peeling with desquamation in the short term and long-term risks of mild to moderate fibrosis, chance of inferior cosmesis, and lymphedema, as well as rare risk of symptomatic lung fibrosis, cardiotoxicity, rib fracture and secondary malignancy.    Plan:  1) We discussed options including no treatment, or ultrahypofractionation treatment of the whole left breast, 2600 cGy in 5 daily fractions, without boost, with deep inspiratory breath hold.   2) The patient was encouraged to contact us either via phone or MyChart about her decision whether she would like to pursue treatment.     The patient was seen and assessed by my attending, Dr. Barrera, who agrees with the above assessment  and plan.    Peg Sy MD PGY4  Department of Radiation Oncology  [phone] Clinic  [phone] Pager    Physician Attestation   I, Bri Barrera, saw this patient and agree with the findings and plan of care as documented in the note.   I was present for key portions of the history and physical exam. Briefly,Ms. Barriga is a 67 year old woman with recently diagnosed invasive ductal carcinoma of the central lower aspect of the left breast, ER95%, PR70%. HER2/roxy 2+ by IHC, Ki-67 not reported, status post partial mastectomy and sentinel lymph node biopsy on 10/24/2022. Pathology revealed pStage  pT1b (8mm), pN0 (0/2 SLN),cM0, negative LVI, grade 1, resected to negative margins. We discussed options including standard hypofractionation, ultra hypofractionated whole breast radiation as per FAST-Forward in 5 fractions, and ultra hypofractionated partial breast radiation in 5 fractions.  We also discussed omission of radiation as per Prime II.  She would be a reasonably eligible for any of these approaches.  We discussed the pros and cons of omission of radiation, including the fact that local recurrence is higher with omission of radiation but noting that the absolute risk remains low at about 10% at 10 years postsurgery with AI alone.  We also noted that these results should be balanced with the fact that life expectancy for the average 65-year-old is greater than 10 years and that some patients may not complete a full course of endocrine therapy.    As an addendum, Ms. Barriga and her partner have elected to omit radiation in favor of pursuing endocrine therapy alone.  We discussed that this is very reasonable in her case.  She will continue with surveillance imaging and follow-up with medical oncology.  She will return to our clinic on an as-needed basis.    We appreciate the opportunity to participate in Ms. Barriga's care. She was encouraged to contact me with questions or concerns should they arise.    I personally  reviewed the available mammogram images. Laboratory data and pathology as noted above was reviewed. I reviewed previous medical records, which are summarized in the HPI. A total of 45 minutes were spent (including face to face time) during this visit, and more than 50% of the time was spent in counseling and coordination of care.     Bri Barrera MD MPH PhD    Department of Radiation Oncology            HPI  INITIAL PATIENT ASSESSMENT    Diagnosis: Breast cancer, 10/24/22 Lt lumpectomy    Prior radiation therapy: None    Prior chemotherapy: None    Prior hormonal therapy:Yes: Letrozole 12/5/22    Pain Eval:  Denies    Psychosocial  Living arrangements: Partner and dog and cat  Fall Risk: independent   referral needs: Not needed    Advanced Directive: No  Implantable Cardiac Device? No    Onset of menarche: @ age 12-13  LMP: No LMP recorded. Patient is postmenopausal.  Onset of menopause: @ mid 50's  Abnormal vaginal bleeding/discharge: No  Are you pregnant? No  Reproductive note: No children    Nurse face-to-face time: Level 4:  15 min face to face time    Review of Systems   Constitutional: Negative for chills, diaphoresis, fever, malaise/fatigue and weight loss.   HENT: Negative for ear pain, nosebleeds and sore throat.    Eyes: Negative for blurred vision, double vision and pain.   Respiratory: Negative for cough and shortness of breath.    Cardiovascular: Negative for chest pain and leg swelling.   Gastrointestinal: Negative for blood in stool, constipation, diarrhea, nausea and vomiting.   Genitourinary: Negative for dysuria, frequency, hematuria and urgency.   Musculoskeletal: Negative for back pain, falls, joint pain and neck pain.   Skin: Negative for rash.   Neurological: Negative for dizziness, tingling, seizures and headaches.   Endo/Heme/Allergies: Does not bruise/bleed easily.   Psychiatric/Behavioral: Negative for depression (Hx of depression well controlled with  effexor) and suicidal ideas. The patient is not nervous/anxious and does not have insomnia.          Bri Barrera

## 2022-12-27 ENCOUNTER — THERAPY VISIT (OUTPATIENT)
Dept: PHYSICAL THERAPY | Facility: CLINIC | Age: 67
End: 2022-12-27
Payer: COMMERCIAL

## 2022-12-27 DIAGNOSIS — G44.219 EPISODIC TENSION-TYPE HEADACHE, NOT INTRACTABLE: Primary | ICD-10-CM

## 2022-12-27 PROCEDURE — 97112 NEUROMUSCULAR REEDUCATION: CPT | Mod: GP | Performed by: PHYSICAL THERAPIST

## 2022-12-27 PROCEDURE — 97140 MANUAL THERAPY 1/> REGIONS: CPT | Mod: GP | Performed by: PHYSICAL THERAPIST

## 2023-01-10 ENCOUNTER — THERAPY VISIT (OUTPATIENT)
Dept: PHYSICAL THERAPY | Facility: CLINIC | Age: 68
End: 2023-01-10
Payer: COMMERCIAL

## 2023-01-10 DIAGNOSIS — G44.219 EPISODIC TENSION-TYPE HEADACHE, NOT INTRACTABLE: Primary | ICD-10-CM

## 2023-01-10 PROCEDURE — 97112 NEUROMUSCULAR REEDUCATION: CPT | Mod: GP | Performed by: PHYSICAL THERAPIST

## 2023-01-10 PROCEDURE — 97140 MANUAL THERAPY 1/> REGIONS: CPT | Mod: GP | Performed by: PHYSICAL THERAPIST

## 2023-01-24 ENCOUNTER — THERAPY VISIT (OUTPATIENT)
Dept: PHYSICAL THERAPY | Facility: CLINIC | Age: 68
End: 2023-01-24
Payer: COMMERCIAL

## 2023-01-24 DIAGNOSIS — G44.219 EPISODIC TENSION-TYPE HEADACHE, NOT INTRACTABLE: Primary | ICD-10-CM

## 2023-01-24 PROCEDURE — 97140 MANUAL THERAPY 1/> REGIONS: CPT | Mod: GP | Performed by: PHYSICAL THERAPIST

## 2023-01-24 PROCEDURE — 97112 NEUROMUSCULAR REEDUCATION: CPT | Mod: GP | Performed by: PHYSICAL THERAPIST

## 2023-02-14 ENCOUNTER — THERAPY VISIT (OUTPATIENT)
Dept: PHYSICAL THERAPY | Facility: CLINIC | Age: 68
End: 2023-02-14
Payer: COMMERCIAL

## 2023-02-14 DIAGNOSIS — G44.219 EPISODIC TENSION-TYPE HEADACHE, NOT INTRACTABLE: Primary | ICD-10-CM

## 2023-02-14 PROCEDURE — 97112 NEUROMUSCULAR REEDUCATION: CPT | Mod: GP | Performed by: PHYSICAL THERAPIST

## 2023-02-14 PROCEDURE — 97140 MANUAL THERAPY 1/> REGIONS: CPT | Mod: GP | Performed by: PHYSICAL THERAPIST

## 2023-02-21 ENCOUNTER — ONCOLOGY VISIT (OUTPATIENT)
Dept: ONCOLOGY | Facility: CLINIC | Age: 68
End: 2023-02-21
Attending: INTERNAL MEDICINE
Payer: COMMERCIAL

## 2023-02-21 VITALS
BODY MASS INDEX: 22.79 KG/M2 | TEMPERATURE: 97.5 F | DIASTOLIC BLOOD PRESSURE: 64 MMHG | WEIGHT: 135.6 LBS | OXYGEN SATURATION: 100 % | HEART RATE: 69 BPM | SYSTOLIC BLOOD PRESSURE: 104 MMHG

## 2023-02-21 DIAGNOSIS — M81.0 OSTEOPOROSIS WITHOUT CURRENT PATHOLOGICAL FRACTURE, UNSPECIFIED OSTEOPOROSIS TYPE: ICD-10-CM

## 2023-02-21 DIAGNOSIS — F02.80 ALZHEIMER'S DISEASE (H): ICD-10-CM

## 2023-02-21 DIAGNOSIS — Z17.0 MALIGNANT NEOPLASM OF LEFT BREAST IN FEMALE, ESTROGEN RECEPTOR POSITIVE, UNSPECIFIED SITE OF BREAST (H): ICD-10-CM

## 2023-02-21 DIAGNOSIS — C50.912 MALIGNANT NEOPLASM OF LEFT BREAST IN FEMALE, ESTROGEN RECEPTOR POSITIVE, UNSPECIFIED SITE OF BREAST (H): ICD-10-CM

## 2023-02-21 DIAGNOSIS — C50.912 INVASIVE DUCTAL CARCINOMA OF BREAST, FEMALE, LEFT (H): Primary | ICD-10-CM

## 2023-02-21 DIAGNOSIS — C50.811 MALIGNANT NEOPLASM OF OVERLAPPING SITES OF RIGHT BREAST IN FEMALE, ESTROGEN RECEPTOR NEGATIVE (H): ICD-10-CM

## 2023-02-21 DIAGNOSIS — G30.9 ALZHEIMER'S DISEASE (H): ICD-10-CM

## 2023-02-21 DIAGNOSIS — Z17.1 MALIGNANT NEOPLASM OF OVERLAPPING SITES OF RIGHT BREAST IN FEMALE, ESTROGEN RECEPTOR NEGATIVE (H): ICD-10-CM

## 2023-02-21 PROCEDURE — 99214 OFFICE O/P EST MOD 30 MIN: CPT | Performed by: INTERNAL MEDICINE

## 2023-02-21 PROCEDURE — G0463 HOSPITAL OUTPT CLINIC VISIT: HCPCS | Performed by: INTERNAL MEDICINE

## 2023-02-21 NOTE — NURSING NOTE
"Oncology Rooming Note    February 21, 2023 10:42 AM   Debby Barriga is a 67 year old female who presents for:    Chief Complaint   Patient presents with     Oncology Clinic Visit     Invasive ductal carcinoma of breast, female, left (H)     Initial Vitals: /64 (BP Location: Right arm, Patient Position: Sitting, Cuff Size: Adult Regular)   Pulse 69   Temp 97.5  F (36.4  C) (Tympanic)   Wt 61.5 kg (135 lb 9.6 oz)   SpO2 100%   BMI 22.79 kg/m   Estimated body mass index is 22.79 kg/m  as calculated from the following:    Height as of 11/15/22: 1.643 m (5' 4.69\").    Weight as of this encounter: 61.5 kg (135 lb 9.6 oz). Body surface area is 1.68 meters squared.  Data Unavailable Comment: Data Unavailable   No LMP recorded. Patient is postmenopausal.  Allergies reviewed: Yes  Medications reviewed: Yes    Medications: Medication refills not needed today.  Pharmacy name entered into [employer]:    [facility] [place], [place] - [address] [facility] [place], [place] - [address] [place] [address] [facility]  [facility] [place] - [address] [id number] [place], [place] - [address] [facility] [place], [place] - [address]    Clinical concerns: No additional clinical concerns.        Yovana Fraser), JESUS MANUELN February 21, 2023 10:43 AM              "

## 2023-02-21 NOTE — PROGRESS NOTES
[facility]  Hematology/Oncology Consultation    Debby Barriga MRN# [id number]   Age: 67 year old YOB: 1955       CC: Breast Cancer    HPI: Debby Barriga is a 67 year old postmenopausal woman with history of early Alzheimer's disease who had a screen detected [id number] HR+/HER2- IDC of the left breast. She is s/p partial mastectomy with SNLB with [id number] disease. She elected not to have adjuvant RT, but has been on Letrozole since 12/2022. She presents today for follow up.       Her full oncologic history is as follows:   Oncologic History  Patient Active Problem List    Diagnosis Date Noted     Invasive ductal carcinoma of breast, female, left (H) 10/12/2022     Priority: High     Left breast cancer    11/25/2022 screening mammogram showing possible asymmetry in the left breast, in the retroareolar plane position.  9/8/2022 diagnostic mammogram confirmed subareolar focal asymmetry shown on screening mammogram.  On targeted ultrasound the mass measured 6 x 5 x 5 mm at 6:00, no suspicious left axillary lymph nodes were noted.  9/20/2022 contrast-enhanced mammogram showing equivocal 6 mm focal enhancements at 6:00 in the periareolar region  9/20/2022 ultrasound-guided core biopsy at 6:00 showing grade 1 IDC.  Grade 2 DCIS.  Calcs associated with invasive carcinoma.  ER (3+, 95%), HI(2+, 70%), HER2 2+ on IHC and FISH 3.5/2.5=1.4  10/24/2022 partial mastectomy w/SLNB.  8 mm of grade 1 IDC.  Grade 2 DCIS.  No LVI.  Negative margins.  Markers were not repeated.  0/2 lymph nodes with carcinoma  12/2022 start letrozole       Early onset Alzheimer's disease with behavioral disturbance (H) 01/25/2022     Priority: Medium     Episodic tension-type headache, not intractable 09/28/2021     Priority: Medium     Alzheimer's dementia without behavioral disturbance, unspecified timing of dementia onset (H) 01/14/2019     Priority: Medium     Working with a functional medicine provider at [facility] in [place].        Myopic astigmatism of both eyes 2015     Priority: Medium     Presbyopia 2015     Priority: Medium     Generalized anxiety disorder 2013     Priority: Medium     Hyperlipidemia 2010     Priority: Medium     Major depression, recurrent (H) 2009     Priority: Medium     Health maintenance examination 2009     Priority: Medium     Overview:   Last PE, 2009  Last Pap, 07  Last Lipids:  Chol: 149    3/17/2011  T    3/17/2011  HDL:   44    3/17/2011  LDL:  91    3/17/2011  Mammo, 10/2002, (elsewhere), 2009-neg  Dexa:2009-osteopenia  Colonoscopy, 08       Plantar fasciitis 2009     Priority: Medium     Overview:   Chronic Left Plantar Fasciitis            Interval History  She is overall doing well since her last visit with me.  She continues to tolerate letrozole without any new adverse effects.     Over the past few weeks she notes mild intermittent pain in her left breast that she describes as burning.  This is exacerbated by light and improved when she is in a dark room/space.  Other than issues with cognition, she has no other new neurological symptoms.      Current Medications:  Current Outpatient Medications   Medication Sig Dispense Refill     acetaminophen (TYLENOL) 325 MG tablet Take 325-650 mg by mouth every 6 hours as needed for mild pain       Barberry-Oreg Grape-Goldenseal (BERBERINE COMPLEX PO) Take by mouth 2 times daily       calcium carbonate (OS-KAMAR) 500 MG tablet Take 1 tablet (500 mg) by mouth 2 times daily 180 tablet 3     cholecalciferol (VITAMIN D3) 125 mcg (5000 units) capsule Take 1 capsule (125 mcg) by mouth daily 90 capsule 3     letrozole (FEMARA) 2.5 MG tablet Take 1 tablet (2.5 mg) by mouth daily 90 tablet 3     magnesium 250 MG tablet Take 1 tablet by mouth 2 times daily       medium chain triglycerides, MCT OIL, (MCT OIL) oil Take 15 mLs by mouth every morning       multivitamin w/minerals  (THERA-VIT-M) tablet Take 1 tablet by mouth every morning       Omega-3 Fish Oil 500 MG capsule Take 2,000 mg by mouth 2 times daily       venlafaxine (EFFEXOR XR) 150 MG 24 hr capsule TAKE 1 CAPSULE BY MOUTH EVERY DAY 90 capsule 1         ECOG Performance Status: 1    Physical Examination:  /64 (BP Location: Right arm, Patient Position: Sitting, Cuff Size: Adult Regular)   Pulse 69   Temp 97.5  F (36.4  C) (Tympanic)   Wt 61.5 kg (135 lb 9.6 oz)   SpO2 100%   BMI 22.79 kg/m    General:  Well appearing, well-nourished adult female in NAD.  HEENT:  Normocephalic.  Sclera anicteric.  MMM.  No lesions of the oropharynx.  Breast: s/p left partial mastectomy. Well healed.  No abnormalities on exam today  Lymph:  No cervical, supraclavicular, or axillary LAD.  Chest:  CTA bilaterally.  No wheezes or crackles.  CV:  RRR.  No murmurs or gallops  Abd:  Soft/NT/ND.  BS normoactive.  No hepatosplenomegaly.  Ext:  No pitting edema of the bilateral lower extremities.  Pulses 2+ and symmetric.  Musculo:  Strength 5/5 throughout.  Neuro:  Cranial nerves grossly intact.  Psych:  Mood and affect appear normal.    Laboratory Data:  Lab Results   Component Value Date    WBC 8.7 01/18/2022    HGB 12.3 10/18/2022    HCT 41.5 01/18/2022    MCV 91 01/18/2022     01/18/2022      Lab Results   Component Value Date     01/18/2022    BUN 11 01/18/2022    ANIONGAP 6 01/18/2022     Lab Results   Component Value Date    ALT 53 (H) 01/13/2022    AST 42 01/13/2022    ALKPHOS 41 01/13/2022     No results found for: INR, PT      Radiology data:  I have personally reviewed the images of / or the following radiology data:  Per oncology timeline    Pathology and other data:  I have personally reviewed the following data: Per oncology timeline      Assessment and Recommendations  Debby Barriga  is a 67 year old postmenopausal woman with history of early Alzheimer's disease who had a screen detected [id number] HR+/HER2- IDC of the left  breast. She is s/p partial mastectomy with SNLB with [id number] disease. She elected not to have adjuvant RT, but has been on Letrozole since 12/2022. She presents for follow up.     #Left breast cancer  - Continue letrozole   - Next mammogram due in April 2023 -but we will get this now in light of her new symptoms with pain.  Her exam is reassuring today  - Yanelis (partner) wonders if we should obtain a brain MRI to further evaluate the new intermittent breast pain.  To me, this seems very atypical and the pain is intermittent in nature.  Given the lack of other neurological symptoms and her early stage breast cancer, it is unlikely to be related to systemic spread of her cancer. Therefore, I recommend establishing care with a neurologist as detailed below    #Bone health  - Baseline DEXA scan -ordered today  - Continue Calcium and vitamin D  - Consider bisphosphonate    #Alzheimer's disease  I recommended establishing care with a new neurologist. She previously did not have a good experience, but is willing to reconsider establishing care again.        RTC in 6 months or earlier if she develops new symptoms.  We will try to arrange bisphosphonate infusion around the same time if her baseline DEXA scan reveals osteopenia/osteoporosis      30 minutes spent on the date of the encounter doing chart review, review of test results, interpretation of tests, patient visit, documentation and discussion with other provider(s)       Dame Pia MD   of Medicine  Division of Hematology, Oncology and Transplantation  [facility]

## 2023-02-21 NOTE — LETTER
2/21/2023         RE: Debby Barriga  [address]  [place] [address]        Dear Colleague,    Thank you for referring your patient, Debby Barriga, to the [facility]. Please see a copy of my visit note below.    [facility]  Hematology/Oncology Consultation    Debby Barriga MRN# [id number]   Age: 67 year old YOB: 1955       CC: Breast Cancer    HPI: Debby Barriga is a 67 year old postmenopausal woman with history of early Alzheimer's disease who had a screen detected [id number] HR+/HER2- IDC of the left breast. She is s/p partial mastectomy with SNLB with [id number] disease. She elected not to have adjuvant RT, but has been on Letrozole since 12/2022. She presents today for follow up.       Her full oncologic history is as follows:   Oncologic History  Patient Active Problem List    Diagnosis Date Noted     Invasive ductal carcinoma of breast, female, left (H) 10/12/2022     Priority: High     Left breast cancer    11/25/2022 screening mammogram showing possible asymmetry in the left breast, in the retroareolar plane position.  9/8/2022 diagnostic mammogram confirmed subareolar focal asymmetry shown on screening mammogram.  On targeted ultrasound the mass measured 6 x 5 x 5 mm at 6:00, no suspicious left axillary lymph nodes were noted.  9/20/2022 contrast-enhanced mammogram showing equivocal 6 mm focal enhancements at 6:00 in the periareolar region  9/20/2022 ultrasound-guided core biopsy at 6:00 showing grade 1 IDC.  Grade 2 DCIS.  Calcs associated with invasive carcinoma.  ER (3+, 95%), KS(2+, 70%), HER2 2+ on IHC and FISH 3.5/2.5=1.4  10/24/2022 partial mastectomy w/SLNB.  8 mm of grade 1 IDC.  Grade 2 DCIS.  No LVI.  Negative margins.  Markers were not repeated.  0/2 lymph nodes with carcinoma  12/2022 start letrozole       Early onset Alzheimer's disease with behavioral disturbance (H) 01/25/2022     Priority: Medium     Episodic tension-type headache,  not intractable 2021     Priority: Medium     Alzheimer's dementia without behavioral disturbance, unspecified timing of dementia onset (H) 2019     Priority: Medium     Working with a functional medicine provider at [facility] in [place].        Myopic astigmatism of both eyes 2015     Priority: Medium     Presbyopia 2015     Priority: Medium     Generalized anxiety disorder 2013     Priority: Medium     Hyperlipidemia 2010     Priority: Medium     Major depression, recurrent (H) 2009     Priority: Medium     Health maintenance examination 2009     Priority: Medium     Overview:   Last PE, 2009  Last Pap, 07  Last Lipids:  Chol: 149    3/17/2011  T    3/17/2011  HDL:   44    3/17/2011  LDL:  91    3/17/2011  Mammo, 10/2002, (elsewhere), 2009-neg  Dexa:2009-osteopenia  Colonoscopy, 08       Plantar fasciitis 2009     Priority: Medium     Overview:   Chronic Left Plantar Fasciitis            Interval History  She is overall doing well since her last visit with me.  She continues to tolerate letrozole without any new adverse effects.     Over the past few weeks she notes mild intermittent pain in her left breast that she describes as burning.  This is exacerbated by light and improved when she is in a dark room/space.  Other than issues with cognition, she has no other new neurological symptoms.      Current Medications:  Current Outpatient Medications   Medication Sig Dispense Refill     acetaminophen (TYLENOL) 325 MG tablet Take 325-650 mg by mouth every 6 hours as needed for mild pain       Barberry-Oreg Grape-Goldenseal (BERBERINE COMPLEX PO) Take by mouth 2 times daily       calcium carbonate (OS-KAMAR) 500 MG tablet Take 1 tablet (500 mg) by mouth 2 times daily 180 tablet 3     cholecalciferol (VITAMIN D3) 125 mcg (5000 units) capsule Take 1 capsule (125 mcg) by mouth daily 90 capsule 3     letrozole (FEMARA) 2.5 MG tablet  Take 1 tablet (2.5 mg) by mouth daily 90 tablet 3     magnesium 250 MG tablet Take 1 tablet by mouth 2 times daily       medium chain triglycerides, MCT OIL, (MCT OIL) oil Take 15 mLs by mouth every morning       multivitamin w/minerals (THERA-VIT-M) tablet Take 1 tablet by mouth every morning       Omega-3 Fish Oil 500 MG capsule Take 2,000 mg by mouth 2 times daily       venlafaxine (EFFEXOR XR) 150 MG 24 hr capsule TAKE 1 CAPSULE BY MOUTH EVERY DAY 90 capsule 1         ECOG Performance Status: 1    Physical Examination:  /64 (BP Location: Right arm, Patient Position: Sitting, Cuff Size: Adult Regular)   Pulse 69   Temp 97.5  F (36.4  C) (Tympanic)   Wt 61.5 kg (135 lb 9.6 oz)   SpO2 100%   BMI 22.79 kg/m    General:  Well appearing, well-nourished adult female in NAD.  HEENT:  Normocephalic.  Sclera anicteric.  MMM.  No lesions of the oropharynx.  Breast: s/p left partial mastectomy. Well healed.  No abnormalities on exam today  Lymph:  No cervical, supraclavicular, or axillary LAD.  Chest:  CTA bilaterally.  No wheezes or crackles.  CV:  RRR.  No murmurs or gallops  Abd:  Soft/NT/ND.  BS normoactive.  No hepatosplenomegaly.  Ext:  No pitting edema of the bilateral lower extremities.  Pulses 2+ and symmetric.  Musculo:  Strength 5/5 throughout.  Neuro:  Cranial nerves grossly intact.  Psych:  Mood and affect appear normal.    Laboratory Data:  Lab Results   Component Value Date    WBC 8.7 01/18/2022    HGB 12.3 10/18/2022    HCT 41.5 01/18/2022    MCV 91 01/18/2022     01/18/2022      Lab Results   Component Value Date     01/18/2022    BUN 11 01/18/2022    ANIONGAP 6 01/18/2022     Lab Results   Component Value Date    ALT 53 (H) 01/13/2022    AST 42 01/13/2022    ALKPHOS 41 01/13/2022     No results found for: INR, PT      Radiology data:  I have personally reviewed the images of / or the following radiology data:  Per oncology timeline    Pathology and other data:  I have personally  reviewed the following data: Per oncology timeline      Assessment and Recommendations  Debby Barriga  is a 67 year old postmenopausal woman with history of early Alzheimer's disease who had a screen detected [id number] HR+/HER2- IDC of the left breast. She is s/p partial mastectomy with SNLB with [id number] disease. She elected not to have adjuvant RT, but has been on Letrozole since 12/2022. She presents for follow up.     #Left breast cancer  - Continue letrozole   - Next mammogram due in April 2023 -but we will get this now in light of her new symptoms with pain.  Her exam is reassuring today  - Yanelis (partner) wonders if we should obtain a brain MRI to further evaluate the new intermittent breast pain.  To me, this seems very atypical and the pain is intermittent in nature.  Given the lack of other neurological symptoms and her early stage breast cancer, it is unlikely to be related to systemic spread of her cancer. Therefore, I recommend establishing care with a neurologist as detailed below    #Bone health  - Baseline DEXA scan -ordered today  - Continue Calcium and vitamin D  - Consider bisphosphonate    #Alzheimer's disease  I recommended establishing care with a new neurologist. She previously did not have a good experience, but is willing to reconsider establishing care again.        RTC in 6 months or earlier if she develops new symptoms.  We will try to arrange bisphosphonate infusion around the same time if her baseline DEXA scan reveals osteopenia/osteoporosis      30 minutes spent on the date of the encounter doing chart review, review of test results, interpretation of tests, patient visit, documentation and discussion with other provider(s)       Dame Pia MD   of Medicine  Division of Hematology, Oncology and Transplantation  [facility]

## 2023-02-28 ENCOUNTER — THERAPY VISIT (OUTPATIENT)
Dept: PHYSICAL THERAPY | Facility: CLINIC | Age: 68
End: 2023-02-28
Payer: COMMERCIAL

## 2023-02-28 ENCOUNTER — ANCILLARY PROCEDURE (OUTPATIENT)
Dept: MAMMOGRAPHY | Facility: CLINIC | Age: 68
End: 2023-02-28
Attending: INTERNAL MEDICINE
Payer: COMMERCIAL

## 2023-02-28 DIAGNOSIS — G44.219 EPISODIC TENSION-TYPE HEADACHE, NOT INTRACTABLE: Primary | ICD-10-CM

## 2023-02-28 DIAGNOSIS — Z17.0 MALIGNANT NEOPLASM OF LEFT BREAST IN FEMALE, ESTROGEN RECEPTOR POSITIVE, UNSPECIFIED SITE OF BREAST (H): ICD-10-CM

## 2023-02-28 DIAGNOSIS — Z17.1 MALIGNANT NEOPLASM OF OVERLAPPING SITES OF RIGHT BREAST IN FEMALE, ESTROGEN RECEPTOR NEGATIVE (H): ICD-10-CM

## 2023-02-28 DIAGNOSIS — C50.811 MALIGNANT NEOPLASM OF OVERLAPPING SITES OF RIGHT BREAST IN FEMALE, ESTROGEN RECEPTOR NEGATIVE (H): ICD-10-CM

## 2023-02-28 DIAGNOSIS — C50.912 MALIGNANT NEOPLASM OF LEFT BREAST IN FEMALE, ESTROGEN RECEPTOR POSITIVE, UNSPECIFIED SITE OF BREAST (H): ICD-10-CM

## 2023-02-28 DIAGNOSIS — C50.912 INVASIVE DUCTAL CARCINOMA OF BREAST, FEMALE, LEFT (H): ICD-10-CM

## 2023-02-28 PROCEDURE — 97140 MANUAL THERAPY 1/> REGIONS: CPT | Mod: GP | Performed by: PHYSICAL THERAPIST

## 2023-02-28 PROCEDURE — 97112 NEUROMUSCULAR REEDUCATION: CPT | Mod: GP | Performed by: PHYSICAL THERAPIST

## 2023-02-28 PROCEDURE — G0279 TOMOSYNTHESIS, MAMMO: HCPCS | Mod: LT | Performed by: RADIOLOGY

## 2023-02-28 PROCEDURE — 77065 DX MAMMO INCL CAD UNI: CPT | Mod: LT | Performed by: RADIOLOGY

## 2023-03-28 ENCOUNTER — ANCILLARY PROCEDURE (OUTPATIENT)
Dept: BONE DENSITY | Facility: CLINIC | Age: 68
End: 2023-03-28
Attending: INTERNAL MEDICINE
Payer: COMMERCIAL

## 2023-03-28 ENCOUNTER — PATIENT OUTREACH (OUTPATIENT)
Dept: GERIATRIC MEDICINE | Facility: CLINIC | Age: 68
End: 2023-03-28

## 2023-03-28 DIAGNOSIS — C50.912 INVASIVE DUCTAL CARCINOMA OF BREAST, FEMALE, LEFT (H): ICD-10-CM

## 2023-03-28 DIAGNOSIS — M81.0 OSTEOPOROSIS WITHOUT CURRENT PATHOLOGICAL FRACTURE, UNSPECIFIED OSTEOPOROSIS TYPE: ICD-10-CM

## 2023-03-28 PROCEDURE — 77080 DXA BONE DENSITY AXIAL: CPT | Performed by: INTERNAL MEDICINE

## 2023-03-28 NOTE — LETTER
April 20, 2023      YOSI N BYE  [address]  [place] [address]        Dear Yosi:     I m your care coordinator. I ve been unable to reach you by phone. I am writing to ask you or an authorized representative to call me at [phone]. If you reach my voicemail, please leave a message with your daytime telephone number. . Include a date and time that I can call you. If you are hearing impaired, call the [place] [employer] at 024 or [phone] ([email or web address] relay service).     The reason I am trying to reach you is:    [] To schedule an assessment  [x] For your six (6)-month check-in  [] Other:      Please call me as soon as you receive this letter. I look forward to speaking with you.    Sincerely,    Yary Francois RN  [phone]  @[email or web address] is a health plan that contracts with both Medicare and the [place] Medical Assistance (Medicaid) program to provide benefits of both programs to enrollees. Enrollment in [facility] depends on contract renewal.    [id number] accepted  [id number]                                                                         A (08/2022)

## 2023-03-29 ENCOUNTER — PATIENT OUTREACH (OUTPATIENT)
Dept: ONCOLOGY | Facility: CLINIC | Age: 68
End: 2023-03-29
Payer: COMMERCIAL

## 2023-03-29 NOTE — PROGRESS NOTES
[facility]: Cancer Care                                                                                          Called pt to inform of bone scan results. Left VM for call back with phone number and instructions.     Signature:  Adilia Lamb RN

## 2023-03-30 ENCOUNTER — PATIENT OUTREACH (OUTPATIENT)
Dept: ONCOLOGY | Facility: CLINIC | Age: 68
End: 2023-03-30
Payer: COMMERCIAL

## 2023-03-30 NOTE — PROGRESS NOTES
[facility]: Cancer Care                                                                                          Called pt again and spoke to spouse re: Bone scan results and potential of doing Zometa. Gave information abt scan, recommendation of Zometa potentially in Aug when she sees Dr Palacio next. Went over needing to see a dentist before starting the Zometa and why. Spouse verbalized understanding with no further questions.     Signature:  Adilia Lamb RN

## 2023-03-31 NOTE — PROGRESS NOTES
[facility] Care Coordination Contact  First attempt  Emailed spouse Josie to complete six month assessment and left a message requesting a return call or email.  Yary Francois RN  [facility]   [phone]

## 2023-04-04 NOTE — PROGRESS NOTES
[facility] Care Coordination Contact  Second attempt  Called spouse Josie Chavis to complete six month assessment and left a message requesting a return call.  Yary Francois RN  [facility]   [phone]

## 2023-04-19 NOTE — PROGRESS NOTES
[facility] Six-Month Telephone Assessment  3rd attempt    Called spouse Josie to complete six month assessment and left a message requesting a return call.    Tasked BRITTNY Evans, to mail UTR letter tomorrow or Friday.  Yary Francois RN  [facility]   [phone]

## 2023-04-20 NOTE — PROGRESS NOTES
"Per CC, mailed client an \"Unable to Contact\" letter.      Cristina Campbell  Case Management Specialist  [facility]  [phone]     "

## 2023-05-31 ENCOUNTER — PATIENT OUTREACH (OUTPATIENT)
Dept: GERIATRIC MEDICINE | Facility: CLINIC | Age: 68
End: 2023-05-31
Payer: COMMERCIAL

## 2023-05-31 NOTE — PROGRESS NOTES
[facility] Care Coordination Contact    Received fax from [facility] stating that Debby is due for a PCA assessment in June.   Called her CADI , Briseyda Arnulfo Ambrocio, from [facility] ([phone]) to ask if he has scheduled the assessment yet, but he is no longer working with the member. Spoke with Danuta, who gave me the current CADI CM's info:   Christina Westfall   (P) [phone]  [email or web address]    Emailed her and asked for a response to discuss scheduling a joint assessment this summer, if ok with member and spouse.   Also updated Refusal POC with CM's information.  Yary Francois RN  [facility]   [phone]

## 2023-06-09 NOTE — PROGRESS NOTES
[facility] Care Coordination Contact    Emailed JOVANA Lundy CM, and asked that when she schedules member's annual assessment she checks with Debby and her spouse about doing a joint visit.  Yary Francois RN  [facility]   [phone]

## 2023-06-30 NOTE — PROGRESS NOTES
[facility] Care Coordination Contact  First attempt  Debby's CADI , Christina Westfall, scheduled her annual assessment for 7/11/23 at 4:00 PM. She told member and spouse, Josie, that I would like to be part of that visit.    Left detailed voicemail for Josie and Debby to ask if they are okay with a home visit and I would join the already scheduled visit on 7/11. Asked for return call to discuss.  Yary Francois RN  [facility]   [phone]

## 2023-07-06 NOTE — PROGRESS NOTES
[facility] Care Coordination Contact    Josie called this care coordinator back and said they are fine with me coming to their home for the assessment on 7/11/23.  Yary Francois RN  [facility]   [phone]

## 2023-07-11 ENCOUNTER — PATIENT OUTREACH (OUTPATIENT)
Dept: GERIATRIC MEDICINE | Facility: CLINIC | Age: 68
End: 2023-07-11
Payer: COMMERCIAL

## 2023-07-11 SDOH — SOCIAL STABILITY: SOCIAL INSECURITY
WITHIN THE LAST YEAR, HAVE YOU BEEN RAPED OR FORCED TO HAVE ANY KIND OF SEXUAL ACTIVITY BY YOUR PARTNER OR EX-PARTNER?: NO

## 2023-07-11 SDOH — ECONOMIC STABILITY: FOOD INSECURITY: HOW HARD IS IT FOR YOU TO PAY FOR THE VERY BASICS LIKE FOOD, HOUSING, MEDICAL CARE, AND HEATING?: NOT HARD AT ALL

## 2023-07-11 SDOH — ECONOMIC STABILITY: FOOD INSECURITY: WITHIN THE PAST 12 MONTHS, THE FOOD YOU BOUGHT JUST DIDN'T LAST AND YOU DIDN'T HAVE MONEY TO GET MORE.: NEVER TRUE

## 2023-07-11 SDOH — HEALTH STABILITY: MENTAL HEALTH: HOW MANY DRINKS CONTAINING ALCOHOL DO YOU HAVE ON A TYPICAL DAY WHEN YOU ARE DRINKING?: PATIENT DOES NOT DRINK

## 2023-07-11 SDOH — ECONOMIC STABILITY: HOUSING INSECURITY: IN THE LAST 12 MONTHS, WAS THERE A TIME WHEN YOU WERE NOT ABLE TO PAY THE MORTGAGE OR RENT ON TIME?: NO

## 2023-07-11 SDOH — SOCIAL STABILITY: SOCIAL NETWORK
DO YOU BELONG TO ANY CLUBS OR ORGANIZATIONS SUCH AS CHURCH GROUPS, UNIONS, FRATERNAL OR ATHLETIC GROUPS, OR SCHOOL GROUPS?: NO

## 2023-07-11 SDOH — ECONOMIC STABILITY: TRANSPORTATION INSECURITY: IN THE PAST 12 MONTHS, HAS LACK OF TRANSPORTATION KEPT YOU FROM MEDICAL APPOINTMENTS OR FROM GETTING MEDICATIONS?: NO

## 2023-07-11 SDOH — SOCIAL STABILITY: SOCIAL NETWORK
IN A TYPICAL WEEK, HOW MANY TIMES DO YOU TALK ON THE PHONE WITH FAMILY, FRIENDS, OR NEIGHBORS?: MORE THAN THREE TIMES A WEEK

## 2023-07-11 SDOH — HEALTH STABILITY: MENTAL HEALTH: HOW OFTEN DO YOU HAVE SIX OR MORE DRINKS ON ONE OCCASION?: NEVER

## 2023-07-11 SDOH — SOCIAL STABILITY: SOCIAL NETWORK: HOW OFTEN DO YOU ATTEND CHURCH OR RELIGIOUS SERVICES?: NEVER

## 2023-07-11 SDOH — ECONOMIC STABILITY: HOUSING INSECURITY: IN THE LAST 12 MONTHS, HOW MANY PLACES HAVE YOU LIVED?: 1

## 2023-07-11 SDOH — SOCIAL STABILITY: SOCIAL NETWORK: HOW OFTEN DO YOU GET TOGETHER WITH FRIENDS OR RELATIVES?: MORE THAN THREE TIMES A WEEK

## 2023-07-11 SDOH — ECONOMIC STABILITY: HOUSING INSECURITY
IN THE LAST 12 MONTHS, WAS THERE A TIME WHEN YOU DID NOT HAVE A STEADY PLACE TO SLEEP OR SLEPT IN A SHELTER (INCLUDING NOW)?: NO

## 2023-07-11 SDOH — SOCIAL STABILITY: SOCIAL INSECURITY: ARE YOU MARRIED, WIDOWED, DIVORCED, SEPARATED, NEVER MARRIED, OR LIVING WITH A PARTNER?: MARRIED

## 2023-07-11 SDOH — ECONOMIC STABILITY: FOOD INSECURITY: WITHIN THE PAST 12 MONTHS, YOU WORRIED THAT YOUR FOOD WOULD RUN OUT BEFORE YOU GOT THE MONEY TO BUY MORE.: NEVER TRUE

## 2023-07-11 SDOH — HEALTH STABILITY: MENTAL HEALTH: HOW OFTEN DO YOU HAVE A DRINK CONTAINING ALCOHOL?: NEVER

## 2023-07-11 SDOH — SOCIAL STABILITY: SOCIAL NETWORK: HOW OFTEN DO YOU ATTEND MEETINGS OF THE CLUBS OR ORGANIZATIONS YOU BELONG TO?: NEVER

## 2023-07-11 SDOH — SOCIAL STABILITY: SOCIAL INSECURITY: WITHIN THE LAST YEAR, HAVE YOU BEEN AFRAID OF YOUR PARTNER OR EX-PARTNER?: NO

## 2023-07-11 SDOH — HEALTH STABILITY: MENTAL HEALTH
DO YOU FEEL STRESS - TENSE, RESTLESS, NERVOUS, OR ANXIOUS, OR UNABLE TO SLEEP AT NIGHT BECAUSE YOUR MIND IS TROUBLED ALL THE TIME - THESE DAYS?: NOT AT ALL

## 2023-07-11 SDOH — SOCIAL STABILITY: SOCIAL INSECURITY
WITHIN THE LAST YEAR, HAVE YOU BEEN KICKED, HIT, SLAPPED, OR OTHERWISE PHYSICALLY HURT BY YOUR PARTNER OR EX-PARTNER?: NO

## 2023-07-11 SDOH — SOCIAL STABILITY: SOCIAL INSECURITY: WITHIN THE LAST YEAR, HAVE YOU BEEN HUMILIATED OR EMOTIONALLY ABUSED IN OTHER WAYS BY YOUR PARTNER OR EX-PARTNER?: NO

## 2023-07-11 SDOH — HEALTH STABILITY: PHYSICAL HEALTH: ON AVERAGE, HOW MANY DAYS PER WEEK DO YOU ENGAGE IN MODERATE TO STRENUOUS EXERCISE (LIKE A BRISK WALK)?: 5 DAYS

## 2023-07-11 SDOH — HEALTH STABILITY: PHYSICAL HEALTH: ON AVERAGE, HOW MANY MINUTES DO YOU ENGAGE IN EXERCISE AT THIS LEVEL?: 40 MIN

## 2023-07-11 ASSESSMENT — ACTIVITIES OF DAILY LIVING (ADL)
DEPENDENT_IADLS:: CLEANING;COOKING;LAUNDRY;SHOPPING;MEAL PREPARATION;MEDICATION MANAGEMENT;MONEY MANAGEMENT;TRANSPORTATION
LACK_OF_TRANSPORTATION: NO

## 2023-07-11 ASSESSMENT — LIFESTYLE VARIABLES
AUDIT-C TOTAL SCORE: 0
SKIP TO QUESTIONS 9-10: 1

## 2023-07-11 NOTE — Clinical Note
Armando Harrell, I am Debby's care coordinator from [facility]. This is an FYI that I did her annual health risk assessment, along with a [facility] assessor and her [facility] . Debby is doing pretty well and is getting what looks and sounds like great care from her wife, Josie.   Please see my note from 7/11 for more details and for the ingredients in the supplements Debby takes, per functional med recommendations. The meds were not in the database and care coordinator unable to add them to med list; Zeolite, Tox-Ease Bind & Advanced Memory Formula.   Please let me know if you have questions, concerns or I can be of any help with Debby. Thank you! Yary Francois RN [facility]  [phone]

## 2023-07-18 DIAGNOSIS — F33.1 MODERATE EPISODE OF RECURRENT MAJOR DEPRESSIVE DISORDER (H): ICD-10-CM

## 2023-07-18 DIAGNOSIS — F41.1 GENERALIZED ANXIETY DISORDER: ICD-10-CM

## 2023-07-21 DIAGNOSIS — F41.1 GENERALIZED ANXIETY DISORDER: ICD-10-CM

## 2023-07-21 DIAGNOSIS — F33.1 MODERATE EPISODE OF RECURRENT MAJOR DEPRESSIVE DISORDER (H): ICD-10-CM

## 2023-07-21 RX ORDER — VENLAFAXINE HYDROCHLORIDE 150 MG/1
CAPSULE, EXTENDED RELEASE ORAL
Qty: 90 CAPSULE | Refills: 1 | Status: SHIPPED | OUTPATIENT
Start: 2023-07-21 | End: 2023-12-26

## 2023-07-21 RX ORDER — VENLAFAXINE HYDROCHLORIDE 150 MG/1
CAPSULE, EXTENDED RELEASE ORAL
Qty: 90 CAPSULE | Refills: 0 | Status: SHIPPED | OUTPATIENT
Start: 2023-07-21 | End: 2023-11-06

## 2023-07-21 NOTE — TELEPHONE ENCOUNTER
11/15/22 was last OV with PCP.  PHQ-9 score:      11/15/2022     1:01 PM   PHQ   PHQ-9 Total Score 5   Q9: Thoughts of better off dead/self-harm past 2 weeks Not at all         Med failed prot.  Sent pt the phq9 over [employer].  Sending this to DOD.    Last Written Prescription Date:  1/27/2023  Last Fill Quantity: 90,  # refills: 1   Last office visit: Visit date not found ; last virtual visit: Visit date not found with prescribing provider:  11/15/22   Future Office Visit:          EILEEN Woods

## 2023-07-21 NOTE — TELEPHONE ENCOUNTER
Medication Question or Refill    Contacts       Type Contact Phone/Fax    07/21/2023 07:03 AM CDT Phone (Incoming) Debby Barriga (Self) [phone] (M)          What medication are you calling about (include dose and sig)?: venlafaxne    Preferred Pharmacy:  [id number] - [address] AT [address] [address]  Phone: [phone] Fax: [phone]      Controlled Substance Agreement on file:   CSA -- Patient Level:    CSA: None found at the patient level.       Who prescribed the medication?: vianney grullon    Do you need a refill? Yes, No    When did you use the medication last? 7/21/2023 only has 3 left    Patient offered an appointment? No    Do you have any questions or concerns?  No      Could we send this information to you in [facility] or would you prefer to receive a phone call?:   Patient would prefer a phone call   Okay to leave a detailed message?: Yes at Home number on file [phone] (home)

## 2023-07-21 NOTE — TELEPHONE ENCOUNTER
Prescription approved per [facility] Refill Protocol.    Yuriy Bentley,BSN, RN

## 2023-07-31 ENCOUNTER — PATIENT OUTREACH (OUTPATIENT)
Dept: GERIATRIC MEDICINE | Facility: CLINIC | Age: 68
End: 2023-07-31
Payer: COMMERCIAL

## 2023-07-31 NOTE — PROGRESS NOTES
[facility] Care Coordination Contact    PCA Summary received from [employer] , Becca Bedolla, today.  Becca said she has not sent it to [facility] yet.    Saved summary to member's file and tasked BRITTNY Evans, to send to [facility].  Will route EOV to Cristina when complete other outstanding documentation.    Also emailed Becca to ask for the full [employer] assessment and JOVANA Meade CM, to send me the CSSP when completed.  Yary Francois RN  [facility]   [phone]

## 2023-08-01 NOTE — PROGRESS NOTES
Per CC, emailed [facility] PCA dept [employer] PCA assessment for authorization.    Cristina Campbell  Case Management Specialist  [facility]  [phone]

## 2023-08-01 NOTE — PROGRESS NOTES
[facility] Care Coordination Contact  [facility] CCDB Assessment   (for members on other waivers)    Home visit for Health Risk Assessment with Debby Barriga completed on July 11, 2023    Type of residence:: Private home - stairs  Current living arrangement:: I live in a private home with family     Assessment completed with:: Patient, Family, Other ([facility] , [facility] )    Current Care Plan  Member is a recipient of the CADI Waiver program.  Member currently receiving the following home care services:     Member currently receiving the following community resources: County Worker, Housekeeping/Chore Agency, PCA, IHS      Medication Review  Medication reconciliation completed in Epic: Yes  Medication set-up & administration: Family/informal caregiver sets up weekly.  Family caregiver administers medications.  Medication Risk Assessment Medication (1 or more, place referral to MTM): supplements  MTM Referral Placed: No - declined    Debby takes the following supplements that were recommended by her functional medicine doctor:  Zeolite which contains: iron, magnesium, zinc, potassium, copper, sodium & zeolite clinoptilolite (3 capsules daily).  2.  Tox-Ease Bind which contains: Flax seed oil, Linum usitatissiumum, shilajit extract, charcoal, beta sitosterol (1 capsule daily)  3.  Advanced Memory Formula which contains: calcium (from tricalcium phosphate), phosphorous, ginko leaf, phosphatidylserine, Acetyl L-Carnitine, Vinpocetine, Bacopa, Blueberry extract, Luteolin, Lecithin (from soy) & Neurofactor. Also cellulose, silica, calcium stearate, rice concentrate,     Josie said the supplements were recommended by Debby's functional med provider and she is doing well, so they do not want to change anything. Offered MTM referral, but they declined at this time      Mental/Behavioral Health   Depression Screening: member has history of depression, but she and Josie reported her mood has  been very stable and quite good over the past year. Deferred PHQ at this time.      Mental health DX:: Yes - depression & anxiety  - reportedly well controlled on current meds as well as therapy and daily exercise.    Falls Assessment:   Fallen 2 or more times in the past year?: No   Any fall with injury in the past year?: No    ADL/IADL Dependencies: Bathing, grooming, dressing and ambulation.  Member is able to walk without assistive device, but can't be left alone while walking due to dementia as she would get lost or make poor decisions and it would be unsafe.      Dependent IADLs:: Cleaning, Cooking, Laundry, Shopping, Meal Preparation, Medication Management, Money Management, Transportation (patient recieves help with all of these activities from her spouse & PCA)    [facility] sponsored benefits: Shared information re: Silver Sneakers/gym memberships, ASA, Calcium +D.    PCA Assessment completed at visit:  PCA assessment completed by the [facility] , Becca Bedolla, from [facility].       Care Plan & Recommendations: Recommend the member & spouse continue to work with her CADI , Krishna, to ensure the services she needs to remain safe in her home.     CADI Care Plan/ISP requested from waiver .     Follow-Up Plan: Member informed of future contact, plan to f/u with member with a 6 month telephone assessment.  Contact information shared with member and family, encouraged member to call with any questions or concerns at any time.    [facility] care continuum providers: Please see Snapshot and Care Management Flowsheets for Specific details of care plan.    This CC note routed to PCP, Sharri Lacey RN  [facility]   [phone]

## 2023-08-02 ENCOUNTER — PATIENT OUTREACH (OUTPATIENT)
Dept: GERIATRIC MEDICINE | Facility: CLINIC | Age: 68
End: 2023-08-02
Payer: COMMERCIAL

## 2023-08-02 NOTE — PROGRESS NOTES
[facility] Care Coordination Contact    Received after visit chart from care coordinator.  Completed following tasks: Mailed copy of care plan to client, Mailed Safe Medication Disposal , Mailed [employer] Leave Behind Letter, and Updated services in Database    Cristina Campbell  Case Management Specialist  [facility]  [phone]

## 2023-08-02 NOTE — LETTER
August 2, 2023      YOSI N BYE  [address]  [facility] [address]      Dear Yosi:    At [facility], we re dedicated to improving your health and wellness. Enclosed is the Care Plan developed with you on 8/1/2023. Please review the Care Plan carefully.    As a reminder, during your visit we talked about:  Ways to manage your physical and mental health  Using health care to maintain and improve your health   Your preventive care needs     Remember to contact your care coordinator if you:  Are hospitalized, or plan to be hospitalized   Have a fall    Have a change in your physical or mental health  Need help finding support or services    If you have questions, or don t agree with your Care Plan, call me at [phone]. You can also call me if your needs change. TTY users, call the [place] [employer] at (386) or 1-967.294.8228 (mhrcoy-ys-bopnsz relay service).    Sincerely,    Yary Francois RN  [phone]  [email or web address] ([facility]) is a health plan that contracts with both Medicare and the [place] Medical Assistance (Medicaid) program to provide benefits of both programs to enrollees. Enrollment in [facility] depends on contract renewal.    [id number] accepted    [id number] (07/2022)

## 2023-08-08 ENCOUNTER — ONCOLOGY VISIT (OUTPATIENT)
Dept: ONCOLOGY | Facility: CLINIC | Age: 68
End: 2023-08-08
Attending: INTERNAL MEDICINE
Payer: COMMERCIAL

## 2023-08-08 VITALS
RESPIRATION RATE: 16 BRPM | WEIGHT: 139.4 LBS | HEART RATE: 73 BPM | DIASTOLIC BLOOD PRESSURE: 63 MMHG | OXYGEN SATURATION: 100 % | HEIGHT: 65 IN | SYSTOLIC BLOOD PRESSURE: 94 MMHG | TEMPERATURE: 97.8 F | BODY MASS INDEX: 23.22 KG/M2

## 2023-08-08 DIAGNOSIS — C50.912 INVASIVE DUCTAL CARCINOMA OF BREAST, FEMALE, LEFT (H): Primary | ICD-10-CM

## 2023-08-08 DIAGNOSIS — M85.89 OSTEOPENIA OF MULTIPLE SITES: ICD-10-CM

## 2023-08-08 DIAGNOSIS — Z12.31 ENCOUNTER FOR SCREENING MAMMOGRAM FOR MALIGNANT NEOPLASM OF BREAST: ICD-10-CM

## 2023-08-08 PROCEDURE — G0463 HOSPITAL OUTPT CLINIC VISIT: HCPCS | Performed by: INTERNAL MEDICINE

## 2023-08-08 PROCEDURE — 99214 OFFICE O/P EST MOD 30 MIN: CPT | Performed by: INTERNAL MEDICINE

## 2023-08-08 RX ORDER — CALCIUM CARBONATE/VITAMIN D3 600 MG-10
2 TABLET ORAL DAILY
Qty: 60 TABLET | Refills: 3 | Status: SHIPPED | OUTPATIENT
Start: 2023-08-08 | End: 2023-11-28

## 2023-08-08 RX ORDER — CALCIUM CARBONATE 500(1250)
2 TABLET ORAL DAILY
Qty: 60 TABLET | Refills: 4 | Status: CANCELLED | OUTPATIENT
Start: 2023-08-08

## 2023-08-08 ASSESSMENT — PAIN SCALES - GENERAL: PAINLEVEL: NO PAIN (0)

## 2023-08-08 NOTE — PROGRESS NOTES
[facility]  Hematology/Oncology Consultation    Debby Barriga MRN# [id number]   Age: 67 year old YOB: 1955       CC: Breast Cancer    HPI: Debby Barriga is a 67 year old postmenopausal woman with history of early Alzheimer's disease who had a screen detected [id number] HR+/HER2- IDC of the left breast. She is s/p partial mastectomy with SNLB with [id number] disease. She elected not to have adjuvant RT, but has been on Letrozole since 12/2022. She presents today for follow up.       Her full oncologic history is as follows:   Oncologic History  Patient Active Problem List    Diagnosis Date Noted     Invasive ductal carcinoma of breast, female, left (H) 10/12/2022     Priority: Medium     Left breast cancer    11/25/2022 screening mammogram showing possible asymmetry in the left breast, in the retroareolar plane position.  9/8/2022 diagnostic mammogram confirmed subareolar focal asymmetry shown on screening mammogram.  On targeted ultrasound the mass measured 6 x 5 x 5 mm at 6:00, no suspicious left axillary lymph nodes were noted.  9/20/2022 contrast-enhanced mammogram showing equivocal 6 mm focal enhancements at 6:00 in the periareolar region  9/20/2022 ultrasound-guided core biopsy at 6:00 showing grade 1 IDC.  Grade 2 DCIS.  Calcs associated with invasive carcinoma.  ER (3+, 95%), VT(2+, 70%), HER2 2+ on IHC and FISH 3.5/2.5=1.4  10/24/2022 partial mastectomy w/SLNB.  8 mm of grade 1 IDC.  Grade 2 DCIS.  No LVI.  Negative margins.  Markers were not repeated.  0/2 lymph nodes with carcinoma  12/2022 start letrozole       Early onset Alzheimer's disease with behavioral disturbance (H) 01/25/2022     Priority: Medium     Episodic tension-type headache, not intractable 09/28/2021     Priority: Medium     Alzheimer's dementia without behavioral disturbance, unspecified timing of dementia onset (H) 01/14/2019     Priority: Medium     Working with a functional medicine provider at [facility] in [place].        Myopic astigmatism of both eyes 2015     Priority: Medium     Presbyopia 2015     Priority: Medium     Generalized anxiety disorder 2013     Priority: Medium     Hyperlipidemia 2010     Priority: Medium     Major depression, recurrent (H) 2009     Priority: Medium     Health maintenance examination 2009     Priority: Medium     Overview:   Last PE, 2009  Last Pap, 07  Last Lipids:  Chol: 149    3/17/2011  T    3/17/2011  HDL:   44    3/17/2011  LDL:  91    3/17/2011  Mammo, 10/2002, (elsewhere), 2009-neg  Dexa:2009-osteopenia  Colonoscopy, 08       Plantar fasciitis 2009     Priority: Medium     Overview:   Chronic Left Plantar Fasciitis            Interval History  Debby presents to clinic for continued surveillance of HR+ cancer of left breast; s/p lumpectomy.   She continues to tolerate letrozole without major side effects     She has had some mild joint pain in her right wrist that happens rarely, she has been seeing an acupuncturist for this. States that she does not think this wrist pain is related to Letrozole treatment. No hot flashes, no vaginal dryness, no mood lability. Feels like memory has improved over the past few months.    Denies skin changes in breast, no nipple discharge, retraction, pain or masses.     She has been staying active over the summer, going for walks and working in the garden.       Current Medications:  Current Outpatient Medications   Medication Sig Dispense Refill     acetaminophen (TYLENOL) 325 MG tablet Take 325-650 mg by mouth every 6 hours as needed for mild pain       Barberry-Oreg Grape-Goldenseal (BERBERINE COMPLEX PO) Take by mouth 2 times daily       calcium carbonate (OS-KAMAR) 500 MG tablet Take 1 tablet (500 mg) by mouth 2 times daily 180 tablet 3     cholecalciferol (VITAMIN D3) 125 mcg (5000 units) capsule Take 1 capsule (125 mcg) by mouth daily 90 capsule 3     letrozole  "(FEMARA) 2.5 MG tablet Take 1 tablet (2.5 mg) by mouth daily 90 tablet 3     magnesium 250 MG tablet Take 1 tablet by mouth 2 times daily       medium chain triglycerides, MCT OIL, (MCT OIL) oil Take 15 mLs by mouth every morning       multivitamin w/minerals (THERA-VIT-M) tablet Take 1 tablet by mouth every morning       Omega-3 Fish Oil 500 MG capsule Take 2,000 mg by mouth 2 times daily       venlafaxine (EFFEXOR XR) 150 MG 24 hr capsule TAKE 1 CAPSULE BY MOUTH EVERY DAY 90 capsule 0     venlafaxine (EFFEXOR XR) 150 MG 24 hr capsule TAKE 1 CAPSULE BY MOUTH EVERY DAY 90 capsule 1         ECOG Performance Status: 1    Physical Examination:  BP 94/63   Pulse 73   Temp 97.8  F (36.6  C) (Oral)   Resp 16   Ht 1.645 m (5' 4.75\")   Wt 63.2 kg (139 lb 6.4 oz)   SpO2 100%   BMI 23.38 kg/m    General:  Well appearing, well-nourished adult female in NAD.  HEENT:  Normocephalic.  Sclera anicteric.  MMM.  No lesions of the oropharynx.  Breast: s/p left partial mastectomy. Well healed.  No abnormalities on exam today  Lymph:  No cervical, supraclavicular, or axillary LAD.  Chest:  CTA bilaterally.  No wheezes or crackles.  CV:  RRR.  No murmurs or gallops  Abd:  Soft/NT/ND.  BS normoactive.  No hepatosplenomegaly.  Ext:  No pitting edema of the bilateral lower extremities.  Pulses 2+ and symmetric.  Musculo:  Strength 5/5 throughout.  Neuro:  Cranial nerves grossly intact.  Psych:  Mood and affect appear normal.    Laboratory Data:  Lab Results   Component Value Date    WBC 8.7 01/18/2022    HGB 12.3 10/18/2022    HCT 41.5 01/18/2022    MCV 91 01/18/2022     01/18/2022      Lab Results   Component Value Date     01/18/2022    BUN 11 01/18/2022    ANIONGAP 6 01/18/2022     Lab Results   Component Value Date    ALT 53 (H) 01/13/2022    AST 42 01/13/2022    ALKPHOS 41 01/13/2022     No results found for: INR, PT      Radiology data:  I have personally reviewed the images of / or the following radiology data:  " Per oncology timeline    DEXA shows mind osteopenia     Pathology and other data:  I have personally reviewed the following data: Per oncology timeline      Assessment and Recommendations  Debby Barriga  is a 67 year old postmenopausal woman with history of early Alzheimer's disease who had a screen detected [id number] HR+/HER2- IDC of the left breast. She is s/p partial mastectomy with SNLB with [id number] disease. She elected not to have adjuvant RT, but has been on Letrozole since 12/2022. She presents for follow up.     #Left breast cancer  - Continue letrozole   - Annual screening mammogram to be scheduled February 2024  - RTC in 6 months (Feb 2024) for follow up and to review screening mammogram    #Bone health  - Baseline DEXA shows T score of -2.2 (left femur), -2.1 (right femur) - Next due 5/2025  - Encouraged weight bearing exercise  - Continue Calcium and vitamin D. At least 1000 mg/iu daily   - She would prefer to hold off on bisphosphonate treatment for now which is reasonable     #Alzheimer's disease  - currently not following with a neurologist  - feels like her memory is improving, will continue to monitor.       RTC in 6 months or earlier if she develops new symptoms.  We will try to screening mammogram on same day as next appointment.       30 minutes spent on the date of the encounter doing chart review, review of test results, interpretation of tests, patient visit and documentation       Dame Pia MD   of Medicine  Division of Hematology, Oncology and Transplantation  [facility]

## 2023-08-08 NOTE — LETTER
8/8/2023         RE: Debby Barriga  [address]  [place] [address]        Dear Colleague,    Thank you for referring your patient, Debby Barriga, to the [facility]. Please see a copy of my visit note below.    [facility]  Hematology/Oncology Consultation    Debby Barriga MRN# [id number]   Age: 67 year old YOB: 1955       CC: Breast Cancer    HPI: Debby Barriga is a 67 year old postmenopausal woman with history of early Alzheimer's disease who had a screen detected [id number] HR+/HER2- IDC of the left breast. She is s/p partial mastectomy with SNLB with [id number] disease. She elected not to have adjuvant RT, but has been on Letrozole since 12/2022. She presents today for follow up.       Her full oncologic history is as follows:   Oncologic History  Patient Active Problem List    Diagnosis Date Noted    Invasive ductal carcinoma of breast, female, left (H) 10/12/2022     Priority: Medium     Left breast cancer    11/25/2022 screening mammogram showing possible asymmetry in the left breast, in the retroareolar plane position.  9/8/2022 diagnostic mammogram confirmed subareolar focal asymmetry shown on screening mammogram.  On targeted ultrasound the mass measured 6 x 5 x 5 mm at 6:00, no suspicious left axillary lymph nodes were noted.  9/20/2022 contrast-enhanced mammogram showing equivocal 6 mm focal enhancements at 6:00 in the periareolar region  9/20/2022 ultrasound-guided core biopsy at 6:00 showing grade 1 IDC.  Grade 2 DCIS.  Calcs associated with invasive carcinoma.  ER (3+, 95%), [place](2+, 70%), HER2 2+ on IHC and FISH 3.5/2.5=1.4  10/24/2022 partial mastectomy w/SLNB.  8 mm of grade 1 IDC.  Grade 2 DCIS.  No LVI.  Negative margins.  Markers were not repeated.  0/2 lymph nodes with carcinoma  12/2022 start letrozole      Early onset Alzheimer's disease with behavioral disturbance (H) 01/25/2022     Priority: Medium    Episodic tension-type headache,  not intractable 2021     Priority: Medium    Alzheimer's dementia without behavioral disturbance, unspecified timing of dementia onset (H) 2019     Priority: Medium     Working with a functional medicine provider at [facility] in [place].       Myopic astigmatism of both eyes 2015     Priority: Medium    Presbyopia 2015     Priority: Medium    Generalized anxiety disorder 2013     Priority: Medium    Hyperlipidemia 2010     Priority: Medium    Major depression, recurrent (H) 2009     Priority: Medium    Health maintenance examination 2009     Priority: Medium     Overview:   Last PE, 2009  Last Pap, 07  Last Lipids:  Chol: 149    3/17/2011  T    3/17/2011  HDL:   44    3/17/2011  LDL:  91    3/17/2011  Mammo, 10/2002, (elsewhere), 2009-neg  Dexa:2009-osteopenia  Colonoscopy, 08      Plantar fasciitis 2009     Priority: Medium     Overview:   Chronic Left Plantar Fasciitis            Interval History  Debby presents to clinic for continued surveillance of HR+ cancer of left breast; s/p lumpectomy.   She continues to tolerate letrozole without major side effects     She has had some mild joint pain in her right wrist that happens rarely, she has been seeing an acupuncturist for this. States that she does not think this wrist pain is related to Letrozole treatment. No hot flashes, no vaginal dryness, no mood lability. Feels like memory has improved over the past few months.    Denies skin changes in breast, no nipple discharge, retraction, pain or masses.     She has been staying active over the summer, going for walks and working in the garden.       Current Medications:  Current Outpatient Medications   Medication Sig Dispense Refill    acetaminophen (TYLENOL) 325 MG tablet Take 325-650 mg by mouth every 6 hours as needed for mild pain      Barberry-Oreg Grape-Goldenseal (BERBERINE COMPLEX PO) Take by mouth 2 times daily       "calcium carbonate (OS-KAMAR) 500 MG tablet Take 1 tablet (500 mg) by mouth 2 times daily 180 tablet 3    cholecalciferol (VITAMIN D3) 125 mcg (5000 units) capsule Take 1 capsule (125 mcg) by mouth daily 90 capsule 3    letrozole (FEMARA) 2.5 MG tablet Take 1 tablet (2.5 mg) by mouth daily 90 tablet 3    magnesium 250 MG tablet Take 1 tablet by mouth 2 times daily      medium chain triglycerides, MCT OIL, (MCT OIL) oil Take 15 mLs by mouth every morning      multivitamin w/minerals (THERA-VIT-M) tablet Take 1 tablet by mouth every morning      Omega-3 Fish Oil 500 MG capsule Take 2,000 mg by mouth 2 times daily      venlafaxine (EFFEXOR XR) 150 MG 24 hr capsule TAKE 1 CAPSULE BY MOUTH EVERY DAY 90 capsule 0    venlafaxine (EFFEXOR XR) 150 MG 24 hr capsule TAKE 1 CAPSULE BY MOUTH EVERY DAY 90 capsule 1         ECOG Performance Status: 1    Physical Examination:  BP 94/63   Pulse 73   Temp 97.8  F (36.6  C) (Oral)   Resp 16   Ht 1.645 m (5' 4.75\")   Wt 63.2 kg (139 lb 6.4 oz)   SpO2 100%   BMI 23.38 kg/m    General:  Well appearing, well-nourished adult female in NAD.  HEENT:  Normocephalic.  Sclera anicteric.  MMM.  No lesions of the oropharynx.  Breast: s/p left partial mastectomy. Well healed.  No abnormalities on exam today  Lymph:  No cervical, supraclavicular, or axillary LAD.  Chest:  CTA bilaterally.  No wheezes or crackles.  CV:  RRR.  No murmurs or gallops  Abd:  Soft/NT/ND.  BS normoactive.  No hepatosplenomegaly.  Ext:  No pitting edema of the bilateral lower extremities.  Pulses 2+ and symmetric.  Musculo:  Strength 5/5 throughout.  Neuro:  Cranial nerves grossly intact.  Psych:  Mood and affect appear normal.    Laboratory Data:  Lab Results   Component Value Date    WBC 8.7 01/18/2022    HGB 12.3 10/18/2022    HCT 41.5 01/18/2022    MCV 91 01/18/2022     01/18/2022      Lab Results   Component Value Date     01/18/2022    BUN 11 01/18/2022    ANIONGAP 6 01/18/2022     Lab Results "   Component Value Date    ALT 53 (H) 01/13/2022    AST 42 01/13/2022    ALKPHOS 41 01/13/2022     No results found for: INR, PT      Radiology data:  I have personally reviewed the images of / or the following radiology data:  Per oncology timeline    DEXA shows mind osteopenia     Pathology and other data:  I have personally reviewed the following data: Per oncology timeline      Assessment and Recommendations  Debby Barriga  is a 67 year old postmenopausal woman with history of early Alzheimer's disease who had a screen detected [id number] HR+/HER2- IDC of the left breast. She is s/p partial mastectomy with SNLB with [id number] disease. She elected not to have adjuvant RT, but has been on Letrozole since 12/2022. She presents for follow up.     #Left breast cancer  - Continue letrozole   - Annual screening mammogram to be scheduled February 2024  - RTC in 6 months (Feb 2024) for follow up and to review screening mammogram    #Bone health  - Baseline DEXA shows T score of -2.2 (left femur), -2.1 (right femur) - Next due 5/2025  - Encouraged weight bearing exercise  - Continue Calcium and vitamin D. At least 1000 mg/iu daily   - She would prefer to hold off on bisphosphonate treatment for now which is reasonable     #Alzheimer's disease  - currently not following with a neurologist  - feels like her memory is improving, will continue to monitor.       RTC in 6 months or earlier if she develops new symptoms.  We will try to screening mammogram on same day as next appointment.       30 minutes spent on the date of the encounter doing chart review, review of test results, interpretation of tests, patient visit and documentation       Dame Pia MD   of Medicine  Division of Hematology, Oncology and Transplantation  [facility]

## 2023-08-08 NOTE — NURSING NOTE
"Oncology Rooming Note    August 8, 2023 3:22 PM   Debby Barriga is a 67 year old female who presents for:    Chief Complaint   Patient presents with    Oncology Clinic Visit     Breast cancer     Initial Vitals: BP 94/63   Pulse 73   Temp 97.8  F (36.6  C) (Oral)   Resp 16   Ht 1.645 m (5' 4.75\")   Wt 63.2 kg (139 lb 6.4 oz)   SpO2 100%   BMI 23.38 kg/m   Estimated body mass index is 23.38 kg/m  as calculated from the following:    Height as of this encounter: 1.645 m (5' 4.75\").    Weight as of this encounter: 63.2 kg (139 lb 6.4 oz). Body surface area is 1.7 meters squared.  No Pain (0) Comment: Data Unavailable   No LMP recorded. Patient is postmenopausal.  Allergies reviewed: Yes  Medications reviewed: Yes    Medications: Medication refills not needed today.  Pharmacy name entered into EPIC:    [facility] - [place], [place] - [address] [facility] [place], [place] - [address] [place] [address] [phone]  [facility] [address] [id number] - [place], [place] - [address] AT [facility] [address]  [facility] [place], [place] - [address]    Clinical concerns:        Minal Escobar CMA             "

## 2023-08-14 ENCOUNTER — MYC MEDICAL ADVICE (OUTPATIENT)
Dept: FAMILY MEDICINE | Facility: CLINIC | Age: 68
End: 2023-08-14
Payer: COMMERCIAL

## 2023-08-15 ENCOUNTER — DOCUMENTATION ONLY (OUTPATIENT)
Dept: OTHER | Facility: CLINIC | Age: 68
End: 2023-08-15
Payer: COMMERCIAL

## 2023-08-15 ENCOUNTER — PATIENT OUTREACH (OUTPATIENT)
Dept: GERIATRIC MEDICINE | Facility: CLINIC | Age: 68
End: 2023-08-15
Payer: COMMERCIAL

## 2023-08-15 NOTE — TELEPHONE ENCOUNTER
I called [facility] provider assistance [place] at [phone]     Spoke with Nay     Informed of patient request for adult day care and gave them her diagnoses shown below that she is currently being treated for by PCP:    Dx Associated  - Generalized anxiety disorder - Moderate episode of recurrent major depressive disorder (H)    Nay has no record of this request anywhere and is looking into this.     She talked to the people in clinical who informed her that the patient's care coordinator would be the one who needs to figure this out and get the ball rolling.     [facility] has [place] listed as her care coordination .    Responded to the [employer] message with this information.     OZ RaymundoN EIELEN  [facility]

## 2023-08-15 NOTE — PROGRESS NOTES
[facility] Care Coordination Contact    CC covering for members CC, Yary Francois RN.  CC received call from members wife, Josie, wondering about getting Debby into adult day care at [facility].  She has a call into [facility] to check on availability.  CC explained that they would need to contact members JOVANA WALKER, Christina Westfall, to discuss as she would be the one to authorize the service under members JOVANA waiver.  No other needs noted at this time.    KAYCEE Barron  [facility] Lead Care Coordinator  Cell: [phone]

## 2023-08-22 ENCOUNTER — PATIENT OUTREACH (OUTPATIENT)
Dept: GERIATRIC MEDICINE | Facility: CLINIC | Age: 68
End: 2023-08-22
Payer: COMMERCIAL

## 2023-08-22 NOTE — PROGRESS NOTES
[facility] Care Coordination Contact    Per [facility] Secure Site, member has 3.5 hours of PCA services per day - see part of graph below:  8/1/2023 1/31/2024 Medically Necessary 2576 units    2/1/2024 7/31/2024 Medically Necessary 2548     Yary Francois RN  [facility]   [phone]

## 2023-09-06 NOTE — PROGRESS NOTES
Pt w/ her partner, agreed to pursue PT if needed but at the time of discharge was having only brief/occasional headaches and neck pain. She was inconsistent in her HEP due to her dementia but very cooperative in therapy.     DISCHARGE  Reason for Discharge: No further expectation of progress.  Patient chooses to discontinue therapy.    Equipment Issued:     Discharge Plan: Patient to continue home program.    Referring Provider:  Sharri Lacey

## 2023-10-16 ENCOUNTER — PATIENT OUTREACH (OUTPATIENT)
Dept: CARE COORDINATION | Facility: CLINIC | Age: 68
End: 2023-10-16
Payer: COMMERCIAL

## 2023-10-20 ENCOUNTER — DOCUMENTATION ONLY (OUTPATIENT)
Dept: FAMILY MEDICINE | Facility: CLINIC | Age: 68
End: 2023-10-20
Payer: COMMERCIAL

## 2023-10-20 ENCOUNTER — PATIENT OUTREACH (OUTPATIENT)
Dept: GERIATRIC MEDICINE | Facility: CLINIC | Age: 68
End: 2023-10-20

## 2023-10-20 NOTE — PROGRESS NOTES
[facility] Care Coordination Contact    Josie, member's wife, left voicemail asking about getting  services for Debby.  Called Josie back and informed her that she needs to speak with Christina Westfall, LIZETTEI , who needs to authorize this service.    Josie said they tried [facility], but Debby hated it. She thinks Debby is declining and needs to get some more support in place. Josie is unable to relax when she leaves home at all and they already have the informal support that they can get.  Yary Francois RN  [facility]   [phone]

## 2023-10-20 NOTE — PROGRESS NOTES
Samantha states they are working with [facility] [on Alzheimer's management] and the clinic will be faxing over lab orders. Given lab fax #.     Avelina Singh RN, BSN, PHN  [facility]  [phone]

## 2023-10-20 NOTE — PROGRESS NOTES
[facility] Care Coordination Contact    Emailed JOVANA Keller CM, and got an auto response that she's out of the office until November. Sent my message onto the covering worker, Branden at: [email or web address] and informed Josie of this as she has tried calling Krishna 3 times in the past week with no response.    I told Branden that Josie is anxious to try the  Services.    Also emailed the [place] Caregiver Assurance Program. Their website shows a monthly charge/fee associated with their services and I asked if that is true for our [facility] members or if it is still covered for them.  Will keep in touch with Josie about this.  Yary Francois RN  [facility]   [phone]

## 2023-10-20 NOTE — PROGRESS NOTES
Writer called and left message on spouse's (Josie) voicemail to call back and speak with a triage nurse.  Josie - ok to leave a VML CTC with spouse.      OZ GautamN RN  [facility]

## 2023-10-20 NOTE — PROGRESS NOTES
Debby Barriga has an upcoming lab appointment.        Future Appointments   Date Time Provider Department Center   10/24/2023  3:45 PM [facility]   2/27/2024  3:00 PM [facility]   2/27/2024  3:30 PM Dame Palacio MD [facility]       The appointment note says: Lab    There is no Lab order available.      Please review and place future orders as appropriate.  If no Lab order will be placed, please advise patient.      Also for consideration: [facility]    Health Maintenance Due   Topic    ANNUAL REVIEW OF HM ORDERS        Thanks,    Ilana Olmstead

## 2023-10-24 DIAGNOSIS — Z85.3 PERSONAL HISTORY OF MALIGNANT NEOPLASM OF BREAST: ICD-10-CM

## 2023-10-24 DIAGNOSIS — C50.912 INVASIVE DUCTAL CARCINOMA OF BREAST, FEMALE, LEFT (H): ICD-10-CM

## 2023-10-24 DIAGNOSIS — Z86.19 PERSONAL HISTORY OF OTHER INFECTIOUS AND PARASITIC DISEASES: ICD-10-CM

## 2023-10-24 DIAGNOSIS — F03.90 DEMENTIA (H): Primary | ICD-10-CM

## 2023-10-24 DIAGNOSIS — F41.9 ANXIETY: ICD-10-CM

## 2023-10-24 DIAGNOSIS — Z77.120 CONTACT WITH AND (SUSPECTED) EXPOSURE TO MOLD (TOXIC): ICD-10-CM

## 2023-10-24 RX ORDER — LETROZOLE 2.5 MG/1
2.5 TABLET, FILM COATED ORAL DAILY
Qty: 90 TABLET | Refills: 3 | Status: SHIPPED | OUTPATIENT
Start: 2023-10-24

## 2023-10-24 NOTE — TELEPHONE ENCOUNTER
Medication requested: Letrozole 2.5 mg tablet    Last prescribing provider: Dame Pia MD on 12/5/22    Last clinic visit date: 8/8/23 with Dr. Palacio    Recommendations for requested medication (if none, N/A): NA    Any other pertinent information (if none, N/A): NA    Refilled: Y/N, if NO, why?    Pended and Routed to Dame Pia MD

## 2023-10-25 NOTE — PROGRESS NOTES
[facility] Care Coordination Contact    Emailed the Caregiver Assurance team at [place] to ask about insurance coverage or whether   the client needs to pay out of pocket now.  Yary Francois RN  [facility]   [phone]

## 2023-10-26 ENCOUNTER — LAB (OUTPATIENT)
Dept: LAB | Facility: CLINIC | Age: 68
End: 2023-10-26
Payer: COMMERCIAL

## 2023-10-26 DIAGNOSIS — F41.9 ANXIETY: ICD-10-CM

## 2023-10-26 DIAGNOSIS — Z77.120 CONTACT WITH AND (SUSPECTED) EXPOSURE TO MOLD (TOXIC): ICD-10-CM

## 2023-10-26 DIAGNOSIS — Z86.19 PERSONAL HISTORY OF OTHER INFECTIOUS AND PARASITIC DISEASES: ICD-10-CM

## 2023-10-26 DIAGNOSIS — Z85.3 PERSONAL HISTORY OF MALIGNANT NEOPLASM OF BREAST: ICD-10-CM

## 2023-10-26 DIAGNOSIS — F03.90 DEMENTIA (H): ICD-10-CM

## 2023-10-26 LAB
ABO/RH(D): NORMAL
ALBUMIN UR-MCNC: NEGATIVE MG/DL
APPEARANCE UR: CLEAR
BACTERIA #/AREA URNS HPF: ABNORMAL /HPF
BASOPHILS # BLD AUTO: 0 10E3/UL (ref 0–0.2)
BASOPHILS NFR BLD AUTO: 0 %
BILIRUB UR QL STRIP: NEGATIVE
COLOR UR AUTO: YELLOW
EOSINOPHIL # BLD AUTO: 0.1 10E3/UL (ref 0–0.7)
EOSINOPHIL NFR BLD AUTO: 2 %
ERYTHROCYTE [DISTWIDTH] IN BLOOD BY AUTOMATED COUNT: 13 % (ref 10–15)
GLUCOSE UR STRIP-MCNC: NEGATIVE MG/DL
HCT VFR BLD AUTO: 38.6 % (ref 35–47)
HGB BLD-MCNC: 12.2 G/DL (ref 11.7–15.7)
HGB UR QL STRIP: ABNORMAL
IMM GRANULOCYTES # BLD: 0 10E3/UL
IMM GRANULOCYTES NFR BLD: 0 %
KETONES UR STRIP-MCNC: NEGATIVE MG/DL
LEUKOCYTE ESTERASE UR QL STRIP: NEGATIVE
LYMPHOCYTES # BLD AUTO: 1.2 10E3/UL (ref 0.8–5.3)
LYMPHOCYTES NFR BLD AUTO: 25 %
MCH RBC QN AUTO: 28.4 PG (ref 26.5–33)
MCHC RBC AUTO-ENTMCNC: 31.6 G/DL (ref 31.5–36.5)
MCV RBC AUTO: 90 FL (ref 78–100)
MONOCYTES # BLD AUTO: 0.5 10E3/UL (ref 0–1.3)
MONOCYTES NFR BLD AUTO: 10 %
NEUTROPHILS # BLD AUTO: 3 10E3/UL (ref 1.6–8.3)
NEUTROPHILS NFR BLD AUTO: 62 %
NITRATE UR QL: NEGATIVE
PH UR STRIP: 6 [PH] (ref 5–7)
PLATELET # BLD AUTO: 270 10E3/UL (ref 150–450)
RBC # BLD AUTO: 4.3 10E6/UL (ref 3.8–5.2)
RBC #/AREA URNS AUTO: ABNORMAL /HPF
SP GR UR STRIP: 1.01 (ref 1–1.03)
SPECIMEN EXPIRATION DATE: NORMAL
SQUAMOUS #/AREA URNS AUTO: ABNORMAL /LPF
URATE SERPL-MCNC: 3.7 MG/DL (ref 2.4–5.7)
UROBILINOGEN UR STRIP-ACNC: 0.2 E.U./DL
VIT B12 SERPL-MCNC: 840 PG/ML (ref 232–1245)
VIT D+METAB SERPL-MCNC: 54 NG/ML (ref 20–50)
WBC # BLD AUTO: 4.8 10E3/UL (ref 4–11)
WBC #/AREA URNS AUTO: ABNORMAL /HPF

## 2023-10-26 PROCEDURE — 84144 ASSAY OF PROGESTERONE: CPT

## 2023-10-26 PROCEDURE — 82533 TOTAL CORTISOL: CPT

## 2023-10-26 PROCEDURE — 82728 ASSAY OF FERRITIN: CPT

## 2023-10-26 PROCEDURE — 82977 ASSAY OF GGT: CPT

## 2023-10-26 PROCEDURE — 82390 ASSAY OF CERULOPLASMIN: CPT

## 2023-10-26 PROCEDURE — 83090 ASSAY OF HOMOCYSTEINE: CPT

## 2023-10-26 PROCEDURE — 83695 ASSAY OF LIPOPROTEIN(A): CPT

## 2023-10-26 PROCEDURE — 99000 SPECIMEN HANDLING OFFICE-LAB: CPT

## 2023-10-26 PROCEDURE — 83525 ASSAY OF INSULIN: CPT

## 2023-10-26 PROCEDURE — 82607 VITAMIN B-12: CPT

## 2023-10-26 PROCEDURE — 85025 COMPLETE CBC W/AUTO DIFF WBC: CPT

## 2023-10-26 PROCEDURE — 82525 ASSAY OF COPPER: CPT | Mod: 90

## 2023-10-26 PROCEDURE — 81001 URINALYSIS AUTO W/SCOPE: CPT

## 2023-10-26 PROCEDURE — 82670 ASSAY OF TOTAL ESTRADIOL: CPT

## 2023-10-26 PROCEDURE — 82306 VITAMIN D 25 HYDROXY: CPT

## 2023-10-26 PROCEDURE — 36415 COLL VENOUS BLD VENIPUNCTURE: CPT

## 2023-10-26 PROCEDURE — 86800 THYROGLOBULIN ANTIBODY: CPT | Mod: 90

## 2023-10-26 PROCEDURE — 84590 ASSAY OF VITAMIN A: CPT | Mod: 90

## 2023-10-26 PROCEDURE — 84207 ASSAY OF VITAMIN B-6: CPT | Mod: 90

## 2023-10-26 PROCEDURE — 84630 ASSAY OF ZINC: CPT | Mod: 90

## 2023-10-26 PROCEDURE — 84439 ASSAY OF FREE THYROXINE: CPT

## 2023-10-26 PROCEDURE — 82627 DEHYDROEPIANDROSTERONE: CPT

## 2023-10-26 PROCEDURE — 84481 FREE ASSAY (FT-3): CPT

## 2023-10-26 PROCEDURE — 84591 ASSAY OF NOS VITAMIN: CPT | Mod: 90

## 2023-10-26 PROCEDURE — 84482 T3 REVERSE: CPT | Mod: 90

## 2023-10-26 PROCEDURE — 86900 BLOOD TYPING SEROLOGIC ABO: CPT

## 2023-10-26 PROCEDURE — 84550 ASSAY OF BLOOD/URIC ACID: CPT

## 2023-10-26 PROCEDURE — 86901 BLOOD TYPING SEROLOGIC RH(D): CPT

## 2023-10-26 PROCEDURE — 86141 C-REACTIVE PROTEIN HS: CPT

## 2023-10-26 PROCEDURE — 84443 ASSAY THYROID STIM HORMONE: CPT

## 2023-10-26 PROCEDURE — 84140 ASSAY OF PREGNENOLONE: CPT | Mod: 90

## 2023-10-26 PROCEDURE — 80053 COMPREHEN METABOLIC PANEL: CPT

## 2023-10-26 PROCEDURE — 86376 MICROSOMAL ANTIBODY EACH: CPT

## 2023-10-27 LAB
ALBUMIN SERPL BCG-MCNC: 4 G/DL (ref 3.5–5.2)
ALP SERPL-CCNC: 41 U/L (ref 35–104)
ALT SERPL W P-5'-P-CCNC: 17 U/L (ref 0–50)
ANION GAP SERPL CALCULATED.3IONS-SCNC: 10 MMOL/L (ref 7–15)
APO A-I SERPL-MCNC: 18 MG/DL
AST SERPL W P-5'-P-CCNC: 22 U/L (ref 0–45)
BILIRUB SERPL-MCNC: <0.2 MG/DL
BUN SERPL-MCNC: 23.9 MG/DL (ref 8–23)
CALCIUM SERPL-MCNC: 9 MG/DL (ref 8.8–10.2)
CHLORIDE SERPL-SCNC: 104 MMOL/L (ref 98–107)
COPPER SERPL-MCNC: 102.6 UG/DL
CORTIS SERPL-MCNC: 7.2 UG/DL
CREAT SERPL-MCNC: 0.69 MG/DL (ref 0.51–0.95)
CRP SERPL HS-MCNC: 0.56 MG/L
DEPRECATED HCO3 PLAS-SCNC: 25 MMOL/L (ref 22–29)
DHEA-S SERPL-MCNC: 50 UG/DL (ref 35–430)
EGFRCR SERPLBLD CKD-EPI 2021: >90 ML/MIN/1.73M2
ESTRADIOL SERPL-MCNC: <5 PG/ML
FERRITIN SERPL-MCNC: 38 NG/ML (ref 11–328)
GGT SERPL-CCNC: 11 U/L (ref 5–36)
GLUCOSE SERPL-MCNC: 97 MG/DL (ref 70–99)
INSULIN SERPL-ACNC: 12.8 UU/ML (ref 2.6–24.9)
Lab: NORMAL
Lab: NORMAL
PERFORMING LABORATORY: NORMAL
PERFORMING LABORATORY: NORMAL
POTASSIUM SERPL-SCNC: 4 MMOL/L (ref 3.4–5.3)
PROGEST SERPL-MCNC: 0.1 NG/ML
PROT SERPL-MCNC: 6.5 G/DL (ref 6.4–8.3)
SODIUM SERPL-SCNC: 139 MMOL/L (ref 135–145)
SPECIMEN STATUS: NORMAL
SPECIMEN STATUS: NORMAL
T3FREE SERPL-MCNC: 2.2 PG/ML (ref 2–4.4)
T4 FREE SERPL-MCNC: 1.02 NG/DL (ref 0.9–1.7)
TEST NAME: NORMAL
TEST NAME: NORMAL
THYROPEROXIDASE AB SERPL-ACNC: <10 IU/ML
TSH SERPL DL<=0.005 MIU/L-ACNC: 1.45 UIU/ML (ref 0.3–4.2)
ZINC SERPL-MCNC: 67.2 UG/DL

## 2023-10-28 LAB — MISCELLANEOUS TEST 1 (ARUP): NORMAL

## 2023-10-29 LAB
ANNOTATION COMMENT IMP: NORMAL
PYRIDOXAL PHOS SERPL-SCNC: 69.9 NMOL/L
RETINYL PALMITATE SERPL-MCNC: 0.04 MG/L
T3REVERSE SERPL-MCNC: 13.9 NG/DL
VIT A SERPL-MCNC: 0.58 MG/L

## 2023-10-30 ENCOUNTER — PATIENT OUTREACH (OUTPATIENT)
Dept: CARE COORDINATION | Facility: CLINIC | Age: 68
End: 2023-10-30
Payer: COMMERCIAL

## 2023-10-30 LAB — CERULOPLASMIN SERPL-MCNC: 26 MG/DL (ref 20–60)

## 2023-11-01 LAB
HCYS SERPL-SCNC: 7.8 UMOL/L (ref 4–12)
PREG SERPL-MCNC: 37 NG/DL

## 2023-11-03 LAB — MAYO MISC RESULT: NORMAL

## 2023-11-17 NOTE — PROGRESS NOTES
[facility] Care Coordination Contact    Called the phone number listed in the caregiver assurance referral ([phone]) and they gave me the correct number for the program which is: [phone]. Called that number and left detailed voicemail asking about cost associated with program and whether it is still covered by [facility]. Asked them to return my call or email me with this information.  Yary Francois RN  [facility]   [phone]

## 2023-11-21 NOTE — PROGRESS NOTES
[facility] Care Coordination Contact    Received email from Isha Fritz from the Caregiver Assurance program, with a link to complete an electronic referral.  I submitted the referral today and emailed Isha to inform her this was done.   Yary Francois RN  [facility]   [phone]

## 2023-11-27 DIAGNOSIS — M85.89 OSTEOPENIA OF MULTIPLE SITES: ICD-10-CM

## 2023-11-27 DIAGNOSIS — C50.912 INVASIVE DUCTAL CARCINOMA OF BREAST, FEMALE, LEFT (H): ICD-10-CM

## 2023-11-28 RX ORDER — CALCIUM CARBONATE/VITAMIN D3 600 MG-10
2 TABLET ORAL DAILY
Qty: 180 TABLET | Refills: 1 | Status: SHIPPED | OUTPATIENT
Start: 2023-11-28

## 2023-11-28 NOTE — TELEPHONE ENCOUNTER
Medication:Calcium  Last prescribing provider:Dr. Palacio  Last clinic visit date: 8/8/2023 Dr. Palacio  Recommendations for requested medication:supplement  Any other pertinent information:Routed to covering provider for this week Dr. Mo

## 2023-12-26 ENCOUNTER — OFFICE VISIT (OUTPATIENT)
Dept: FAMILY MEDICINE | Facility: CLINIC | Age: 68
End: 2023-12-26
Payer: COMMERCIAL

## 2023-12-26 VITALS
RESPIRATION RATE: 18 BRPM | BODY MASS INDEX: 23.99 KG/M2 | HEART RATE: 94 BPM | WEIGHT: 144.01 LBS | OXYGEN SATURATION: 98 % | HEIGHT: 65 IN | SYSTOLIC BLOOD PRESSURE: 106 MMHG | DIASTOLIC BLOOD PRESSURE: 75 MMHG | TEMPERATURE: 97.2 F

## 2023-12-26 DIAGNOSIS — F33.1 MODERATE EPISODE OF RECURRENT MAJOR DEPRESSIVE DISORDER (H): ICD-10-CM
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
## 2023-12-26 DIAGNOSIS — G30.0 EARLY ONSET ALZHEIMER'S DISEASE WITH BEHAVIORAL DISTURBANCE (H): ICD-10-CM

## 2023-12-26 DIAGNOSIS — F41.1 GENERALIZED ANXIETY DISORDER: ICD-10-CM

## 2023-12-26 DIAGNOSIS — F02.818 EARLY ONSET ALZHEIMER'S DISEASE WITH BEHAVIORAL DISTURBANCE (H): ICD-10-CM

## 2023-12-26 DIAGNOSIS — Z00.00 ENCOUNTER FOR MEDICARE ANNUAL WELLNESS EXAM: Primary | ICD-10-CM

## 2023-12-26 PROCEDURE — 90715 TDAP VACCINE 7 YRS/> IM: CPT | Performed by: NURSE PRACTITIONER

## 2023-12-26 PROCEDURE — 90471 IMMUNIZATION ADMIN: CPT | Performed by: NURSE PRACTITIONER

## 2023-12-26 PROCEDURE — G0438 PPPS, INITIAL VISIT: HCPCS | Performed by: NURSE PRACTITIONER

## 2023-12-26 PROCEDURE — 99214 OFFICE O/P EST MOD 30 MIN: CPT | Mod: 25 | Performed by: NURSE PRACTITIONER

## 2023-12-26 RX ORDER — RESPIRATORY SYNCYTIAL VIRUS VACCINE 120MCG/0.5
0.5 KIT INTRAMUSCULAR ONCE
Qty: 1 EACH | Refills: 0 | Status: CANCELLED | OUTPATIENT
Start: 2023-12-26 | End: 2023-12-26

## 2023-12-26 RX ORDER — VENLAFAXINE HYDROCHLORIDE 150 MG/1
CAPSULE, EXTENDED RELEASE ORAL
Qty: 90 CAPSULE | Refills: 3 | Status: SHIPPED | OUTPATIENT
Start: 2023-12-26

## 2023-12-26 ASSESSMENT — ACTIVITIES OF DAILY LIVING (ADL)
CURRENT_FUNCTION: MEDICATION ADMINISTRATION REQUIRES ASSISTANCE
CURRENT_FUNCTION: MONEY MANAGEMENT REQUIRES ASSISTANCE
CURRENT_FUNCTION: TELEPHONE REQUIRES ASSISTANCE
CURRENT_FUNCTION: PREPARING MEALS REQUIRES ASSISTANCE
CURRENT_FUNCTION: LAUNDRY REQUIRES ASSISTANCE
CURRENT_FUNCTION: SHOPPING REQUIRES ASSISTANCE
CURRENT_FUNCTION: BATHING REQUIRES ASSISTANCE
CURRENT_FUNCTION: HOUSEWORK REQUIRES ASSISTANCE
CURRENT_FUNCTION: TRANSPORTATION REQUIRES ASSISTANCE

## 2023-12-26 ASSESSMENT — ENCOUNTER SYMPTOMS
WEAKNESS: 0
JOINT SWELLING: 0
FREQUENCY: 0
FEVER: 0
ABDOMINAL PAIN: 0
ARTHRALGIAS: 0
DIZZINESS: 0
DIARRHEA: 0
NAUSEA: 0
HEMATOCHEZIA: 0
BREAST MASS: 0
NERVOUS/ANXIOUS: 0
HEARTBURN: 0
MYALGIAS: 0
EYE PAIN: 0
PARESTHESIAS: 0
CONSTIPATION: 0
SORE THROAT: 0
DYSURIA: 0
HEADACHES: 0
HEMATURIA: 0
CHILLS: 0
PALPITATIONS: 0
COUGH: 1
SHORTNESS OF BREATH: 0

## 2023-12-26 ASSESSMENT — PAIN SCALES - GENERAL: PAINLEVEL: NO PAIN (0)

## 2023-12-26 NOTE — PATIENT INSTRUCTIONS
Patient Education   Personalized Prevention Plan  You are due for the preventive services outlined below.  Your care team is available to assist you in scheduling these services.  If you have already completed any of these items, please share that information with your care team to update in your medical record.  Health Maintenance Due   Topic Date Due     Zoster (Shingles) Vaccine (1 of 2) Never done     RSV VACCINE (Pregnancy & 60+) (1 - 1-dose 60+ series) Never done     Diptheria Tetanus Pertussis (DTAP/TDAP/TD) Vaccine (4 - Td or Tdap) 07/23/2023     Annual Wellness Visit  11/15/2023     COVID-19 Vaccine (7 - 2023-24 season) 12/07/2023     Learning About Dietary Guidelines  What are the Dietary Guidelines for Americans?     Dietary Guidelines for Americans provide tips for eating well and staying healthy. This helps reduce the risk for long-term (chronic) diseases.  These guidelines recommend that you:  Eat and drink the right amount for you. The U.S. government's food guide is called MyPlate. It can help you make your own well-balanced eating plan.  Try to balance your eating with your activity. This helps you stay at a healthy weight.  Drink alcohol in moderation, if at all.  Limit foods high in salt, saturated fat, trans fat, and added sugar.  These guidelines are from the U.S. Department of Agriculture and the U.S. Department of Health and Human Services. They are updated every 5 years.  What is MyPlate?  MyPlate is the U.S. government's food guide. It can help you make your own well-balanced eating plan. A balanced eating plan means that you eat enough, but not too much, and that your food gives you the nutrients you need to stay healthy.  MyPlate focuses on eating plenty of whole grains, fruits, and vegetables, and on limiting fat and sugar. It is available online at www.ChooseMyPlate.gov.  How can you get started?  If you're trying to eat healthier, you can slowly change your eating habits over time. You  "don't have to make big changes all at once. Start by adding one or two healthy foods to your meals each day.  Grains  Choose whole-grain breads and cereals and whole-wheat pasta and whole-grain crackers.  Vegetables  Eat a variety of vegetables every day. They have lots of nutrients and are part of a heart-healthy diet.  Fruits  Eat a variety of fruits every day. Fruits contain lots of nutrients. Choose fresh fruit instead of fruit juice.  Protein foods  Choose fish and lean poultry more often. Eat red meat and fried meats less often. Dried beans, tofu, and nuts are also good sources of protein.  Dairy  Choose low-fat or fat-free products from this food group. If you have problems digesting milk, try eating cheese or yogurt instead.  Fats and oils  Limit fats and oils if you're trying to cut calories. Choose healthy fats when you cook. These include canola oil and olive oil.  Where can you learn more?  Go to https://www.[employer].net/patiented  Enter D676 in the search box to learn more about \"Learning About Dietary Guidelines.\"  Current as of: February 28, 2023               Content Version: 13.8    3341-3328 [employer].   Care instructions adapted under license by your healthcare professional. If you have questions about a medical condition or this instruction, always ask your healthcare professional. [employer] disclaims any warranty or liability for your use of this information.      Activities of Daily Living    Your Health Risk Assessment indicates you have difficulties with activities of daily living such as housework, bathing, preparing meals, taking medication, etc. Please make a follow up appointment for us to address this issue in more detail.  Hearing Loss: Care Instructions  Overview     Hearing loss is a sudden or slow decrease in how well you hear. It can range from slight to profound. Permanent hearing loss can occur with aging. It also can happen when you are exposed " long-term to loud noise. Examples include listening to loud music, riding motorcycles, or being around other loud machines.  Hearing loss can affect your work and home life. It can make you feel lonely or depressed. You may feel that you have lost your independence. But hearing aids and other devices can help you hear better and feel connected to others.  Follow-up care is a key part of your treatment and safety. Be sure to make and go to all appointments, and call your doctor if you are having problems. It's also a good idea to know your test results and keep a list of the medicines you take.  How can you care for yourself at home?  Avoid loud noises whenever possible. This helps keep your hearing from getting worse.  Always wear hearing protection around loud noises.  Wear a hearing aid as directed.  A professional can help you pick a hearing aid that will work best for you.  You can also get hearing aids over the counter for mild to moderate hearing loss.  Have hearing tests as your doctor suggests. They can show whether your hearing has changed. Your hearing aid may need to be adjusted.  Use other devices as needed. These may include:  Telephone amplifiers and hearing aids that can connect to a television, stereo, radio, or microphone.  Devices that use lights or vibrations. These alert you to the doorbell, a ringing telephone, or a baby monitor.  Television closed-captioning. This shows the words at the bottom of the screen. Most new TVs can do this.  TTY (text telephone). This lets you type messages back and forth on the telephone instead of talking or listening. These devices are also called TDD. When messages are typed on the keyboard, they are sent over the phone line to a receiving TTY. The message is shown on a monitor.  Use text messaging, social media, and email if it is hard for you to communicate by telephone.  Try to learn a listening technique called speechreading. It is not lipreading. You pay  "attention to people's gestures, expressions, posture, and tone of voice. These clues can help you understand what a person is saying. Face the person you are talking to, and have them face you. Make sure the lighting is good. You need to see the other person's face clearly.  Think about counseling if you need help to adjust to your hearing loss.  When should you call for help?  Watch closely for changes in your health, and be sure to contact your doctor if:    You think your hearing is getting worse.     You have new symptoms, such as dizziness or nausea.   Where can you learn more?  Go to https://www.[employer].net/patiented  Enter R798 in the search box to learn more about \"Hearing Loss: Care Instructions.\"  Current as of: February 28, 2023               Content Version: 13.8    0989-0540 [employer].   Care instructions adapted under license by your healthcare professional. If you have questions about a medical condition or this instruction, always ask your healthcare professional. [employer], [employer] disclaims any warranty or liability for your use of this information.         "

## 2023-12-26 NOTE — PROGRESS NOTES
"SUBJECTIVE:   Debby is a 68 year old, presenting for the following:  Physical        12/26/2023    10:25 AM   Additional Questions   Roomed by Marsha       Are you in the first 12 months of your Medicare coverage?  No    Healthy Habits:     In general, how would you rate your overall health?  Good    Frequency of exercise:  4-5 days/week    Duration of exercise:  15-30 minutes    Do you usually eat at least 4 servings of fruit and vegetables a day, include whole grains    & fiber and avoid regularly eating high fat or \"junk\" foods?  No    Taking medications regularly:  Yes    Medication side effects:  None    Ability to successfully perform activities of daily living:  Telephone requires assistance, transportation requires assistance, shopping requires assistance, preparing meals requires assistance, housework requires assistance, bathing requires assistance, laundry requires assistance, medication administration requires assistance and money management requires assistance    Home Safety:  No safety concerns identified    Hearing Impairment:  Difficulty following a conversation in a noisy restaurant or crowded room, difficulty following dialogue in the theater and need to ask people to speak up or repeat themselves    In the past 6 months, have you been bothered by leaking of urine?  No    In general, how would you rate your overall mental or emotional health?  Good    Additional concerns today:  No    Currently seeing a functional medicine specialist in [place] for her Alzheimer's disease.  She will be starting on HRT for 6 weeks and then will begin a detox for mold.     Today's PHQ-9 Score:       12/26/2023    10:10 AM   PHQ-9 SCORE   PHQ-9 Total Score MyChart 1 (Minimal depression)   PHQ-9 Total Score 1           Have you ever done Advance Care Planning? (For example, a Health Directive, POLST, or a discussion with a medical provider or your loved ones about your wishes): No, advance care planning information " given to patient to review.  Patient plans to discuss their wishes with loved ones or provider.         Fall risk  Fallen 2 or more times in the past year?: No  Any fall with injury in the past year?: No    Cognitive Screening Not appropriate due to known dementia    Do you have sleep apnea, excessive snoring or daytime drowsiness? : no    Reviewed and updated as needed this visit by clinical staff   Tobacco  Allergies  Meds              Reviewed and updated as needed this visit by Provider                 Social History     Tobacco Use     Smoking status: Former     Types: Cigarettes     Quit date: 1982     Years since quittin.4     Passive exposure: Past     Smokeless tobacco: Never   Substance Use Topics     Alcohol use: No             2023    10:14 AM   Alcohol Use   Prescreen: >3 drinks/day or >7 drinks/week? Not Applicable     Do you have a current opioid prescription? No  Do you use any other controlled substances or medications that are not prescribed by a provider? None              Current providers sharing in care for this patient include:   Patient Care Team:  Sharri Lacey NP as PCP - General (Nurse Practitioner - Family)  Tayler Dey MD as MD (Ophthalmology)  Canelo Guerra OD as MD (Optometry)  Sharri Lacey NP as Assigned PCP  Yary Francois, RN as Lead Care Coordinator (Primary Care - CC)  Anitha Brooks, RN as Specialty Care Coordinator (Hematology & Oncology)  Darin Morrow MD as Assigned Surgical Provider  Dame Palacio MD as Assigned Cancer Care Provider  Adilia Lamb, RN as Specialty Care Coordinator (Breast Oncology)    The following health maintenance items are reviewed in Epic and correct as of today:  Health Maintenance   Topic Date Due     ZOSTER IMMUNIZATION (1 of 2) Never done     RSV VACCINE (Pregnancy & 60+) (1 - 1-dose 60+ series) Never done     DTAP/TDAP/TD IMMUNIZATION (4 - Td or Tdap) 2023     MEDICARE ANNUAL WELLNESS VISIT   11/15/2023     ANNUAL REVIEW OF HM ORDERS  11/15/2023     COVID-19 Vaccine (7 - 2023-24 season) 12/07/2023     PHQ-9  06/26/2024     FALL RISK ASSESSMENT  12/26/2024     MAMMO SCREENING  02/28/2025     LIPID  10/11/2026     ADVANCE CARE PLANNING  08/15/2028     COLORECTAL CANCER SCREENING  02/10/2032     DEXA  03/28/2038     HEPATITIS C SCREENING  Completed     DEPRESSION ACTION PLAN  Completed     INFLUENZA VACCINE  Completed     Pneumococcal Vaccine: 65+ Years  Completed     IPV IMMUNIZATION  Aged Out     HPV IMMUNIZATION  Aged Out     MENINGITIS IMMUNIZATION  Aged Out     RSV MONOCLONAL ANTIBODY  Aged Out     PAP  Discontinued               10/11/2021    10:59 AM   Breast CA Risk Assessment (FHS-7)   Do you have a family history of breast, colon, or ovarian cancer? No / Unknown           Pertinent mammograms are reviewed under the imaging tab.    Review of Systems   Constitutional:  Negative for chills and fever.   HENT:  Positive for hearing loss. Negative for congestion, ear pain and sore throat.    Eyes:  Negative for pain and visual disturbance.   Respiratory:  Positive for cough. Negative for shortness of breath.    Cardiovascular:  Negative for chest pain, palpitations and peripheral edema.   Gastrointestinal:  Negative for abdominal pain, constipation, diarrhea, heartburn, hematochezia and nausea.   Breasts:  Negative for tenderness, breast mass and discharge.   Genitourinary:  Negative for dysuria, frequency, genital sores, hematuria, pelvic pain, urgency, vaginal bleeding and vaginal discharge.   Musculoskeletal:  Negative for arthralgias, joint swelling and myalgias.   Skin:  Negative for rash.   Neurological:  Negative for dizziness, weakness, headaches and paresthesias.   Psychiatric/Behavioral:  Negative for mood changes. The patient is not nervous/anxious.          OBJECTIVE:   /75 (BP Location: Right arm, Patient Position: Sitting, Cuff Size: Adult Regular)   Pulse 94   Temp 97.2  F (36.2  " C) (Temporal)   Resp 18   Ht 1.645 m (5' 4.76\")   Wt 65.3 kg (144 lb 0.2 oz)   SpO2 98%   Breastfeeding No   BMI 24.14 kg/m   Estimated body mass index is 24.14 kg/m  as calculated from the following:    Height as of this encounter: 1.645 m (5' 4.76\").    Weight as of this encounter: 65.3 kg (144 lb 0.2 oz).  Physical Exam  GENERAL: healthy, alert and no distress  EYES: Eyes grossly normal to inspection, PERRL and conjunctivae and sclerae normal  HENT: ear canals and TM's normal, nose and mouth without ulcers or lesions  NECK: no adenopathy, no asymmetry, masses, or scars and thyroid normal to palpation  RESP: lungs clear to auscultation - no rales, rhonchi or wheezes  CV: regular rate and rhythm, normal S1 S2, no S3 or S4, no murmur, click or rub, no peripheral edema and peripheral pulses strong  ABDOMEN: soft, nontender, no hepatosplenomegaly, no masses and bowel sounds normal  MS: no gross musculoskeletal defects noted, no edema  SKIN: no suspicious lesions or rashes  NEURO: Normal strength and tone, mentation intact and speech normal  PSYCH: affect normal        ASSESSMENT / PLAN:   (Z00.00) Encounter for Medicare annual wellness exam  (primary encounter diagnosis)  Comment:   Plan:     (F41.1) Generalized anxiety disorder  Comment: stable  Plan: venlafaxine (EFFEXOR XR) 150 MG 24 hr capsule        The current medical regimen is effective;  continue present plan and medications.     (F33.1) Moderate episode of recurrent major depressive disorder (H)  Comment: stable  Plan: venlafaxine (EFFEXOR XR) 150 MG 24 hr capsule        The current medical regimen is effective;  continue present plan and medications.     (W55.03XA) Cat scratch  Comment:   Plan: Tdap given.     (G30.0,  F02.818) Early onset Alzheimer's disease with behavioral disturbance (H)  Comment:   Plan: She is following with functional medicine specialist.           COUNSELING:  Reviewed preventive health counseling, as reflected in patient " instructions        She reports that she quit smoking about 41 years ago. Her smoking use included cigarettes. She has been exposed to tobacco smoke. She has never used smokeless tobacco.      Appropriate preventive services were discussed with this patient, including applicable screening as appropriate for fall prevention, nutrition, physical activity, Tobacco-use cessation, weight loss and cognition.  Checklist reviewing preventive services available has been given to the patient.    Reviewed patients plan of care and provided an AVS. The Basic Care Plan (routine screening as documented in Health Maintenance) for Debby meets the Care Plan requirement. This Care Plan has been established and reviewed with the Patient and spouse.          Sharri Lacey NP  [facility]    Identified Health Risks:    Answers submitted by the patient for this visit:  Patient Health Questionnaire (Submitted on 12/26/2023)  If you checked off any problems, how difficult have these problems made it for you to do your work, take care of things at home, or get along with other people?: Not difficult at all  PHQ9 TOTAL SCORE: 1    The patient was counseled and encouraged to consider modifying their diet and eating habits. She was provided with information on recommended healthy diet options.  The patient reports that she has difficulty with activities of daily living. I have asked that the patient make a follow up appointment in 52 weeks where this issue will be further evaluated and addressed.  The patient was provided with written information regarding signs of hearing loss.

## 2024-01-03 ENCOUNTER — PATIENT OUTREACH (OUTPATIENT)
Dept: GERIATRIC MEDICINE | Facility: CLINIC | Age: 69
End: 2024-01-03
Payer: COMMERCIAL

## 2024-01-03 NOTE — PROGRESS NOTES
[facility] Care Coordination Contact    Emailed Isha Fritz from the Caregiver Assurance Program. Asked where they are at with my referral from 11/21/23.  Isha had informed this writer, in an email, that she planned to call Josie, member's spouse, on 11/28.    Yary Francois RN  [facility]   [phone]

## 2024-01-04 NOTE — PROGRESS NOTES
[facility] Care Coordination Contact    Isha from Caregiver Assurance emailed to let me know she was unable to reach Debby Galindo's spouse.  Isha said if I speak with them I could give them her email address and direct phone number: [email or web address], [phone].    Yary Francois RN  [facility]   [phone]

## 2024-01-18 ENCOUNTER — OFFICE VISIT (OUTPATIENT)
Dept: URGENT CARE | Facility: URGENT CARE | Age: 69
End: 2024-01-18
Payer: COMMERCIAL

## 2024-01-18 VITALS
SYSTOLIC BLOOD PRESSURE: 104 MMHG | BODY MASS INDEX: 23.32 KG/M2 | RESPIRATION RATE: 15 BRPM | HEIGHT: 65 IN | OXYGEN SATURATION: 96 % | WEIGHT: 140 LBS | HEART RATE: 105 BPM | DIASTOLIC BLOOD PRESSURE: 66 MMHG | TEMPERATURE: 98.2 F

## 2024-01-18 DIAGNOSIS — C50.912 INVASIVE DUCTAL CARCINOMA OF BREAST, FEMALE, LEFT (H): ICD-10-CM

## 2024-01-18 DIAGNOSIS — J30.89 NON-SEASONAL ALLERGIC RHINITIS, UNSPECIFIED TRIGGER: ICD-10-CM

## 2024-01-18 DIAGNOSIS — R07.0 THROAT PAIN: ICD-10-CM

## 2024-01-18 DIAGNOSIS — J01.90 ACUTE SINUSITIS WITH SYMPTOMS > 10 DAYS: Primary | ICD-10-CM

## 2024-01-18 LAB
DEPRECATED S PYO AG THROAT QL EIA: NEGATIVE
GROUP A STREP BY PCR: NOT DETECTED

## 2024-01-18 PROCEDURE — 99214 OFFICE O/P EST MOD 30 MIN: CPT | Performed by: PHYSICIAN ASSISTANT

## 2024-01-18 PROCEDURE — 87651 STREP A DNA AMP PROBE: CPT | Performed by: PHYSICIAN ASSISTANT

## 2024-01-18 RX ORDER — CALCIUM CARBONATE 500(1250)
1 TABLET ORAL 2 TIMES DAILY
Qty: 180 TABLET | Refills: 3 | Status: SHIPPED | OUTPATIENT
Start: 2024-01-18

## 2024-01-18 RX ORDER — ECHINACEA PURPUREA EXTRACT 125 MG
TABLET ORAL
Qty: 88 ML | Refills: 0 | Status: SHIPPED | OUTPATIENT
Start: 2024-01-18

## 2024-01-18 RX ORDER — DESLORATADINE 5 MG/1
5 TABLET ORAL DAILY
Qty: 30 TABLET | Refills: 4 | Status: SHIPPED | OUTPATIENT
Start: 2024-01-18

## 2024-01-18 RX ORDER — AMOXICILLIN 875 MG
875 TABLET ORAL 2 TIMES DAILY
Qty: 20 TABLET | Refills: 0 | Status: SHIPPED | OUTPATIENT
Start: 2024-01-18 | End: 2024-01-28

## 2024-01-18 NOTE — PROGRESS NOTES
Patient presents with:  Urgent Care: Sore throat x 1 month.     (J01.90) Acute sinusitis with symptoms > 10 days  (primary encounter diagnosis)  Comment:   Plan: amoxicillin (AMOXIL) 875 MG tablet        sodium chloride (OCEAN) 0.65 % nasal spray as needed    (R07.0) Throat pain  Comment:   Plan: Streptococcus A Rapid Screen w/Reflex to PCR -         Clinic Collect, Group A Streptococcus PCR         Throat Swab            (J30.89) Non-seasonal allergic rhinitis, unspecified trigger  Comment:   Plan: desloratadine (CLARINEX) 5 MG tablet            (C50.912) Invasive ductal carcinoma of breast, female, left (H)  Comment: renewing the calcium given by her Oncologist  Plan:       calcium carbonate (OS-KAMAR) 500 MG tablet            At the end of the encounter, I discussed results, diagnosis, medications. Discussed red flags for immediate return to clinic/ER, as well as indications for follow up if no improvement. Patient understood and agreed to plan. Patient was stable for discharge     If not improving or if condition worsens, follow up with your Primary Care Provider      31 minutes spent by me on the date of the encounter doing chart review, review of test results, interpretation of tests, patient visit, documentation, and discussion with family       SUBJECTIVE:   Debby Barriga is a 68 year old female who presents today with throat pain for the past month, worse in the morning.  She also complains of sneezing and nasal congestion.  Denies any fevers.  Does complain of a cough in the morning.  She is here today with a family member who helps with the HPI, as patient has Alzheimer's.    Her family member is curious if we can refill her calcium tablets today.      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 (Patient not taking: Reported on 10/19/2020) 30 tablet 7     Social History     Tobacco Use    Smoking status: Never Smoker    Smokeless tobacco: Never Used   Substance Use Topics    Alcohol use: Not on file     Family History   Problem Relation Age of Onset    Diabetes Mother     Diabetes Father          ROS:    10 point ROS of systems including Constitutional, Eyes, Respiratory, Cardiovascular, Gastroenterology, Genitourinary, Integumentary, Muscularskeletal, Psychiatric ,neurological were all negative except for pertinent positives noted in my HPI       OBJECTIVE:  /66   Pulse 105   Temp 98.2  F (36.8  C) (Temporal)   Resp 15   Ht 1.638 m (5' 4.5\")   Wt 63.5 kg (140 lb)   SpO2 96%   BMI 23.66 kg/m    Physical Exam:  GENERAL APPEARANCE: healthy, alert and no distress  EYES: EOMI,  PERRL, conjunctiva clear  HENT: ear canals and TM's normal.  Nose and mouth without ulcers, erythema or lesions.  OP with postnasal drip.  HENT: nasal turbinates boggy with bluish hue and rhinorrhea yellow  NECK: supple, nontender, no lymphadenopathy  RESP: lungs clear to auscultation - no rales, rhonchi or wheezes  CV: regular rates and rhythm, normal S1 S2, no murmur noted  ABDOMEN:  soft, nontender, no HSM or masses and bowel sounds normal  NEURO: Normal strength and tone, sensory exam grossly normal,  normal speech and " mentation  SKIN: no suspicious lesions or rashes    Results for orders placed or performed in visit on 01/18/24   Streptococcus A Rapid Screen w/Reflex to PCR - Clinic Collect     Status: Normal    Specimen: Throat; Swab   Result Value Ref Range    Group A Strep antigen Negative Negative

## 2024-01-18 NOTE — PATIENT INSTRUCTIONS
(J01.90) Acute sinusitis with symptoms > 10 days  (primary encounter diagnosis)  Comment:   Plan: amoxicillin (AMOXIL) 875 MG tablet        sodium chloride (OCEAN) 0.65 % nasal spray as needed    (R07.0) Throat pain  Comment:   Plan: Streptococcus A Rapid Screen w/Reflex to PCR -         Clinic Collect, Group A Streptococcus PCR         Throat Swab            (J30.89) Non-seasonal allergic rhinitis, unspecified trigger  Comment:   Plan: desloratadine (CLARINEX) 5 MG tablet            (C50.912) Invasive ductal carcinoma of breast, female, left (H)  Comment: renewing the calcium given by her Oncologist  Plan:       calcium carbonate (OS-KAMAR) 500 MG tablet

## 2024-01-23 PROBLEM — G44.219 EPISODIC TENSION-TYPE HEADACHE, NOT INTRACTABLE: Status: RESOLVED | Noted: 2021-09-28 | Resolved: 2024-01-23

## 2024-01-25 ENCOUNTER — LAB (OUTPATIENT)
Dept: LAB | Facility: CLINIC | Age: 69
End: 2024-01-25
Payer: COMMERCIAL

## 2024-01-25 DIAGNOSIS — Z85.3 PERSONAL HISTORY OF MALIGNANT NEOPLASM OF BREAST: ICD-10-CM

## 2024-01-25 DIAGNOSIS — Z86.19 PERSONAL HISTORY OF OTHER INFECTIOUS AND PARASITIC DISEASES: ICD-10-CM

## 2024-01-25 DIAGNOSIS — Z86.19 PERSONAL HISTORY OF INFECTIOUS DISEASE: ICD-10-CM

## 2024-01-25 DIAGNOSIS — F41.9 ANXIETY: ICD-10-CM

## 2024-01-25 DIAGNOSIS — F41.9 ANXIETY: Primary | ICD-10-CM

## 2024-01-25 DIAGNOSIS — F41.1 GENERALIZED ANXIETY DISORDER: Primary | ICD-10-CM

## 2024-01-25 PROCEDURE — 82627 DEHYDROEPIANDROSTERONE: CPT

## 2024-01-25 PROCEDURE — 82670 ASSAY OF TOTAL ESTRADIOL: CPT

## 2024-01-25 PROCEDURE — 84144 ASSAY OF PROGESTERONE: CPT

## 2024-01-25 PROCEDURE — 36415 COLL VENOUS BLD VENIPUNCTURE: CPT

## 2024-01-26 LAB
DHEA-S SERPL-MCNC: 117 UG/DL (ref 35–430)
ESTRADIOL SERPL-MCNC: 30 PG/ML
PROGEST SERPL-MCNC: 0.3 NG/ML

## 2024-02-06 DIAGNOSIS — F41.1 GENERALIZED ANXIETY DISORDER: ICD-10-CM

## 2024-02-06 DIAGNOSIS — Z77.120 CONTACT WITH AND (SUSPECTED) EXPOSURE TO MOLD (TOXIC): ICD-10-CM

## 2024-02-06 DIAGNOSIS — F02.818 DEMENTIA DUE TO MEDICAL CONDITION WITH BEHAVIORAL DISTURBANCE (H): ICD-10-CM

## 2024-02-06 DIAGNOSIS — F33.1 MODERATE EPISODE OF RECURRENT MAJOR DEPRESSIVE DISORDER (H): ICD-10-CM

## 2024-02-06 DIAGNOSIS — F41.9 ANXIETY: Primary | ICD-10-CM

## 2024-02-06 DIAGNOSIS — Z86.19 PERSONAL HISTORY OF OTHER INFECTIOUS AND PARASITIC DISEASES: ICD-10-CM

## 2024-02-06 DIAGNOSIS — Z85.3 PERSONAL HISTORY OF MALIGNANT NEOPLASM OF BREAST: ICD-10-CM

## 2024-02-06 RX ORDER — VENLAFAXINE HYDROCHLORIDE 150 MG/1
CAPSULE, EXTENDED RELEASE ORAL
Qty: 90 CAPSULE | Refills: 3 | OUTPATIENT
Start: 2024-02-06

## 2024-02-26 ENCOUNTER — PATIENT OUTREACH (OUTPATIENT)
Dept: GERIATRIC MEDICINE | Facility: CLINIC | Age: 69
End: 2024-02-26
Payer: COMMERCIAL

## 2024-02-26 NOTE — PROGRESS NOTES
[facility] Care Coordination Contact      [facility] Six-Month Telephone Assessment    6 month telephone assessment completed on 2/28/24.    ER visits: No  Hospitalizations: No  TCU stays: No  Significant health status changes: No  Falls/Injuries: No  ADL/IADL changes: No  Changes in services: No - not at this time, but member's MA may be terminated due to increase in her Soc Security.  Josie is in contact with [employer] , who is looking into the Medical Assistance issue. I emailed Christina Westfall and asked her to update me on what she finds out. Also asked if it is possible for member to continue on MA and CADI, with a waiver obligation/spenddown.     Caregiver Assessment follow up: Spoke with member's wife, Josie Chavis, who just had major shoulder surgery and will be recovering for the next 3 months. Josie said they are doing ok so far and have some help from friends as well as starting Meals on Wheels, but she is concerned about next week. She said they are managing, but it is stressful. I suggested [employer] supplemental benefit of rides to the grocery store and Josie said she doesn't think that would be helpful at this time.     Advised Josie to reach out to the Caregiver Assurance Program, who I referred them to in January. They may have some ideas for resources during this time. Josie voiced understanding.    Goals: See POC in chart for goal progress documentation.      Will see member in 6 months for an annual health risk assessment.   Encouraged member to call CC with any questions or concerns in the meantime.     Yary Francois RN  [facility]   [phone]

## 2024-03-07 ENCOUNTER — PATIENT OUTREACH (OUTPATIENT)
Dept: GERIATRIC MEDICINE | Facility: CLINIC | Age: 69
End: 2024-03-07
Payer: COMMERCIAL

## 2024-03-07 NOTE — PROGRESS NOTES
[facility] Care Coordination Contact    No longer active with [facility] community case management effective 1/31/24.  Reason for community disenrollment: Change Insurance  ([facility] health plan as of 2/1/24).  Spoke with member's wife, Josie on 2/28/24, who told writer they had switched insurance and were in contact with member's CADI  about services, etc.  Yary Francois RN  [facility]   [phone]

## 2024-03-21 ENCOUNTER — ANCILLARY PROCEDURE (OUTPATIENT)
Dept: MAMMOGRAPHY | Facility: CLINIC | Age: 69
End: 2024-03-21
Attending: INTERNAL MEDICINE
Payer: COMMERCIAL

## 2024-03-21 DIAGNOSIS — C50.912 INVASIVE DUCTAL CARCINOMA OF BREAST, FEMALE, LEFT (H): ICD-10-CM

## 2024-03-21 DIAGNOSIS — Z12.31 ENCOUNTER FOR SCREENING MAMMOGRAM FOR MALIGNANT NEOPLASM OF BREAST: ICD-10-CM

## 2024-03-21 PROCEDURE — 77067 SCR MAMMO BI INCL CAD: CPT | Mod: GC | Performed by: RADIOLOGY

## 2024-03-21 PROCEDURE — 77063 BREAST TOMOSYNTHESIS BI: CPT | Mod: GC | Performed by: RADIOLOGY

## 2024-04-03 ENCOUNTER — LAB (OUTPATIENT)
Dept: LAB | Facility: CLINIC | Age: 69
End: 2024-04-03
Payer: COMMERCIAL

## 2024-04-03 DIAGNOSIS — F41.9 ANXIETY DISORDER, UNSPECIFIED TYPE: ICD-10-CM

## 2024-04-03 DIAGNOSIS — C50.919 MALIGNANT NEOPLASM OF FEMALE BREAST, UNSPECIFIED ESTROGEN RECEPTOR STATUS, UNSPECIFIED LATERALITY, UNSPECIFIED SITE OF BREAST (H): ICD-10-CM

## 2024-04-03 DIAGNOSIS — F02.811: ICD-10-CM

## 2024-04-03 DIAGNOSIS — Z86.19 PERSONAL HISTORY OF INFECTIOUS DISEASE: ICD-10-CM

## 2024-04-03 DIAGNOSIS — F41.9 ANXIETY DISORDER: Primary | ICD-10-CM

## 2024-04-03 DIAGNOSIS — Z77.120 CONTACT WITH MOLD: ICD-10-CM

## 2024-04-03 DIAGNOSIS — F02.818 ALZHEIMER'S DEMENTIA WITH BEHAVIORAL DISTURBANCE (H): ICD-10-CM

## 2024-04-03 DIAGNOSIS — F02.818 DEMENTIA DUE TO MEDICAL CONDITION WITH BEHAVIORAL DISTURBANCE (H): ICD-10-CM

## 2024-04-03 DIAGNOSIS — F45.8 ANXIETY HYPERVENTILATION: ICD-10-CM

## 2024-04-03 DIAGNOSIS — F03.90 DEMENTIA (H): ICD-10-CM

## 2024-04-03 DIAGNOSIS — Z86.19 PERSONAL HISTORY OF OTHER INFECTIOUS AND PARASITIC DISEASES: ICD-10-CM

## 2024-04-03 DIAGNOSIS — Z86.19 HISTORY OF HEPATITIS B: ICD-10-CM

## 2024-04-03 DIAGNOSIS — Z85.3 PERSONAL HISTORY OF MALIGNANT NEOPLASM OF BREAST: ICD-10-CM

## 2024-04-03 DIAGNOSIS — G30.9 ALZHEIMER'S DEMENTIA WITH BEHAVIORAL DISTURBANCE (H): ICD-10-CM

## 2024-04-03 DIAGNOSIS — Z77.120 CONTACT WITH AND (SUSPECTED) EXPOSURE TO MOLD (TOXIC): ICD-10-CM

## 2024-04-03 DIAGNOSIS — F41.9 ANXIETY: ICD-10-CM

## 2024-04-03 DIAGNOSIS — Z77.120 MOLD EXPOSURE: ICD-10-CM

## 2024-04-03 DIAGNOSIS — S09.90XS: ICD-10-CM

## 2024-04-03 DIAGNOSIS — Z77.120 SUSPECTED EXPOSURE TO MOLD: ICD-10-CM

## 2024-04-03 DIAGNOSIS — F41.9 ANXIETY HYPERVENTILATION: ICD-10-CM

## 2024-04-03 DIAGNOSIS — Z77.120 MOLD EXPOSURE: Primary | ICD-10-CM

## 2024-04-03 PROCEDURE — 84466 ASSAY OF TRANSFERRIN: CPT

## 2024-04-03 PROCEDURE — 84140 ASSAY OF PREGNENOLONE: CPT | Mod: 90

## 2024-04-03 PROCEDURE — 83540 ASSAY OF IRON: CPT

## 2024-04-03 PROCEDURE — 82728 ASSAY OF FERRITIN: CPT

## 2024-04-03 PROCEDURE — 99000 SPECIMEN HANDLING OFFICE-LAB: CPT

## 2024-04-03 PROCEDURE — 36415 COLL VENOUS BLD VENIPUNCTURE: CPT

## 2024-04-03 PROCEDURE — 84144 ASSAY OF PROGESTERONE: CPT

## 2024-04-03 PROCEDURE — 82670 ASSAY OF TOTAL ESTRADIOL: CPT

## 2024-04-03 PROCEDURE — 82627 DEHYDROEPIANDROSTERONE: CPT

## 2024-04-04 LAB
DHEA-S SERPL-MCNC: 158 UG/DL (ref 35–430)
ESTRADIOL SERPL-MCNC: 19 PG/ML
FERRITIN SERPL-MCNC: 39 NG/ML (ref 11–328)
IRON BINDING CAPACITY (ROCHE): 244 UG/DL (ref 240–430)
IRON SATN MFR SERPL: 22 % (ref 15–46)
IRON SERPL-MCNC: 54 UG/DL (ref 37–145)
PROGEST SERPL-MCNC: 0.6 NG/ML
TRANSFERRIN SERPL-MCNC: 203 MG/DL (ref 200–360)

## 2024-04-06 LAB — PREG SERPL-MCNC: 43 NG/DL

## 2024-04-17 DIAGNOSIS — Z77.120 SUSPECTED EXPOSURE TO MOLD: Primary | ICD-10-CM

## 2024-04-17 DIAGNOSIS — Z85.3 PERSONAL HISTORY OF MALIGNANT NEOPLASM OF BREAST: ICD-10-CM

## 2024-04-17 DIAGNOSIS — Z86.19 PERSONAL HISTORY OF INFECTIOUS DISEASE: ICD-10-CM

## 2024-04-17 DIAGNOSIS — F03.90 DEMENTIA (H): ICD-10-CM

## 2024-05-07 DIAGNOSIS — Z77.120 CONTACT WITH AND (SUSPECTED) EXPOSURE TO MOLD (TOXIC): ICD-10-CM

## 2024-05-07 DIAGNOSIS — Z77.120 SUSPECTED EXPOSURE TO MOLD: ICD-10-CM

## 2024-05-07 DIAGNOSIS — Z86.19 PERSONAL HISTORY OF OTHER INFECTIOUS AND PARASITIC DISEASES: ICD-10-CM

## 2024-05-07 DIAGNOSIS — Z85.3 PERSONAL HISTORY OF MALIGNANT NEOPLASM OF BREAST: ICD-10-CM

## 2024-05-07 DIAGNOSIS — Z86.19 PERSONAL HISTORY OF INFECTIOUS DISEASE: Primary | ICD-10-CM

## 2024-05-07 DIAGNOSIS — C50.919 MALIGNANT NEOPLASM OF FEMALE BREAST, UNSPECIFIED ESTROGEN RECEPTOR STATUS, UNSPECIFIED LATERALITY, UNSPECIFIED SITE OF BREAST (H): ICD-10-CM

## 2024-05-07 DIAGNOSIS — F03.90 DEMENTIA (H): ICD-10-CM

## 2024-05-07 DIAGNOSIS — C50.619: ICD-10-CM

## 2024-05-07 DIAGNOSIS — F41.9 ANXIETY: ICD-10-CM

## 2024-07-15 NOTE — PROGRESS NOTES
HISTORY OF PRESENT ILLNESS:  Debby Barriga is a 67-year-old woman with a new diagnosis of a left breast cancer.  On 08/25 she underwent a screening mammogram, which revealed a new asymmetry in her left breast.  On 09/08 she underwent a diagnostic mammogram and ultrasound, which demonstrated a 6 mm mass.  On 09/20 she underwent a contrast-enhanced mammography, which demonstrated 6 mm of enhancement.  On 09/20 she underwent an image-guided needle biopsy, which demonstrated a grade 1 invasive ductal cancer, ER positive, AK positive, HER-2 negative.  She is now here to talk about treatment options.  She denies any previous breast problems.  She has no breast symptoms at the present time.    PAST MEDICAL HISTORY:  Significant for Alzheimer's.  She has no other major medical problems.    FAMILY HISTORY:  Negative as far she knows regarding breast or ovarian cancer.    IMPRESSION:  Stage I, ER-positive breast cancer.    PLAN:  I talked with her about the various treatment options including mastectomy with or without reconstruction versus a lumpectomy plus radiation therapy.  She is interested in breast-conserving surgery.  I did recommend a sentinel lymph node biopsy.  I told her that endocrine therapy would be recommended.  I told her that it would be very unlikely that she would need chemotherapy.  She was accompanied by her partner today, and they understand and wish to proceed with a seed-localized lumpectomy and sentinel lymph node biopsy.      
forehead

## 2024-08-08 ENCOUNTER — LAB (OUTPATIENT)
Dept: LAB | Facility: CLINIC | Age: 69
End: 2024-08-08
Payer: COMMERCIAL

## 2024-08-08 DIAGNOSIS — F03.90 DEMENTIA (H): ICD-10-CM

## 2024-08-08 DIAGNOSIS — E78.5 HYPERLIPIDEMIA: ICD-10-CM

## 2024-08-08 DIAGNOSIS — C50.919 MALIGNANT NEOPLASM OF FEMALE BREAST, UNSPECIFIED ESTROGEN RECEPTOR STATUS, UNSPECIFIED LATERALITY, UNSPECIFIED SITE OF BREAST (H): ICD-10-CM

## 2024-08-08 DIAGNOSIS — Z13.220 SCREENING FOR HYPERLIPIDEMIA: Primary | ICD-10-CM

## 2024-08-15 ENCOUNTER — LAB (OUTPATIENT)
Dept: LAB | Facility: CLINIC | Age: 69
End: 2024-08-15
Payer: COMMERCIAL

## 2024-08-15 DIAGNOSIS — K52.9 GASTROENTERITIS: ICD-10-CM

## 2024-08-15 DIAGNOSIS — G30.9 ALZHEIMER'S DISEASE (H): Primary | ICD-10-CM

## 2024-08-15 DIAGNOSIS — G44.86 CERVICOGENIC HEADACHE: ICD-10-CM

## 2024-08-15 DIAGNOSIS — F02.80 ALZHEIMER'S DISEASE (H): Primary | ICD-10-CM

## 2024-08-15 DIAGNOSIS — E78.5 HYPERLIPIDEMIA: ICD-10-CM

## 2024-08-15 DIAGNOSIS — N95.1 MENOPAUSAL STATE: ICD-10-CM

## 2024-08-15 LAB
BASOPHILS # BLD AUTO: 0 10E3/UL (ref 0–0.2)
BASOPHILS NFR BLD AUTO: 1 %
CHOLEST SERPL-MCNC: 223 MG/DL
CRP SERPL-MCNC: <3 MG/L
EOSINOPHIL # BLD AUTO: 0.1 10E3/UL (ref 0–0.7)
EOSINOPHIL NFR BLD AUTO: 2 %
ERYTHROCYTE [DISTWIDTH] IN BLOOD BY AUTOMATED COUNT: 13.3 % (ref 10–15)
ESTRADIOL SERPL-MCNC: 38 PG/ML
FASTING STATUS PATIENT QL REPORTED: NO
HCT VFR BLD AUTO: 41.7 % (ref 35–47)
HDLC SERPL-MCNC: 64 MG/DL
HGB BLD-MCNC: 13.1 G/DL (ref 11.7–15.7)
IMM GRANULOCYTES # BLD: 0 10E3/UL
IMM GRANULOCYTES NFR BLD: 0 %
LDLC SERPL CALC-MCNC: 133 MG/DL
LYMPHOCYTES # BLD AUTO: 1.7 10E3/UL (ref 0.8–5.3)
LYMPHOCYTES NFR BLD AUTO: 27 %
MCH RBC QN AUTO: 28.4 PG (ref 26.5–33)
MCHC RBC AUTO-ENTMCNC: 31.4 G/DL (ref 31.5–36.5)
MCV RBC AUTO: 91 FL (ref 78–100)
MONOCYTES # BLD AUTO: 0.6 10E3/UL (ref 0–1.3)
MONOCYTES NFR BLD AUTO: 9 %
NEUTROPHILS # BLD AUTO: 3.9 10E3/UL (ref 1.6–8.3)
NEUTROPHILS NFR BLD AUTO: 62 %
NONHDLC SERPL-MCNC: 159 MG/DL
PLATELET # BLD AUTO: 258 10E3/UL (ref 150–450)
PROGEST SERPL-MCNC: 0.6 NG/ML
RBC # BLD AUTO: 4.61 10E6/UL (ref 3.8–5.2)
SHBG SERPL-SCNC: 89 NMOL/L (ref 30–135)
TRIGL SERPL-MCNC: 132 MG/DL
WBC # BLD AUTO: 6.3 10E3/UL (ref 4–11)

## 2024-08-15 PROCEDURE — 85025 COMPLETE CBC W/AUTO DIFF WBC: CPT

## 2024-08-15 PROCEDURE — 99000 SPECIMEN HANDLING OFFICE-LAB: CPT

## 2024-08-15 PROCEDURE — 80061 LIPID PANEL: CPT

## 2024-08-15 PROCEDURE — 84144 ASSAY OF PROGESTERONE: CPT

## 2024-08-15 PROCEDURE — 36415 COLL VENOUS BLD VENIPUNCTURE: CPT

## 2024-08-15 PROCEDURE — 84270 ASSAY OF SEX HORMONE GLOBUL: CPT

## 2024-08-15 PROCEDURE — 86140 C-REACTIVE PROTEIN: CPT

## 2024-08-15 PROCEDURE — 84403 ASSAY OF TOTAL TESTOSTERONE: CPT

## 2024-08-15 PROCEDURE — 82670 ASSAY OF TOTAL ESTRADIOL: CPT

## 2024-08-15 PROCEDURE — 84140 ASSAY OF PREGNENOLONE: CPT | Mod: 90

## 2024-08-18 LAB
PREG SERPL-MCNC: 49 NG/DL
TESTOST FREE SERPL-MCNC: 0.09 NG/DL
TESTOST SERPL-MCNC: 10 NG/DL (ref 8–60)

## 2024-11-30 ENCOUNTER — MYC REFILL (OUTPATIENT)
Dept: ONCOLOGY | Facility: CLINIC | Age: 69
End: 2024-11-30
Payer: COMMERCIAL

## 2024-11-30 DIAGNOSIS — M85.89 OSTEOPENIA OF MULTIPLE SITES: ICD-10-CM

## 2024-11-30 DIAGNOSIS — C50.912 INVASIVE DUCTAL CARCINOMA OF BREAST, FEMALE, LEFT (H): ICD-10-CM

## 2024-12-02 RX ORDER — CALCIUM CARBONATE/VITAMIN D3 600 MG-10
2 TABLET ORAL DAILY
Qty: 180 TABLET | Refills: 1 | OUTPATIENT
Start: 2024-12-02

## 2024-12-02 NOTE — TELEPHONE ENCOUNTER
Pending Prescriptions:                       Disp   Refills    calcium carbonate-vitamin D (CALTRATE) 60*180 ta*1            Sig: Take 2 tablets by mouth daily.    Last prescribing provider:     Last clinic visit date: 08/08/23 w/    Recommendations for requested medication (if none, N/A): Copied from last OV note: - Continue Calcium and vitamin D. At least 1000 mg/iu daily     Any other pertinent information (if none, N/A): N/A    Refilled: Y/N, if NO, why?

## 2024-12-03 NOTE — TELEPHONE ENCOUNTER
Call to pt regarding refill request and to offer to assist with scheduling follow up appt. Spoke to pt spouse Josie, consent to communicate on file, who stated pt is buying OTC calcium and does not want to schedule and appt at this time.

## 2024-12-04 ENCOUNTER — LAB (OUTPATIENT)
Dept: LAB | Facility: CLINIC | Age: 69
End: 2024-12-04
Payer: COMMERCIAL

## 2024-12-04 DIAGNOSIS — N95.1 MENOPAUSAL SYNDROME: ICD-10-CM

## 2024-12-04 DIAGNOSIS — G30.9 ALZHEIMER'S DISEASE (H): Primary | ICD-10-CM

## 2024-12-04 DIAGNOSIS — F02.80 ALZHEIMER'S DISEASE (H): Primary | ICD-10-CM

## 2024-12-04 DIAGNOSIS — K52.9 GASTROENTERITIS: ICD-10-CM

## 2024-12-04 DIAGNOSIS — G44.86 CERVICOGENIC HEADACHE: ICD-10-CM

## 2024-12-04 LAB
BASOPHILS # BLD AUTO: 0 10E3/UL (ref 0–0.2)
BASOPHILS NFR BLD AUTO: 0 %
EOSINOPHIL # BLD AUTO: 0.1 10E3/UL (ref 0–0.7)
EOSINOPHIL NFR BLD AUTO: 1 %
ERYTHROCYTE [DISTWIDTH] IN BLOOD BY AUTOMATED COUNT: 13.1 % (ref 10–15)
HCT VFR BLD AUTO: 39.1 % (ref 35–47)
HGB BLD-MCNC: 12.6 G/DL (ref 11.7–15.7)
IMM GRANULOCYTES # BLD: 0 10E3/UL
IMM GRANULOCYTES NFR BLD: 0 %
LYMPHOCYTES # BLD AUTO: 1.5 10E3/UL (ref 0.8–5.3)
LYMPHOCYTES NFR BLD AUTO: 25 %
MCH RBC QN AUTO: 29.2 PG (ref 26.5–33)
MCHC RBC AUTO-ENTMCNC: 32.2 G/DL (ref 31.5–36.5)
MCV RBC AUTO: 91 FL (ref 78–100)
MONOCYTES # BLD AUTO: 0.6 10E3/UL (ref 0–1.3)
MONOCYTES NFR BLD AUTO: 11 %
NEUTROPHILS # BLD AUTO: 3.8 10E3/UL (ref 1.6–8.3)
NEUTROPHILS NFR BLD AUTO: 63 %
PLATELET # BLD AUTO: 253 10E3/UL (ref 150–450)
RBC # BLD AUTO: 4.31 10E6/UL (ref 3.8–5.2)
SHBG SERPL-SCNC: 79 NMOL/L (ref 30–135)
WBC # BLD AUTO: 6 10E3/UL (ref 4–11)

## 2024-12-04 PROCEDURE — 82670 ASSAY OF TOTAL ESTRADIOL: CPT

## 2024-12-04 PROCEDURE — 99000 SPECIMEN HANDLING OFFICE-LAB: CPT

## 2024-12-04 PROCEDURE — 84270 ASSAY OF SEX HORMONE GLOBUL: CPT

## 2024-12-04 PROCEDURE — 84140 ASSAY OF PREGNENOLONE: CPT | Mod: 90

## 2024-12-04 PROCEDURE — 84144 ASSAY OF PROGESTERONE: CPT

## 2024-12-04 PROCEDURE — 36415 COLL VENOUS BLD VENIPUNCTURE: CPT

## 2024-12-04 PROCEDURE — 86140 C-REACTIVE PROTEIN: CPT

## 2024-12-04 PROCEDURE — 85025 COMPLETE CBC W/AUTO DIFF WBC: CPT

## 2024-12-05 LAB
CRP SERPL-MCNC: <3 MG/L
ESTRADIOL SERPL-MCNC: 28 PG/ML
PROGEST SERPL-MCNC: 0.2 NG/ML

## 2024-12-09 LAB — PREG SERPL-MCNC: 38 NG/DL

## 2024-12-18 ENCOUNTER — TELEPHONE (OUTPATIENT)
Dept: FAMILY MEDICINE | Facility: CLINIC | Age: 69
End: 2024-12-18
Payer: COMMERCIAL

## 2024-12-18 NOTE — TELEPHONE ENCOUNTER
Forms/Letter Request    Type of form/letter: Nursing Home/Assisted Living Orders      Do we have the form/letter: Yes: Form folder CARE TEAM 4    Who is the form from? Assisted living (if other please explain)    Where did/will the form come from? form was faxed in    When is form/letter needed by: ASAP    How would you like the form/letter returned: Fax : ANT attached, number listed in contact

## 2024-12-19 NOTE — TELEPHONE ENCOUNTER
Since I haven't seen her in a year, I would like her wife to clarify if I should check yes to the question about wandering risk.

## 2025-01-07 ENCOUNTER — OFFICE VISIT (OUTPATIENT)
Dept: FAMILY MEDICINE | Facility: CLINIC | Age: 70
End: 2025-01-07
Attending: NURSE PRACTITIONER
Payer: COMMERCIAL

## 2025-01-07 VITALS
HEART RATE: 72 BPM | DIASTOLIC BLOOD PRESSURE: 71 MMHG | HEIGHT: 65 IN | TEMPERATURE: 97.2 F | BODY MASS INDEX: 26.24 KG/M2 | WEIGHT: 157.5 LBS | SYSTOLIC BLOOD PRESSURE: 105 MMHG | OXYGEN SATURATION: 99 %

## 2025-01-07 DIAGNOSIS — Z00.00 ENCOUNTER FOR MEDICARE ANNUAL WELLNESS EXAM: Primary | ICD-10-CM

## 2025-01-07 DIAGNOSIS — F41.1 GENERALIZED ANXIETY DISORDER: ICD-10-CM

## 2025-01-07 DIAGNOSIS — F33.1 MODERATE EPISODE OF RECURRENT MAJOR DEPRESSIVE DISORDER (H): ICD-10-CM

## 2025-01-07 DIAGNOSIS — C50.912 INVASIVE DUCTAL CARCINOMA OF BREAST, FEMALE, LEFT (H): ICD-10-CM

## 2025-01-07 DIAGNOSIS — G30.9 ALZHEIMER'S DEMENTIA WITHOUT BEHAVIORAL DISTURBANCE (H): ICD-10-CM

## 2025-01-07 DIAGNOSIS — F02.80 ALZHEIMER'S DEMENTIA WITHOUT BEHAVIORAL DISTURBANCE (H): ICD-10-CM

## 2025-01-07 PROCEDURE — G2211 COMPLEX E/M VISIT ADD ON: HCPCS | Performed by: NURSE PRACTITIONER

## 2025-01-07 PROCEDURE — 99214 OFFICE O/P EST MOD 30 MIN: CPT | Mod: 25 | Performed by: NURSE PRACTITIONER

## 2025-01-07 PROCEDURE — G0439 PPPS, SUBSEQ VISIT: HCPCS | Performed by: NURSE PRACTITIONER

## 2025-01-07 RX ORDER — VENLAFAXINE HYDROCHLORIDE 150 MG/1
CAPSULE, EXTENDED RELEASE ORAL
Qty: 90 CAPSULE | Refills: 4 | Status: SHIPPED | OUTPATIENT
Start: 2025-01-07

## 2025-01-07 SDOH — HEALTH STABILITY: PHYSICAL HEALTH: ON AVERAGE, HOW MANY DAYS PER WEEK DO YOU ENGAGE IN MODERATE TO STRENUOUS EXERCISE (LIKE A BRISK WALK)?: 3 DAYS

## 2025-01-07 ASSESSMENT — PATIENT HEALTH QUESTIONNAIRE - PHQ9
SUM OF ALL RESPONSES TO PHQ QUESTIONS 1-9: 4
10. IF YOU CHECKED OFF ANY PROBLEMS, HOW DIFFICULT HAVE THESE PROBLEMS MADE IT FOR YOU TO DO YOUR WORK, TAKE CARE OF THINGS AT HOME, OR GET ALONG WITH OTHER PEOPLE: NOT DIFFICULT AT ALL
SUM OF ALL RESPONSES TO PHQ QUESTIONS 1-9: 4

## 2025-01-07 ASSESSMENT — SOCIAL DETERMINANTS OF HEALTH (SDOH): HOW OFTEN DO YOU GET TOGETHER WITH FRIENDS OR RELATIVES?: ONCE A WEEK

## 2025-01-07 ASSESSMENT — PAIN SCALES - GENERAL: PAINLEVEL_OUTOF10: NO PAIN (0)

## 2025-01-07 NOTE — PATIENT INSTRUCTIONS
Patient Education   Preventive Care Advice   This is general advice given by our system to help you stay healthy. However, your care team may have specific advice just for you. Please talk to your care team about your preventive care needs.  Nutrition  Eat 5 or more servings of fruits and vegetables each day.  Try wheat bread, brown rice and whole grain pasta (instead of white bread, rice, and pasta).  Get enough calcium and vitamin D. Check the label on foods and aim for 100% of the RDA (recommended daily allowance).  Lifestyle  Exercise at least 150 minutes each week  (30 minutes a day, 5 days a week).  Do muscle strengthening activities 2 days a week. These help control your weight and prevent disease.  No smoking.  Wear sunscreen to prevent skin cancer.  Have a dental exam and cleaning every 6 months.  Yearly exams  See your health care team every year to talk about:  Any changes in your health.  Any medicines your care team has prescribed.  Preventive care, family planning, and ways to prevent chronic diseases.  Shots (vaccines)   HPV shots (up to age 26), if you've never had them before.  Hepatitis B shots (up to age 59), if you've never had them before.  COVID-19 shot: Get this shot when it's due.  Flu shot: Get a flu shot every year.  Tetanus shot: Get a tetanus shot every 10 years.  Pneumococcal, hepatitis A, and RSV shots: Ask your care team if you need these based on your risk.  Shingles shot (for age 50 and up)  General health tests  Diabetes screening:  Starting at age 35, Get screened for diabetes at least every 3 years.  If you are younger than age 35, ask your care team if you should be screened for diabetes.  Cholesterol test: At age 39, start having a cholesterol test every 5 years, or more often if advised.  Bone density scan (DEXA): At age 50, ask your care team if you should have this scan for osteoporosis (brittle bones).  Hepatitis C: Get tested at least once in your life.  STIs (sexually  transmitted infections)  Before age 24: Ask your care team if you should be screened for STIs.  After age 24: Get screened for STIs if you're at risk. You are at risk for STIs (including HIV) if:  You are sexually active with more than one person.  You don't use condoms every time.  You or a partner was diagnosed with a sexually transmitted infection.  If you are at risk for HIV, ask about PrEP medicine to prevent HIV.  Get tested for HIV at least once in your life, whether you are at risk for HIV or not.  Cancer screening tests  Cervical cancer screening: If you have a cervix, begin getting regular cervical cancer screening tests starting at age 21.  Breast cancer scan (mammogram): If you've ever had breasts, begin having regular mammograms starting at age 40. This is a scan to check for breast cancer.  Colon cancer screening: It is important to start screening for colon cancer at age 45.  Have a colonoscopy test every 10 years (or more often if you're at risk) Or, ask your provider about stool tests like a FIT test every year or Cologuard test every 3 years.  To learn more about your testing options, visit:   .  For help making a decision, visit:   https://bit.ly/db38909.  Prostate cancer screening test: If you have a prostate, ask your care team if a prostate cancer screening test (PSA) at age 55 is right for you.  Lung cancer screening: If you are a current or former smoker ages 50 to 80, ask your care team if ongoing lung cancer screenings are right for you.  For informational purposes only. Not to replace the advice of your health care provider. Copyright   2023 [facility]. All rights reserved. Clinically reviewed by the [facility] Transitions Program. Linq3 182363 - REV 01/24.  Learning About Activities of Daily Living  What are activities of daily living?     Activities of daily living (ADLs) are the basic self-care tasks you do every day. These include eating, bathing, dressing,  and moving around.  As you age, and if you have health problems, you may find that it's harder to do some of these tasks. If so, your doctor can suggest ideas that may help.  To measure what kind of help you may need, your doctor will ask how well you are able to do ADLs. Let your doctor know if there are any tasks that you are having trouble doing. This is an important first step to getting help. And when you have the help you need, you can stay as independent as possible.  How will a doctor assess your ADLs?  Asking about ADLs is part of a routine health checkup your doctor will likely do as you age. Your health check might be done in a doctor's office, in your home, or at a hospital. The goal is to find out if you are having any problems that could make it hard to care for yourself or that make it unsafe for you to be on your own.  To measure your ADLs, your doctor will ask how hard it is for you to do routine tasks. Your doctor may also want to know if you have changed the way you do a task because of a health problem. Your doctor may watch how you:  Walk back and forth.  Keep your balance while you stand or walk.  Move from sitting to standing or from a bed to a chair.  Button or unbutton a shirt or sweater.  Remove and put on your shoes.  It's common to feel a little worried or anxious if you find you can't do all the things you used to be able to do. Talking with your doctor about ADLs is a way to make sure you're as safe as possible and able to care for yourself as well as you can. You may want to bring a caregiver, friend, or family member to your checkup. They can help you talk to your doctor.  Follow-up care is a key part of your treatment and safety. Be sure to make and go to all appointments, and call your doctor if you are having problems. It's also a good idea to know your test results and keep a list of the medicines you take.  Current as of: October 24, 2023  Content Version: 14.3    2024 [facility]  [employer].   Care instructions adapted under license by your healthcare professional. If you have questions about a medical condition or this instruction, always ask your healthcare professional. [facility] [employer] disclaims any warranty or liability for your use of this information.

## 2025-01-07 NOTE — PROGRESS NOTES
"Preventive Care Visit  [facility]  Sharri Lacey, NP, Nurse Practitioner - Family  Jan 7, 2025      Assessment & Plan     (Z00.00) Encounter for Medicare annual wellness exam  (primary encounter diagnosis)  Comment:   Plan:     (F41.1) Generalized anxiety disorder  Comment: stable  Plan: venlafaxine (EFFEXOR XR) 150 MG 24 hr capsule        The current medical regimen is effective;  continue present plan and medications.     (F33.1) Moderate episode of recurrent major depressive disorder (H)  Comment: stable  Plan: venlafaxine (EFFEXOR XR) 150 MG 24 hr capsule        The current medical regimen is effective;  continue present plan and medications.     (G30.9,  F02.80) Alzheimer's dementia without behavioral disturbance (H)  Comment: stable  Plan: She is continuing to work with a functional     (C50.912) Invasive ductal carcinoma of breast, female, left (H)  Comment:   Plan: She and her wife have chosen to not pursue further treatment, as they are choosing HRT through functional medicine for her Alzheimer's instead.      The longitudinal plan of care for the diagnosis(es)/condition(s) as documented were addressed during this visit. Due to the added complexity in care, I will continue to support Debby in the subsequent management and with ongoing continuity of care.            BMI  Estimated body mass index is 26.56 kg/m  as calculated from the following:    Height as of this encounter: 1.64 m (5' 4.57\").    Weight as of this encounter: 71.4 kg (157 lb 8 oz).       Counseling  Appropriate preventive services were addressed with this patient via screening, questionnaire, or discussion as appropriate for fall prevention, nutrition, physical activity, Tobacco-use cessation, social engagement, weight loss and cognition.  Checklist reviewing preventive services available has been given to the patient.  Reviewed patient's diet, addressing concerns and/or questions.   She is at risk for lack of " exercise and has been provided with information to increase physical activity for the benefit of her well-being.   Updated plan of care.  Patient reported difficulty with activities of daily living were addressed today.        Víctor Guardado is a 69 year old, presenting for the following:  Annual Visit        1/7/2025    10:26 AM   Additional Questions   Roomed by Nancy CONTEH   Accompanied by partner           HPI          Health Care Directive  Patient does not have a Health Care Directive: Discussed advance care planning with patient; however, patient declined at this time.      1/7/2025   General Health   How would you rate your overall physical health? Good   Feel stress (tense, anxious, or unable to sleep) Only a little   (!) STRESS CONCERN      1/7/2025   Nutrition   Diet: Carbohydrate counting    Gluten-free/reduced       Multiple values from one day are sorted in reverse-chronological order         1/7/2025   Exercise   Days per week of moderate/strenous exercise 3 days         1/7/2025   Social Factors   Frequency of gathering with friends or relatives Once a week   Worry food won't last until get money to buy more No   Food not last or not have enough money for food? No   Do you have housing? (Housing is defined as stable permanent housing and does not include staying ouside in a car, in a tent, in an abandoned building, in an overnight shelter, or couch-surfing.) No   Are you worried about losing your housing? No   Lack of transportation? No   Unable to get utilities (heat,electricity)? No   Want help with housing or utility concern? No   (!) HOUSING CONCERN PRESENT      1/7/2025   Fall Risk   Fallen 2 or more times in the past year? No    No   Trouble with walking or balance? No    No       Multiple values from one day are sorted in reverse-chronological order          1/7/2025   Activities of Daily Living- Home Safety   Needs help with the following daily activites Telephone use    Transportation     Shopping    Preparing meals    Housework    Bathing    Laundry    Medication administration    Money management    Toileting    Feeding    Dressing   Safety concerns in the home None of the above       Multiple values from one day are sorted in reverse-chronological order         2025   Dental   Dentist two times every year? Yes         2025   Hearing Screening   Hearing concerns? None of the above         2025   Driving Risk Screening   Patient/family members have concerns about driving No         2025   General Alertness/Fatigue Screening   Have you been more tired than usual lately? No         2025   Urinary Incontinence Screening   Bothered by leaking urine in past 6 months No         2025   TB Screening   Were you born outside of the [place]? No       Today's PHQ-9 Score:       2025    10:12 AM   PHQ-9 SCORE   PHQ-9 Total Score MyChart 4 (Minimal depression)   PHQ-9 Total Score 4        Patient-reported         2025   Substance Use   Alcohol more than 3/day or more than 7/wk Not Applicable   Do you have a current opioid prescription? No   How severe/bad is pain from 1 to 10? 0/10 (No Pain)   Do you use any other substances recreationally? No     Social History     Tobacco Use    Smoking status: Former     Current packs/day: 0.00     Types: Cigarettes     Quit date: 1982     Years since quittin.4     Passive exposure: Past    Smokeless tobacco: Never   Vaping Use    Vaping status: Never Used   Substance Use Topics    Alcohol use: No    Drug use: Not Currently           3/21/2024   LAST FHS-7 RESULTS   1st degree relative breast or ovarian cancer No   Any relative bilateral breast cancer No   Any male have breast cancer No   Any ONE woman have BOTH breast AND ovarian cancer No   Any woman with breast cancer before 50yrs No   2 or more relatives with breast AND/OR ovarian cancer No   2 or more relatives with breast AND/OR bowel cancer No              History of abnormal Pap  smear:         Latest Ref Rng & Units 6/20/2017    10:45 AM 6/20/2017     8:53 AM   PAP / HPV   PAP (Historical)   NIL    HPV 16 DNA NEG Negative     HPV 18 DNA NEG Negative     Other HR HPV NEG Negative       ASCVD Risk   The 10-year ASCVD risk score (James CRAWFORD, et al., 2019) is: 4.7%    Values used to calculate the score:      Age: 69 years      Sex: Female      Is Non- : No      Diabetic: No      Tobacco smoker: No      Systolic Blood Pressure: 105 mmHg      Is BP treated: No      HDL Cholesterol: 64 mg/dL      Total Cholesterol: 223 mg/dL            Reviewed and updated as needed this visit by Provider                      Current providers sharing in care for this patient include:  Patient Care Team:  Sharri Lacey NP as PCP - General (Nurse Practitioner - Family)  Tayler Dey MD as MD (Ophthalmology)  Canelo Guerra OD as MD (Optometry)  Dame Palacio MD as Assigned Cancer Care Provider  Adilia Lamb RN as Specialty Care Coordinator (Breast Oncology)  Sharri Lacey NP as Assigned PCP    The following health maintenance items are reviewed in Epic and correct as of today:  Health Maintenance   Topic Date Due    LUNG CANCER SCREENING  Never done    RSV VACCINE (1 - Risk 60-74 years 1-dose series) Never done    ANNUAL REVIEW OF HM ORDERS  12/26/2024    MEDICARE ANNUAL WELLNESS VISIT  12/26/2024    MAMMO SCREENING  03/21/2025    PHQ-9  07/07/2025    LIPID  08/15/2025    FALL RISK ASSESSMENT  01/07/2026    GLUCOSE  10/26/2026    ADVANCE CARE PLANNING  12/26/2028    COLORECTAL CANCER SCREENING  02/10/2032    DTAP/TDAP/TD IMMUNIZATION (5 - Td or Tdap) 12/26/2033    DEXA  03/28/2038    HEPATITIS C SCREENING  Completed    DEPRESSION ACTION PLAN  Completed    INFLUENZA VACCINE  Completed    Pneumococcal Vaccine: 50+ Years  Completed    ZOSTER IMMUNIZATION  Completed    COVID-19 Vaccine  Completed    HPV IMMUNIZATION  Aged Out    MENINGITIS IMMUNIZATION  Aged Out  "   RSV MONOCLONAL ANTIBODY  Aged Out    PAP  Discontinued            Objective    Exam  /71 (BP Location: Right arm, Patient Position: Sitting, Cuff Size: Adult Regular)   Pulse 72   Temp 97.2  F (36.2  C) (Temporal)   Ht 1.64 m (5' 4.57\")   Wt 71.4 kg (157 lb 8 oz)   SpO2 99%   BMI 26.56 kg/m     Estimated body mass index is 26.56 kg/m  as calculated from the following:    Height as of this encounter: 1.64 m (5' 4.57\").    Weight as of this encounter: 71.4 kg (157 lb 8 oz).    Physical Exam  GENERAL: alert and no distress  EYES: Eyes grossly normal to inspection, PERRL and conjunctivae and sclerae normal  HENT: ear canals and TM's normal, nose and mouth without ulcers or lesions  NECK: no adenopathy, no asymmetry, masses, or scars  RESP: lungs clear to auscultation - no rales, rhonchi or wheezes  CV: regular rate and rhythm, normal S1 S2, no S3 or S4, no murmur, click or rub, no peripheral edema  ABDOMEN: soft, nontender, no hepatosplenomegaly, no masses and bowel sounds normal  MS: no gross musculoskeletal defects noted, no edema  SKIN: no suspicious lesions or rashes  NEURO: Normal strength and tone, mentation intact and speech normal  PSYCH: affect normal/bright, judgement and insight impaired, and appearance well groomed        1/7/2025   Mini Cog   Mini-Cog Not Completed (choose reason) Patient declines              Signed Electronically by: Sharri Lacey NP    Answers submitted by the patient for this visit:  Patient Health Questionnaire (Submitted on 1/7/2025)  If you checked off any problems, how difficult have these problems made it for you to do your work, take care of things at home, or get along with other people?: Not difficult at all  PHQ9 TOTAL SCORE: 4    "

## 2025-01-13 ENCOUNTER — TELEPHONE (OUTPATIENT)
Dept: FAMILY MEDICINE | Facility: CLINIC | Age: 70
End: 2025-01-13
Payer: COMMERCIAL

## 2025-01-13 NOTE — TELEPHONE ENCOUNTER
Forms/Letter Request    Type of form/letter: OTHER: annual medical examination report       Do we have the form/letter: Yes: placed in care team 4 providers form folder    Who is the form from? [facility] (if other please explain)    Where did/will the form come from? form was faxed in    When is form/letter needed by: ASAP    How would you like the form/letter returned: Fax : [phone]

## 2025-01-22 ENCOUNTER — TELEPHONE (OUTPATIENT)
Dept: FAMILY MEDICINE | Facility: CLINIC | Age: 70
End: 2025-01-22
Payer: COMMERCIAL

## 2025-01-22 NOTE — TELEPHONE ENCOUNTER
Forms/Letter Request    Type of form/letter: OTHER: ICD-10 Code       Do we have the form/letter: Yes: Placed in care team 4    Who is the form from? Home care    Where did/will the form come from? form was faxed in    When is form/letter needed by: as soon as able    How would you like the form/letter returned: Fax : [phone]

## 2025-02-04 ENCOUNTER — NURSE TRIAGE (OUTPATIENT)
Dept: FAMILY MEDICINE | Facility: CLINIC | Age: 70
End: 2025-02-04
Payer: COMMERCIAL

## 2025-02-04 NOTE — TELEPHONE ENCOUNTER
Latonya -- need T approval that it is ok patient be seen tomorrow with PCP vs. Protocol disposition of UC tonight?      Adult  mentioned strong urine odor today and dark color (brown)  Patient flushes before partner can see at home so unable to get more information about this  Partner has noted that patient has frequency in urination but always has to a certain degree with the alzheimers   Patient voided 3 times between 7964-8404 today  Denies dysuria   No fever   Denies any back or flank pain   Partner notes increased confusion in the past 2-3 weeks  Partner has been keeping a closer eye on patient's fluid intake and encouraging fluids   They would prefer to see CLOTILDE Hayden. Writer was able to schedule with PCP tomorrow AM.   Patient to continue current plan unless notified otherwise. Patient to call and ask to speak to triage nurse if develops any new/worsening symptoms. Patient verbalized understanding and agrees with plan.    Reason for Disposition   Bad or foul-smelling urine    Additional Information   Negative: Shock suspected (e.g., cold/pale/clammy skin, too weak to stand, low BP, rapid pulse)   Negative: Sounds like a life-threatening emergency to the triager   Negative: Followed a female genital area injury (e.g., labia, vagina, vulva)   Negative: Followed a male genital area injury (penis, scrotum)   Negative: Vaginal discharge   Negative: Pus (white, yellow) or bloody discharge from end of penis   Negative: Pain or burning with passing urine (urination) and pregnant   Negative: Pain or burning with passing urine (urination) and female   Negative: Pain or burning with passing urine (urination) and male   Negative: Pain or itching in the vulvar area   Negative: Pain in scrotum is main symptom   Negative: Blood in the urine is main symptom   Negative: Symptoms arising from use of a urinary catheter (e.g., coude, Mitchell)   Negative: Unable to urinate (or only a few drops) > 4 hours and bladder  feels very full (e.g., palpable bladder or strong urge to urinate)   Negative: Decreased urination and drinking very little and dehydration suspected (e.g., dark urine, no urine > 12 hours, very dry mouth, very lightheaded)   Negative: Patient sounds very sick or weak to the triager   Negative: Fever > 100.4 F  (38.0 C)   Negative: Side (flank) or lower back pain present    Protocols used: Urinary Symptoms-A-OH    Isabelle Morrissey RN  [facility]

## 2025-02-04 NOTE — TELEPHONE ENCOUNTER
Symptoms    Describe your symptoms:  UTI Symptoms - pt called in and is having urinary frequency and urine is brown colored and odor.  Pt mentioned that she was seen in January for her physical and was hoping for an antibiotic for symptoms.     Any pain: No    How long have you been having symptoms:  one week     Have you been seen for this:  No    Appointment offered?: No    Triage offered?: No      Preferred Pharmacy:     [id number] [address] AT [address] [address]  Phone: [phone] Fax: [phone]        Could we send this information to you in [employer] or would you prefer to receive a phone call?:   Patient would prefer a phone call   Okay to leave a detailed message?: Yes at Cell number on file:  Call spouse at   Telephone Information:   Mobile [phone]

## 2025-02-05 ENCOUNTER — OFFICE VISIT (OUTPATIENT)
Dept: FAMILY MEDICINE | Facility: CLINIC | Age: 70
End: 2025-02-05
Payer: COMMERCIAL

## 2025-02-05 VITALS
OXYGEN SATURATION: 94 % | DIASTOLIC BLOOD PRESSURE: 76 MMHG | BODY MASS INDEX: 26.52 KG/M2 | SYSTOLIC BLOOD PRESSURE: 124 MMHG | TEMPERATURE: 98.1 F | WEIGHT: 159.2 LBS | HEART RATE: 72 BPM | HEIGHT: 65 IN | RESPIRATION RATE: 18 BRPM

## 2025-02-05 DIAGNOSIS — F02.818 EARLY ONSET ALZHEIMER'S DISEASE WITH BEHAVIORAL DISTURBANCE (H): ICD-10-CM

## 2025-02-05 DIAGNOSIS — R39.9 SYMPTOMS INVOLVING URINARY SYSTEM: Primary | ICD-10-CM

## 2025-02-05 DIAGNOSIS — G30.0 EARLY ONSET ALZHEIMER'S DISEASE WITH BEHAVIORAL DISTURBANCE (H): ICD-10-CM

## 2025-02-05 DIAGNOSIS — R82.90 MALODOROUS URINE: ICD-10-CM

## 2025-02-05 LAB
ALBUMIN UR-MCNC: NEGATIVE MG/DL
APPEARANCE UR: CLEAR
BACTERIA #/AREA URNS HPF: ABNORMAL /HPF
BILIRUB UR QL STRIP: NEGATIVE
COLOR UR AUTO: YELLOW
GLUCOSE UR STRIP-MCNC: NEGATIVE MG/DL
HGB UR QL STRIP: ABNORMAL
KETONES UR STRIP-MCNC: NEGATIVE MG/DL
LEUKOCYTE ESTERASE UR QL STRIP: ABNORMAL
NITRATE UR QL: NEGATIVE
PH UR STRIP: 6 [PH] (ref 5–7)
RBC #/AREA URNS AUTO: ABNORMAL /HPF
SP GR UR STRIP: <=1.005 (ref 1–1.03)
SQUAMOUS #/AREA URNS AUTO: ABNORMAL /LPF
UROBILINOGEN UR STRIP-ACNC: 0.2 E.U./DL
WBC #/AREA URNS AUTO: ABNORMAL /HPF

## 2025-02-05 PROCEDURE — 99214 OFFICE O/P EST MOD 30 MIN: CPT | Performed by: NURSE PRACTITIONER

## 2025-02-05 PROCEDURE — G2211 COMPLEX E/M VISIT ADD ON: HCPCS | Performed by: NURSE PRACTITIONER

## 2025-02-05 PROCEDURE — 81001 URINALYSIS AUTO W/SCOPE: CPT | Performed by: NURSE PRACTITIONER

## 2025-02-05 RX ORDER — OLANZAPINE 2.5 MG/1
2.5 TABLET, FILM COATED ORAL AT BEDTIME
Qty: 30 TABLET | Refills: 5 | Status: SHIPPED | OUTPATIENT
Start: 2025-02-05

## 2025-02-05 RX ORDER — NITROFURANTOIN 25; 75 MG/1; MG/1
100 CAPSULE ORAL 2 TIMES DAILY
Qty: 10 CAPSULE | Refills: 0 | Status: CANCELLED | OUTPATIENT
Start: 2025-02-05

## 2025-02-05 ASSESSMENT — PAIN SCALES - GENERAL: PAINLEVEL_OUTOF10: NO PAIN (0)

## 2025-02-05 NOTE — TELEPHONE ENCOUNTER
Noted.    No further action needed from clinic RN team.    OZ FlorezN, RN-BC  [facility]

## 2025-02-05 NOTE — PROGRESS NOTES
"  Assessment & Plan     1. Symptoms involving urinary system (Primary)  2. Malodorous urine    UA negative for UTI. Discussed that if patient develops increasing confusion, dysuria, frequency, or hematuria that this would be reason to repeat the UA.     - UA with Microscopic reflex to Culture - lab collect; Future  - UA with Microscopic reflex to Culture - lab collect  - UA Microscopic with Reflex to Culture      3. Early onset Alzheimer's disease with behavioral disturbance (H)    Patient with increasing paranoia and irritability. Plan to trial Zyprexa at bedtime. Have discussed seeing a neurologist previously, but patient and her partner decline.   Discussed the use and indication of this medication as well as potential side effects.     - OLANZapine (ZYPREXA) 2.5 MG tablet; Take 1 tablet (2.5 mg) by mouth at bedtime.  Dispense: 30 tablet; Refill: 5    The longitudinal plan of care for the diagnosis(es)/condition(s) as documented were addressed during this visit. Due to the added complexity in care, I will continue to support Debby in the subsequent management and with ongoing continuity of care.        BMI  Estimated body mass index is 26.41 kg/m  as calculated from the following:    Height as of this encounter: 1.654 m (5' 5.1\").    Weight as of this encounter: 72.2 kg (159 lb 3.2 oz).         Subjective   Debby is a 69 year old, presenting for the following health issues:  UTI (Possible UTI, dark and odorous urine )      2/5/2025    10:01 AM   Additional Questions   Roomed by Marielle crystal   Accompanied by wife     UTI    History of Present Illness       Reason for visit:  Uti  Symptom onset:  3-7 days ago  Symptom intensity:  Mild  Symptom progression:  Staying the same  Had these symptoms before:  No  What makes it worse:  No  What makes it better:  No        Patient presents with her partner today for evaluation of a UTI. The patient has a history of alzheimer's and is not a reliable historian. Her partner reports " that while she was at her day program yesterday a caretaker reported that Debby's urine was malodorous and dark. Her partner has not appreciated dark or malodorous urine at home. Debby denies any dysuria, hematuria, increased frequency, increased confusion, flank pain, or abdominal pain. Her partner reports it is difficult to assess her symptoms due to her alzheimer's. Her partner voiced concerns about Debby's increasing paranoia and irritability and believes this is related to her worsening alzheimer's.     Review of Systems  Constitutional, HEENT, cardiovascular, pulmonary, gi and gu systems are negative, except as otherwise noted.      Objective    There were no vitals taken for this visit.  There is no height or weight on file to calculate BMI.  Physical Exam   GENERAL: alert and no distress  NECK: no adenopathy, no asymmetry, masses, or scars  RESP: lungs clear to auscultation - no rales, rhonchi or wheezes  CV: regular rate and rhythm, normal S1 S2, no S3 or S4, no murmur, click or rub, no peripheral edema  ABDOMEN: soft, nontender, no hepatosplenomegaly, no masses and bowel sounds normal  : No flank pain  MS: no gross musculoskeletal defects noted, no edema    Results for orders placed or performed in visit on 02/05/25 (from the past 24 hours)   UA with Microscopic reflex to Culture - lab collect    Specimen: Urine, Clean Catch   Result Value Ref Range    Color Urine Yellow Colorless, Straw, Light Yellow, Yellow    Appearance Urine Clear Clear    Glucose Urine Negative Negative mg/dL    Bilirubin Urine Negative Negative    Ketones Urine Negative Negative mg/dL    Specific Gravity Urine <=1.005 1.003 - 1.035    Blood Urine Trace (A) Negative    pH Urine 6.0 5.0 - 7.0    Protein Albumin Urine Negative Negative mg/dL    Urobilinogen Urine 0.2 0.2, 1.0 E.U./dL    Nitrite Urine Negative Negative    Leukocyte Esterase Urine Trace (A) Negative   UA Microscopic with Reflex to Culture   Result Value Ref Range     Bacteria Urine Few (A) None Seen /HPF    RBC Urine 0-2 0-2 /HPF /HPF    WBC Urine 0-5 0-5 /HPF /HPF    Squamous Epithelials Urine Few (A) None Seen /LPF    Narrative    Urine Culture not indicated         Ilana Ziegler, Student Nurse Practitioner   Signed Electronically by: Sharri Lacey NP

## 2025-02-13 ENCOUNTER — TELEPHONE (OUTPATIENT)
Dept: FAMILY MEDICINE | Facility: CLINIC | Age: 70
End: 2025-02-13
Payer: COMMERCIAL

## 2025-02-13 NOTE — TELEPHONE ENCOUNTER
Latonya-Please review and advise.    Consent to communicate with Josie on file.    Called home number for Josie: no answer and voicemail box not set up.    Writer called mobile number for Josie, who stated patient:  Prescribed Olanzapine on 2/5/25 and when looked it up found information stating should not be used in patient's with Alzheimer's  Should patient be taking this medication?    Informed Josie message will be sent to  NELLI Lacey CNP, who is due back in clinic on 2/17/25.    Josie verbalized understanding and in agreement with plan.    Thank you!  OZ FlorezN, RN-BC  [facility]

## 2025-02-13 NOTE — TELEPHONE ENCOUNTER
Patient Returning Call    Reason for call:  Spouse states Debby is taking a wrong medication, would like to speak to a nurse.    Information relayed to patient:  n/a    Patient has additional questions:  No      Could we send this information to you in [facility] or would you prefer to receive a phone call?:   Patient would prefer a phone call   Okay to leave a detailed message?: Yes at Home number on file [phone] (home)

## 2025-02-14 NOTE — TELEPHONE ENCOUNTER
Antipsychotic medications do have a slight increased risk of all-cause mortality for people with dementia, but they are still typically used for behavioral symptoms of Alzheimer's if an antidepressant has not helped due to lack of other options and beneift of quality of life outweighing risks.

## 2025-02-17 ENCOUNTER — TELEPHONE (OUTPATIENT)
Dept: FAMILY MEDICINE | Facility: CLINIC | Age: 70
End: 2025-02-17
Payer: COMMERCIAL

## 2025-02-17 DIAGNOSIS — S09.90XS: ICD-10-CM

## 2025-02-17 DIAGNOSIS — G30.0 EARLY ONSET ALZHEIMER'S DISEASE WITH BEHAVIORAL DISTURBANCE (H): ICD-10-CM

## 2025-02-17 DIAGNOSIS — F02.811: ICD-10-CM

## 2025-02-17 DIAGNOSIS — F02.80 ALZHEIMER'S DEMENTIA WITHOUT BEHAVIORAL DISTURBANCE (H): Primary | ICD-10-CM

## 2025-02-17 DIAGNOSIS — F02.818 EARLY ONSET ALZHEIMER'S DISEASE WITH BEHAVIORAL DISTURBANCE (H): ICD-10-CM

## 2025-02-17 DIAGNOSIS — G30.9 ALZHEIMER'S DEMENTIA WITHOUT BEHAVIORAL DISTURBANCE (H): Primary | ICD-10-CM

## 2025-02-17 NOTE — TELEPHONE ENCOUNTER
Please review and sign if agreeable, requesting urgent/stat writer did not address urgency-thanks!

## 2025-02-17 NOTE — TELEPHONE ENCOUNTER
Order/Referral Request    Who is requesting: Patient spouse    Orders being requested: Referral for Neurology at --Dr. Aguiar    Reason service is needed/diagnosis: Alzheimer, Delusions are getting worse, episodes last longer. Please call spouse when referral is placed so she knows when she can call and schedule the appt.    When are orders needed by: ASAP    Has this been discussed with Provider: Yes    Does patient have a preference on a Group/Provider/Facility? [facility]--Dr. Aguiar    Does patient have an appointment scheduled?: No    Where to send orders: Fax: [phone]  Phone Number: [phone]    Could we send this information to you in [employer] or would you prefer to receive a phone call?:   Patient would prefer a phone call   Okay to leave a detailed message?: Yes at Cell number on file:    Telephone Information:   Mobile [phone]

## 2025-02-17 NOTE — TELEPHONE ENCOUNTER
Writer contacted and spoke to Samantha. Relayed pcp's message below to Samantha. Samantha has no questions.    ZO YostN, PHN, RN-[facility]

## 2025-03-19 DIAGNOSIS — G30.0 EARLY ONSET ALZHEIMER'S DISEASE WITH BEHAVIORAL DISTURBANCE (H): Primary | ICD-10-CM

## 2025-03-19 DIAGNOSIS — F02.818 EARLY ONSET ALZHEIMER'S DISEASE WITH BEHAVIORAL DISTURBANCE (H): Primary | ICD-10-CM

## 2025-03-19 NOTE — PROGRESS NOTES
Discussed with Celi Peterson neurology - her aggression and paranoia has escalated, needs memory care.  Will place care coordination referral for  assistance.

## 2025-03-20 ENCOUNTER — PATIENT OUTREACH (OUTPATIENT)
Dept: CARE COORDINATION | Facility: CLINIC | Age: 70
End: 2025-03-20
Payer: COMMERCIAL

## 2025-03-20 NOTE — PROGRESS NOTES
Clinic Care Coordination Contact  Community Health Worker Initial Outreach    CHW Initial Information Gathering:  Referral Source: PCP  Preferred Urgent Care: [facility], [phone]  Current living arrangement:: I live in a private home with family  Type of residence:: Private home - stairs  Community Resources: PCA  Supplies Currently Used at Home: Gloves, Incontinence Supplies, Nutritional Supplements, Wipes  Equipment Currently Used at Home: shower chair  Informal Support system:: Spouse, Friends  No PCP office visit in Past Year: No  Transportation means:: Regular car  CHW Additional Questions  If ED/Hospital discharge, follow-up appointment scheduled as recommended?: N/A  Medication changes made following ED/Hospital discharge?: N/A  MyChart active?: Yes  Patient sent Social Drivers of Health questionnaire?: No    CHW introduced self and role to Samantha ( patient spouse). Samantha reports she took patient to a Dr's appointment 03/19/2025 and patient started panicking and did not remember Samantha and would not speak with provider until Samantha left out the room in fear she was in harm. Samantha reports it hard to communicate and get answers from patient because patient dementia has progressed. Samantha reports she will be able to complete SW assessment on behalf of patient. Samantha reports patient is currently enrolled at [facility] adult  and has assessment with them tomorrow regarding long term care.    Patient accepts CC: Yes. Patient scheduled for assessment with CC on 03/24/2025 at 1:00PM. Patient noted desire to discuss assistance with resources.     [facility]ree [facility] Health Worker   [facility] Care Coordination  [email or web address]  Office: [phone]

## 2025-03-24 ENCOUNTER — TELEPHONE (OUTPATIENT)
Dept: FAMILY MEDICINE | Facility: CLINIC | Age: 70
End: 2025-03-24

## 2025-03-24 ENCOUNTER — MEDICAL CORRESPONDENCE (OUTPATIENT)
Dept: HEALTH INFORMATION MANAGEMENT | Facility: CLINIC | Age: 70
End: 2025-03-24

## 2025-03-24 ENCOUNTER — PATIENT OUTREACH (OUTPATIENT)
Dept: FAMILY MEDICINE | Facility: CLINIC | Age: 70
End: 2025-03-24
Payer: COMMERCIAL

## 2025-03-24 NOTE — TELEPHONE ENCOUNTER
Forms/Letter Request    Type of form/letter: Nursing Home/Assisted Living Orders/ [facility] admission orders      Do we have the form/letter: Yes: placed in care team 4 providers form folder    Who is the form from? [facility] [employer] (if other please explain)    Where did/will the form come from? form was faxed in    When is form/letter needed by: this week     How would you like the form/letter returned: Fax : [phone]     Phone report was given to Anjel SILVESTRE CVRU

## 2025-03-24 NOTE — PROGRESS NOTES
Clinic Care Coordination Contact  Care Team Conversations    BOUCHRA LAKE received a phone call back from patient's spouse, Samantha, on this date. Samantha was crying on phone and voiced that patient is going to [facility] in [place] on Thursday (3/27). Samantha voiced that she doesn't need any resources at this time. Samantha voiced that patient is just so mean to her and that Samantha is so young. BOUCHRA LAEK validated feelings and apologized for what Samantha is going through. BOUCHRA LAKE discussed role and need for support for her. Samantha voiced she is aware of support groups through [employer] and just hasn't been able to go to a support group while caring for patient. Samantha voiced she will look at support groups once patient moves to Memory Care. Samantha voiced she just wanted to call BOUCHRA LAKE back to notify that there is no additional needs. Samantha voiced she will [facility]  that [facility] is moving and has already talked to PCP clinic. Samantha voiced no questions or concerns.     BOUCHRA LAKE sent a message to PCP notifying PCP of move to Memory Care and closing Care Coordination program. BOUCHRA LAKE closed Care Coordination program and remains available as needed.    Lashell Rivera [facility]  Social Work Care Coordinator  [facility]  [email or web address]  Office: [phone]  Gender pronouns: she/her

## 2025-03-24 NOTE — LETTER
[facility] CARE COORDINATION  [address]  [place] [address]    March 24, 2025    Debby Barriga  [address]  [place] [address]      Dear Debby,    I am a clinic care coordinator who works with Sharri Lacey NP with the [facility]. I wanted to thank you for spending the time to talk with me.  Below is a description of clinic care coordination and how I can further assist you.       The clinic care coordination team is made up of a registered nurse, , financial resource worker and community health worker who understand the health care system. The goal of clinic care coordination is to help you manage your health and improve access to the health care system. Our team works alongside your provider to assist you in determining your health and social needs. We can help you obtain health care and community resources, providing you with necessary information and education. We can work with you through any barriers and develop a care plan that helps coordinate and strengthen the communication between you and your care team.  Our services are voluntary and are offered without charge to you personally.    Please feel free to contact me with any questions or concerns regarding care coordination and what we can offer.      We are focused on providing you with the highest-quality healthcare experience possible.    Sincerely,     Lashell Rivera [facility]  Social Work Care Coordinator  [facility]  [email or web address]  Office: [phone]  Gender pronouns: she/her

## 2025-03-24 NOTE — TELEPHONE ENCOUNTER
Form faxed to [phone]  Along with meds list, immunization records, and office notes from 1/7/2025  Copy kept in clinic  Copy sent to abstracting

## 2025-03-24 NOTE — Clinical Note
Hello, Thank you for referring patient to Care Coordination. I did talk to patient's spouse, Samantha, today. She voiced that patient is moving to [facility] in [place] on Thursday (3/27). She voiced no need for resources at this time. I did close patient's Care Coordination referral at this time.  Please let me know if you have any questions or concerns. Thanks again, Lashell Rivera [facility]  Social Work Care Coordinator [email or web address] Office: [phone] Gender pronouns: she/her

## 2025-03-24 NOTE — TELEPHONE ENCOUNTER
Form completed.  Please print office notes from 1/7/25, med list and immunization record to send as well.

## 2025-04-01 ENCOUNTER — LAB REQUISITION (OUTPATIENT)
Dept: LAB | Facility: CLINIC | Age: 70
End: 2025-04-01
Payer: COMMERCIAL

## 2025-04-01 ENCOUNTER — DOCUMENTATION ONLY (OUTPATIENT)
Dept: GERIATRICS | Facility: CLINIC | Age: 70
End: 2025-04-01
Payer: COMMERCIAL

## 2025-04-01 DIAGNOSIS — E11.9 TYPE 2 DIABETES MELLITUS WITHOUT COMPLICATIONS (H): ICD-10-CM

## 2025-04-01 DIAGNOSIS — R63.4 ABNORMAL WEIGHT LOSS: ICD-10-CM

## 2025-04-01 DIAGNOSIS — E78.5 HYPERLIPIDEMIA, UNSPECIFIED: ICD-10-CM

## 2025-04-02 LAB
ANION GAP SERPL CALCULATED.3IONS-SCNC: 11 MMOL/L (ref 7–15)
BUN SERPL-MCNC: 16 MG/DL (ref 8–23)
CALCIUM SERPL-MCNC: 9.3 MG/DL (ref 8.8–10.4)
CHLORIDE SERPL-SCNC: 104 MMOL/L (ref 98–107)
CREAT SERPL-MCNC: 0.84 MG/DL (ref 0.51–0.95)
EGFRCR SERPLBLD CKD-EPI 2021: 76 ML/MIN/1.73M2
ERYTHROCYTE [DISTWIDTH] IN BLOOD BY AUTOMATED COUNT: 12.9 % (ref 10–15)
EST. AVERAGE GLUCOSE BLD GHB EST-MCNC: 111 MG/DL
GLUCOSE SERPL-MCNC: 87 MG/DL (ref 70–99)
HBA1C MFR BLD: 5.5 %
HCO3 SERPL-SCNC: 26 MMOL/L (ref 22–29)
HCT VFR BLD AUTO: 37.7 % (ref 35–47)
HGB BLD-MCNC: 12.2 G/DL (ref 11.7–15.7)
MCH RBC QN AUTO: 28.9 PG (ref 26.5–33)
MCHC RBC AUTO-ENTMCNC: 32.4 G/DL (ref 31.5–36.5)
MCV RBC AUTO: 89 FL (ref 78–100)
PLATELET # BLD AUTO: 290 10E3/UL (ref 150–450)
POTASSIUM SERPL-SCNC: 3.7 MMOL/L (ref 3.4–5.3)
RBC # BLD AUTO: 4.22 10E6/UL (ref 3.8–5.2)
SODIUM SERPL-SCNC: 141 MMOL/L (ref 135–145)
TSH SERPL DL<=0.005 MIU/L-ACNC: 1.39 UIU/ML (ref 0.3–4.2)
WBC # BLD AUTO: 6.2 10E3/UL (ref 4–11)

## 2025-04-02 PROCEDURE — 80048 BASIC METABOLIC PNL TOTAL CA: CPT | Mod: ORL | Performed by: NURSE PRACTITIONER

## 2025-04-02 PROCEDURE — 83036 HEMOGLOBIN GLYCOSYLATED A1C: CPT | Mod: ORL | Performed by: NURSE PRACTITIONER

## 2025-04-02 PROCEDURE — 36415 COLL VENOUS BLD VENIPUNCTURE: CPT | Mod: ORL | Performed by: NURSE PRACTITIONER

## 2025-04-02 PROCEDURE — P9604 ONE-WAY ALLOW PRORATED TRIP: HCPCS | Mod: ORL | Performed by: NURSE PRACTITIONER

## 2025-04-02 PROCEDURE — 84443 ASSAY THYROID STIM HORMONE: CPT | Mod: ORL | Performed by: NURSE PRACTITIONER

## 2025-04-02 PROCEDURE — 85027 COMPLETE CBC AUTOMATED: CPT | Mod: ORL | Performed by: NURSE PRACTITIONER

## 2025-04-02 NOTE — PROGRESS NOTES
[facility] GERIATRICS    PRIMARY CARE PROVIDER AND CLINIC:  KATLYN Garcia CNP, [address] [facility] [address]  Chief Complaint   Patient presents with    [facility] Medical Record Number:  [id number]  Place of Service where encounter took place:  [facility] ([facility]) [[id number]]    Debby Barriga  is a 69 year old  (1955), admitted to the above facility from home due to care needs  . .     Medical history includes  Dementia with psychotic features and hallucinations  History of invasive ductal carcinoma  Anxiety/depression    Today Debby was in her room.  She was mobile and able to walk without any adaptive equipment.  She denied any shortness of breath GERD-like symptoms, chest discomfort dysuria, constipation or diarrhea.  She was slightly anxious and she was having delusions/hallucinations.  Her wife Yanelis was present.  Yanelis stated Debby does not like to be touched/hand-held for guidance.  Per nursing, She receives extensive assistance with toileting and managing bowel and bladder incontinence. Staff to attempt toileting resident before meals, at HS, and PRN to prevent incontinence.     CODE STATUS/ADVANCE DIRECTIVES DISCUSSION:  No Order  DNR / DNI  ALLERGIES:   Allergies   Allergen Reactions    Hydrocodone      Ears ringing  Questionable allergy Ears ringing  Ears ringing    Trazodone      Other reaction(s): Headache      PAST MEDICAL HISTORY:   Past Medical History:   Diagnosis Date    Breast cancer (H) 10/2022    Corneal ulcer of right eye 03/01/2015    Mild major depression 02/07/2013    Tear of retina     right eye      PAST SURGICAL HISTORY:   has a past surgical history that includes Laparoscopic tubal ligation; orthopedic surgery; Lumpectomy breast with sentinel node, combined (Left, 10/24/2022); and Biopsy node sentinel (Left, 10/24/2022).  FAMILY HISTORY: family history includes Alzheimer Disease in her mother; Prostate Cancer in her father.  SOCIAL HISTORY:    reports that she quit smoking about 42 years ago. Her smoking use included cigarettes. She has been exposed to tobacco smoke. She has never used smokeless tobacco. She reports that she does not currently use drugs. She reports that she does not drink alcohol.  Patient's living condition: lives with spouse    Post Discharge Medication Reconciliation Status:   MED REC REQUIRED{TIP  Click the link below to document or use med rec list, use list to pull in response :[id number]}  Post Medication Reconciliation Status:  Discharge medications reconciled and changed, see notes/orders       Current Outpatient Medications   Medication Sig Dispense Refill    acetaminophen (TYLENOL) 325 MG tablet Take 325-650 mg by mouth every 6 hours as needed for mild pain      Berberine Chloride (BERBERINE HCI) 500 MG CAPS Take 1 tablet by mouth 2 times daily.      calcium carbonate (OS-KAMAR) 500 MG tablet Take 1 tablet (500 mg) by mouth 2 times daily 180 tablet 3    calcium carbonate-vitamin D (CALTRATE) 600-10 MG-MCG per tablet TAKE 2 TABLETS BY MOUTH DAILY 180 tablet 1    cholecalciferol (VITAMIN D3) 125 mcg (5000 units) capsule Take 1 capsule (125 mcg) by mouth daily 90 capsule 3    estradiol (CLIMARA) 0.05 MG/24HR weekly patch Place 1 patch onto the skin twice a week. every Wed, Sat      medium chain triglycerides, MCT OIL, (MCT OIL) oil Take 15 mLs by mouth every morning      multivitamin w/minerals (THERA-VIT-M) tablet Take 1 tablet by mouth every morning      OLANZapine (ZYPREXA) 2.5 MG tablet Take 1 tablet (2.5 mg) by mouth as needed (agitation).      Omega-3 Fish Oil 500 MG capsule Take 2,000 mg by mouth 2 times daily      progesterone (PROMETRIUM) 100 MG capsule Take 100 mg by mouth daily.      psyllium (METAMUCIL/KONSYL) capsule Take 1 capsule by mouth daily.      QUEtiapine (SEROQUEL) 25 MG tablet Take 25 mg by mouth daily.      venlafaxine (EFFEXOR XR) 150 MG 24 hr capsule TAKE 1 CAPSULE BY MOUTH EVERY DAY 90 capsule 4     No  "current facility-administered medications for this visit.       ROS:  10 point ROS of systems including Constitutional, Eyes, Respiratory, Cardiovascular, Gastroenterology, Genitourinary, Integumentary, Musculoskeletal, Psychiatric were all negative except for pertinent positives noted in my HPI.    Vitals:  /81   Pulse 76   Temp 97.3  F (36.3  C)   Resp 17   Ht 1.676 m (5' 6\")   Wt 72.1 kg (158 lb 14.4 oz)   SpO2 97%   BMI 25.65 kg/m    Exam:  GENERAL APPEARANCE:  Alert, in no distress  RESP:  lungs clear to auscultation , no respiratory distress  CV:  rate-normal  PSYCH:  insight and judgement impaired, memory impaired , delusions, anxious    Lab/Diagnostic data:  Most Recent 3 CBC's:  Recent Labs   Lab Test 04/02/25  0604 12/04/24  1451 08/15/24  1337   WBC 6.2 6.0 6.3   HGB 12.2 12.6 13.1   MCV 89 91 91    253 258     Most Recent 3 BMP's:  Recent Labs   Lab Test 04/02/25  0604 10/26/23  1610 10/18/22  1341 01/18/22  1130    139  --  139   POTASSIUM 3.7 4.0 3.9 3.9   CHLORIDE 104 104  --  107   CO2 26 25  --  26   BUN 16.0 23.9*  --  11   CR 0.84 0.69 0.69 0.63   ANIONGAP 11 10  --  6   KAMAR 9.3 9.0  --  9.2   GLC 87 97  --  97       ASSESSMENT/PLAN:    (G30.0,  F02.C2) 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     In reviewing previous medical record her MoCA was 3/30    Review of systems was able to be completed and patient did not have any complaints however she was having delusions of a man being around.     Patient requiring 24-hour skilled nursing care and would be a high risk of wandering if she were moved off the dementia care unit.       Expect further functional and cognitive decline.  Expect weight loss.     She was given Zyprexa by her primary care provider but had not started it  She is taking Seroquel this morning when you get a minute you " can you call  -Taking estrogen patch and progesterone given to her by functional medicine  Plan:   Stop Zyprexa use as needed  Continue with Seroquel  Progesterone 100 mg daily was ordered by functional medicine.     (F33.9) Recurrent major depressive disorder, remission status unspecified  (F41.1) Generalized anxiety disorder  Comment: Chronic  Present  Currently taking Effexor  Plan: Referral to ACP    (C50.912) Invasive ductal carcinoma of breast, female, left (H)  Comment: per oncology note (2/2023) screen detected [id number] HR+/HER2- IDC of the left breast. She is s/p partial mastectomy with SNLB with [id number] disease. She elected not to have adjuvant RT, but has been on Letrozole since 12/2022.  Recommended Dexa scan, Calcium and vitamin D and Consider bisphosphonate  -Last MAMMOGRAM (3/2024)  Plan: ***          Electronically signed by:  Coty Jaimes ***        60 minutes was spent reviewing medical record, assessing patient, reviewing medical notes from previous primary care provider, talking with family and answering their questions/concerns, coordinating above plan of care with nursing , SW, therapy and dietitian and patient and reviewed medications with patient and counseled pt. on above plan of care.  Counseled on medications and side effects.

## 2025-04-03 ENCOUNTER — ASSISTED LIVING VISIT (OUTPATIENT)
Dept: GERIATRICS | Facility: CLINIC | Age: 70
End: 2025-04-03
Payer: COMMERCIAL

## 2025-04-03 VITALS
TEMPERATURE: 97.3 F | HEIGHT: 66 IN | SYSTOLIC BLOOD PRESSURE: 120 MMHG | OXYGEN SATURATION: 97 % | RESPIRATION RATE: 17 BRPM | DIASTOLIC BLOOD PRESSURE: 81 MMHG | WEIGHT: 158.9 LBS | BODY MASS INDEX: 25.54 KG/M2 | HEART RATE: 76 BPM

## 2025-04-03 DIAGNOSIS — Z78.9 DECREASED ACTIVITIES OF DAILY LIVING (ADL): ICD-10-CM

## 2025-04-03 DIAGNOSIS — F02.818 EARLY ONSET ALZHEIMER'S DISEASE WITH BEHAVIORAL DISTURBANCE (H): ICD-10-CM

## 2025-04-03 DIAGNOSIS — G30.0 SEVERE EARLY ONSET ALZHEIMER'S DEMENTIA WITH PSYCHOTIC DISTURBANCE (H): Primary | ICD-10-CM

## 2025-04-03 DIAGNOSIS — F41.1 GENERALIZED ANXIETY DISORDER: ICD-10-CM

## 2025-04-03 DIAGNOSIS — F02.C2 SEVERE EARLY ONSET ALZHEIMER'S DEMENTIA WITH PSYCHOTIC DISTURBANCE (H): Primary | ICD-10-CM

## 2025-04-03 DIAGNOSIS — F33.9 RECURRENT MAJOR DEPRESSIVE DISORDER, REMISSION STATUS UNSPECIFIED: ICD-10-CM

## 2025-04-03 DIAGNOSIS — C50.912 INVASIVE DUCTAL CARCINOMA OF BREAST, FEMALE, LEFT (H): ICD-10-CM

## 2025-04-03 DIAGNOSIS — G30.0 EARLY ONSET ALZHEIMER'S DISEASE WITH BEHAVIORAL DISTURBANCE (H): ICD-10-CM

## 2025-04-03 PROCEDURE — 99344 HOME/RES VST NEW MOD MDM 60: CPT | Performed by: NURSE PRACTITIONER

## 2025-04-03 RX ORDER — QUETIAPINE FUMARATE 25 MG/1
25 TABLET, FILM COATED ORAL DAILY
COMMUNITY

## 2025-04-03 RX ORDER — ESTRADIOL 0.05 MG/D
1 PATCH TRANSDERMAL
COMMUNITY

## 2025-04-03 RX ORDER — BERBERINE CHLOR/SEAWEED/CHROM 500-250 MG
1 CAPSULE ORAL 2 TIMES DAILY
COMMUNITY

## 2025-04-03 RX ORDER — OLANZAPINE 2.5 MG/1
2.5 TABLET, FILM COATED ORAL PRN
Status: SHIPPED
Start: 2025-04-03

## 2025-04-03 RX ORDER — PROGESTERONE 100 MG/1
100 CAPSULE ORAL DAILY
COMMUNITY

## 2025-04-08 ENCOUNTER — TELEPHONE (OUTPATIENT)
Dept: GERIATRICS | Facility: CLINIC | Age: 70
End: 2025-04-08
Payer: COMMERCIAL

## 2025-04-08 ENCOUNTER — PATIENT OUTREACH (OUTPATIENT)
Dept: GERIATRIC MEDICINE | Facility: CLINIC | Age: 70
End: 2025-04-08
Payer: COMMERCIAL

## 2025-04-08 NOTE — LETTER
April 8, 2025    YOSI SANTOS  C/O NICKO YU  [address]  [place] [address]    Dear Yosi,     Welcome to [facility]'s [place] [facility] Care Plus (MSC+) plan. My name is Shaista Moreland RN, PHN. I am your MSC+ care coordinator. You are eligible for Care Coordination through [facility] MSC+.    Here is how Care Coordination works:  I will meet with you to discuss your care needs and health goals.  I will work with your facility to ensure your care needs are being met.  I will review the facility's plan of care for you and help them meet your needs.  If you are discharged from the nursing home, I will help you return to the community.    Our goal is to provide services that will keep you as healthy and independent as possible.     Soon you will receive a new MSC+ member identification (ID) card from [facility]. When you receive it, please use this card along with your Medical Assistance card and your Medicare card (if you have Medicare). You must show this card whenever you get health services.    Being in the [place] [facility] Plus (MSC+) Care Coordination program is voluntary and offered to you at no cost.  If you ever wish to stop being in the Care Coordination program or have questions, call me at [phone]. If you reach my voice mail, leave a message and your phone number. If you are hearing impaired, call the [place] Relay at 105 or [phone] (kivsqy-yl-rdwvad relay service).    Sincerely,      Shaista Moreland RN, PHN  [phone]  [email or web address]      [id number] accepted     (12/2019)

## 2025-04-08 NOTE — TELEPHONE ENCOUNTER
[facility]   2025     Name: Debby Barriga   : 1955       Orders:  Discontinue progesterone   Discontinue olanzapine  Discontinue Estradiol patch  Discontinue berberine  Discontinue omega-3  Discontinue calcium mirela- vitamin D  Discontinue medium chain triglycerides  Discontinue multivitamin  Continue with seroquel schedule and as needed.     Electronically signed by     KATLYN Garcia CNP on 2025 at 3:13 PM

## 2025-04-08 NOTE — PROGRESS NOTES
[facility] Care Coordination Contact    Member became effective with  [facility] on 4/1/2025 with [place] MSC+.  Previous Health Plan: MA/Fee For Service  Previous Care System: [facility]  Previous care coordinators name and number: [email or web address] / [phone]  Waiver Type: EW  [facility] received: No: Requested on 4/8/25  Mailed welcome letter and [facility] When to Contact Your Care Coordinator  Address/Phone discrepancy: N/A    Anitha Emmanuel  Case Management Specialist   [facility]  [phone]

## 2025-04-10 ENCOUNTER — ASSISTED LIVING VISIT (OUTPATIENT)
Dept: GERIATRICS | Facility: CLINIC | Age: 70
End: 2025-04-10
Payer: COMMERCIAL

## 2025-04-10 VITALS
SYSTOLIC BLOOD PRESSURE: 119 MMHG | RESPIRATION RATE: 20 BRPM | DIASTOLIC BLOOD PRESSURE: 59 MMHG | BODY MASS INDEX: 25.33 KG/M2 | TEMPERATURE: 97 F | OXYGEN SATURATION: 95 % | HEART RATE: 100 BPM | HEIGHT: 66 IN | WEIGHT: 157.6 LBS

## 2025-04-10 DIAGNOSIS — G30.0 EARLY ONSET ALZHEIMER'S DISEASE WITH BEHAVIORAL DISTURBANCE (H): ICD-10-CM

## 2025-04-10 DIAGNOSIS — F02.818 EARLY ONSET ALZHEIMER'S DISEASE WITH BEHAVIORAL DISTURBANCE (H): ICD-10-CM

## 2025-04-10 DIAGNOSIS — F41.1 GENERALIZED ANXIETY DISORDER: ICD-10-CM

## 2025-04-10 DIAGNOSIS — F02.C2 SEVERE EARLY ONSET ALZHEIMER'S DEMENTIA WITH PSYCHOTIC DISTURBANCE (H): ICD-10-CM

## 2025-04-10 DIAGNOSIS — Z78.9 DECREASED ACTIVITIES OF DAILY LIVING (ADL): ICD-10-CM

## 2025-04-10 DIAGNOSIS — C50.912 INVASIVE DUCTAL CARCINOMA OF BREAST, FEMALE, LEFT (H): ICD-10-CM

## 2025-04-10 DIAGNOSIS — G30.0 SEVERE EARLY ONSET ALZHEIMER'S DEMENTIA WITH PSYCHOTIC DISTURBANCE (H): ICD-10-CM

## 2025-04-10 DIAGNOSIS — F33.9 RECURRENT MAJOR DEPRESSIVE DISORDER, REMISSION STATUS UNSPECIFIED: Primary | ICD-10-CM

## 2025-04-10 RX ORDER — QUETIAPINE FUMARATE 25 MG/1
25 TABLET, FILM COATED ORAL DAILY PRN
COMMUNITY

## 2025-04-10 NOTE — PROGRESS NOTES
"[facility] GERIATRICS    Chief Complaint   Patient presents with    RECHECK     HPI:  Debby Barriga is a 69 year old  (1955), who is being seen today for an episodic care visit at: [facility] ([facility]) [[id number]].     Today she was walking down the hallway with a staff.    She was pleasantly confused  Pt is being aggressive towards staff.   Had a fall on 4/3      Allergies, and PMH/PSH reviewed in [facility] today.  REVIEW OF SYSTEMS:  4 point ROS including Respiratory, CV, GI and , other than that noted in the HPI,  is negative    Objective:   /59   Pulse 100   Temp 97  F (36.1  C)   Resp 20   Ht 1.676 m (5' 6\")   Wt 71.5 kg (157 lb 9.6 oz)   SpO2 95%   BMI 25.44 kg/m    GENERAL APPEARANCE:  Alert  RESP:  no respiratory distress  PSYCH:  insight and judgement impaired, memory impaired     Most Recent 3 CBC's:  Recent Labs   Lab Test 04/02/25  0604 12/04/24  1451 08/15/24  1337   WBC 6.2 6.0 6.3   HGB 12.2 12.6 13.1   MCV 89 91 91    253 258     Most Recent 3 BMP's:  Recent Labs   Lab Test 04/02/25  0604 10/26/23  1610 10/18/22  1341 01/18/22  1130    139  --  139   POTASSIUM 3.7 4.0 3.9 3.9   CHLORIDE 104 104  --  107   CO2 26 25  --  26   BUN 16.0 23.9*  --  11   CR 0.84 0.69 0.69 0.63   ANIONGAP 11 10  --  6   KAMAR 9.3 9.0  --  9.2   GLC 87 97  --  97       Assessment/Plan:    (G30.0,  F02.C2) 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     In reviewing previous medical record her MoCA was 3/30    Review of systems was able to be completed and patient did not have any complaints however she was having delusions of a man being around.     Patient requiring 24-hour skilled nursing care and would be a high risk of wandering if she were moved off the dementia care unit.       Expect further functional and cognitive decline.  Expect weight loss.     She is " taking Seroquel this morning   -Plan:   Stop Zyprexa use as needed  Continue with Seroquel scheduled and prn  Stop Progesterone 100 mg daily was ordered by functional medicine.         (F33.9) Recurrent major depressive disorder, remission status unspecified  (F41.1) Generalized anxiety disorder  Comment: Chronic  Hx of trauma  Difficulty managing emotions  Currently taking Effexor  Plan: Referral to ACP     (C50.912) Invasive ductal carcinoma of breast, female, left (H)  Comment: per oncology note (2/2023) screen detected [id number] HR+/HER2- IDC of the left breast. She is s/p partial mastectomy with SNLB with [id number] disease. She elected not to have adjuvant RT, but has been on Letrozole since 12/2022.  Recommended Dexa scan,  and Consider bisphosphonate  Taking Calcium and vitamin D  -Last MAMMOGRAM (3/2024)  -Taking estrogen patch and progesterone given to her by functional medicine  Plan:   Stopped estrogen and progesterone  Stopped berbine  Stopped calcium and vitamin D   Stopped prn Zyprexa  Stopped omega 3  Stopped medium chain triglycerides  Stopped multivitamin                 MED REC REQUIRED  Post Medication Reconciliation Status:  Discharge medications reconciled and changed, see notes/orders        Electronically signed by:     KATLYN Garcia CNP on 4/10/2025 at 3:44 PM

## 2025-04-15 ENCOUNTER — ASSISTED LIVING VISIT (OUTPATIENT)
Dept: GERIATRICS | Facility: CLINIC | Age: 70
End: 2025-04-15
Payer: COMMERCIAL

## 2025-04-15 VITALS
RESPIRATION RATE: 20 BRPM | BODY MASS INDEX: 25.33 KG/M2 | DIASTOLIC BLOOD PRESSURE: 59 MMHG | TEMPERATURE: 97 F | HEIGHT: 66 IN | SYSTOLIC BLOOD PRESSURE: 119 MMHG | HEART RATE: 100 BPM | OXYGEN SATURATION: 95 % | WEIGHT: 157.6 LBS

## 2025-04-15 DIAGNOSIS — G30.0 EARLY ONSET ALZHEIMER'S DISEASE WITH BEHAVIORAL DISTURBANCE (H): Primary | ICD-10-CM

## 2025-04-15 DIAGNOSIS — F33.9 RECURRENT MAJOR DEPRESSIVE DISORDER, REMISSION STATUS UNSPECIFIED: ICD-10-CM

## 2025-04-15 DIAGNOSIS — F41.1 GENERALIZED ANXIETY DISORDER: ICD-10-CM

## 2025-04-15 DIAGNOSIS — F02.818 EARLY ONSET ALZHEIMER'S DISEASE WITH BEHAVIORAL DISTURBANCE (H): Primary | ICD-10-CM

## 2025-04-15 DIAGNOSIS — C50.912 INVASIVE DUCTAL CARCINOMA OF BREAST, FEMALE, LEFT (H): ICD-10-CM

## 2025-04-15 DIAGNOSIS — M81.0 OSTEOPOROSIS WITHOUT CURRENT PATHOLOGICAL FRACTURE, UNSPECIFIED OSTEOPOROSIS TYPE: ICD-10-CM

## 2025-04-15 PROCEDURE — 99349 HOME/RES VST EST MOD MDM 40: CPT | Performed by: INTERNAL MEDICINE

## 2025-04-16 ENCOUNTER — PATIENT OUTREACH (OUTPATIENT)
Dept: GERIATRIC MEDICINE | Facility: CLINIC | Age: 70
End: 2025-04-16
Payer: COMMERCIAL

## 2025-04-16 NOTE — PROGRESS NOTES
[facility] Care Coordination Communication    Email sent to Antionette Juarez [facility]  at [facility] to  schedule annual assessment. For member. Assessment  is scheduled for 04/21/25.. CC will complete chart review and reach out to family or primary contact as part of the assessment.     Shaista Moreland RN, PHN, [facility]  Care Coordinator [facility]  [phone]

## 2025-04-17 ENCOUNTER — ASSISTED LIVING VISIT (OUTPATIENT)
Dept: GERIATRICS | Facility: CLINIC | Age: 70
End: 2025-04-17
Payer: COMMERCIAL

## 2025-04-17 VITALS
OXYGEN SATURATION: 98 % | RESPIRATION RATE: 18 BRPM | TEMPERATURE: 97.5 F | HEART RATE: 98 BPM | HEIGHT: 66 IN | SYSTOLIC BLOOD PRESSURE: 123 MMHG | WEIGHT: 154.6 LBS | DIASTOLIC BLOOD PRESSURE: 68 MMHG | BODY MASS INDEX: 24.85 KG/M2

## 2025-04-17 DIAGNOSIS — F22 DELUSION (H): ICD-10-CM

## 2025-04-17 DIAGNOSIS — G47.00 INSOMNIA, UNSPECIFIED TYPE: Primary | ICD-10-CM

## 2025-04-17 DIAGNOSIS — F02.C2 SEVERE EARLY ONSET ALZHEIMER'S DEMENTIA WITH PSYCHOTIC DISTURBANCE (H): ICD-10-CM

## 2025-04-17 DIAGNOSIS — G30.0 SEVERE EARLY ONSET ALZHEIMER'S DEMENTIA WITH PSYCHOTIC DISTURBANCE (H): ICD-10-CM

## 2025-04-17 DIAGNOSIS — F41.1 GENERALIZED ANXIETY DISORDER: ICD-10-CM

## 2025-04-17 DIAGNOSIS — F33.9 RECURRENT MAJOR DEPRESSIVE DISORDER, REMISSION STATUS UNSPECIFIED: ICD-10-CM

## 2025-04-17 RX ORDER — QUETIAPINE FUMARATE 25 MG/1
12.5 TABLET, FILM COATED ORAL EVERY MORNING
Status: SHIPPED
Start: 2025-04-17

## 2025-04-17 NOTE — PROGRESS NOTES
"[facility] GERIATRICS    Chief Complaint   Patient presents with    Nursing Home Acute     HPI:  Debby Barriga is a 69 year old  (1955), who is being seen today for an episodic care visit at: [facility] ([facility]) [[id number]].     Diet changed to finger foods  Debby didn't sleep last night (she states she did but nursing reports differently).  Was wondering on 4/15 as well.  Refused HS cares on 4/13  Weight is stable   Walking in hallway  VS stable    Allergies, and PMH/PSH reviewed in [facility] today.  REVIEW OF SYSTEMS:  4 point ROS including Respiratory, CV, GI and , other than that noted in the HPI,  is negative    Objective:   /68   Pulse 98   Temp 97.5  F (36.4  C)   Resp 18   Ht 1.676 m (5' 6\")   Wt 70.1 kg (154 lb 9.6 oz)   SpO2 98%   BMI 24.95 kg/m    GENERAL APPEARANCE:  Alert  RESP:  no respiratory distress  PSYCH:  insight and judgement impaired, memory impaired     Most Recent 3 CBC's:  Recent Labs   Lab Test 04/02/25  0604 12/04/24  1451 08/15/24  1337   WBC 6.2 6.0 6.3   HGB 12.2 12.6 13.1   MCV 89 91 91    253 258     Most Recent 3 BMP's:  Recent Labs   Lab Test 04/02/25  0604 10/26/23  1610 10/18/22  1341 01/18/22  1130    139  --  139   POTASSIUM 3.7 4.0 3.9 3.9   CHLORIDE 104 104  --  107   CO2 26 25  --  26   BUN 16.0 23.9*  --  11   CR 0.84 0.69 0.69 0.63   ANIONGAP 11 10  --  6   KAMAR 9.3 9.0  --  9.2   GLC 87 97  --  97       Assessment/Plan:     Insomnia, unspecified type  Generalized anxiety disorder  Recurrent major depressive disorder, remission status unspecified  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  Pt is not sleeping but is walking in hallways.  VS are stable.    Plan:   Start melatonin scheduled and prn  Continue with seroquel scheduled BID and prn       MED REC REQUIRED  Post Medication Reconciliation Status: .reconciled at " previous visit       Electronically signed by:     KATLYN Garcia CNP on 4/17/2025 at 2:43 PM

## 2025-04-22 ENCOUNTER — PATIENT OUTREACH (OUTPATIENT)
Dept: GERIATRIC MEDICINE | Facility: CLINIC | Age: 70
End: 2025-04-22
Payer: COMMERCIAL

## 2025-04-22 ASSESSMENT — ACTIVITIES OF DAILY LIVING (ADL)
DEPENDENT_IADLS:: CLEANING;COOKING;LAUNDRY;SHOPPING;MEAL PREPARATION;MEDICATION MANAGEMENT;MONEY MANAGEMENT;TRANSPORTATION

## 2025-04-22 NOTE — Clinical Note
I saw Debby on 04/21/25. No concerns from her or from her Spouse Josie. Immunizations are up to date. Has not had hearing or dental but was seen for Vision exam on 4/15/25. Please let me know if you have any concerns I can assist with.   Shaista Moreland BSN/PHN/WCC/[facility]/ Care Coordinator [address]   [address]  [place] [address] [email or web address]   [email or web address]    Cell: [phone] Office: [phone] Fax: [phone]

## 2025-04-27 NOTE — PROGRESS NOTES
"Debby Barriga is a 69 year old female seen April 15, 2025 at [facility] where she has resided for one month (admit 4/2025) seen for initial visit.   Pt is seen on the unit up to chair.   States \"I'm good\"   No complaints today   Nursing staff reports pt continues to have delusions, and can be resistant to cares.         By chart review, pt has been followed by the [facility] for progressive dementia with psychotic features   She was seen in March 2025 for increasing hallucinations and paranoia    Quetiapine was started and pt admitted to Memory Care unit due to higher care needs.     Pt also followed by Oncology for breast cancer found in 2022 s/p lumpectomy in 2023 and on letrozole.       Past Medical History:   Diagnosis Date    Breast cancer (H) 10/2022    Corneal ulcer of right eye 03/01/2015    Mild major depression  Early onset dementia, with psychoses  Osteoporosis 02/07/2013    Tear of retina     right eye     Past Surgical History:   Procedure Laterality Date    BIOPSY NODE SENTINEL Left 10/24/2022    Procedure: Left seed-localized  lumpectomy with sentinel node biopsy;  Surgeon: Darin Morrow MD;  Location: [facility] OR    LAPAROSCOPIC TUBAL LIGATION      LUMPECTOMY BREAST WITH SENTINEL NODE, COMBINED Left 10/24/2022    Procedure: Left seed-localized  lumpectomy with sentinel node biopsy;  Surgeon: Darin Morrow MD;  Location: [facility] OR    ORTHOPEDIC SURGERY      wrist     SH:  Previously lived with her wife Samantha, house in [facility]   Smoker distant past     ROS:  [facility] 3/30     Ambulatory without device    Incontinent of bladder and bowel, needs extensive assistance to manage this      04/10/25 71.5 kg (157 lb 9.6 oz)   04/03/25 72.1 kg (158 lb 14.4 oz)   02/05/25 72.2 kg (159 lb 3.2 oz)      GENERAL APPEARANCE: alert and no distress  /59   Pulse 100   Temp 97  F (36.1  C)   Resp 20   Ht 1.676 m (5' 6\")   Wt 71.5 kg (157 lb 9.6 oz)   SpO2 95%   BMI 25.44 kg/m     HEENT: " normocephalic, no lesion or abnormalities  NECK: no adenopathy, no asymmetry, masses, or scars and thyroid normal to palpation  RESP: lungs clear to auscultation - no rales, rhonchi or wheezes  CV: regular rate and rhythm, normal S1 S2  ABDOMEN:  soft, nontender, no HSM or masses and bowel sounds normal  MS: extremities normal- no gross deformities noted, no evidence of inflammation in joints, FROM in all extremities.  SKIN: no suspicious lesions or rashes  NEURO: oriented x1    PSYCH: affect okay, pleasant     Lab Results   Component Value Date     04/02/2025    POTASSIUM 3.7 04/02/2025    CHLORIDE 104 04/02/2025    CO2 26 04/02/2025    ANIONGAP 11 04/02/2025    GLC 87 04/02/2025    BUN 16.0 04/02/2025    CR 0.84 04/02/2025    GFRESTIMATED 76 04/02/2025    KAMAR 9.3 04/02/2025     Lab Results   Component Value Date    WBC 6.2 04/02/2025      HGB 12.2 04/02/2025      MCV 89 04/02/2025       04/02/2025     TSH   Date Value Ref Range Status   04/02/2025 1.39 0.30 - 4.20 uIU/mL Final   02/11/2020 0.67 0.40 - 4.00 mU/L Final      Lab Results   Component Value Date    A1C 5.5 04/02/2025     MR BRAIN WITHOUT IV CONTRAST   The study was performed according to IRB [id number] [facility] study protocol. A few   punctate foci of T2 signal hyperintensity along the left superior frontal gyrus   and the left paracentral lobule are unchanged and are most suggestive of minimal   leukoaraiosis or migraine-related signal and body. Mild generalized age-related   cerebral volume loss. The brain is otherwise normal without evidence of mass lesions       IMP/PLAN:   (G30.0,  F02.818) Early onset Alzheimer's disease with behavioral disturbance (H)  (primary encounter diagnosis)  Comment: delusions, wandering  Plan: Board and Care Memory Care unit support for med admin, meals, activity and secure unit     Quetiapine 12.5 mg AM and 25 mg HS, and PRN for psychotic features and to lower her distress       (F41.1) Generalized anxiety  disorder  (F33.9) Recurrent major depressive disorder, remission status unspecified  Comment: longstanding  Plan: venlafaxine 150 mg/day   Melatonin 3 mg HS and repeat PRN for sleep     (C50.062) Invasive ductal carcinoma of breast, female, left (H)  Comment: HR+/HER2-  found on screening mammogram in 2022, s/p partial mastectomy, neg LNs   Declined XRT, was on letrozole for a while      Last seen by Oncology August 2023     Plan: Oncology appointment     (M81.0) Osteoporosis without current pathological fracture, unspecified osteoporosis type  Comment: by DXA in 2023   Plan: vit D 125 mcg/day and calcium 500 mg bid    Consider tx given pt's relatively young age and fall risk     Advance directive: DNR  /DNI, treat reversible illness.     Rosetta Santiago MD

## 2025-04-28 ENCOUNTER — CLINICAL UPDATE (OUTPATIENT)
Dept: PHARMACY | Facility: CLINIC | Age: 70
End: 2025-04-28
Payer: COMMERCIAL

## 2025-04-28 DIAGNOSIS — F02.818 EARLY ONSET ALZHEIMER'S DISEASE WITH BEHAVIORAL DISTURBANCE (H): ICD-10-CM

## 2025-04-28 DIAGNOSIS — F33.9 RECURRENT MAJOR DEPRESSIVE DISORDER, REMISSION STATUS UNSPECIFIED: Primary | ICD-10-CM

## 2025-04-28 DIAGNOSIS — F41.1 GENERALIZED ANXIETY DISORDER: ICD-10-CM

## 2025-04-28 DIAGNOSIS — M81.0 OSTEOPOROSIS, UNSPECIFIED OSTEOPOROSIS TYPE, UNSPECIFIED PATHOLOGICAL FRACTURE PRESENCE: ICD-10-CM

## 2025-04-28 DIAGNOSIS — G30.0 EARLY ONSET ALZHEIMER'S DISEASE WITH BEHAVIORAL DISTURBANCE (H): ICD-10-CM

## 2025-04-28 PROCEDURE — 99207 PR NO CHARGE LOS: CPT | Performed by: PHARMACIST

## 2025-04-28 NOTE — PROGRESS NOTES
This patient's medication list and chart were reviewed as part of the service provided by [facility].    Assessment/Recommendations:    Appears patient is on high dose vitamin D3 125mcg (5000u) daily.  May benefit from consideration to reduction in dose to 25mcg (1000u) daily and follow-up vitamin D level in 8-12 weeks.  Goal vitamin D level 30-50.  Appropriate to continue calcium supplement given history of osteoporosis, patient is ambulatory.  Appears IV bisphosphonate had been recommended in past, patient opted not to start at that time.  May consider readdressing potential treatment options for osteoporosis if aligns with goals of care given high fall/fracture risk.    Noted on chart review that she is having intermittent delusions with anxiety and agitation.  Continue to monitor efficacy of quetiapine, target symptoms, as well as for adverse effects.     Estimated Creatinine Clearance: 71.9 mL/min (based on SCr of 0.84 mg/dL).      Kimi Solorio, Pharm.D.,[facility]  Board Certified Geriatric Pharmacist  Medication Therapy Management Pharmacist  [phone]

## 2025-04-29 ENCOUNTER — LAB REQUISITION (OUTPATIENT)
Dept: LAB | Facility: CLINIC | Age: 70
End: 2025-04-29
Payer: COMMERCIAL

## 2025-04-29 ENCOUNTER — ASSISTED LIVING VISIT (OUTPATIENT)
Dept: GERIATRICS | Facility: CLINIC | Age: 70
End: 2025-04-29
Payer: COMMERCIAL

## 2025-04-29 VITALS
RESPIRATION RATE: 16 BRPM | OXYGEN SATURATION: 96 % | HEART RATE: 100 BPM | HEIGHT: 66 IN | TEMPERATURE: 97.3 F | SYSTOLIC BLOOD PRESSURE: 123 MMHG | DIASTOLIC BLOOD PRESSURE: 74 MMHG | WEIGHT: 156.2 LBS | BODY MASS INDEX: 25.1 KG/M2

## 2025-04-29 DIAGNOSIS — G30.0 SEVERE EARLY ONSET ALZHEIMER'S DEMENTIA WITH PSYCHOTIC DISTURBANCE (H): Primary | ICD-10-CM

## 2025-04-29 DIAGNOSIS — F02.C2 SEVERE EARLY ONSET ALZHEIMER'S DEMENTIA WITH PSYCHOTIC DISTURBANCE (H): Primary | ICD-10-CM

## 2025-04-29 DIAGNOSIS — E55.9 VITAMIN D DEFICIENCY, UNSPECIFIED: ICD-10-CM

## 2025-04-29 DIAGNOSIS — G30.0 EARLY ONSET ALZHEIMER'S DISEASE WITH BEHAVIORAL DISTURBANCE (H): ICD-10-CM

## 2025-04-29 DIAGNOSIS — M81.0 OSTEOPOROSIS WITHOUT CURRENT PATHOLOGICAL FRACTURE, UNSPECIFIED OSTEOPOROSIS TYPE: ICD-10-CM

## 2025-04-29 DIAGNOSIS — C50.912 INVASIVE DUCTAL CARCINOMA OF BREAST, FEMALE, LEFT (H): ICD-10-CM

## 2025-04-29 DIAGNOSIS — F02.818 EARLY ONSET ALZHEIMER'S DISEASE WITH BEHAVIORAL DISTURBANCE (H): ICD-10-CM

## 2025-04-29 PROCEDURE — 99349 HOME/RES VST EST MOD MDM 40: CPT | Performed by: NURSE PRACTITIONER

## 2025-04-29 RX ORDER — ALENDRONATE SODIUM 70 MG/1
70 TABLET ORAL
Status: SHIPPED
Start: 2025-04-29

## 2025-04-29 NOTE — PROGRESS NOTES
[facility]   2025     Name: Debby Barriga   : 1955     Background:  Pt has diagnosis of osteoporosis     Orders:  alendronate (FOSAMAX) 70 MG tablet by mouth every 7 days.    Administer first thing in the morning and >=30 minutes before the first food with 8 oz of water.   Must stay upright (not to lie down) for >=30 minutes and until after first food of the day (to reduce esophageal irritation).     Dx osteoporosis       Electronically signed by     KATLYN Garcia CNP on 2025 at 4:01 PM

## 2025-04-29 NOTE — PROGRESS NOTES
"[facility] GERIATRICS    Chief Complaint   Patient presents with    RECHECK     HPI:  Debby Barriga is a 69 year old  (1955), who is being seen today for an episodic care visit at: [facility] ([facility]) [[id number]].       Today Debby was seen in the dining room.  She had no complaints.  Staff states she does not have any behaviors in the morning but at night she does have some sundowning.  Will continue with current Seroquel.  Pharmacy reviewed her medications.  Per  Blood pressure is stable.  Heart rate slightly elevated.  Weight stable.  UA        Allergies, and PMH/PSH reviewed in EPIC today.  REVIEW OF SYSTEMS:  4 point ROS including Respiratory, CV, GI and , other than that noted in the HPI,  is negative    Objective:   /74   Pulse 100   Temp 97.3  F (36.3  C)   Resp 16   Ht 1.676 m (5' 6\")   Wt 70.9 kg (156 lb 3.2 oz)   SpO2 96%   BMI 25.21 kg/m    GENERAL APPEARANCE:  Alert, in no distress  RESP:  no respiratory distress  PSYCH:  insight and judgement impaired, memory impaired     Most Recent 3 CBC's:  Recent Labs   Lab Test 04/02/25  0604 12/04/24  1451 08/15/24  1337   WBC 6.2 6.0 6.3   HGB 12.2 12.6 13.1   MCV 89 91 91    253 258     Most Recent 3 BMP's:  Recent Labs   Lab Test 04/02/25  0604 10/26/23  1610 10/18/22  1341 01/18/22  1130    139  --  139   POTASSIUM 3.7 4.0 3.9 3.9   CHLORIDE 104 104  --  107   CO2 26 25  --  26   BUN 16.0 23.9*  --  11   CR 0.84 0.69 0.69 0.63   ANIONGAP 11 10  --  6   KAMAR 9.3 9.0  --  9.2   GLC 87 97  --  97       Assessment/Plan:  (G30.0,  F02.C2) Severe early onset Alzheimer's dementia with psychotic disturbance (H)  (primary encounter diagnosis)  (G30.0,  F02.818) Early onset Alzheimer's disease with behavioral disturbance (H)  Comment: progressive  Pt now at Dementia care unit  Walking in hallways  Confused   At times refuses care particularly at night  Plan:  continue seroquel   May need to change timing of Seroquel and move the morning " dose to afternoon.    (M81.0) Osteoporosis without current pathological fracture, unspecified osteoporosis type  Comment: chronic  IV bisphosphonate had been recommended in past, patient opted not to start at that time.   Taking vitamin D 5,000 units nad calcium  Pharmacy recommended decreasing vitamin D  Plan: Discontinue current vitamin D of 5.,000  Start vitamin of 1000 units     Vitamin D level July 2025      (C50.912) Invasive ductal carcinoma of breast, female, left (H)  Comment: History of  At admission was taking estrogen progesterone - discontinued   HR+/HER2-  found on screening mammogram in 2022, s/p partial mastectomy, neg LNs   Declined XRT, was on letrozole for a while      Last seen by Oncology August 2023    Plan: follow with oncology         MED REC REQUIRED  Post Medication Reconciliation Status:  Discharge medications reconciled and changed, see notes/orders      Electronically signed by:       KATLYN Garcia CNP on 4/29/2025 at 3:49 PM

## 2025-05-03 ENCOUNTER — HEALTH MAINTENANCE LETTER (OUTPATIENT)
Age: 70
End: 2025-05-03

## 2025-05-05 NOTE — PROGRESS NOTES
[facility] Care Coordination Contact  [facility] Institutional Assessment     Institutional Assessment for Health Risk Assessment with Debby Barriga completed on April 21, 2025 at [facility]    Type of residence:: Nursing home ([facility])  Current living arrangement:: I live in a nursing home ([facility])     Assessment completed with:: Spouse or significant other    Mental/Behavioral Health   Depression Screening:           Mental health DX:: Yes (Anxiety and depression)   Mental health DX how managed:: Medication, In Home Counseling    Falls Assessment:   Fallen 2 or more times in the past year?: (!) Yes   Any fall with injury in the past year?: No    ADL/IADL Dependencies:   Dependent ADLs:: Ambulation-no assistive device, Bathing, Dressing, Grooming, Incontinence, Positioning, Transfers, Toileting, Wheelchair-with assist (Member is able to walk without assistive device, but can't be left alone while walking due to dementia as she would get lost or make poor decisions and it would be unsafe.)  Dependent IADLs:: Cleaning, Cooking, Laundry, Shopping, Meal Preparation, Medication Management, Money Management, Transportation (patient recieves help with all of these activities from her spouse & PCA)      Care Plan & Recommendations: Debby is a 69-year-old female living in long-term care at [facility]  She is alert to person only was a current PHQ-9 of 5/27 and BIMS of 0/15.  Assessment completed via chart review, patient interview, family interview, this staff interview, care coordinator observations.  Chart review indicates that she has a primary medical diagnosis of Alzheimer's early onset with secondary diagnosis of dementia, visual-spatial deficits, malignant neoplasm of the left breast, B vitamin deficiency, osteoporosis, constipation and mental health diagnoses that include vessel hallucinations, generalized anxiety and posttraumatic stress disorder.   Jennifer Guardado has had 2 falls in the past year patient currently up-to-date on immunizations has hearing aids but has not had a hearing exam in the past year had a vision exam on 4/16/2025 and has not had a dental exam in the past year.  She is seen by in-house podiatry.  Current diet is regular diet finger foods and thin liquids.  CODE STATUS reviewed POLST on file indicates DNR/DNI/comfort cares. Discussed options/opportunities for transitions.Plans to remain in long-term care/memory care.    See Institutional Care Plan for detailed assessment information.    Obtained a copy of the facility care plan and MDS from facility electronic records. Requested of residential social worker to put this care coordinator on care conference attendee list.    Placed the Health Plan facility face sheet in the member's facility chart.    Follow-Up Plan: Member informed of future contact, plan to f/u with member with a 6 month assessment, attend 1 care conference annually, and will follow any hospitalizations or transitions. Care Coordinator contact information shared with member/family and facility, and encouraged to call this care coordinator with any questions or concerns at any time.     [place] care continuum providers: Please see Snapshot and Care Management Flowsheets for Specific details of care plan.    This CC note routed to PCP, Barbie Galaviz.

## 2025-05-06 ENCOUNTER — PATIENT OUTREACH (OUTPATIENT)
Dept: GERIATRIC MEDICINE | Facility: CLINIC | Age: 70
End: 2025-05-06
Payer: COMMERCIAL

## 2025-05-06 NOTE — PROGRESS NOTES
[facility] Care Coordination Contact    Received after visit chart from care coordinator.  Completed following tasks: Mailed copy of signature sheet for member to sign & return in SASE, uploaded to Project Manager for tracking., Mailed Transition of Care Member Handout, and Mailed are POC Letter to member/rep with Support Plan & Signature Page    Anitha Emmanuel  Case Management Specialist   [facility]  [phone]

## 2025-05-06 NOTE — LETTER
May 6, 2025       YOSI SANTOS  C[address]O [address]  [address]  [facility] [address]      Dear Yosi,    At [facility], we re dedicated to improving your health and wellness. Enclosed is the Support Plan developed with you on 4/22/2025.     Please review this Support Plan carefully. If you find it acceptable, please sign it and return the signature page in the enclosed self-addressed, stamped envelope.     As a reminder, during your visit we talked about:    Ways to manage your physical and mental health   Using health care to maintain and improve your health   Your preventive care needs    Remember to contact your care coordinator if you:   Are hospitalized or plan to be hospitalized   Have a fall   Have a change in your physical or mental health   Need help finding supports or services     If you have questions or don t agree with your Support Plan, call me at [phone]. You can also call if your needs change. TTY machine users please call the [place] [employer] at 339 or 1-248.356.4715 (unxrux-bq-qpaeab relay service).    Thank you,         Shaista Moreland RN, PHN  [phone]  [email or web address]            [id number] accepted  [id number]   (06/2024)          [address], [place] [address]  [phone]  fax [phone]   [facility]

## 2025-05-13 ENCOUNTER — TELEPHONE (OUTPATIENT)
Dept: GERIATRICS | Facility: CLINIC | Age: 70
End: 2025-05-13
Payer: COMMERCIAL

## 2025-05-13 NOTE — TELEPHONE ENCOUNTER
[facility] Triage Nurse Telephone Encounter    Provider: KATLYN Cuellar CNP  Facility: [facility] Facility Type:  AL    Caller: Hector  Call Back Number: [phone]    Allergies:    Allergies   Allergen Reactions    Hydrocodone      Ears ringing  Questionable allergy Ears ringing  Ears ringing    Trazodone      Other reaction(s): Headache        Reason for call: Nursing requesting PRN dose of Seroquel for pt. Between 3-4PM daily, pt is becoming agitated and difficult to redirect.    Verbal Order/Direction given by Provider: Seroquel 12.5 mg by mouth daily as needed for delusions, agitation x 14 days. Document sleeping habits and behaviors in PCC     Provider giving Order:  KATLYN Cuellar CNP    Verbal Order given to: Hector Cosme RN

## 2025-05-14 ENCOUNTER — TELEPHONE (OUTPATIENT)
Dept: GERIATRICS | Facility: CLINIC | Age: 70
End: 2025-05-14
Payer: COMMERCIAL

## 2025-05-14 DIAGNOSIS — B00.9 HSV (HERPES SIMPLEX VIRUS) INFECTION: Primary | ICD-10-CM

## 2025-05-14 DIAGNOSIS — F41.1 GENERALIZED ANXIETY DISORDER: Primary | ICD-10-CM

## 2025-05-14 RX ORDER — VALACYCLOVIR HYDROCHLORIDE 500 MG/1
500 TABLET, FILM COATED ORAL 2 TIMES DAILY
Qty: 6 TABLET | Refills: 0 | Status: SHIPPED | OUTPATIENT
Start: 2025-05-14 | End: 2025-05-17

## 2025-05-14 RX ORDER — QUETIAPINE FUMARATE 25 MG/1
25 TABLET, FILM COATED ORAL DAILY
Qty: 30 TABLET | Refills: 3 | Status: SHIPPED | OUTPATIENT
Start: 2025-05-14

## 2025-05-14 NOTE — TELEPHONE ENCOUNTER
[facility]   May 14, 2025     Name: Debby Barriga   : 1955     Background:  Estimated Creatinine Clearance: 72.7 mL/min (based on SCr of 0.84 mg/dL).      Orders:  valACYclovir (VALTREX) 500 MG tablet by mouth twice daily for 3 days.    Dx herpes complex      Electronically signed by     KATLYN Garcia CNP on 2025 at 2:34 PM

## 2025-05-14 NOTE — TELEPHONE ENCOUNTER
"[facility] Triage Call    Provider: KATLYN Cuellar CNP  Facility: [facility] Facility Type:  LTC    Caller: Anaya  Call Back Number: [phone]    Allergies:    Allergies   Allergen Reactions    Hydrocodone      Ears ringing  Questionable allergy Ears ringing  Ears ringing    Trazodone      Other reaction(s): Headache          SBAR:     S-(situation): Mouth sores    B-(background): N/A    A-(assessment): Pt has a reddened/inflamed canker sore forming on the inside of lower lip and an intact cold sore blister on outside of lower right side of lip. Denies any pain/discomfort. Appeared 2 days ago. VS stable.    R-(recommendations): Abreve for cold sore? Please advise.        Telephone encounter sent to:  KATLYN Cuellar CNP    Please send response/orders to \"Geriatrics Nurse Pool\"    Sabiha Rivera RN      "

## 2025-05-17 NOTE — PROGRESS NOTES
[facility] GERIATRICS  Chief Complaint   Patient presents with    [facility] Medical Record Number:  [id number]  Place of Service where encounter took place:  [facility] ([facility]) [[id number]]    HPI:    Debby Barriga  is 69 year old (1955), who is being seen today for a federally mandated E/M visit.       Medical history includes  Dementia with psychotic features and hallucinations  History of invasive ductal carcinoma  Anxiety/depression      Today patient was seen, due to cognitive impairment review of systems was limited but patient denied chest pain, shortness of breath, dizziness, lightheadedness, and a poor appetite.   Nursing has concerns of pt having behaviors when she sees people behind glass, therefore glass had to be covered and then is very resistive when staff trying to redirect her.  Patient needs cuing assistance with ADLs and walks independently.   Her appetite is fair and consumes a regular diet, finger food.  Per nursing, skin is intact. Code status is DRN/DNI.    In reviewing point click care weight is stable, BP and HR stable, on room air.       ALLERGIES:Hydrocodone and Trazodone  PAST MEDICAL HISTORY:   Past Medical History:   Diagnosis Date    Breast cancer (H) 10/2022    Corneal ulcer of right eye 03/01/2015    Mild major depression 02/07/2013    Tear of retina     right eye     PAST SURGICAL HISTORY:   has a past surgical history that includes Laparoscopic tubal ligation; orthopedic surgery; Lumpectomy breast with sentinel node, combined (Left, 10/24/2022); and Biopsy node sentinel (Left, 10/24/2022).  FAMILY HISTORY: family history includes Alzheimer Disease in her mother; Prostate Cancer in her father.  SOCIAL HISTORY:  reports that she quit smoking about 42 years ago. Her smoking use included cigarettes. She has been exposed to tobacco smoke. She has never used smokeless tobacco. She reports that she does not currently use drugs. She reports that she does not  drink alcohol.    MEDICATIONS:  MED REC REQUIRED  Post Medication Reconciliation Status:  Discharge medications reconciled and changed, see notes/orders         Review of your medicines            Accurate as of May 20, 2025  7:24 AM. If you have any questions, ask your nurse or doctor.                CONTINUE these medicines which have NOT CHANGED        Dose / Directions   acetaminophen 325 MG tablet  Commonly known as: TYLENOL      Dose: 325-650 mg  Take 325-650 mg by mouth every 6 hours as needed for mild pain  Refills: 0     alendronate 70 MG tablet  Commonly known as: FOSAMAX  Used for: Osteoporosis without current pathological fracture, unspecified osteoporosis type      Dose: 70 mg  Take 1 tablet (70 mg) by mouth every 7 days.  Refills: 0     calcium carbonate 500 MG tablet  Commonly known as: OS-KAMAR  Used for: Invasive ductal carcinoma of breast, female, left (H)      Dose: 1 tablet  Take 1 tablet (500 mg) by mouth 2 times daily  Quantity: 180 tablet  Refills: 3     * melatonin 3 MG tablet  Used for: Insomnia, unspecified type      Dose: 3 mg  Take 1 tablet (3 mg) by mouth at bedtime.  Refills: 0     * melatonin 3 MG tablet  Used for: Insomnia, unspecified type      Dose: 3 mg  Take 1 tablet (3 mg) by mouth nightly as needed for sleep.  Refills: 0     psyllium capsule  Commonly known as: METAMUCIL/KONSYL      Dose: 1 capsule  Take 1 capsule by mouth daily.  Refills: 0     * QUEtiapine 25 MG tablet  Commonly known as: SEROquel  Indication: Agitation      Dose: 12.5 mg  Take 12.5 mg by mouth daily as needed.  Refills: 0     * QUEtiapine 25 MG tablet  Commonly known as: SEROquel  Used for: Severe early onset Alzheimer's dementia with psychotic disturbance (H), Delusion (H)      Dose: 12.5 mg  Take 0.5 tablets (12.5 mg) by mouth every morning.  Refills: 0     * QUEtiapine 25 MG tablet  Commonly known as: SEROquel  Used for: Generalized anxiety disorder      Dose: 25 mg  Take 1 tablet (25 mg) by mouth  "daily.  Quantity: 30 tablet  Refills: 3     valACYclovir 500 MG tablet  Commonly known as: VALTREX  Used for: HSV (herpes simplex virus) infection      Dose: 500 mg  Take 1 tablet (500 mg) by mouth 2 times daily for 3 days.  Quantity: 6 tablet  Refills: 0     venlafaxine 150 MG 24 hr capsule  Commonly known as: EFFEXOR XR  Used for: Generalized anxiety disorder, Moderate episode of recurrent major depressive disorder (H)      TAKE 1 CAPSULE BY MOUTH EVERY DAY  Quantity: 90 capsule  Refills: 4           * This list has 5 medication(s) that are the same as other medications prescribed for you. Read the directions carefully, and ask your doctor or other care provider to review them with you.                   Case Management:  I have reviewed the care plan and MDS and do agree with the plan. Patient's desire to return to the community is not present. Information reviewed:  Medications, vital signs, orders, and nursing notes.    ROS:  Unobtainable secondary to cognitive impairment.     Vitals:  /76   Pulse 99   Temp 97.2  F (36.2  C)   Resp 20   Ht 1.676 m (5' 6\")   Wt 71.8 kg (158 lb 6.4 oz)   SpO2 95%   BMI 25.57 kg/m    Body mass index is 25.57 kg/m .  Exam:  GENERAL APPEARANCE:  Alert, in no distress  RESP:  lungs clear to auscultation , no respiratory distress  CV:  rate-normal  PSYCH:  insight and judgement impaired, memory impaired     Lab/Diagnostic data:   Most Recent 3 CBC's:  Recent Labs   Lab Test 04/02/25  0604 12/04/24  1451 08/15/24  1337   WBC 6.2 6.0 6.3   HGB 12.2 12.6 13.1   MCV 89 91 91    253 258     Most Recent 3 BMP's:  Recent Labs   Lab Test 04/02/25  0604 10/26/23  1610 10/18/22  1341 01/18/22  1130    139  --  139   POTASSIUM 3.7 4.0 3.9 3.9   CHLORIDE 104 104  --  107   CO2 26 25  --  26   BUN 16.0 23.9*  --  11   CR 0.84 0.69 0.69 0.63   ANIONGAP 11 10  --  6   KAMAR 9.3 9.0  --  9.2   GLC 87 97  --  97       ASSESSMENT/PLAN    (G30.0,  F02.C2) Severe early onset " Alzheimer's dementia with psychotic disturbance (H)  (primary encounter diagnosis)  (Z78.9) decreased activities of daily living  (F33.9) Recurrent major depressive disorder, remission status unspecified  (F41.1) Generalized anxiety disorder  Comment: Chronic, progressive.     In reviewing previous medical record her MoCA was 3/30    Review of systems was able to be completed and patient did not have any complaints however she was having delusions of a man being around.     Patient requiring 24-hour skilled nursing care and would be a high risk of wandering if she were moved off the dementia care unit.       Currently taking Effexor  Started on Seroquel and currently 12.5 BID and 25 at HS.  Pt continues to be difficult to redirect, delusional, paranoia  Plan: ACP seeing     (C50.912) Invasive ductal carcinoma of breast, female, left (H)  Comment: per oncology note (2/2023) screen detected [id number] HR+/HER2- IDC of the left breast. She is s/p partial mastectomy with SNLB with [id number] disease. She elected not to have adjuvant RT, but has been on Letrozole since 12/2022.  Recommended Dexa scan, Calcium and vitamin D and Consider bisphosphonate  -Last MAMMOGRAM (3/2024)  -taking fosamax  -taking calcium and vitamin D  Plan:    Continue with plan of care no changes at this time, adjustment as needed         Electronically signed by:      KATLYN Garcia CNP

## 2025-05-20 ENCOUNTER — ASSISTED LIVING VISIT (OUTPATIENT)
Dept: GERIATRICS | Facility: CLINIC | Age: 70
End: 2025-05-20
Payer: COMMERCIAL

## 2025-05-20 VITALS
OXYGEN SATURATION: 95 % | BODY MASS INDEX: 25.46 KG/M2 | DIASTOLIC BLOOD PRESSURE: 76 MMHG | HEIGHT: 66 IN | RESPIRATION RATE: 20 BRPM | HEART RATE: 99 BPM | WEIGHT: 158.4 LBS | SYSTOLIC BLOOD PRESSURE: 116 MMHG | TEMPERATURE: 97.2 F

## 2025-05-20 DIAGNOSIS — F33.9 RECURRENT MAJOR DEPRESSIVE DISORDER, REMISSION STATUS UNSPECIFIED: ICD-10-CM

## 2025-05-20 DIAGNOSIS — G30.0 EARLY ONSET ALZHEIMER'S DISEASE WITH BEHAVIORAL DISTURBANCE (H): Primary | ICD-10-CM

## 2025-05-20 DIAGNOSIS — F02.818 EARLY ONSET ALZHEIMER'S DISEASE WITH BEHAVIORAL DISTURBANCE (H): Primary | ICD-10-CM

## 2025-05-20 DIAGNOSIS — F02.C2 SEVERE EARLY ONSET ALZHEIMER'S DEMENTIA WITH PSYCHOTIC DISTURBANCE (H): ICD-10-CM

## 2025-05-20 DIAGNOSIS — G30.0 SEVERE EARLY ONSET ALZHEIMER'S DEMENTIA WITH PSYCHOTIC DISTURBANCE (H): ICD-10-CM

## 2025-05-20 DIAGNOSIS — F22 DELUSION (H): ICD-10-CM

## 2025-05-20 DIAGNOSIS — F41.1 GENERALIZED ANXIETY DISORDER: ICD-10-CM

## 2025-05-20 DIAGNOSIS — Z78.9 DECREASED ACTIVITIES OF DAILY LIVING (ADL): ICD-10-CM

## 2025-05-20 DIAGNOSIS — C50.912 INVASIVE DUCTAL CARCINOMA OF BREAST, FEMALE, LEFT (H): ICD-10-CM

## 2025-05-20 PROCEDURE — 99349 HOME/RES VST EST MOD MDM 40: CPT | Performed by: NURSE PRACTITIONER

## 2025-05-22 RX ORDER — VITAMIN B COMPLEX
1 TABLET ORAL DAILY
COMMUNITY

## 2025-05-22 RX ORDER — QUETIAPINE FUMARATE 25 MG/1
12.5 TABLET, FILM COATED ORAL 2 TIMES DAILY
Status: SHIPPED
Start: 2025-05-22

## 2025-05-24 ENCOUNTER — HEALTH MAINTENANCE LETTER (OUTPATIENT)
Age: 70
End: 2025-05-24

## 2025-06-02 ENCOUNTER — ASSISTED LIVING VISIT (OUTPATIENT)
Dept: GERIATRICS | Facility: CLINIC | Age: 70
End: 2025-06-02
Payer: COMMERCIAL

## 2025-06-02 VITALS
OXYGEN SATURATION: 97 % | DIASTOLIC BLOOD PRESSURE: 68 MMHG | RESPIRATION RATE: 18 BRPM | BODY MASS INDEX: 24.85 KG/M2 | TEMPERATURE: 96.9 F | HEART RATE: 96 BPM | WEIGHT: 154.6 LBS | SYSTOLIC BLOOD PRESSURE: 119 MMHG | HEIGHT: 66 IN

## 2025-06-02 DIAGNOSIS — F33.9 RECURRENT MAJOR DEPRESSIVE DISORDER, REMISSION STATUS UNSPECIFIED: ICD-10-CM

## 2025-06-02 DIAGNOSIS — F02.818 EARLY ONSET ALZHEIMER'S DISEASE WITH BEHAVIORAL DISTURBANCE (H): Primary | ICD-10-CM

## 2025-06-02 DIAGNOSIS — G30.0 EARLY ONSET ALZHEIMER'S DISEASE WITH BEHAVIORAL DISTURBANCE (H): Primary | ICD-10-CM

## 2025-06-02 DIAGNOSIS — F22 DELUSION (H): ICD-10-CM

## 2025-06-02 DIAGNOSIS — C50.912 INVASIVE DUCTAL CARCINOMA OF BREAST, FEMALE, LEFT (H): ICD-10-CM

## 2025-06-02 DIAGNOSIS — M81.0 AGE-RELATED OSTEOPOROSIS WITHOUT CURRENT PATHOLOGICAL FRACTURE: ICD-10-CM

## 2025-06-02 DIAGNOSIS — F41.1 GENERALIZED ANXIETY DISORDER: ICD-10-CM

## 2025-06-02 PROCEDURE — 99349 HOME/RES VST EST MOD MDM 40: CPT | Performed by: INTERNAL MEDICINE

## 2025-06-09 ENCOUNTER — ASSISTED LIVING VISIT (OUTPATIENT)
Dept: GERIATRICS | Facility: CLINIC | Age: 70
End: 2025-06-09
Payer: COMMERCIAL

## 2025-06-09 VITALS
TEMPERATURE: 96.8 F | OXYGEN SATURATION: 97 % | DIASTOLIC BLOOD PRESSURE: 77 MMHG | SYSTOLIC BLOOD PRESSURE: 114 MMHG | RESPIRATION RATE: 18 BRPM | HEART RATE: 101 BPM

## 2025-06-09 DIAGNOSIS — F33.9 RECURRENT MAJOR DEPRESSIVE DISORDER, REMISSION STATUS UNSPECIFIED: ICD-10-CM

## 2025-06-09 DIAGNOSIS — G30.0 SEVERE EARLY ONSET ALZHEIMER'S DEMENTIA WITH PSYCHOTIC DISTURBANCE (H): Primary | ICD-10-CM

## 2025-06-09 DIAGNOSIS — F02.C2 SEVERE EARLY ONSET ALZHEIMER'S DEMENTIA WITH PSYCHOTIC DISTURBANCE (H): Primary | ICD-10-CM

## 2025-06-09 DIAGNOSIS — Z78.9 DECREASED ACTIVITIES OF DAILY LIVING (ADL): ICD-10-CM

## 2025-06-09 DIAGNOSIS — F41.1 GENERALIZED ANXIETY DISORDER: ICD-10-CM

## 2025-06-09 DIAGNOSIS — F22 DELUSION (H): ICD-10-CM

## 2025-06-09 PROCEDURE — 99349 HOME/RES VST EST MOD MDM 40: CPT

## 2025-06-09 NOTE — PROGRESS NOTES
[facility] GERIATRICS    Chief Complaint   Patient presents with    RECHECK     HPI:  Debby Barriga is a 69 year old  (1955), who is being seen today for an episodic care visit at: [facility] ([facility]) [[id number]].     Today's concern is:   Hallucination and paranoia with reduced impulse control      PMH: Dementia with psychosis feature, history of invasive ductal carcinoma, anxiety and depression, delusional thoughts, generalized anxiety, severe early onset of Alzheimer.    Today, is seen while pacing on the unit.  She has incoherent speech and hallucinating about everything.  Staff report compulsivity progressively worsening with minimal impulse control.  Patient is on Seroquel 25 mg at at bedtime and 12.5 mg twice daily.  Medication adjusted today to 25 mg 4 times daily.  No additional concern noted today.    Allergies, and PMH/PSH reviewed in [facility] today.  REVIEW OF SYSTEMS:  Unobtainable secondary to cognitive impairment.     Objective:   /77   Pulse 101   Temp 96.8  F (36  C)   Resp 18   SpO2 97%   GENERAL APPEARANCE:  Alert, in no distress  RESP:  respiratory effort and palpation of chest normal  ABDOMEN:  normal bowel sounds, soft, nontender, no hepatosplenomegaly or other masses  :    No concern  M/S:   Gait and station normal  SKIN:  Inspection of skin and subcutaneous tissue baseline  NEURO:   Examination of sensation by touch normal  PSYCH:  affect and mood normal, delusional paranoia difficult to redirect    Most Recent 3 CBC's:  Recent Labs   Lab Test 04/02/25  0604 12/04/24  1451 08/15/24  1337   WBC 6.2 6.0 6.3   HGB 12.2 12.6 13.1   MCV 89 91 91    253 258     Most Recent 3 BMP's:  Recent Labs   Lab Test 04/02/25  0604 10/26/23  1610 10/18/22  1341 01/18/22  1130    139  --  139   POTASSIUM 3.7 4.0 3.9 3.9   CHLORIDE 104 104  --  107   CO2 26 25  --  26   BUN 16.0 23.9*  --  11   CR 0.84 0.69 0.69 0.63   ANIONGAP 11 10  --  6   KAMAR 9.3 9.0  --  9.2   GLC 87 97  --   97       Assessment/Plan:  (G30.0,  F02.C2) 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  Patient wandering to other residents room very difficult to redirect.  Patient limited historian and complaints about nothing continue to express not existing reality/delusional thought.  Medication adjusted today she is on the locked unit with wander guard.  Plan:   - Change Seroquel to 25 mg 4 times daily  - Continue Effexor 150 mg daily  - 24/7 behavioral monitoring  - ACP following    MED REC REQUIREDPost Medication Reconciliation Status: discharge medications reconciled and changed, per note/orders      Orders:  Change Seroquel to 25 mg 4 times daily    Electronically signed by: KATLYN Pedersen CNP

## 2025-06-11 RX ORDER — QUETIAPINE FUMARATE 25 MG/1
25 TABLET, FILM COATED ORAL 4 TIMES DAILY
Qty: 30 TABLET | Refills: 3 | Status: SHIPPED | OUTPATIENT
Start: 2025-06-11

## 2025-06-16 NOTE — PROGRESS NOTES
Debby Barriga is a 69 year old female seen June 2, 2025 at [facility] where she has resided for 2 months (admit 4/2025) seen to follow up progressive dementia.   Pt is seen in her room up to chair.   Reports she is feeling well, no specific complaints today     Nursing staff reports pt continues to have delusions, and can be resistant to cares.   She wanders about at night, goes into other resident's rooms.  Agitation starts mid-afternoon and she is difficult to redirect at that time          By chart review, pt [facility] for progressive dementia with psychotic features   She was seen in March 2025 for increasing hallucinations and paranoia    Quetiapine was started and pt admitted to Memory Care unit due to higher care needs.     Pt also followed by Oncology for breast cancer found in 2022 s/p lumpectomy in 2023 and on letrozole.       Past Medical History:   Diagnosis Date    Breast cancer (H) 10/2022    Corneal ulcer of right eye 03/01/2015    Mild major depression  Early onset dementia, with psychoses  Osteoporosis 02/07/2013    Tear of retina     right eye     Past Surgical History:   Procedure Laterality Date    BIOPSY NODE SENTINEL Left 10/24/2022    Procedure: Left seed-localized  lumpectomy with sentinel node biopsy;  Surgeon: Darin Morrow MD;  Location: [facility] OR    LAPAROSCOPIC TUBAL LIGATION      LUMPECTOMY BREAST WITH SENTINEL NODE, COMBINED Left 10/24/2022    Procedure: Left seed-localized  lumpectomy with sentinel node biopsy;  Surgeon: Darin Morrow MD;  Location: [facility] OR    ORTHOPEDIC SURGERY      wrist     SH:  Previously lived with her wife Samantha, house in hospitals   Smoker distant past     ROS:  MoCA 3/30   BIMS 0/15     Ambulatory without device    Incontinent of bladder and bowel, needs extensive assistance to manage this    Wt Readings from Last 5 Encounters:   06/02/25 70.1 kg (154 lb 9.6 oz)   05/20/25 71.8 kg (158 lb 6.4 oz)   04/29/25 70.9  "kg (156 lb 3.2 oz)   04/17/25 70.1 kg (154 lb 9.6 oz)   04/15/25 71.5 kg (157 lb 9.6 oz)      04/03/25 72.1 kg (158 lb 14.4 oz)   02/05/25 72.2 kg (159 lb 3.2 oz)      GENERAL APPEARANCE: alert and no distress  /68   Pulse 96   Temp 96.9  F (36.1  C)   Resp 18   Ht 1.676 m (5' 6\")   Wt 70.1 kg (154 lb 9.6 oz)   SpO2 97%   BMI 24.95 kg/m     HEENT: normocephalic, no lesion or abnormalities  RESP: lungs clear to auscultation - no rales, rhonchi or wheezes  CV: regular rate and rhythm, normal S1 S2  ABDOMEN:  soft, nontender, no HSM or masses and bowel sounds normal  MS: extremities without edema   NEURO: oriented x1    PSYCH: affect okay, pleasant     Labs reviewed, unremarkable in April       MR BRAIN WITHOUT IV CONTRAST    A few punctate foci of T2 signal hyperintensity along the left superior frontal gyrus   and the left paracentral lobule are unchanged and are most suggestive of minimal   leukoaraiosis or migraine-related signal and body. Mild generalized age-related   cerebral volume loss. The brain is otherwise normal without evidence of mass lesions       IMP/PLAN:   (G30.0,  F02.818) Early onset Alzheimer's disease with behavioral disturbance (H)    Comment: delusions, nocturnal wandering, paranoia  Plan: Board and Care Memory Care unit support for med admin, meals, activity and secure unit     Quetiapine 12.5 mg bid and 25 mg HS, and PRN for psychotic features, sundowning, and to lower her distress       (F41.1) Generalized anxiety disorder  (F33.9) Recurrent major depressive disorder, remission status unspecified  Comment: longstanding  Plan: venlafaxine 150 mg/day   Melatonin 3 mg HS and repeat PRN for sleep     (C50.912) Invasive ductal carcinoma of breast, female, left (H)  Comment: HR+/HER2-  found on screening mammogram in 2022, s/p partial mastectomy, neg LNs   Declined adjuvant XRT, was on letrozole for a while      Last seen by Oncology August 2023     Plan: Oncology appointment for " ongoing surveillance     (M81.0) Osteoporosis without current pathological fracture, unspecified osteoporosis type  Comment: by DXA in 2023   Plan: vit D 25 mcg/day and calcium 500 mg bid    Alendronate 70 mg /week      Advance directive: DNR  /DNI, treat reversible illness.     Rosetta Santiago MD

## 2025-06-26 ENCOUNTER — ASSISTED LIVING VISIT (OUTPATIENT)
Dept: GERIATRICS | Facility: CLINIC | Age: 70
End: 2025-06-26
Payer: COMMERCIAL

## 2025-06-26 VITALS
BODY MASS INDEX: 24.53 KG/M2 | HEART RATE: 62 BPM | WEIGHT: 152 LBS | DIASTOLIC BLOOD PRESSURE: 76 MMHG | SYSTOLIC BLOOD PRESSURE: 131 MMHG | TEMPERATURE: 97.7 F | OXYGEN SATURATION: 94 % | RESPIRATION RATE: 18 BRPM

## 2025-06-26 DIAGNOSIS — G30.0 SEVERE EARLY ONSET ALZHEIMER'S DEMENTIA WITH PSYCHOTIC DISTURBANCE (H): Primary | ICD-10-CM

## 2025-06-26 DIAGNOSIS — F02.C2 SEVERE EARLY ONSET ALZHEIMER'S DEMENTIA WITH PSYCHOTIC DISTURBANCE (H): Primary | ICD-10-CM

## 2025-06-26 DIAGNOSIS — F22 DELUSION (H): ICD-10-CM

## 2025-06-26 DIAGNOSIS — G30.0 EARLY ONSET ALZHEIMER'S DISEASE WITH BEHAVIORAL DISTURBANCE (H): ICD-10-CM

## 2025-06-26 DIAGNOSIS — F02.818 EARLY ONSET ALZHEIMER'S DISEASE WITH BEHAVIORAL DISTURBANCE (H): ICD-10-CM

## 2025-06-26 DIAGNOSIS — Z78.9 DECREASED ACTIVITIES OF DAILY LIVING (ADL): ICD-10-CM

## 2025-06-26 DIAGNOSIS — F41.1 GENERALIZED ANXIETY DISORDER: ICD-10-CM

## 2025-06-26 PROCEDURE — 99349 HOME/RES VST EST MOD MDM 40: CPT

## 2025-06-26 NOTE — PROGRESS NOTES
[facility] GERIATRICS    Chief Complaint   Patient presents with    RECHECK     HPI:  Debby Barriga is a 69 year old  (1955), who is being seen today for an episodic care visit at: [facility] ([facility]) [[id number]]. Today's concern is: ***    Allergies, and PMH/PSH reviewed in [facility] today.  REVIEW OF SYSTEMS:  {kgkrwk91:584158}    Objective:   /76   Pulse 62   Temp 97.7  F (36.5  C)   Resp 18   Wt 68.9 kg (152 lb)   SpO2 94%   BMI 24.53 kg/m    {Nursing home physical exam :856216}    {fgslab:730636}    Assessment/Plan:  {S DX2:862013}    MED REC REQUIRED{TIP  Click the link below to document or use med rec list, use list to pull in response :900734}  Post Medication Reconciliation Status: {MED REC LIST:975310}      Orders:  {fgsorders:603177}  ***    Electronically signed by: Mi Rodriguez ***       97       Assessment/Plan:  (G30.0,  F02.C2) 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  Patient with significant behavioral improvement since medication adjustment at the beginning of the month.  Staff report he has been doing well since medication adjustment medication was further adjusted to be given 3 times a day instead of 4 times daily-  [place]:  - Continue Seroquel 50 mg at at bedtime 25 mg twice daily  - Continue Effexor 150 mg daily  - 24/7 behavioral monitoring  - ACP following    MED REC REQUIREDPost Medication Reconciliation Status: discharge medications reconciled and changed, per note/orders      Orders:  No new orders today continue plan of care with no change    Electronically signed by: KATLYN Pedersen CNP   Who

## 2025-06-30 RX ORDER — QUETIAPINE FUMARATE 50 MG/1
50 TABLET, FILM COATED ORAL AT BEDTIME
COMMUNITY
Start: 2025-06-30

## 2025-06-30 RX ORDER — QUETIAPINE FUMARATE 25 MG/1
25 TABLET, FILM COATED ORAL 2 TIMES DAILY
COMMUNITY
Start: 2025-06-30

## 2025-07-02 ENCOUNTER — RESULTS FOLLOW-UP (OUTPATIENT)
Dept: GERIATRICS | Facility: CLINIC | Age: 70
End: 2025-07-02

## 2025-07-02 LAB — VIT D+METAB SERPL-MCNC: 60 NG/ML (ref 20–50)

## 2025-07-02 PROCEDURE — P9603 ONE-WAY ALLOW PRORATED MILES: HCPCS | Mod: ORL | Performed by: NURSE PRACTITIONER

## 2025-07-02 PROCEDURE — 36415 COLL VENOUS BLD VENIPUNCTURE: CPT | Mod: ORL | Performed by: NURSE PRACTITIONER

## 2025-07-02 PROCEDURE — 82306 VITAMIN D 25 HYDROXY: CPT | Mod: ORL | Performed by: NURSE PRACTITIONER

## 2025-07-05 ENCOUNTER — HEALTH MAINTENANCE LETTER (OUTPATIENT)
Age: 70
End: 2025-07-05

## 2025-07-22 ENCOUNTER — TRANSFERRED RECORDS (OUTPATIENT)
Dept: MULTI SPECIALTY CLINIC | Facility: CLINIC | Age: 70
End: 2025-07-22
Payer: COMMERCIAL

## 2025-07-22 LAB — RETINOPATHY: NORMAL

## 2025-07-28 VITALS
BODY MASS INDEX: 25.01 KG/M2 | HEART RATE: 97 BPM | SYSTOLIC BLOOD PRESSURE: 112 MMHG | TEMPERATURE: 97.7 F | RESPIRATION RATE: 18 BRPM | WEIGHT: 155.6 LBS | OXYGEN SATURATION: 97 % | HEIGHT: 66 IN | DIASTOLIC BLOOD PRESSURE: 80 MMHG

## 2025-07-29 ENCOUNTER — ASSISTED LIVING VISIT (OUTPATIENT)
Dept: GERIATRICS | Facility: CLINIC | Age: 70
End: 2025-07-29
Payer: COMMERCIAL

## 2025-07-29 DIAGNOSIS — M81.0 AGE-RELATED OSTEOPOROSIS WITHOUT CURRENT PATHOLOGICAL FRACTURE: ICD-10-CM

## 2025-07-29 DIAGNOSIS — F22 PARANOID DELUSION (H): ICD-10-CM

## 2025-07-29 DIAGNOSIS — G30.0 SEVERE EARLY ONSET ALZHEIMER'S DEMENTIA WITH PSYCHOTIC DISTURBANCE (H): Primary | ICD-10-CM

## 2025-07-29 DIAGNOSIS — F02.C2 SEVERE EARLY ONSET ALZHEIMER'S DEMENTIA WITH PSYCHOTIC DISTURBANCE (H): Primary | ICD-10-CM

## 2025-07-29 DIAGNOSIS — R44.1 VISUAL HALLUCINATIONS: ICD-10-CM

## 2025-07-29 DIAGNOSIS — F43.10 PTSD (POST-TRAUMATIC STRESS DISORDER): ICD-10-CM

## 2025-07-29 DIAGNOSIS — C50.912 INVASIVE DUCTAL CARCINOMA OF BREAST, FEMALE, LEFT (H): ICD-10-CM

## 2025-07-29 PROCEDURE — 99349 HOME/RES VST EST MOD MDM 40: CPT | Performed by: NURSE PRACTITIONER

## 2025-07-29 NOTE — PROGRESS NOTES
"[facility] GERIATRICS    Chief Complaint   Patient presents with    RECHECK     HPI:  Debby Barriga is a 69 year old  (1955), who is being seen today for an episodic care visit at: [facility] ([facility]) [[id number]].    Weight stable  Vital signs stable  Debby had no complaints today  Staff say her visual hallucinations are better      Allergies, and PMH/PSH reviewed in [facility] today.  REVIEW OF SYSTEMS:  Limited secondary to cognitive impairment but today pt reports no concerns    Objective:   /80   Pulse 97   Temp 97.7  F (36.5  C)   Resp 18   Ht 1.676 m (5' 6\")   Wt 70.6 kg (155 lb 9.6 oz)   SpO2 97%   BMI 25.11 kg/m    GENERAL APPEARANCE:  Alert, in no distress  RESP:  no respiratory distress  PSYCH:  insight and judgement impaired, memory impaired     Most Recent 3 CBC's:  Recent Labs   Lab Test 04/02/25  0604 12/04/24  1451 08/15/24  1337   WBC 6.2 6.0 6.3   HGB 12.2 12.6 13.1   MCV 89 91 91    253 258     Most Recent 3 BMP's:  Recent Labs   Lab Test 04/02/25  0604 10/26/23  1610 10/18/22  1341 01/18/22  1130    139  --  139   POTASSIUM 3.7 4.0 3.9 3.9   CHLORIDE 104 104  --  107   CO2 26 25  --  26   BUN 16.0 23.9*  --  11   CR 0.84 0.69 0.69 0.63   ANIONGAP 11 10  --  6   KAMAR 9.3 9.0  --  9.2   GLC 87 97  --  97       Assessment/Plan:    (G30.0,  F02.C2) Severe early onset Alzheimer's dementia with psychotic disturbance (H)  (primary encounter diagnosis)  (R44.1) Visual hallucinations  (F43.10) PTSD (post-traumatic stress disorder)  (F22) Paranoid delusion (H)  Comment: Chronic, progressive.     In reviewing previous medical record her MoCA was 3/30    Review of systems was able to be completed and patient did not have any complaints however she was having delusions of a man being around.     Patient requiring 24-hour skilled nursing care and would be a high risk of wandering if she were moved off the dementia care unit.      Currently taking Effexor  Currently taking Seroquel 25 mg " twice daily and 50 mg at at bedtime pt continues to be difficult to redirect, delusional, paranoia  Plan: Follows with [facility] psychiatry     (C50.912) Invasive ductal carcinoma of breast, female, left (H)  Comment: per oncology note (2/2023) screen detected [id number] HR+/HER2- IDC of the left breast. She is s/p partial mastectomy with SNLB with [id number] disease. She elected not to have adjuvant RT, but had been on Letrozole since 12/2022.    -Last MAMMOGRAM (3/2024)  Plan:   Follow-up with oncology    (M81.0) Age-related osteoporosis without current pathological fracture  Comment: by DXA in 2023   Takes vit D 25 mcg/day and calcium 500 mg bid    Takes alendronate 70 mg /week   Plan: Continue with plan of care no changes at this time, adjustment as needed      MED REC REQUIRED  Post Medication Reconciliation Status:  Discharge medications reconciled and changed, see notes/orders      Electronically signed by:       KATLYN Garcia CNP on 7/29/2025 at 2:15 PM

## 2025-08-07 ENCOUNTER — PATIENT OUTREACH (OUTPATIENT)
Dept: GERIATRIC MEDICINE | Facility: CLINIC | Age: 70
End: 2025-08-07
Payer: COMMERCIAL

## 2025-08-18 ENCOUNTER — PATIENT OUTREACH (OUTPATIENT)
Dept: GERIATRIC MEDICINE | Facility: CLINIC | Age: 70
End: 2025-08-18
Payer: MEDICARE

## 2025-08-22 ENCOUNTER — LAB REQUISITION (OUTPATIENT)
Dept: LAB | Facility: CLINIC | Age: 70
End: 2025-08-22
Payer: COMMERCIAL

## 2025-08-22 DIAGNOSIS — E55.9 VITAMIN D DEFICIENCY, UNSPECIFIED: ICD-10-CM

## 2025-08-25 LAB — VIT D+METAB SERPL-MCNC: 56 NG/ML (ref 20–50)

## (undated) DEVICE — SUCTION MANIFOLD NEPTUNE 2 SYS 1 PORT 702-025-000

## (undated) DEVICE — CLIP HORIZON SM RED WIDE SLOT 001201

## (undated) DEVICE — LINEN TOWEL PACK X5 5464

## (undated) DEVICE — DRAPE LAP TRANSVERSE 29421

## (undated) DEVICE — GLOVE PROTEXIS POWDER FREE SMT 8.0  2D72PT80X

## (undated) DEVICE — DRAPE U SPLIT 74X120" 29440

## (undated) DEVICE — MARKER MARGIN MARKER STD 6 COLOR SGL USE MMS6

## (undated) DEVICE — BASIN SET SINGLE STERILE 13752-624

## (undated) DEVICE — SYR 10ML FINGER CONTROL W/O NDL 309695

## (undated) DEVICE — PREP CHLORAPREP 26ML TINTED ORANGE  260815

## (undated) DEVICE — DRAPE SHEET MED 44X70" 9355

## (undated) DEVICE — SU SILK 3-0 SH 30" K832H

## (undated) DEVICE — LOCALIZER SURGICAL PROBE

## (undated) DEVICE — CLIP HORIZON MED BLUE 002200

## (undated) DEVICE — NDL 27GA 1.25" 305136

## (undated) DEVICE — NDL 25GA 2"  8881200441

## (undated) DEVICE — ESU ELEC BLADE 2.75" COATED/INSULATED E1455

## (undated) DEVICE — SU PDS II 4-0 FS-2 27" Z422H

## (undated) DEVICE — DRAPE IOBAN INCISE 23X17" 6650EZ

## (undated) DEVICE — NDL BLUNT 18GA 1" W/O FILTER 305181

## (undated) DEVICE — SOL NACL 0.9% IRRIG 500ML BOTTLE 2F7123

## (undated) DEVICE — ADH SKIN CLOSURE PREMIERPRO EXOFIN 1.0ML 3470

## (undated) DEVICE — SU VICRYL 3-0 SH 27" J316H

## (undated) DEVICE — SYR 05ML LL W/O NDL

## (undated) DEVICE — ESU GROUND PAD ADULT W/CORD E7507

## (undated) DEVICE — PREP PAD ALCOHOL 6818

## (undated) DEVICE — SPECIMEN CONTAINER W/10% BUFFERED FORMALIN 120ML 591201

## (undated) DEVICE — BNDG COHESIVE 2"X5YDS UNSTERILE ASSORTED COLORS LF 8962

## (undated) DEVICE — PAD CHUX UNDERPAD 30X30"

## (undated) DEVICE — PACK MINOR CUSTOM ASC

## (undated) RX ORDER — LIDOCAINE HYDROCHLORIDE AND EPINEPHRINE 10; 10 MG/ML; UG/ML
INJECTION, SOLUTION INFILTRATION; PERINEURAL
Status: DISPENSED
Start: 2022-10-24

## (undated) RX ORDER — LIDOCAINE HYDROCHLORIDE 20 MG/ML
INJECTION, SOLUTION EPIDURAL; INFILTRATION; INTRACAUDAL; PERINEURAL
Status: DISPENSED
Start: 2022-10-24

## (undated) RX ORDER — ISOSULFAN BLUE 50 MG/5ML
INJECTION, SOLUTION SUBCUTANEOUS
Status: DISPENSED
Start: 2022-10-24

## (undated) RX ORDER — GLYCOPYRROLATE 0.2 MG/ML
INJECTION INTRAMUSCULAR; INTRAVENOUS
Status: DISPENSED
Start: 2022-10-24

## (undated) RX ORDER — PROPOFOL 10 MG/ML
INJECTION, EMULSION INTRAVENOUS
Status: DISPENSED
Start: 2022-10-24

## (undated) RX ORDER — FENTANYL CITRATE 50 UG/ML
INJECTION, SOLUTION INTRAMUSCULAR; INTRAVENOUS
Status: DISPENSED
Start: 2022-10-24

## (undated) RX ORDER — EPHEDRINE SULFATE 50 MG/ML
INJECTION, SOLUTION INTRAMUSCULAR; INTRAVENOUS; SUBCUTANEOUS
Status: DISPENSED
Start: 2022-10-24

## (undated) RX ORDER — KETOROLAC TROMETHAMINE 30 MG/ML
INJECTION, SOLUTION INTRAMUSCULAR; INTRAVENOUS
Status: DISPENSED
Start: 2022-10-24

## (undated) RX ORDER — DEXAMETHASONE SODIUM PHOSPHATE 4 MG/ML
INJECTION, SOLUTION INTRA-ARTICULAR; INTRALESIONAL; INTRAMUSCULAR; INTRAVENOUS; SOFT TISSUE
Status: DISPENSED
Start: 2022-10-24

## (undated) RX ORDER — CEFAZOLIN SODIUM 1 G/3ML
INJECTION, POWDER, FOR SOLUTION INTRAMUSCULAR; INTRAVENOUS
Status: DISPENSED
Start: 2022-10-24

## (undated) RX ORDER — ACETAMINOPHEN 325 MG/1
TABLET ORAL
Status: DISPENSED
Start: 2022-10-24

## (undated) RX ORDER — ONDANSETRON 2 MG/ML
INJECTION INTRAMUSCULAR; INTRAVENOUS
Status: DISPENSED
Start: 2022-10-24

## (undated) RX ORDER — BUPIVACAINE HYDROCHLORIDE 2.5 MG/ML
INJECTION, SOLUTION EPIDURAL; INFILTRATION; INTRACAUDAL
Status: DISPENSED
Start: 2022-10-24

## (undated) RX ORDER — FENTANYL CITRATE-0.9 % NACL/PF 10 MCG/ML
PLASTIC BAG, INJECTION (ML) INTRAVENOUS
Status: DISPENSED
Start: 2022-10-24